# Patient Record
Sex: FEMALE | Race: WHITE | NOT HISPANIC OR LATINO
[De-identification: names, ages, dates, MRNs, and addresses within clinical notes are randomized per-mention and may not be internally consistent; named-entity substitution may affect disease eponyms.]

---

## 2017-02-21 ENCOUNTER — MEDICATION RENEWAL (OUTPATIENT)
Age: 82
End: 2017-02-21

## 2017-02-24 ENCOUNTER — RX RENEWAL (OUTPATIENT)
Age: 82
End: 2017-02-24

## 2017-03-06 ENCOUNTER — APPOINTMENT (OUTPATIENT)
Dept: INTERNAL MEDICINE | Facility: CLINIC | Age: 82
End: 2017-03-06

## 2017-03-06 ENCOUNTER — LABORATORY RESULT (OUTPATIENT)
Age: 82
End: 2017-03-06

## 2017-03-10 ENCOUNTER — APPOINTMENT (OUTPATIENT)
Dept: INTERNAL MEDICINE | Facility: CLINIC | Age: 82
End: 2017-03-10

## 2017-03-20 ENCOUNTER — RX RENEWAL (OUTPATIENT)
Age: 82
End: 2017-03-20

## 2017-03-20 LAB
25(OH)D3 SERPL-MCNC: 26.6 NG/ML
ALBUMIN SERPL ELPH-MCNC: 3.8 G/DL
ALP BLD-CCNC: 59 U/L
ALT SERPL-CCNC: 15 U/L
ANION GAP SERPL CALC-SCNC: 12 MMOL/L
APPEARANCE: ABNORMAL
AST SERPL-CCNC: 23 U/L
BASOPHILS # BLD AUTO: 0.03 K/UL
BASOPHILS NFR BLD AUTO: 0.4 %
BILIRUB SERPL-MCNC: 1 MG/DL
BILIRUBIN URINE: NEGATIVE
BLOOD URINE: ABNORMAL
BUN SERPL-MCNC: 22 MG/DL
CALCIUM SERPL-MCNC: 9.4 MG/DL
CHLORIDE SERPL-SCNC: 103 MMOL/L
CHOLEST SERPL-MCNC: 153 MG/DL
CHOLEST/HDLC SERPL: 2.7 RATIO
CO2 SERPL-SCNC: 29 MMOL/L
COLOR: YELLOW
CREAT SERPL-MCNC: 1.02 MG/DL
EOSINOPHIL # BLD AUTO: 0.23 K/UL
EOSINOPHIL NFR BLD AUTO: 2.9 %
FOLATE SERPL-MCNC: 11.5 NG/ML
GLUCOSE QUALITATIVE U: NORMAL MG/DL
GLUCOSE SERPL-MCNC: 100 MG/DL
HBA1C MFR BLD HPLC: 6 %
HCT VFR BLD CALC: 40.8 %
HDLC SERPL-MCNC: 57 MG/DL
HGB BLD-MCNC: 13.1 G/DL
IMM GRANULOCYTES NFR BLD AUTO: 0.1 %
KETONES URINE: NEGATIVE
LDLC SERPL CALC-MCNC: 82 MG/DL
LEUKOCYTE ESTERASE URINE: NEGATIVE
LYMPHOCYTES # BLD AUTO: 1.3 K/UL
LYMPHOCYTES NFR BLD AUTO: 16.6 %
MAN DIFF?: NORMAL
MCHC RBC-ENTMCNC: 29.9 PG
MCHC RBC-ENTMCNC: 32.1 GM/DL
MCV RBC AUTO: 93.2 FL
MONOCYTES # BLD AUTO: 0.41 K/UL
MONOCYTES NFR BLD AUTO: 5.2 %
NEUTROPHILS # BLD AUTO: 5.85 K/UL
NEUTROPHILS NFR BLD AUTO: 74.8 %
NITRITE URINE: NEGATIVE
PH URINE: 6.5
PLATELET # BLD AUTO: 126 K/UL
POTASSIUM SERPL-SCNC: 5.5 MMOL/L
PROT SERPL-MCNC: 6.7 G/DL
PROTEIN URINE: ABNORMAL MG/DL
RBC # BLD: 4.38 M/UL
RBC # FLD: 14.9 %
SODIUM SERPL-SCNC: 144 MMOL/L
SPECIFIC GRAVITY URINE: 1.02
T4 SERPL-MCNC: 6.6 UG/DL
TRIGL SERPL-MCNC: 71 MG/DL
TSH SERPL-ACNC: 5.61 UIU/ML
UROBILINOGEN URINE: NORMAL MG/DL
VIT B12 SERPL-MCNC: 694 PG/ML
WBC # FLD AUTO: 7.83 K/UL

## 2017-04-27 ENCOUNTER — APPOINTMENT (OUTPATIENT)
Dept: CARDIOLOGY | Facility: CLINIC | Age: 82
End: 2017-04-27

## 2017-04-27 ENCOUNTER — NON-APPOINTMENT (OUTPATIENT)
Age: 82
End: 2017-04-27

## 2017-04-27 VITALS
DIASTOLIC BLOOD PRESSURE: 95 MMHG | BODY MASS INDEX: 39.65 KG/M2 | HEIGHT: 61 IN | OXYGEN SATURATION: 97 % | WEIGHT: 210 LBS | HEART RATE: 89 BPM | SYSTOLIC BLOOD PRESSURE: 148 MMHG

## 2017-04-27 VITALS — DIASTOLIC BLOOD PRESSURE: 62 MMHG | SYSTOLIC BLOOD PRESSURE: 104 MMHG

## 2017-05-01 ENCOUNTER — MEDICATION RENEWAL (OUTPATIENT)
Age: 82
End: 2017-05-01

## 2017-05-24 ENCOUNTER — APPOINTMENT (OUTPATIENT)
Dept: INTERNAL MEDICINE | Facility: CLINIC | Age: 82
End: 2017-05-24

## 2017-05-24 ENCOUNTER — RX RENEWAL (OUTPATIENT)
Age: 82
End: 2017-05-24

## 2017-05-24 VITALS — RESPIRATION RATE: 14 BRPM | HEART RATE: 84 BPM | SYSTOLIC BLOOD PRESSURE: 136 MMHG | DIASTOLIC BLOOD PRESSURE: 78 MMHG

## 2017-06-09 ENCOUNTER — RX RENEWAL (OUTPATIENT)
Age: 82
End: 2017-06-09

## 2017-06-19 ENCOUNTER — RX RENEWAL (OUTPATIENT)
Age: 82
End: 2017-06-19

## 2017-06-27 ENCOUNTER — APPOINTMENT (OUTPATIENT)
Dept: INTERNAL MEDICINE | Facility: CLINIC | Age: 82
End: 2017-06-27

## 2017-06-27 VITALS — DIASTOLIC BLOOD PRESSURE: 70 MMHG | SYSTOLIC BLOOD PRESSURE: 110 MMHG

## 2017-06-27 DIAGNOSIS — H10.32 UNSPECIFIED ACUTE CONJUNCTIVITIS, LEFT EYE: ICD-10-CM

## 2017-06-27 RX ORDER — POLYMYXIN B SULFATE AND TRIMETHOPRIM 10000; 1 [USP'U]/ML; MG/ML
10000-0.1 SOLUTION OPHTHALMIC
Qty: 1 | Refills: 0 | Status: ACTIVE | COMMUNITY
Start: 2017-06-27 | End: 1900-01-01

## 2017-07-13 LAB
ALBUMIN SERPL ELPH-MCNC: 3.8 G/DL
ALP BLD-CCNC: 54 U/L
ALT SERPL-CCNC: 12 U/L
ANION GAP SERPL CALC-SCNC: 16 MMOL/L
AST SERPL-CCNC: 22 U/L
BILIRUB SERPL-MCNC: 1 MG/DL
BUN SERPL-MCNC: 21 MG/DL
CALCIUM SERPL-MCNC: 9.2 MG/DL
CHLORIDE SERPL-SCNC: 104 MMOL/L
CHOLEST SERPL-MCNC: 143 MG/DL
CHOLEST/HDLC SERPL: 2.8 RATIO
CO2 SERPL-SCNC: 27 MMOL/L
CREAT SERPL-MCNC: 0.76 MG/DL
GLUCOSE SERPL-MCNC: 70 MG/DL
HBA1C MFR BLD HPLC: 5.6 %
HDLC SERPL-MCNC: 52 MG/DL
LDLC SERPL CALC-MCNC: 72 MG/DL
NT-PROBNP SERPL-MCNC: 2081 PG/ML
POTASSIUM SERPL-SCNC: 3.9 MMOL/L
PROT SERPL-MCNC: 6.9 G/DL
SODIUM SERPL-SCNC: 147 MMOL/L
T4 FREE SERPL-MCNC: 1.2 NG/DL
TRIGL SERPL-MCNC: 95 MG/DL
TSH SERPL-ACNC: 3.32 UIU/ML

## 2017-08-03 ENCOUNTER — APPOINTMENT (OUTPATIENT)
Dept: CARDIOLOGY | Facility: CLINIC | Age: 82
End: 2017-08-03
Payer: MEDICARE

## 2017-08-03 ENCOUNTER — RX RENEWAL (OUTPATIENT)
Age: 82
End: 2017-08-03

## 2017-08-03 ENCOUNTER — NON-APPOINTMENT (OUTPATIENT)
Age: 82
End: 2017-08-03

## 2017-08-03 VITALS
DIASTOLIC BLOOD PRESSURE: 90 MMHG | SYSTOLIC BLOOD PRESSURE: 143 MMHG | HEIGHT: 61 IN | OXYGEN SATURATION: 92 % | BODY MASS INDEX: 39.65 KG/M2 | WEIGHT: 210 LBS | HEART RATE: 97 BPM | TEMPERATURE: 97.9 F

## 2017-08-03 VITALS — SYSTOLIC BLOOD PRESSURE: 122 MMHG | DIASTOLIC BLOOD PRESSURE: 78 MMHG

## 2017-08-03 PROCEDURE — 93000 ELECTROCARDIOGRAM COMPLETE: CPT

## 2017-08-03 PROCEDURE — 93306 TTE W/DOPPLER COMPLETE: CPT

## 2017-08-03 PROCEDURE — 99215 OFFICE O/P EST HI 40 MIN: CPT | Mod: 25

## 2017-08-17 ENCOUNTER — LABORATORY RESULT (OUTPATIENT)
Age: 82
End: 2017-08-17

## 2017-08-17 ENCOUNTER — APPOINTMENT (OUTPATIENT)
Dept: INTERNAL MEDICINE | Facility: CLINIC | Age: 82
End: 2017-08-17
Payer: MEDICARE

## 2017-08-17 DIAGNOSIS — Z01.818 ENCOUNTER FOR OTHER PREPROCEDURAL EXAMINATION: ICD-10-CM

## 2017-08-17 PROCEDURE — 36415 COLL VENOUS BLD VENIPUNCTURE: CPT

## 2017-08-17 PROCEDURE — 99214 OFFICE O/P EST MOD 30 MIN: CPT | Mod: 25

## 2017-09-07 LAB
ANION GAP SERPL CALC-SCNC: 14 MMOL/L
APPEARANCE: ABNORMAL
APTT BLD: 67.5 SEC
BASOPHILS # BLD AUTO: 0.01 K/UL
BASOPHILS NFR BLD AUTO: 0.1 %
BILIRUBIN URINE: NEGATIVE
BLOOD URINE: NEGATIVE
BUN SERPL-MCNC: 18 MG/DL
CALCIUM SERPL-MCNC: 9.7 MG/DL
CHLORIDE SERPL-SCNC: 100 MMOL/L
CO2 SERPL-SCNC: 28 MMOL/L
COLOR: ABNORMAL
CREAT SERPL-MCNC: 0.94 MG/DL
EOSINOPHIL # BLD AUTO: 0.25 K/UL
EOSINOPHIL NFR BLD AUTO: 3 %
GLUCOSE QUALITATIVE U: NORMAL MG/DL
GLUCOSE SERPL-MCNC: 83 MG/DL
HCT VFR BLD CALC: 42.5 %
HGB BLD-MCNC: 13.5 G/DL
IMM GRANULOCYTES NFR BLD AUTO: 0.2 %
INR PPP: 1.29 RATIO
KETONES URINE: NEGATIVE
LEUKOCYTE ESTERASE URINE: NEGATIVE
LYMPHOCYTES # BLD AUTO: 1.39 K/UL
LYMPHOCYTES NFR BLD AUTO: 16.8 %
MAN DIFF?: NORMAL
MCHC RBC-ENTMCNC: 29.6 PG
MCHC RBC-ENTMCNC: 31.8 GM/DL
MCV RBC AUTO: 93.2 FL
MONOCYTES # BLD AUTO: 0.53 K/UL
MONOCYTES NFR BLD AUTO: 6.4 %
NEUTROPHILS # BLD AUTO: 6.06 K/UL
NEUTROPHILS NFR BLD AUTO: 73.5 %
NITRITE URINE: NEGATIVE
PH URINE: 5.5
PLATELET # BLD AUTO: 148 K/UL
POTASSIUM SERPL-SCNC: 4.4 MMOL/L
PROTEIN URINE: NEGATIVE MG/DL
PT BLD: 14.6 SEC
RBC # BLD: 4.56 M/UL
RBC # FLD: 14.6 %
SODIUM SERPL-SCNC: 142 MMOL/L
SPECIFIC GRAVITY URINE: 1.02
UROBILINOGEN URINE: NORMAL MG/DL
WBC # FLD AUTO: 8.26 K/UL

## 2017-09-13 ENCOUNTER — APPOINTMENT (OUTPATIENT)
Dept: CARDIOLOGY | Facility: CLINIC | Age: 82
End: 2017-09-13
Payer: MEDICARE

## 2017-09-13 ENCOUNTER — NON-APPOINTMENT (OUTPATIENT)
Age: 82
End: 2017-09-13

## 2017-09-13 VITALS
BODY MASS INDEX: 39.65 KG/M2 | OXYGEN SATURATION: 96 % | SYSTOLIC BLOOD PRESSURE: 134 MMHG | WEIGHT: 210 LBS | HEIGHT: 61 IN | DIASTOLIC BLOOD PRESSURE: 85 MMHG | HEART RATE: 90 BPM

## 2017-09-13 PROCEDURE — 93000 ELECTROCARDIOGRAM COMPLETE: CPT

## 2017-09-13 PROCEDURE — 99215 OFFICE O/P EST HI 40 MIN: CPT

## 2017-09-18 ENCOUNTER — RX RENEWAL (OUTPATIENT)
Age: 82
End: 2017-09-18

## 2017-11-30 ENCOUNTER — MEDICATION RENEWAL (OUTPATIENT)
Age: 82
End: 2017-11-30

## 2017-12-04 ENCOUNTER — APPOINTMENT (OUTPATIENT)
Dept: CARDIOLOGY | Facility: CLINIC | Age: 82
End: 2017-12-04

## 2017-12-18 ENCOUNTER — RX RENEWAL (OUTPATIENT)
Age: 82
End: 2017-12-18

## 2018-02-01 ENCOUNTER — RX RENEWAL (OUTPATIENT)
Age: 83
End: 2018-02-01

## 2018-02-21 ENCOUNTER — NON-APPOINTMENT (OUTPATIENT)
Age: 83
End: 2018-02-21

## 2018-02-21 ENCOUNTER — APPOINTMENT (OUTPATIENT)
Dept: CARDIOLOGY | Facility: CLINIC | Age: 83
End: 2018-02-21
Payer: MEDICARE

## 2018-02-21 VITALS
WEIGHT: 207 LBS | HEIGHT: 61 IN | HEART RATE: 84 BPM | BODY MASS INDEX: 39.08 KG/M2 | DIASTOLIC BLOOD PRESSURE: 80 MMHG | SYSTOLIC BLOOD PRESSURE: 136 MMHG

## 2018-02-21 DIAGNOSIS — I48.0 PAROXYSMAL ATRIAL FIBRILLATION: ICD-10-CM

## 2018-02-21 PROCEDURE — 99214 OFFICE O/P EST MOD 30 MIN: CPT

## 2018-02-21 PROCEDURE — 93000 ELECTROCARDIOGRAM COMPLETE: CPT

## 2018-02-28 ENCOUNTER — RX RENEWAL (OUTPATIENT)
Age: 83
End: 2018-02-28

## 2018-03-17 ENCOUNTER — RX RENEWAL (OUTPATIENT)
Age: 83
End: 2018-03-17

## 2018-05-09 ENCOUNTER — APPOINTMENT (OUTPATIENT)
Dept: INTERNAL MEDICINE | Facility: CLINIC | Age: 83
End: 2018-05-09
Payer: MEDICARE

## 2018-05-09 VITALS — SYSTOLIC BLOOD PRESSURE: 132 MMHG | RESPIRATION RATE: 14 BRPM | HEART RATE: 78 BPM | DIASTOLIC BLOOD PRESSURE: 80 MMHG

## 2018-05-09 VITALS — HEIGHT: 61 IN | BODY MASS INDEX: 39.08 KG/M2 | WEIGHT: 207 LBS

## 2018-05-09 DIAGNOSIS — M17.11 UNILATERAL PRIMARY OSTEOARTHRITIS, RIGHT KNEE: ICD-10-CM

## 2018-05-09 PROCEDURE — 99214 OFFICE O/P EST MOD 30 MIN: CPT

## 2018-05-29 ENCOUNTER — RX RENEWAL (OUTPATIENT)
Age: 83
End: 2018-05-29

## 2018-05-31 ENCOUNTER — MEDICATION RENEWAL (OUTPATIENT)
Age: 83
End: 2018-05-31

## 2018-06-15 ENCOUNTER — RX RENEWAL (OUTPATIENT)
Age: 83
End: 2018-06-15

## 2018-07-10 ENCOUNTER — MEDICATION RENEWAL (OUTPATIENT)
Age: 83
End: 2018-07-10

## 2018-07-11 ENCOUNTER — APPOINTMENT (OUTPATIENT)
Dept: CARDIOLOGY | Facility: CLINIC | Age: 83
End: 2018-07-11
Payer: MEDICARE

## 2018-07-11 ENCOUNTER — NON-APPOINTMENT (OUTPATIENT)
Age: 83
End: 2018-07-11

## 2018-07-11 VITALS
WEIGHT: 210 LBS | SYSTOLIC BLOOD PRESSURE: 158 MMHG | HEIGHT: 61 IN | BODY MASS INDEX: 39.65 KG/M2 | HEART RATE: 88 BPM | DIASTOLIC BLOOD PRESSURE: 76 MMHG

## 2018-07-11 VITALS — SYSTOLIC BLOOD PRESSURE: 112 MMHG | DIASTOLIC BLOOD PRESSURE: 74 MMHG

## 2018-07-11 DIAGNOSIS — R06.00 DYSPNEA, UNSPECIFIED: ICD-10-CM

## 2018-07-11 PROCEDURE — 93000 ELECTROCARDIOGRAM COMPLETE: CPT

## 2018-07-11 PROCEDURE — 99214 OFFICE O/P EST MOD 30 MIN: CPT

## 2018-07-17 ENCOUNTER — RX RENEWAL (OUTPATIENT)
Age: 83
End: 2018-07-17

## 2018-07-25 ENCOUNTER — APPOINTMENT (OUTPATIENT)
Dept: INTERNAL MEDICINE | Facility: CLINIC | Age: 83
End: 2018-07-25

## 2018-08-27 ENCOUNTER — MEDICATION RENEWAL (OUTPATIENT)
Age: 83
End: 2018-08-27

## 2018-08-28 ENCOUNTER — RX RENEWAL (OUTPATIENT)
Age: 83
End: 2018-08-28

## 2018-09-13 ENCOUNTER — RX RENEWAL (OUTPATIENT)
Age: 83
End: 2018-09-13

## 2018-09-17 ENCOUNTER — MEDICATION RENEWAL (OUTPATIENT)
Age: 83
End: 2018-09-17

## 2018-10-22 ENCOUNTER — RX RENEWAL (OUTPATIENT)
Age: 83
End: 2018-10-22

## 2018-11-07 ENCOUNTER — NON-APPOINTMENT (OUTPATIENT)
Age: 83
End: 2018-11-07

## 2018-11-07 ENCOUNTER — APPOINTMENT (OUTPATIENT)
Dept: CARDIOLOGY | Facility: CLINIC | Age: 83
End: 2018-11-07
Payer: MEDICARE

## 2018-11-07 VITALS
BODY MASS INDEX: 39.65 KG/M2 | SYSTOLIC BLOOD PRESSURE: 125 MMHG | DIASTOLIC BLOOD PRESSURE: 89 MMHG | WEIGHT: 210 LBS | HEART RATE: 102 BPM | HEIGHT: 61 IN | OXYGEN SATURATION: 96 %

## 2018-11-07 VITALS — SYSTOLIC BLOOD PRESSURE: 128 MMHG | DIASTOLIC BLOOD PRESSURE: 84 MMHG

## 2018-11-07 PROCEDURE — 93306 TTE W/DOPPLER COMPLETE: CPT

## 2018-11-07 PROCEDURE — 99214 OFFICE O/P EST MOD 30 MIN: CPT

## 2018-11-07 PROCEDURE — 93000 ELECTROCARDIOGRAM COMPLETE: CPT

## 2018-11-07 NOTE — REASON FOR VISIT
[Anticoagulation] : anticoagulation [Atrial Fibrillation] : atrial fibrillation [Cardiomyopathy] : cardiomyopathy [Coronary Artery Disease] : coronary artery disease [Dyspnea] : dyspnea [FreeTextEntry1] : Accelerated ventricular rate

## 2018-11-07 NOTE — HISTORY OF PRESENT ILLNESS
[FreeTextEntry1] : Mrs. Barber presents presents in scheduled followup reporting that she has been feeling very well.  Unchanged exertional dyspnea, which occurs in the context of walking long distances w/ severe knee pain. Edema is unchanged; minimal upon arising in AM.  She is unable to elevate her legs because of knee pain. Ambulates with a cane without any effort provoked symptoms w/ usual ADL.\par \par No c/o chest, throat,jaw, arm or upper back discomfort.  No dyspnea, orthopnea or PND.  No palpitations, dizziness or syncope.  No claudication.

## 2018-12-07 ENCOUNTER — TRANSCRIPTION ENCOUNTER (OUTPATIENT)
Age: 83
End: 2018-12-07

## 2018-12-14 ENCOUNTER — RX RENEWAL (OUTPATIENT)
Age: 83
End: 2018-12-14

## 2019-01-23 ENCOUNTER — RX RENEWAL (OUTPATIENT)
Age: 84
End: 2019-01-23

## 2019-02-15 ENCOUNTER — APPOINTMENT (OUTPATIENT)
Dept: INTERNAL MEDICINE | Facility: CLINIC | Age: 84
End: 2019-02-15
Payer: MEDICARE

## 2019-02-15 DIAGNOSIS — R05 COUGH: ICD-10-CM

## 2019-02-15 PROCEDURE — 99213 OFFICE O/P EST LOW 20 MIN: CPT

## 2019-02-17 ENCOUNTER — RX RENEWAL (OUTPATIENT)
Age: 84
End: 2019-02-17

## 2019-03-13 ENCOUNTER — APPOINTMENT (OUTPATIENT)
Dept: INTERNAL MEDICINE | Facility: CLINIC | Age: 84
End: 2019-03-13

## 2019-03-29 ENCOUNTER — NON-APPOINTMENT (OUTPATIENT)
Age: 84
End: 2019-03-29

## 2019-03-29 ENCOUNTER — APPOINTMENT (OUTPATIENT)
Dept: CARDIOLOGY | Facility: CLINIC | Age: 84
End: 2019-03-29
Payer: MEDICARE

## 2019-03-29 VITALS
SYSTOLIC BLOOD PRESSURE: 143 MMHG | HEIGHT: 61 IN | WEIGHT: 208 LBS | HEART RATE: 87 BPM | OXYGEN SATURATION: 93 % | DIASTOLIC BLOOD PRESSURE: 90 MMHG | BODY MASS INDEX: 39.27 KG/M2

## 2019-03-29 DIAGNOSIS — E87.5 HYPERKALEMIA: ICD-10-CM

## 2019-03-29 PROCEDURE — 99215 OFFICE O/P EST HI 40 MIN: CPT

## 2019-03-29 PROCEDURE — 93000 ELECTROCARDIOGRAM COMPLETE: CPT

## 2019-03-29 NOTE — REASON FOR VISIT
[Anticoagulation] : anticoagulation [Atrial Fibrillation] : atrial fibrillation [Cardiomyopathy] : cardiomyopathy [Coronary Artery Disease] : coronary artery disease [Dyspnea] : dyspnea [FreeTextEntry1] : edema

## 2019-03-29 NOTE — HISTORY OF PRESENT ILLNESS
[FreeTextEntry1] : Mrs. Barber presents presents in scheduled followup accompanied by her daughter.  She has been feeling genrally well, Mild exertional dyspnea walikn w/ RW or cane and worsening edema, without weight gain. Spends much of the day sitting in chair. Legs are elevated, but nominally so..  Unchanged exertional dyspnea, which occurs in the context of walking long distances w/ severe knee pain. Edema is unchanged; minimal upon arising in AM.  Unable to to elevate her legs adequately because of knee pain.\par \par No c/o chest, throat,jaw, arm or upper back discomfort.  No  orthopnea or PND.  No palpitations, dizziness or syncope.  No claudication.

## 2019-04-11 ENCOUNTER — LABORATORY RESULT (OUTPATIENT)
Age: 84
End: 2019-04-11

## 2019-04-16 ENCOUNTER — MEDICATION RENEWAL (OUTPATIENT)
Age: 84
End: 2019-04-16

## 2019-04-16 DIAGNOSIS — Z79.899 OTHER LONG TERM (CURRENT) DRUG THERAPY: ICD-10-CM

## 2019-04-25 ENCOUNTER — RX RENEWAL (OUTPATIENT)
Age: 84
End: 2019-04-25

## 2019-04-25 ENCOUNTER — LABORATORY RESULT (OUTPATIENT)
Age: 84
End: 2019-04-25

## 2019-04-26 ENCOUNTER — APPOINTMENT (OUTPATIENT)
Dept: INTERNAL MEDICINE | Facility: CLINIC | Age: 84
End: 2019-04-26
Payer: MEDICARE

## 2019-04-26 VITALS
HEART RATE: 84 BPM | RESPIRATION RATE: 14 BRPM | HEIGHT: 61 IN | BODY MASS INDEX: 37.95 KG/M2 | SYSTOLIC BLOOD PRESSURE: 136 MMHG | WEIGHT: 201 LBS | DIASTOLIC BLOOD PRESSURE: 84 MMHG

## 2019-04-26 DIAGNOSIS — D69.3 IMMUNE THROMBOCYTOPENIC PURPURA: ICD-10-CM

## 2019-04-26 DIAGNOSIS — I50.43 ACUTE ON CHRONIC COMBINED SYSTOLIC (CONGESTIVE) AND DIASTOLIC (CONGESTIVE) HEART FAILURE: ICD-10-CM

## 2019-04-26 PROCEDURE — 36415 COLL VENOUS BLD VENIPUNCTURE: CPT

## 2019-04-26 PROCEDURE — 99214 OFFICE O/P EST MOD 30 MIN: CPT | Mod: 25

## 2019-04-26 PROCEDURE — G0439: CPT | Mod: 25

## 2019-04-26 PROCEDURE — 99497 ADVNCD CARE PLAN 30 MIN: CPT | Mod: 33

## 2019-04-26 RX ORDER — CEFDINIR 300 MG/1
300 CAPSULE ORAL
Qty: 20 | Refills: 0 | Status: DISCONTINUED | COMMUNITY
Start: 2019-02-15 | End: 2019-04-26

## 2019-04-26 NOTE — PHYSICAL EXAM
[No Acute Distress] : no acute distress [Supple] : supple [No Respiratory Distress] : no respiratory distress  [Clear to Auscultation] : lungs were clear to auscultation bilaterally [No Accessory Muscle Use] : no accessory muscle use [Normal Rate] : normal rate  [Regular Rhythm] : with a regular rhythm [Normal S1, S2] : normal S1 and S2 [No Edema] : there was no peripheral edema

## 2019-04-28 NOTE — PHYSICAL EXAM
[General Appearance - Alert] : alert [General Appearance - In No Acute Distress] : in no acute distress [Sclera] : the sclera and conjunctiva were normal [Neck Appearance] : the appearance of the neck was normal [Neck Cervical Mass (___cm)] : no neck mass was observed [Jugular Venous Distention Increased] : there was no jugular-venous distention [Thyroid Diffuse Enlargement] : the thyroid was not enlarged [Thyroid Nodule] : there were no palpable thyroid nodules [Auscultation Breath Sounds / Voice Sounds] : lungs were clear to auscultation bilaterally [Heart Sounds] : normal S1 and S2 [Heart Sounds Gallop] : no gallops [Murmurs] : no murmurs [Heart Sounds Pericardial Friction Rub] : no pericardial rub [Irregularly Irregular] : the rhythm was irregularly irregular [Breast Appearance] : normal in appearance [Breast Abnormal Lactation (Galactorrhea)] : no nipple discharge [Breast Palpation Mass] : no palpable masses [Bowel Sounds] : normal bowel sounds [Abdomen Soft] : soft [Abdomen Tenderness] : non-tender [] : no hepato-splenomegaly [Abdomen Mass (___ Cm)] : no abdominal mass palpated [Cervical Lymph Nodes Enlarged Posterior Bilaterally] : posterior cervical [Cervical Lymph Nodes Enlarged Anterior Bilaterally] : anterior cervical [Supraclavicular Lymph Nodes Enlarged Bilaterally] : supraclavicular [No CVA Tenderness] : no ~M costovertebral angle tenderness [No Spinal Tenderness] : no spinal tenderness [Abnormal Walk] : normal gait [Nail Clubbing] : no clubbing  or cyanosis of the fingernails [Motor Tone] : muscle strength and tone were normal [Musculoskeletal - Swelling] : no joint swelling seen [Oriented To Time, Place, And Person] : oriented to person, place, and time [Impaired Insight] : insight and judgment were intact [Affect] : the affect was normal [FreeTextEntry1] : trace pedal edema

## 2019-04-28 NOTE — ASSESSMENT
[FreeTextEntry1] : Blood work was drawn and sent to the lab today. The patient has been instructed to call the office next week to discuss today's lab work.\par \par Continue all medications.\par F/U with Cardiology as scheduled\par \par 16 minutes spent with patient discussing ACP/HCP. She has designated a proxy, and the proxy is aware of the patients wishes and will comply.\par \par Optho evaluation due.\par \par Consider MRI Brain if memory continues to decline.\par Do not consider patient a candidate for pharmacological intervention at this time.\par Consider Neuro eval\par \par F/U 3 months.

## 2019-04-28 NOTE — HEALTH RISK ASSESSMENT
[Excellent] : ~his/her~  mood as  excellent [] : No [No falls in past year] : Patient reported no falls in the past year [0] : 2) Feeling down, depressed, or hopeless: Not at all (0) [[id number]] : 0 [Alone] : lives alone [] :  [Fully functional (bathing, dressing, toileting, transferring, walking, feeding)] : Fully functional (bathing, dressing, toileting, transferring, walking, feeding) [Fully functional (using the telephone, shopping, preparing meals, housekeeping, doing laundry, using] : Fully functional and needs no help or supervision to perform IADLs (using the telephone, shopping, preparing meals, housekeeping, doing laundry, using transportation, managing medications and managing finances) [Reports changes in hearing] : Reports no changes in hearing [Reports changes in vision] : Reports no changes in vision [With Patient/Caregiver] : With Patient/Caregiver [Designated Healthcare Proxy] : Designated healthcare proxy [Name: ___] : Health Care Proxy's Name: [unfilled]  [Relationship: ___] : Relationship: [unfilled] [I will adhere to the patient's wishes as expressed in the advance directive except as noted below.] : I will adhere to the patient's wishes as expressed in the advance directive except as noted below [AdvancecareDate] : 04/19

## 2019-04-29 LAB
25(OH)D3 SERPL-MCNC: 20.9 NG/ML
ALBUMIN SERPL ELPH-MCNC: 4.1 G/DL
ALP BLD-CCNC: 63 U/L
ALT SERPL-CCNC: 11 U/L
ANION GAP SERPL CALC-SCNC: 9 MMOL/L
APPEARANCE: ABNORMAL
AST SERPL-CCNC: 20 U/L
B BURGDOR IGG+IGM SER QL IB: NORMAL
BILIRUB SERPL-MCNC: 1.3 MG/DL
BILIRUBIN URINE: NEGATIVE
BLOOD URINE: ABNORMAL
BUN SERPL-MCNC: 21 MG/DL
CALCIUM SERPL-MCNC: 9.3 MG/DL
CHLORIDE SERPL-SCNC: 101 MMOL/L
CO2 SERPL-SCNC: 34 MMOL/L
COLOR: YELLOW
CREAT SERPL-MCNC: 0.87 MG/DL
FOLATE SERPL-MCNC: 19.3 NG/ML
GLUCOSE QUALITATIVE U: NEGATIVE
GLUCOSE SERPL-MCNC: 57 MG/DL
KETONES URINE: NEGATIVE
LEUKOCYTE ESTERASE URINE: NEGATIVE
NITRITE URINE: NEGATIVE
PH URINE: 5.5
POTASSIUM SERPL-SCNC: 4.2 MMOL/L
PROT SERPL-MCNC: 6.5 G/DL
PROTEIN URINE: NORMAL
SODIUM SERPL-SCNC: 144 MMOL/L
SPECIFIC GRAVITY URINE: 1.02
T PALLIDUM AB SER QL IA: NEGATIVE
UROBILINOGEN URINE: NORMAL
VIT B12 SERPL-MCNC: 757 PG/ML

## 2019-05-08 ENCOUNTER — MEDICATION RENEWAL (OUTPATIENT)
Age: 84
End: 2019-05-08

## 2019-05-10 ENCOUNTER — APPOINTMENT (OUTPATIENT)
Dept: CARDIOLOGY | Facility: CLINIC | Age: 84
End: 2019-05-10
Payer: MEDICARE

## 2019-05-10 VITALS
DIASTOLIC BLOOD PRESSURE: 80 MMHG | OXYGEN SATURATION: 95 % | HEIGHT: 61 IN | SYSTOLIC BLOOD PRESSURE: 130 MMHG | HEART RATE: 72 BPM | WEIGHT: 204 LBS | BODY MASS INDEX: 38.51 KG/M2

## 2019-05-10 PROCEDURE — 99213 OFFICE O/P EST LOW 20 MIN: CPT

## 2019-05-10 NOTE — HISTORY OF PRESENT ILLNESS
[FreeTextEntry1] : Mrs. Barber presents presents in scheduled followup accompanied by her daughter.  She has been feeling  well, No c/o dyspnea, but edema is about the same despite bid furosemide(fair compliance w/ 2nd dose). Weight stable at home.  Spends much of the day sitting in chair. Legs are elevated, but nominally so.\par \par No c/o chest, throat,jaw, arm or upper back discomfort.  No  orthopnea or PND.  No palpitations, dizziness or syncope.  No claudication.

## 2019-05-17 ENCOUNTER — MEDICATION RENEWAL (OUTPATIENT)
Age: 84
End: 2019-05-17

## 2019-06-04 ENCOUNTER — MEDICATION RENEWAL (OUTPATIENT)
Age: 84
End: 2019-06-04

## 2019-06-05 ENCOUNTER — APPOINTMENT (OUTPATIENT)
Dept: CARDIOLOGY | Facility: CLINIC | Age: 84
End: 2019-06-05
Payer: MEDICARE

## 2019-06-05 ENCOUNTER — NON-APPOINTMENT (OUTPATIENT)
Age: 84
End: 2019-06-05

## 2019-06-05 VITALS
DIASTOLIC BLOOD PRESSURE: 97 MMHG | WEIGHT: 202 LBS | BODY MASS INDEX: 38.17 KG/M2 | OXYGEN SATURATION: 96 % | HEART RATE: 75 BPM | SYSTOLIC BLOOD PRESSURE: 154 MMHG

## 2019-06-05 VITALS — SYSTOLIC BLOOD PRESSURE: 124 MMHG | DIASTOLIC BLOOD PRESSURE: 80 MMHG

## 2019-06-05 PROCEDURE — 93000 ELECTROCARDIOGRAM COMPLETE: CPT

## 2019-06-05 PROCEDURE — 99214 OFFICE O/P EST MOD 30 MIN: CPT

## 2019-06-05 NOTE — HISTORY OF PRESENT ILLNESS
[FreeTextEntry1] : Mrs. Barber presents presents in scheduled followup.  She has been feeling  well, No c/o dyspnea, mild edema is unchanged. Weight Increased but she clearly increased. Her caloric intake and. Ambulates slowly and comfortably with hercane\par \par No c/o chest, throat,jaw, arm or upper back discomfort.  No  orthopnea or PND.  No palpitations, dizziness or syncope.  No claudication.

## 2019-06-12 ENCOUNTER — MEDICATION RENEWAL (OUTPATIENT)
Age: 84
End: 2019-06-12

## 2019-06-17 ENCOUNTER — MEDICATION RENEWAL (OUTPATIENT)
Age: 84
End: 2019-06-17

## 2019-07-22 ENCOUNTER — RX RENEWAL (OUTPATIENT)
Age: 84
End: 2019-07-22

## 2019-08-02 ENCOUNTER — APPOINTMENT (OUTPATIENT)
Dept: INTERNAL MEDICINE | Facility: CLINIC | Age: 84
End: 2019-08-02

## 2019-08-26 ENCOUNTER — RX RENEWAL (OUTPATIENT)
Age: 84
End: 2019-08-26

## 2019-08-27 ENCOUNTER — RX RENEWAL (OUTPATIENT)
Age: 84
End: 2019-08-27

## 2019-08-29 ENCOUNTER — MEDICATION RENEWAL (OUTPATIENT)
Age: 84
End: 2019-08-29

## 2019-09-04 ENCOUNTER — APPOINTMENT (OUTPATIENT)
Dept: INTERNAL MEDICINE | Facility: CLINIC | Age: 84
End: 2019-09-04
Payer: MEDICARE

## 2019-09-04 VITALS — BODY MASS INDEX: 38.89 KG/M2 | HEIGHT: 61 IN | WEIGHT: 206 LBS

## 2019-09-04 VITALS — RESPIRATION RATE: 14 BRPM | DIASTOLIC BLOOD PRESSURE: 80 MMHG | SYSTOLIC BLOOD PRESSURE: 132 MMHG | HEART RATE: 72 BPM

## 2019-09-04 DIAGNOSIS — R26.89 OTHER ABNORMALITIES OF GAIT AND MOBILITY: ICD-10-CM

## 2019-09-04 PROCEDURE — 99214 OFFICE O/P EST MOD 30 MIN: CPT | Mod: 25

## 2019-09-04 PROCEDURE — 36415 COLL VENOUS BLD VENIPUNCTURE: CPT

## 2019-09-06 LAB
ALBUMIN SERPL ELPH-MCNC: 4.4 G/DL
ALP BLD-CCNC: 62 U/L
ALT SERPL-CCNC: 14 U/L
ANION GAP SERPL CALC-SCNC: 12 MMOL/L
AST SERPL-CCNC: 21 U/L
BILIRUB SERPL-MCNC: 1.3 MG/DL
BUN SERPL-MCNC: 19 MG/DL
CALCIUM SERPL-MCNC: 10 MG/DL
CHLORIDE SERPL-SCNC: 101 MMOL/L
CHOLEST SERPL-MCNC: 155 MG/DL
CHOLEST/HDLC SERPL: 2.8 RATIO
CO2 SERPL-SCNC: 32 MMOL/L
CREAT SERPL-MCNC: 0.88 MG/DL
ESTIMATED AVERAGE GLUCOSE: 111 MG/DL
GLUCOSE SERPL-MCNC: 83 MG/DL
HBA1C MFR BLD HPLC: 5.5 %
HDLC SERPL-MCNC: 56 MG/DL
LDLC SERPL CALC-MCNC: 84 MG/DL
POTASSIUM SERPL-SCNC: 4.6 MMOL/L
PROT SERPL-MCNC: 6.9 G/DL
SODIUM SERPL-SCNC: 145 MMOL/L
TRIGL SERPL-MCNC: 74 MG/DL

## 2019-09-06 NOTE — PHYSICAL EXAM
[Normal] : soft, non-tender, non-distended, no masses palpated, no HSM and normal bowel sounds [de-identified] : trace b/l ankle edema [de-identified] : obese

## 2019-09-06 NOTE — ASSESSMENT
[FreeTextEntry1] : Blood work was drawn and sent to the lab today. The patient has been instructed to call the office next week to discuss today's lab work.\par \par Pt insists on holding pravachol - feels this is cause of memory issues.\par To hold for one month only, reassess in one month\par Neurology evaluation\par \par PT for balance.\par Low salt diet / leg elevation\par Consider compression stockings for ankle edema

## 2019-09-06 NOTE — HISTORY OF PRESENT ILLNESS
[de-identified] : Pt presents for f/u evaluation of afib, cad, htn, hyperlipidemia, obesity.\par \par feels well.\par No CP/SOB/palpitations.\par \par Still concerned about her memory, although no obvious changes noted since last visit.

## 2019-09-13 ENCOUNTER — APPOINTMENT (OUTPATIENT)
Dept: CARDIOLOGY | Facility: CLINIC | Age: 84
End: 2019-09-13

## 2019-10-09 ENCOUNTER — APPOINTMENT (OUTPATIENT)
Dept: INTERNAL MEDICINE | Facility: CLINIC | Age: 84
End: 2019-10-09
Payer: MEDICARE

## 2019-10-09 PROCEDURE — 99214 OFFICE O/P EST MOD 30 MIN: CPT | Mod: 25

## 2019-10-09 PROCEDURE — 36415 COLL VENOUS BLD VENIPUNCTURE: CPT

## 2019-10-12 NOTE — PHYSICAL EXAM
[Normal] : soft, non-tender, non-distended, no masses palpated, no HSM and normal bowel sounds [de-identified] : trace b/l ankle edema [de-identified] : obese

## 2019-10-12 NOTE — HISTORY OF PRESENT ILLNESS
[de-identified] : Pt presents for f/u evaluation of afib, cad, htn, hyperlipidemia, obesity.\par \par feels well.\par No CP/SOB/palpitations.\par \par Still concerned about her memory, feels there is a slight improvement off her statin.

## 2019-10-12 NOTE — ASSESSMENT
[FreeTextEntry1] : Blood work was drawn and sent to the lab today. The patient has been instructed to call the office next week to discuss today's lab work.\par \par To restart statin pending above bloodwork\par \par Neurology evaluation\par \par PT for balance.\par Low salt diet / leg elevation\par Consider compression stockings for ankle edema

## 2019-10-14 LAB
ALBUMIN SERPL ELPH-MCNC: 4.3 G/DL
ALP BLD-CCNC: 61 U/L
ALT SERPL-CCNC: 19 U/L
ANION GAP SERPL CALC-SCNC: 13 MMOL/L
AST SERPL-CCNC: 26 U/L
BILIRUB SERPL-MCNC: 1.2 MG/DL
BUN SERPL-MCNC: 15 MG/DL
CALCIUM SERPL-MCNC: 9.7 MG/DL
CHLORIDE SERPL-SCNC: 100 MMOL/L
CHOLEST SERPL-MCNC: 214 MG/DL
CHOLEST/HDLC SERPL: 3.5 RATIO
CO2 SERPL-SCNC: 30 MMOL/L
CREAT SERPL-MCNC: 0.9 MG/DL
GLUCOSE SERPL-MCNC: 94 MG/DL
HDLC SERPL-MCNC: 61 MG/DL
LDLC SERPL CALC-MCNC: 136 MG/DL
POTASSIUM SERPL-SCNC: 4.7 MMOL/L
PROT SERPL-MCNC: 6.8 G/DL
SODIUM SERPL-SCNC: 142 MMOL/L
TRIGL SERPL-MCNC: 83 MG/DL

## 2019-11-06 ENCOUNTER — APPOINTMENT (OUTPATIENT)
Dept: CARDIOLOGY | Facility: CLINIC | Age: 84
End: 2019-11-06

## 2019-11-25 ENCOUNTER — APPOINTMENT (OUTPATIENT)
Dept: CARDIOLOGY | Facility: CLINIC | Age: 84
End: 2019-11-25
Payer: MEDICARE

## 2019-11-25 ENCOUNTER — NON-APPOINTMENT (OUTPATIENT)
Age: 84
End: 2019-11-25

## 2019-11-25 VITALS
WEIGHT: 206 LBS | SYSTOLIC BLOOD PRESSURE: 119 MMHG | HEART RATE: 83 BPM | DIASTOLIC BLOOD PRESSURE: 84 MMHG | BODY MASS INDEX: 38.89 KG/M2 | HEIGHT: 61 IN | OXYGEN SATURATION: 93 %

## 2019-11-25 PROCEDURE — 93000 ELECTROCARDIOGRAM COMPLETE: CPT

## 2019-11-25 PROCEDURE — 99214 OFFICE O/P EST MOD 30 MIN: CPT

## 2019-11-25 NOTE — HISTORY OF PRESENT ILLNESS
[FreeTextEntry1] : Mrs. Barber presents presents in scheduled followup.  Left lower extremity weeping (one small site) resolved there is additional 20 mg of Lasix in the morning.  Swelling is back to baseline.  She is not moisturizing her legs.  She has been feeling otherwise well.\par \par No c/o chest, throat,jaw, arm or upper back discomfort.  No  orthopnea or PND.  No palpitations, dizziness or syncope.  No claudication.\par \par Saw Dr. Boyer concerns about memory loss.  Stopped her statin eelated to this concern.  LDL isabel to 136 and Dr. Gomez switched her to rosuvastatin high down MRI revealed mild cerebral atrophy multiple chronic right cerebellar infarcts.  MRA was normal.  He advised consideration of Aricept but her daughter was concerned about side effects and she did not begin this.

## 2019-11-25 NOTE — REASON FOR VISIT
[Anticoagulation] : anticoagulation [Cardiomyopathy] : cardiomyopathy [Atrial Fibrillation] : atrial fibrillation [Coronary Artery Disease] : coronary artery disease [Dyspnea] : dyspnea [FreeTextEntry1] : edema

## 2020-01-06 ENCOUNTER — RX RENEWAL (OUTPATIENT)
Age: 85
End: 2020-01-06

## 2020-01-21 ENCOUNTER — RX RENEWAL (OUTPATIENT)
Age: 85
End: 2020-01-21

## 2020-01-21 RX ORDER — POTASSIUM CHLORIDE 1500 MG/1
20 TABLET, EXTENDED RELEASE ORAL
Qty: 90 | Refills: 3 | Status: ACTIVE | COMMUNITY
Start: 2020-01-21 | End: 1900-01-01

## 2020-02-03 DIAGNOSIS — R26.81 UNSTEADINESS ON FEET: ICD-10-CM

## 2020-02-04 ENCOUNTER — RX RENEWAL (OUTPATIENT)
Age: 85
End: 2020-02-04

## 2020-03-08 ENCOUNTER — RX RENEWAL (OUTPATIENT)
Age: 85
End: 2020-03-08

## 2020-03-17 ENCOUNTER — RX RENEWAL (OUTPATIENT)
Age: 85
End: 2020-03-17

## 2020-05-11 ENCOUNTER — RX RENEWAL (OUTPATIENT)
Age: 85
End: 2020-05-11

## 2020-05-18 ENCOUNTER — NON-APPOINTMENT (OUTPATIENT)
Age: 85
End: 2020-05-18

## 2020-05-18 ENCOUNTER — APPOINTMENT (OUTPATIENT)
Dept: CARDIOLOGY | Facility: CLINIC | Age: 85
End: 2020-05-18
Payer: MEDICARE

## 2020-05-18 VITALS
WEIGHT: 206 LBS | TEMPERATURE: 97.3 F | OXYGEN SATURATION: 94 % | DIASTOLIC BLOOD PRESSURE: 82 MMHG | BODY MASS INDEX: 38.92 KG/M2 | HEART RATE: 92 BPM | SYSTOLIC BLOOD PRESSURE: 124 MMHG

## 2020-05-18 DIAGNOSIS — E55.9 VITAMIN D DEFICIENCY, UNSPECIFIED: ICD-10-CM

## 2020-05-18 DIAGNOSIS — E78.5 HYPERLIPIDEMIA, UNSPECIFIED: ICD-10-CM

## 2020-05-18 LAB
ALBUMIN SERPL ELPH-MCNC: 4.2 G/DL
ALP BLD-CCNC: 62 U/L
ALT SERPL-CCNC: 17 U/L
ANION GAP SERPL CALC-SCNC: 15 MMOL/L
AST SERPL-CCNC: 23 U/L
BASOPHILS # BLD AUTO: 0.02 K/UL
BASOPHILS NFR BLD AUTO: 0.3 %
BILIRUB SERPL-MCNC: 1.4 MG/DL
BUN SERPL-MCNC: 19 MG/DL
CALCIUM SERPL-MCNC: 9.2 MG/DL
CHLORIDE SERPL-SCNC: 99 MMOL/L
CHOLEST SERPL-MCNC: 134 MG/DL
CHOLEST/HDLC SERPL: 2.2 RATIO
CO2 SERPL-SCNC: 30 MMOL/L
CREAT SERPL-MCNC: 0.88 MG/DL
EOSINOPHIL # BLD AUTO: 0.17 K/UL
EOSINOPHIL NFR BLD AUTO: 2.4 %
ESTIMATED AVERAGE GLUCOSE: 111 MG/DL
GLUCOSE SERPL-MCNC: 95 MG/DL
HBA1C MFR BLD HPLC: 5.5 %
HCT VFR BLD CALC: 44.3 %
HDLC SERPL-MCNC: 60 MG/DL
HGB BLD-MCNC: 14 G/DL
IMM GRANULOCYTES NFR BLD AUTO: 0.3 %
LDLC SERPL CALC-MCNC: 62 MG/DL
LYMPHOCYTES # BLD AUTO: 0.92 K/UL
LYMPHOCYTES NFR BLD AUTO: 12.7 %
MAN DIFF?: NORMAL
MCHC RBC-ENTMCNC: 30 PG
MCHC RBC-ENTMCNC: 31.6 GM/DL
MCV RBC AUTO: 94.9 FL
MONOCYTES # BLD AUTO: 0.42 K/UL
MONOCYTES NFR BLD AUTO: 5.8 %
NEUTROPHILS # BLD AUTO: 5.67 K/UL
NEUTROPHILS NFR BLD AUTO: 78.5 %
NT-PROBNP SERPL-MCNC: 1412 PG/ML
PLATELET # BLD AUTO: 106 K/UL
POTASSIUM SERPL-SCNC: 4.2 MMOL/L
PROT SERPL-MCNC: 6.8 G/DL
RBC # BLD: 4.67 M/UL
RBC # FLD: 14.2 %
SODIUM SERPL-SCNC: 144 MMOL/L
TRIGL SERPL-MCNC: 58 MG/DL
TSH SERPL-ACNC: 5.83 UIU/ML
WBC # FLD AUTO: 7.22 K/UL

## 2020-05-18 PROCEDURE — 99214 OFFICE O/P EST MOD 30 MIN: CPT

## 2020-05-18 PROCEDURE — 93000 ELECTROCARDIOGRAM COMPLETE: CPT

## 2020-05-18 PROCEDURE — 36415 COLL VENOUS BLD VENIPUNCTURE: CPT

## 2020-05-18 NOTE — REASON FOR VISIT
[Anticoagulation] : anticoagulation [Atrial Fibrillation] : atrial fibrillation [Cardiomyopathy] : cardiomyopathy [Dyspnea] : dyspnea [Coronary Artery Disease] : coronary artery disease [FreeTextEntry1] : edema

## 2020-05-18 NOTE — HISTORY OF PRESENT ILLNESS
[FreeTextEntry1] : Mrs. Barber presents presents in scheduled followup accompanied by her daughter after calling this morning to request an urgent follow-up.  Bilateral lower extremity swelling with a few small areas of weeping.  No redness or fever.  During the quarantining, she has not left her home and spends virtually the entire day sitting with her legs down.  Attempted compression stockings but unable to get them on given the swelling.  She is not moisturizing her legs.  She has been feeling otherwise well.\par \par No c/o chest, throat,jaw, arm or upper back discomfort.  No  orthopnea or PND.  No palpitations, dizziness or syncope.  No claudication.\par \par Taking furosemide 40 mg in the morning 20 mg in the early evening.

## 2020-05-20 LAB — 25(OH)D3 SERPL-MCNC: 34.5 NG/ML

## 2020-06-08 ENCOUNTER — APPOINTMENT (OUTPATIENT)
Dept: CARDIOLOGY | Facility: CLINIC | Age: 85
End: 2020-06-08

## 2020-07-13 ENCOUNTER — RX RENEWAL (OUTPATIENT)
Age: 85
End: 2020-07-13

## 2020-08-24 ENCOUNTER — APPOINTMENT (OUTPATIENT)
Dept: CARDIOLOGY | Facility: CLINIC | Age: 85
End: 2020-08-24
Payer: MEDICARE

## 2020-08-24 ENCOUNTER — NON-APPOINTMENT (OUTPATIENT)
Age: 85
End: 2020-08-24

## 2020-08-24 VITALS
BODY MASS INDEX: 38.92 KG/M2 | SYSTOLIC BLOOD PRESSURE: 158 MMHG | TEMPERATURE: 97.3 F | WEIGHT: 206 LBS | DIASTOLIC BLOOD PRESSURE: 96 MMHG | OXYGEN SATURATION: 94 % | HEART RATE: 79 BPM

## 2020-08-24 VITALS — DIASTOLIC BLOOD PRESSURE: 68 MMHG | SYSTOLIC BLOOD PRESSURE: 114 MMHG

## 2020-08-24 PROCEDURE — 93306 TTE W/DOPPLER COMPLETE: CPT

## 2020-08-24 PROCEDURE — 99214 OFFICE O/P EST MOD 30 MIN: CPT

## 2020-08-24 PROCEDURE — 93000 ELECTROCARDIOGRAM COMPLETE: CPT

## 2020-08-24 RX ORDER — POTASSIUM CHLORIDE 1500 MG/1
20 TABLET, FILM COATED, EXTENDED RELEASE ORAL
Qty: 90 | Refills: 0 | Status: DISCONTINUED | COMMUNITY
Start: 2019-04-16 | End: 2020-08-24

## 2020-08-24 NOTE — HISTORY OF PRESENT ILLNESS
[FreeTextEntry1] : Mrs. Barber presents presents in scheduled followup accompanied by her son.  She has been feeling well and offers no complaints.  Following COVID quarantine precautions.\par \par No c/o chest, throat,jaw, arm or upper back discomfort.  No  orthopnea or PND.  No palpitations, dizziness or syncope.  No claudication.  LE edema without change.  Moisturizing.\par \par

## 2020-12-04 ENCOUNTER — RX RENEWAL (OUTPATIENT)
Age: 85
End: 2020-12-04

## 2020-12-11 ENCOUNTER — RX RENEWAL (OUTPATIENT)
Age: 85
End: 2020-12-11

## 2020-12-29 ENCOUNTER — RX RENEWAL (OUTPATIENT)
Age: 85
End: 2020-12-29

## 2021-01-30 ENCOUNTER — RX RENEWAL (OUTPATIENT)
Age: 86
End: 2021-01-30

## 2021-02-01 ENCOUNTER — RX RENEWAL (OUTPATIENT)
Age: 86
End: 2021-02-01

## 2021-02-17 ENCOUNTER — RX RENEWAL (OUTPATIENT)
Age: 86
End: 2021-02-17

## 2021-03-16 ENCOUNTER — NON-APPOINTMENT (OUTPATIENT)
Age: 86
End: 2021-03-16

## 2021-03-22 ENCOUNTER — APPOINTMENT (OUTPATIENT)
Dept: CARDIOLOGY | Facility: CLINIC | Age: 86
End: 2021-03-22
Payer: MEDICARE

## 2021-03-22 ENCOUNTER — NON-APPOINTMENT (OUTPATIENT)
Age: 86
End: 2021-03-22

## 2021-03-22 VITALS
OXYGEN SATURATION: 96 % | DIASTOLIC BLOOD PRESSURE: 89 MMHG | HEIGHT: 61 IN | BODY MASS INDEX: 38.71 KG/M2 | HEART RATE: 74 BPM | WEIGHT: 205 LBS | RESPIRATION RATE: 17 BRPM | SYSTOLIC BLOOD PRESSURE: 128 MMHG | TEMPERATURE: 97.6 F

## 2021-03-22 DIAGNOSIS — M25.473 EFFUSION, UNSPECIFIED ANKLE: ICD-10-CM

## 2021-03-22 DIAGNOSIS — I34.0 NONRHEUMATIC MITRAL (VALVE) INSUFFICIENCY: ICD-10-CM

## 2021-03-22 PROCEDURE — 99214 OFFICE O/P EST MOD 30 MIN: CPT

## 2021-03-22 PROCEDURE — 36415 COLL VENOUS BLD VENIPUNCTURE: CPT

## 2021-03-22 PROCEDURE — 99072 ADDL SUPL MATRL&STAF TM PHE: CPT

## 2021-03-22 PROCEDURE — 93000 ELECTROCARDIOGRAM COMPLETE: CPT

## 2021-03-22 NOTE — HISTORY OF PRESENT ILLNESS
[FreeTextEntry1] : Mrs. Barber presents presents in scheduled followup accompanied by her daughter.  She has been feeling well and offers no complaints.  Following COVID quarantine precautions received first dose of vaccine.\par \par No c/o chest, throat,jaw, arm or upper back discomfort.  No  orthopnea or PND.  No palpitations, dizziness or syncope.  No claudication.  LE edema without change.  Not moisturizing as aggressively.  She has difficult time washing her legs.  When daughter assists her in using exfoliate skin improves rather dramatically.\par \par

## 2021-03-23 LAB
25(OH)D3 SERPL-MCNC: 32 NG/ML
ALBUMIN SERPL ELPH-MCNC: 4 G/DL
ALP BLD-CCNC: 63 U/L
ALT SERPL-CCNC: 15 U/L
ANION GAP SERPL CALC-SCNC: 9 MMOL/L
AST SERPL-CCNC: 24 U/L
BASOPHILS # BLD AUTO: 0.02 K/UL
BASOPHILS NFR BLD AUTO: 0.3 %
BILIRUB SERPL-MCNC: 1 MG/DL
BUN SERPL-MCNC: 21 MG/DL
CALCIUM SERPL-MCNC: 9.6 MG/DL
CHLORIDE SERPL-SCNC: 102 MMOL/L
CHOLEST SERPL-MCNC: 135 MG/DL
CO2 SERPL-SCNC: 33 MMOL/L
CREAT SERPL-MCNC: 0.84 MG/DL
EOSINOPHIL # BLD AUTO: 0.11 K/UL
EOSINOPHIL NFR BLD AUTO: 1.6 %
ESTIMATED AVERAGE GLUCOSE: 117 MG/DL
GLUCOSE SERPL-MCNC: 82 MG/DL
HBA1C MFR BLD HPLC: 5.7 %
HCT VFR BLD CALC: 43.1 %
HDLC SERPL-MCNC: 56 MG/DL
HGB BLD-MCNC: 13.4 G/DL
IMM GRANULOCYTES NFR BLD AUTO: 0.3 %
LDLC SERPL CALC-MCNC: 68 MG/DL
LDLC SERPL DIRECT ASSAY-MCNC: 70 MG/DL
LYMPHOCYTES # BLD AUTO: 0.87 K/UL
LYMPHOCYTES NFR BLD AUTO: 12.9 %
MAGNESIUM SERPL-MCNC: 1.9 MG/DL
MAN DIFF?: NORMAL
MCHC RBC-ENTMCNC: 29.3 PG
MCHC RBC-ENTMCNC: 31.1 GM/DL
MCV RBC AUTO: 94.3 FL
MONOCYTES # BLD AUTO: 0.46 K/UL
MONOCYTES NFR BLD AUTO: 6.8 %
NEUTROPHILS # BLD AUTO: 5.28 K/UL
NEUTROPHILS NFR BLD AUTO: 78.1 %
NONHDLC SERPL-MCNC: 79 MG/DL
NT-PROBNP SERPL-MCNC: 1203 PG/ML
PLATELET # BLD AUTO: 67 K/UL
POTASSIUM SERPL-SCNC: 4.1 MMOL/L
PROT SERPL-MCNC: 6.5 G/DL
RBC # BLD: 4.57 M/UL
RBC # FLD: 14.4 %
SODIUM SERPL-SCNC: 144 MMOL/L
T4 FREE SERPL-MCNC: 1.1 NG/DL
TRIGL SERPL-MCNC: 55 MG/DL
TSH SERPL-ACNC: 4.83 UIU/ML
URATE SERPL-MCNC: 6.7 MG/DL
WBC # FLD AUTO: 6.76 K/UL

## 2021-05-19 ENCOUNTER — RX RENEWAL (OUTPATIENT)
Age: 86
End: 2021-05-19

## 2021-06-02 ENCOUNTER — RX RENEWAL (OUTPATIENT)
Age: 86
End: 2021-06-02

## 2021-07-13 ENCOUNTER — RX RENEWAL (OUTPATIENT)
Age: 86
End: 2021-07-13

## 2021-08-20 ENCOUNTER — RX RENEWAL (OUTPATIENT)
Age: 86
End: 2021-08-20

## 2021-08-27 RX ORDER — APIXABAN 5 MG/1
5 TABLET, FILM COATED ORAL
Qty: 60 | Refills: 6 | Status: DISCONTINUED | COMMUNITY
Start: 2021-08-11 | End: 2021-08-27

## 2021-09-14 ENCOUNTER — APPOINTMENT (OUTPATIENT)
Dept: INTERNAL MEDICINE | Facility: CLINIC | Age: 86
End: 2021-09-14
Payer: MEDICARE

## 2021-09-14 VITALS
BODY MASS INDEX: 40.02 KG/M2 | DIASTOLIC BLOOD PRESSURE: 70 MMHG | WEIGHT: 212 LBS | HEIGHT: 61 IN | OXYGEN SATURATION: 94 % | SYSTOLIC BLOOD PRESSURE: 120 MMHG

## 2021-09-14 DIAGNOSIS — M75.81 OTHER SHOULDER LESIONS, RIGHT SHOULDER: ICD-10-CM

## 2021-09-14 DIAGNOSIS — Z01.810 ENCOUNTER FOR PREPROCEDURAL CARDIOVASCULAR EXAMINATION: ICD-10-CM

## 2021-09-14 PROCEDURE — 99214 OFFICE O/P EST MOD 30 MIN: CPT

## 2021-09-15 PROBLEM — M75.81 TENDINITIS OF RIGHT ROTATOR CUFF: Status: ACTIVE | Noted: 2021-09-14

## 2021-09-20 ENCOUNTER — APPOINTMENT (OUTPATIENT)
Dept: CARDIOLOGY | Facility: CLINIC | Age: 86
End: 2021-09-20

## 2021-12-08 ENCOUNTER — RX RENEWAL (OUTPATIENT)
Age: 86
End: 2021-12-08

## 2022-02-14 ENCOUNTER — RX RENEWAL (OUTPATIENT)
Age: 87
End: 2022-02-14

## 2022-04-04 ENCOUNTER — RX RENEWAL (OUTPATIENT)
Age: 87
End: 2022-04-04

## 2022-05-18 ENCOUNTER — RX RENEWAL (OUTPATIENT)
Age: 87
End: 2022-05-18

## 2022-06-27 ENCOUNTER — APPOINTMENT (OUTPATIENT)
Dept: CARDIOLOGY | Facility: CLINIC | Age: 87
End: 2022-06-27

## 2022-07-29 DIAGNOSIS — Z00.00 ENCOUNTER FOR GENERAL ADULT MEDICAL EXAMINATION W/OUT ABNORMAL FINDINGS: ICD-10-CM

## 2022-08-01 ENCOUNTER — LABORATORY RESULT (OUTPATIENT)
Age: 87
End: 2022-08-01

## 2022-08-02 ENCOUNTER — LABORATORY RESULT (OUTPATIENT)
Age: 87
End: 2022-08-02

## 2022-08-11 DIAGNOSIS — R82.81 PYURIA: ICD-10-CM

## 2022-08-30 ENCOUNTER — NON-APPOINTMENT (OUTPATIENT)
Age: 87
End: 2022-08-30

## 2022-08-30 ENCOUNTER — APPOINTMENT (OUTPATIENT)
Dept: CARDIOLOGY | Facility: CLINIC | Age: 87
End: 2022-08-30

## 2022-08-30 VITALS
OXYGEN SATURATION: 94 % | DIASTOLIC BLOOD PRESSURE: 76 MMHG | WEIGHT: 210 LBS | SYSTOLIC BLOOD PRESSURE: 114 MMHG | BODY MASS INDEX: 39.68 KG/M2 | HEART RATE: 97 BPM

## 2022-08-30 DIAGNOSIS — I35.0 NONRHEUMATIC AORTIC (VALVE) STENOSIS: ICD-10-CM

## 2022-08-30 DIAGNOSIS — I25.10 ATHEROSCLEROTIC HEART DISEASE OF NATIVE CORONARY ARTERY W/OUT ANGINA PECTORIS: ICD-10-CM

## 2022-08-30 DIAGNOSIS — R60.0 LOCALIZED EDEMA: ICD-10-CM

## 2022-08-30 DIAGNOSIS — I48.91 UNSPECIFIED ATRIAL FIBRILLATION: ICD-10-CM

## 2022-08-30 DIAGNOSIS — I10 ESSENTIAL (PRIMARY) HYPERTENSION: ICD-10-CM

## 2022-08-30 DIAGNOSIS — I42.9 CARDIOMYOPATHY, UNSPECIFIED: ICD-10-CM

## 2022-08-30 PROCEDURE — 99215 OFFICE O/P EST HI 40 MIN: CPT | Mod: 25

## 2022-08-30 PROCEDURE — 93000 ELECTROCARDIOGRAM COMPLETE: CPT

## 2022-08-30 NOTE — HISTORY OF PRESENT ILLNESS
[FreeTextEntry1] : Mrs. Barber presents presents in scheduled followup accompanied by her daughter and son..  She has been feeling well and offers no complaints.  Her only ongoing problem relates to her chronic edema.  She lives alone and finds it very difficult to elevate her legs and has not been comfortable with compression stockings.\par \par No c/o chest, throat,jaw, arm or upper back discomfort.  No  orthopnea or PND.  No palpitations, dizziness or syncope.  No claudication.  LE edema without change.  Moisturizing regularly.  She has difficult time washing her legs. .\par \par

## 2022-10-14 ENCOUNTER — RX RENEWAL (OUTPATIENT)
Age: 87
End: 2022-10-14

## 2022-12-05 ENCOUNTER — RX RENEWAL (OUTPATIENT)
Age: 87
End: 2022-12-05

## 2022-12-05 DIAGNOSIS — R31.29 OTHER MICROSCOPIC HEMATURIA: ICD-10-CM

## 2022-12-06 ENCOUNTER — LABORATORY RESULT (OUTPATIENT)
Age: 87
End: 2022-12-06

## 2022-12-16 LAB
APPEARANCE: ABNORMAL
BILIRUBIN URINE: NEGATIVE
BLOOD URINE: ABNORMAL
COLOR: YELLOW
GLUCOSE QUALITATIVE U: NEGATIVE
KETONES URINE: NEGATIVE
LEUKOCYTE ESTERASE URINE: ABNORMAL
NITRITE URINE: POSITIVE
PH URINE: 6.5
PROTEIN URINE: ABNORMAL
SPECIFIC GRAVITY URINE: 1.01
UROBILINOGEN URINE: NORMAL

## 2023-01-23 ENCOUNTER — APPOINTMENT (OUTPATIENT)
Dept: INTERNAL MEDICINE | Facility: CLINIC | Age: 88
End: 2023-01-23

## 2023-02-26 ENCOUNTER — TRANSCRIPTION ENCOUNTER (OUTPATIENT)
Age: 88
End: 2023-02-26

## 2023-02-27 ENCOUNTER — LABORATORY RESULT (OUTPATIENT)
Age: 88
End: 2023-02-27

## 2023-02-27 ENCOUNTER — RESULT CHARGE (OUTPATIENT)
Age: 88
End: 2023-02-27

## 2023-02-27 ENCOUNTER — NON-APPOINTMENT (OUTPATIENT)
Age: 88
End: 2023-02-27

## 2023-02-27 LAB
BILIRUB UR QL STRIP: NEGATIVE
CLARITY UR: NORMAL
COLLECTION METHOD: NORMAL
GLUCOSE UR-MCNC: NEGATIVE
HCG UR QL: 0.2 EU/DL
HGB UR QL STRIP.AUTO: ABNORMAL
KETONES UR-MCNC: NEGATIVE
LEUKOCYTE ESTERASE UR QL STRIP: ABNORMAL
NITRITE UR QL STRIP: POSITIVE
PH UR STRIP: 5
PROT UR STRIP-MCNC: NEGATIVE
SP GR UR STRIP: 1.02

## 2023-02-27 RX ORDER — ROSUVASTATIN CALCIUM 5 MG/1
5 TABLET, FILM COATED ORAL
Qty: 30 | Refills: 5 | Status: ACTIVE | COMMUNITY
Start: 2019-10-14

## 2023-03-02 ENCOUNTER — NON-APPOINTMENT (OUTPATIENT)
Age: 88
End: 2023-03-02

## 2023-03-02 LAB
APPEARANCE: ABNORMAL
BILIRUBIN URINE: NEGATIVE
BLOOD URINE: ABNORMAL
COLOR: YELLOW
GLUCOSE QUALITATIVE U: NEGATIVE
KETONES URINE: NEGATIVE
LEUKOCYTE ESTERASE URINE: ABNORMAL
NITRITE URINE: POSITIVE
PH URINE: 5
PROTEIN URINE: NORMAL
SPECIFIC GRAVITY URINE: 1.02
UROBILINOGEN URINE: NORMAL

## 2023-03-02 RX ORDER — NITROFURANTOIN (MONOHYDRATE/MACROCRYSTALS) 25; 75 MG/1; MG/1
100 CAPSULE ORAL
Qty: 14 | Refills: 0 | Status: ACTIVE | COMMUNITY
Start: 2022-08-11 | End: 1900-01-01

## 2023-03-03 ENCOUNTER — LABORATORY RESULT (OUTPATIENT)
Age: 88
End: 2023-03-03

## 2023-03-03 ENCOUNTER — NON-APPOINTMENT (OUTPATIENT)
Age: 88
End: 2023-03-03

## 2023-03-05 ENCOUNTER — INPATIENT (INPATIENT)
Facility: HOSPITAL | Age: 88
LOS: 9 days | Discharge: HOME HEALTH | DRG: 871 | End: 2023-03-15
Attending: HOSPITALIST | Admitting: STUDENT IN AN ORGANIZED HEALTH CARE EDUCATION/TRAINING PROGRAM
Payer: MEDICARE

## 2023-03-05 VITALS
TEMPERATURE: 98 F | DIASTOLIC BLOOD PRESSURE: 82 MMHG | HEART RATE: 123 BPM | RESPIRATION RATE: 22 BRPM | HEIGHT: 63 IN | SYSTOLIC BLOOD PRESSURE: 146 MMHG | WEIGHT: 199.96 LBS | OXYGEN SATURATION: 100 %

## 2023-03-05 DIAGNOSIS — J96.01 ACUTE RESPIRATORY FAILURE WITH HYPOXIA: ICD-10-CM

## 2023-03-05 DIAGNOSIS — R06.03 ACUTE RESPIRATORY DISTRESS: ICD-10-CM

## 2023-03-05 DIAGNOSIS — J96.21 ACUTE AND CHRONIC RESPIRATORY FAILURE WITH HYPOXIA: ICD-10-CM

## 2023-03-05 DIAGNOSIS — A41.9 SEPSIS, UNSPECIFIED ORGANISM: ICD-10-CM

## 2023-03-05 DIAGNOSIS — N39.0 URINARY TRACT INFECTION, SITE NOT SPECIFIED: ICD-10-CM

## 2023-03-05 DIAGNOSIS — Z29.9 ENCOUNTER FOR PROPHYLACTIC MEASURES, UNSPECIFIED: ICD-10-CM

## 2023-03-05 DIAGNOSIS — I48.91 UNSPECIFIED ATRIAL FIBRILLATION: ICD-10-CM

## 2023-03-05 DIAGNOSIS — I89.0 LYMPHEDEMA, NOT ELSEWHERE CLASSIFIED: ICD-10-CM

## 2023-03-05 LAB
ALBUMIN SERPL ELPH-MCNC: 3.8 G/DL — SIGNIFICANT CHANGE UP (ref 3.3–5)
ALP SERPL-CCNC: 69 U/L — SIGNIFICANT CHANGE UP (ref 40–120)
ALT FLD-CCNC: 11 U/L — SIGNIFICANT CHANGE UP (ref 10–45)
ANION GAP SERPL CALC-SCNC: 11 MMOL/L — SIGNIFICANT CHANGE UP (ref 5–17)
APPEARANCE UR: CLEAR — SIGNIFICANT CHANGE UP
APTT BLD: 63.8 SEC — HIGH (ref 27.5–35.5)
AST SERPL-CCNC: 17 U/L — SIGNIFICANT CHANGE UP (ref 10–40)
BACTERIA # UR AUTO: NEGATIVE — SIGNIFICANT CHANGE UP
BASE EXCESS BLDV CALC-SCNC: 10 MMOL/L — HIGH (ref -2–3)
BASE EXCESS BLDV CALC-SCNC: 10.9 MMOL/L — HIGH (ref -2–3)
BASE EXCESS BLDV CALC-SCNC: 7.5 MMOL/L — HIGH (ref -2–3)
BASOPHILS # BLD AUTO: 0.04 K/UL — SIGNIFICANT CHANGE UP (ref 0–0.2)
BASOPHILS NFR BLD AUTO: 0.3 % — SIGNIFICANT CHANGE UP (ref 0–2)
BILIRUB SERPL-MCNC: 1.5 MG/DL — HIGH (ref 0.2–1.2)
BILIRUB UR-MCNC: NEGATIVE — SIGNIFICANT CHANGE UP
BUN SERPL-MCNC: 13 MG/DL — SIGNIFICANT CHANGE UP (ref 7–23)
CA-I SERPL-SCNC: 1.18 MMOL/L — SIGNIFICANT CHANGE UP (ref 1.15–1.33)
CA-I SERPL-SCNC: 1.2 MMOL/L — SIGNIFICANT CHANGE UP (ref 1.15–1.33)
CA-I SERPL-SCNC: 1.21 MMOL/L — SIGNIFICANT CHANGE UP (ref 1.15–1.33)
CALCIUM SERPL-MCNC: 9.7 MG/DL — SIGNIFICANT CHANGE UP (ref 8.4–10.5)
CHLORIDE BLDV-SCNC: 100 MMOL/L — SIGNIFICANT CHANGE UP (ref 96–108)
CHLORIDE BLDV-SCNC: 98 MMOL/L — SIGNIFICANT CHANGE UP (ref 96–108)
CHLORIDE BLDV-SCNC: 99 MMOL/L — SIGNIFICANT CHANGE UP (ref 96–108)
CHLORIDE SERPL-SCNC: 99 MMOL/L — SIGNIFICANT CHANGE UP (ref 96–108)
CO2 BLDV-SCNC: 41 MMOL/L — HIGH (ref 22–26)
CO2 BLDV-SCNC: 42 MMOL/L — HIGH (ref 22–26)
CO2 BLDV-SCNC: 43 MMOL/L — HIGH (ref 22–26)
CO2 SERPL-SCNC: 33 MMOL/L — HIGH (ref 22–31)
COLOR SPEC: YELLOW — SIGNIFICANT CHANGE UP
CREAT SERPL-MCNC: 0.81 MG/DL — SIGNIFICANT CHANGE UP (ref 0.5–1.3)
DIFF PNL FLD: ABNORMAL
EGFR: 69 ML/MIN/1.73M2 — SIGNIFICANT CHANGE UP
EOSINOPHIL # BLD AUTO: 0.05 K/UL — SIGNIFICANT CHANGE UP (ref 0–0.5)
EOSINOPHIL NFR BLD AUTO: 0.4 % — SIGNIFICANT CHANGE UP (ref 0–6)
EPI CELLS # UR: 1 /HPF — SIGNIFICANT CHANGE UP
FLUAV AG NPH QL: SIGNIFICANT CHANGE UP
FLUBV AG NPH QL: SIGNIFICANT CHANGE UP
GAS PNL BLDV: 138 MMOL/L — SIGNIFICANT CHANGE UP (ref 136–145)
GAS PNL BLDV: 138 MMOL/L — SIGNIFICANT CHANGE UP (ref 136–145)
GAS PNL BLDV: 141 MMOL/L — SIGNIFICANT CHANGE UP (ref 136–145)
GAS PNL BLDV: SIGNIFICANT CHANGE UP
GLUCOSE BLDV-MCNC: 105 MG/DL — HIGH (ref 70–99)
GLUCOSE BLDV-MCNC: 129 MG/DL — HIGH (ref 70–99)
GLUCOSE BLDV-MCNC: 129 MG/DL — HIGH (ref 70–99)
GLUCOSE SERPL-MCNC: 131 MG/DL — HIGH (ref 70–99)
GLUCOSE UR QL: NEGATIVE — SIGNIFICANT CHANGE UP
HCO3 BLDV-SCNC: 39 MMOL/L — HIGH (ref 22–29)
HCO3 BLDV-SCNC: 39 MMOL/L — HIGH (ref 22–29)
HCO3 BLDV-SCNC: 40 MMOL/L — HIGH (ref 22–29)
HCT VFR BLD CALC: 44.6 % — SIGNIFICANT CHANGE UP (ref 34.5–45)
HCT VFR BLDA CALC: 37 % — SIGNIFICANT CHANGE UP (ref 34.5–46.5)
HCT VFR BLDA CALC: 45 % — SIGNIFICANT CHANGE UP (ref 34.5–46.5)
HCT VFR BLDA CALC: 48 % — HIGH (ref 34.5–46.5)
HGB BLD CALC-MCNC: 12.3 G/DL — SIGNIFICANT CHANGE UP (ref 11.7–16.1)
HGB BLD CALC-MCNC: 14.9 G/DL — SIGNIFICANT CHANGE UP (ref 11.7–16.1)
HGB BLD CALC-MCNC: 16 G/DL — SIGNIFICANT CHANGE UP (ref 11.7–16.1)
HGB BLD-MCNC: 13.5 G/DL — SIGNIFICANT CHANGE UP (ref 11.5–15.5)
IMM GRANULOCYTES NFR BLD AUTO: 1 % — HIGH (ref 0–0.9)
INR BLD: 1.46 RATIO — HIGH (ref 0.88–1.16)
KETONES UR-MCNC: NEGATIVE — SIGNIFICANT CHANGE UP
LACTATE BLDV-MCNC: 1.6 MMOL/L — SIGNIFICANT CHANGE UP (ref 0.5–2)
LACTATE BLDV-MCNC: 1.9 MMOL/L — SIGNIFICANT CHANGE UP (ref 0.5–2)
LACTATE BLDV-MCNC: 2 MMOL/L — SIGNIFICANT CHANGE UP (ref 0.5–2)
LEUKOCYTE ESTERASE UR-ACNC: ABNORMAL
LYMPHOCYTES # BLD AUTO: 0.38 K/UL — LOW (ref 1–3.3)
LYMPHOCYTES # BLD AUTO: 2.8 % — LOW (ref 13–44)
MAGNESIUM SERPL-MCNC: 1.8 MG/DL — SIGNIFICANT CHANGE UP (ref 1.6–2.6)
MCHC RBC-ENTMCNC: 29.2 PG — SIGNIFICANT CHANGE UP (ref 27–34)
MCHC RBC-ENTMCNC: 30.3 GM/DL — LOW (ref 32–36)
MCV RBC AUTO: 96.5 FL — SIGNIFICANT CHANGE UP (ref 80–100)
MONOCYTES # BLD AUTO: 0.6 K/UL — SIGNIFICANT CHANGE UP (ref 0–0.9)
MONOCYTES NFR BLD AUTO: 4.4 % — SIGNIFICANT CHANGE UP (ref 2–14)
NEUTROPHILS # BLD AUTO: 12.42 K/UL — HIGH (ref 1.8–7.4)
NEUTROPHILS NFR BLD AUTO: 91.1 % — HIGH (ref 43–77)
NITRITE UR-MCNC: NEGATIVE — SIGNIFICANT CHANGE UP
NRBC # BLD: 0 /100 WBCS — SIGNIFICANT CHANGE UP (ref 0–0)
NT-PROBNP SERPL-SCNC: 2415 PG/ML — HIGH (ref 0–300)
PCO2 BLDV: 67 MMHG — HIGH (ref 39–42)
PCO2 BLDV: 82 MMHG — HIGH (ref 39–42)
PCO2 BLDV: 89 MMHG — HIGH (ref 39–42)
PH BLDV: 7.25 — LOW (ref 7.32–7.43)
PH BLDV: 7.3 — LOW (ref 7.32–7.43)
PH BLDV: 7.37 — SIGNIFICANT CHANGE UP (ref 7.32–7.43)
PH UR: 6.5 — SIGNIFICANT CHANGE UP (ref 5–8)
PHOSPHATE SERPL-MCNC: 4 MG/DL — SIGNIFICANT CHANGE UP (ref 2.5–4.5)
PLATELET # BLD AUTO: 64 K/UL — LOW (ref 150–400)
PO2 BLDV: 22 MMHG — LOW (ref 25–45)
PO2 BLDV: 33 MMHG — SIGNIFICANT CHANGE UP (ref 25–45)
PO2 BLDV: 41 MMHG — SIGNIFICANT CHANGE UP (ref 25–45)
POTASSIUM BLDV-SCNC: 3.5 MMOL/L — SIGNIFICANT CHANGE UP (ref 3.5–5.1)
POTASSIUM BLDV-SCNC: 3.7 MMOL/L — SIGNIFICANT CHANGE UP (ref 3.5–5.1)
POTASSIUM BLDV-SCNC: 3.8 MMOL/L — SIGNIFICANT CHANGE UP (ref 3.5–5.1)
POTASSIUM SERPL-MCNC: 3.7 MMOL/L — SIGNIFICANT CHANGE UP (ref 3.5–5.3)
POTASSIUM SERPL-SCNC: 3.7 MMOL/L — SIGNIFICANT CHANGE UP (ref 3.5–5.3)
PROT SERPL-MCNC: 7.7 G/DL — SIGNIFICANT CHANGE UP (ref 6–8.3)
PROT UR-MCNC: ABNORMAL
PROTHROM AB SERPL-ACNC: 17 SEC — HIGH (ref 10.5–13.4)
RBC # BLD: 4.62 M/UL — SIGNIFICANT CHANGE UP (ref 3.8–5.2)
RBC # FLD: 14.9 % — HIGH (ref 10.3–14.5)
RBC CASTS # UR COMP ASSIST: 24 /HPF — HIGH (ref 0–4)
RSV RNA NPH QL NAA+NON-PROBE: SIGNIFICANT CHANGE UP
SAO2 % BLDV: 35.2 % — LOW (ref 67–88)
SAO2 % BLDV: 39.1 % — LOW (ref 67–88)
SAO2 % BLDV: 75.3 % — SIGNIFICANT CHANGE UP (ref 67–88)
SARS-COV-2 RNA SPEC QL NAA+PROBE: SIGNIFICANT CHANGE UP
SODIUM SERPL-SCNC: 143 MMOL/L — SIGNIFICANT CHANGE UP (ref 135–145)
SP GR SPEC: 1.05 — HIGH (ref 1.01–1.02)
TROPONIN T, HIGH SENSITIVITY RESULT: 26 NG/L — SIGNIFICANT CHANGE UP (ref 0–51)
TROPONIN T, HIGH SENSITIVITY RESULT: 29 NG/L — SIGNIFICANT CHANGE UP (ref 0–51)
UROBILINOGEN FLD QL: NEGATIVE — SIGNIFICANT CHANGE UP
WBC # BLD: 13.63 K/UL — HIGH (ref 3.8–10.5)
WBC # FLD AUTO: 13.63 K/UL — HIGH (ref 3.8–10.5)
WBC UR QL: 1 /HPF — SIGNIFICANT CHANGE UP (ref 0–5)

## 2023-03-05 PROCEDURE — 71275 CT ANGIOGRAPHY CHEST: CPT | Mod: 26,MA

## 2023-03-05 PROCEDURE — 99285 EMERGENCY DEPT VISIT HI MDM: CPT

## 2023-03-05 PROCEDURE — 74177 CT ABD & PELVIS W/CONTRAST: CPT | Mod: 26,MA

## 2023-03-05 PROCEDURE — 71045 X-RAY EXAM CHEST 1 VIEW: CPT | Mod: 26

## 2023-03-05 PROCEDURE — 99223 1ST HOSP IP/OBS HIGH 75: CPT

## 2023-03-05 RX ORDER — ACETAMINOPHEN 500 MG
650 TABLET ORAL EVERY 6 HOURS
Refills: 0 | Status: DISCONTINUED | OUTPATIENT
Start: 2023-03-05 | End: 2023-03-15

## 2023-03-05 RX ORDER — METOPROLOL TARTRATE 50 MG
25 TABLET ORAL ONCE
Refills: 0 | Status: COMPLETED | OUTPATIENT
Start: 2023-03-05 | End: 2023-03-05

## 2023-03-05 RX ORDER — CEFTRIAXONE 500 MG/1
1000 INJECTION, POWDER, FOR SOLUTION INTRAMUSCULAR; INTRAVENOUS ONCE
Refills: 0 | Status: COMPLETED | OUTPATIENT
Start: 2023-03-05 | End: 2023-03-05

## 2023-03-05 RX ORDER — ATORVASTATIN CALCIUM 80 MG/1
20 TABLET, FILM COATED ORAL AT BEDTIME
Refills: 0 | Status: DISCONTINUED | OUTPATIENT
Start: 2023-03-05 | End: 2023-03-15

## 2023-03-05 RX ORDER — FUROSEMIDE 40 MG
20 TABLET ORAL ONCE
Refills: 0 | Status: COMPLETED | OUTPATIENT
Start: 2023-03-05 | End: 2023-03-05

## 2023-03-05 RX ORDER — LANOLIN ALCOHOL/MO/W.PET/CERES
3 CREAM (GRAM) TOPICAL AT BEDTIME
Refills: 0 | Status: DISCONTINUED | OUTPATIENT
Start: 2023-03-05 | End: 2023-03-15

## 2023-03-05 RX ORDER — DABIGATRAN ETEXILATE MESYLATE 150 MG/1
150 CAPSULE ORAL EVERY 12 HOURS
Refills: 0 | Status: DISCONTINUED | OUTPATIENT
Start: 2023-03-05 | End: 2023-03-15

## 2023-03-05 RX ADMIN — CEFTRIAXONE 100 MILLIGRAM(S): 500 INJECTION, POWDER, FOR SOLUTION INTRAMUSCULAR; INTRAVENOUS at 13:22

## 2023-03-05 RX ADMIN — Medication 20 MILLIGRAM(S): at 13:22

## 2023-03-05 RX ADMIN — CEFTRIAXONE 1000 MILLIGRAM(S): 500 INJECTION, POWDER, FOR SOLUTION INTRAMUSCULAR; INTRAVENOUS at 14:00

## 2023-03-05 NOTE — ED PROVIDER NOTE - NSICDXPASTMEDICALHX_GEN_ALL_CORE_FT
PAST MEDICAL HISTORY:  Dyslipidemia     Hypercalcemia     Hypertension     Myocardial Infarction 15-20 years ago

## 2023-03-05 NOTE — ED PROVIDER NOTE - ATTENDING CONTRIBUTION TO CARE
RGUJRAL 89-year-old female history of lymphedema on Lasix 40 mg twice daily brought in by her daughter from home. Patient lives by herself.  Patient has been experiencing intermittent shortness of breath x1 week and last night increased.  Patient denies any chest pain palpitations any cough fever or chills.  No abdominal pain nausea vomiting or diarrhea.  On exam patient is tachypneic mild respiratory distress brought in on nonrebreather.  Lungs with decreased air entry bilaterally with scant wheeze and rales.  Abdomen is soft nontender.  Lower extremity with baseline lymphedema.  Stat portable x-ray obtained call respiratory for BiPAP will obtain labs and CT to evaluate for not limited to ACS heart failure infection.  Patient also has been having intermittent hematuria for 2 weeks and recently started on Macrobid.

## 2023-03-05 NOTE — ED PROVIDER NOTE - PHYSICAL EXAMINATION
gen:Appears uncomfortable  Mentation: AAO x 3  psych: mood appropriate  HEENT: airway patent, conjunctivae clear bilaterally  Cardio: RRR, no m/r/g  Resp:  crackles in lower lung fields  GI: soft/nondistended/nontender  : no CVA tenderness  Neuro: sensation and motor function grossly intact  Skin: No evidence of rash  MSK: normal movement of all extremities  Lymph/Vasc:  chronic bilateral lower extremity lymphedema

## 2023-03-05 NOTE — H&P ADULT - PROBLEM SELECTOR PLAN 5
Diet: currently NPO due to BiPAP, can eat regular diet when off  DVT: Pradaxa  Dispo: pending course, PT consult    Communication: results and plan discussed w/ patient and her daughter at bedside, 10pm. On chronic lasix 40mg in AM, 20mg in PM; no recent changes to regimen. Patient does not routinely elevated her legs.   - lasix as above  - on potassium 20mEq qd at home

## 2023-03-05 NOTE — H&P ADULT - PROBLEM SELECTOR PLAN 2
WBC elevated to 13.6 (was 7.8 on outpatient labs 3/3) and tachycardic, tachypneic, meeting criteria for sepsis w/ most likely  source. CT A/P w/ possible L-sided pyelonephritis. Completed 1-2 days of macrobid as outpatient for UTI, confirmed w/ UA.   - ceftriaxone 1g (3/5 - )  - f/u Ucx, Bcx  - lactate uptrended 1.6 --> 2.0; f/u VBG w/ lactate

## 2023-03-05 NOTE — ED PROVIDER NOTE - PROGRESS NOTE DETAILS
Attending MD Erazo.  Pt signed out to me in stable condition pending CTA chest, CTAP rocephin ->TBA, 89 presented to ED with hypercarbic resp failure on bipap, had outpt UTI, CHF.  Pt with nitrofurantoin as an outpt for UTI now in setting of suspected pyelonephritis with hypercarbic resp failure.  CTA without PE.  Stable for admission to medicine with improving CO2 on Bipap and intact mental status.

## 2023-03-05 NOTE — ED ADULT NURSE NOTE - OBJECTIVE STATEMENT
Elderly patient presenting with daughter at bedside reporting shortness of breath x 2 days. Arrived on NRB @ 15L, tachypneic, and respirations noted to be labored. Upper extremities cool to touch. Denies chest pain. Nondiaphretic. No abdominal pain present. No urinary complaints. Denies sick contacts. Applied BIPAP shortly after arrival.

## 2023-03-05 NOTE — H&P ADULT - PROBLEM SELECTOR PLAN 4
Afib w/ RVR up to 123 on admission, likely compensatory response due to sepsis and dyspnea.  - continue metoprolol succinate 100mg qd (home), maintain HR goal <110  - CHADSVASC approx 6; continue pradaxa 150mg BID (home)  - maintain Mg>2, K>4 Afib w/ RVR up to 123 on admission, likely compensatory response due to sepsis and dyspnea.  - metoprolol succinate 100mg qd (home), maintain HR goal <110      - while inpatient tartrate 50mg BID (do succinate on dc)   - CHADSVASC approx 6; continue pradaxa 150mg BID (home)  - monitor on tele  - maintain Mg>2, K>4

## 2023-03-05 NOTE — ED ADULT NURSE REASSESSMENT NOTE - NS ED NURSE REASSESS COMMENT FT1
Writer consulted MD regarding rationale for Metoprolol. States he will read ordering MD note and get back to me.

## 2023-03-05 NOTE — H&P ADULT - NSHPREVIEWOFSYSTEMS_GEN_ALL_CORE
REVIEW OF SYSTEMS:    CONSTITUTIONAL: No weakness, fevers or chills  EYES/ENT: No visual changes;  No vertigo or throat pain   NECK: No pain or stiffness  RESPIRATORY: +cough, no wheezing, hemoptysis; +shortness of breath  CARDIOVASCULAR: No chest pain or palpitations  GASTROINTESTINAL: No abdominal or epigastric pain. No nausea, vomiting, or hematemesis; No diarrhea or constipation. No melena or hematochezia.  GENITOURINARY: No dysuria, frequency, +hematuria, incontinence  NEUROLOGICAL: No numbness or weakness  SKIN: No itching, rashes  ENDO: no heat, cold intolerance

## 2023-03-05 NOTE — H&P ADULT - HISTORY OF PRESENT ILLNESS
89F w/ PMH Afib (on pradaxa), prior DVT, HTN, HLD presenting w/ dyspnea x1week. Patient has had intermittent difficulty breathing over last 1 week w/ acute worsening over last 24h. Usually able to walk approx 15 feet w/o issue (exercise limited by lymphedema and arthritis), but has been unable to catch her breath after ambulating to and from her bathroom. Her daughter took SpO2 at home and reports it was in the 60s; patient then used her sister's portable O2, which helped until EMS arrived. She has been sleeping in a recliner chair more frequently than usual, but notes this is because of difficulty getting up from bed. Notes increased LE edema from baseline. Takes lasix on a regular basis and has been taking as prescribed, no increases or decreases in dosage recently. Denies PND, chest pain, palpitations, diaphoresis, arm/jaw pain, indigestion, nausea, vomiting, changes in bowel habits. Endorses intermittent dry cough; no fevers, constitutional symptoms, myalgias, weight loss. Daughter endorses chronic incontinence and seeing blood (no clots) in pull-ups over last few days. Patient recently diagnosed w/ UTI and took 1-2 days of macrobid 100mg BID, but noted that hematuria persisted.    ED vitals: 146/82, , RR 22, temp 97.9 100% NRB 15L  ED course: Started on BiPAP for increased WOB. CTA done w/o evidence PE. Give IV lasix 20mg x1, ceftriaxone x1.  89F w/ PMH Afib (on pradaxa), prior DVT, remote history of MI, HTN, HLD presenting w/ dyspnea x1week. Patient has had intermittent difficulty breathing over last 1 week w/ acute worsening over last 24h. Usually able to walk approx 15 feet w/o issue (exercise limited by lymphedema and arthritis), but has been unable to catch her breath after ambulating to and from her bathroom. Her daughter took SpO2 at home and reports it was in the 60s; patient then used her sister's portable O2, which helped until EMS arrived. She has been sleeping in a recliner chair more frequently than usual, but notes this is because of difficulty getting up from bed. Notes increased LE edema from baseline. Takes lasix on a regular basis and has been taking as prescribed, no increases or decreases in dosage recently. Denies PND, chest pain, palpitations, diaphoresis, arm/jaw pain, indigestion, nausea, vomiting, changes in bowel habits. Endorses intermittent dry cough; no fevers, constitutional symptoms, myalgias, weight loss. Daughter endorses chronic incontinence and seeing blood (no clots) in pull-ups over last few days.     Patient recently diagnosed w/ UTI and took 1-2 days of macrobid 100mg BID, but noted that hematuria persisted. Outpatient Ucx - Aug 2022, had E. coli resistant to ampicillin. No recent hospitalizations, abx use.     ED vitals: 146/82, , RR 22, temp 97.9 100% NRB 15L  ED course: Started on BiPAP for increased WOB. CTA done w/o evidence PE. Give IV lasix 20mg x1, ceftriaxone x1.

## 2023-03-05 NOTE — ED PROVIDER NOTE - OBJECTIVE STATEMENT
89-year-old female with a past medical history of hypertension, hyperlipidemia, A-fib on Eliquis, previous DVTs presenting with difficulty breathing. Patient has had intermittent difficulty breathing over the last 24 hours, getting worse. patient recently diagnosed with a urinary tract infection and currently being treated with nitrofurantoin. Today patient's breathing was labored and oxygen saturation was noted to be low and EMS placed the patient on oxygen with correction of her O2 saturation. Patient denies chest pain, fevers, nausea, vomiting, abdominal pain.

## 2023-03-05 NOTE — H&P ADULT - PROBLEM SELECTOR PLAN 1
Patient admitted w/ RICHARD x1week w/ acute worsening over last 24h; hypoxic at home, respiratory acidosis and started on BiPAP w/ improvement in CO2 levels. Trop WNL, BNP elevated to 2415; baseline is approx 1000. CT chest w/o edema, consolidations.   Last TTE on file 2020, showed biatrial enlargement w/ normal RV and LV systolic fxn, mild pulmonary hypertension, mild-moderate AS.   Considering CHF exacerbation, sepsis; less likely PNA, ACS. CTA w/o evidence of PE.   - continue BiPAP, wean as tolerated and f/u repeat VBG  - f/u TTE  - f/u RVP Patient admitted w/ RICHARD x1week w/ acute worsening over last 24h; hypoxic at home, respiratory acidosis and started on BiPAP w/ improvement in CO2 levels. Trop WNL, BNP elevated to 2415; baseline is approx 1000. CT chest w/o edema, consolidations.   Last TTE on file 2020, showed biatrial enlargement w/ normal RV and LV systolic fxn, mild pulmonary hypertension, mild-moderate AS. According to OP cardiology notes, has had periods of systolic dysfxn in past.   Considering CHF exacerbation, sepsis; less likely PNA, ACS. CTA w/o evidence of PE.   - continue BiPAP, wean as tolerated and f/u repeat VBG  - f/u TTE  - f/u RVP Patient admitted w/ RICHARD x1week w/ acute worsening over last 24h; hypoxic at home, respiratory acidosis and started on BiPAP w/ improvement in CO2 levels. Trop WNL, BNP elevated to 2415; baseline is approx 1000. CT chest w/o edema, consolidations. Evidence of hypervolemia on exam.   Last TTE on file 2020, showed biatrial enlargement w/ normal RV and LV systolic fxn, mild pulmonary hypertension, mild-moderate AS. According to OP cardiology notes, has had periods of systolic dysfxn in past.   Considering CHF exacerbation, sepsis; less likely PNA, ACS. CTA w/o evidence of PE.   - continue BiPAP, wean as tolerated and f/u repeat VBG  - f/u TTE  - f/u RVP  - continue lasix Patient admitted w/ RICHARD x1week w/ acute worsening over last 24h; hypoxic at home, respiratory acidosis and started on BiPAP w/ improvement in CO2 levels. Trop WNL, BNP elevated to 2415; baseline is approx 1000. CT chest w/o edema, consolidations. Evidence of hypervolemia on exam.   Last TTE on file 2020, showed biatrial enlargement w/ normal RV and LV systolic fxn, mild pulmonary hypertension, mild-moderate AS. According to OP cardiology notes, has had periods of systolic dysfxn in past.   Considering CHF exacerbation, sepsis; less likely PNA, ACS. CTA w/o evidence of PE.   - continue BiPAP, wean as tolerated and f/u repeat VBG  - f/u TTE  - f/u RVP  - continue lasix 20mg IV BID

## 2023-03-05 NOTE — H&P ADULT - NSHPLABSRESULTS_GEN_ALL_CORE
.  LABS:                         13.5   13.63 )-----------( 64       ( 05 Mar 2023 11:34 )             44.6     -    143  |  99  |  13  ----------------------------<  131<H>  3.7   |  33<H>  |  0.81    Ca    9.7      05 Mar 2023 11:34  Phos  4.0     03-  Mg     1.8     03-05    TPro  7.7  /  Alb  3.8  /  TBili  1.5<H>  /  DBili  x   /  AST  17  /  ALT  11  /  AlkPhos  69  03-05    PT/INR - ( 05 Mar 2023 11:34 )   PT: 17.0 sec;   INR: 1.46 ratio         PTT - ( 05 Mar 2023 11:34 )  PTT:63.8 sec  Urinalysis Basic - ( 05 Mar 2023 18:17 )    Color: Yellow / Appearance: Clear / S.047 / pH: x  Gluc: x / Ketone: Negative  / Bili: Negative / Urobili: Negative   Blood: x / Protein: Trace / Nitrite: Negative   Leuk Esterase: Large / RBC: 24 /hpf / WBC 1 /HPF   Sq Epi: x / Non Sq Epi: 1 /hpf / Bacteria: Negative      Serum Pro-Brain Natriuretic Peptide: 2415 pg/mL ( @ 11:34)        RADIOLOGY, EKG & ADDITIONAL TESTS: Reviewed.

## 2023-03-05 NOTE — H&P ADULT - NSHPSOCIALHISTORY_GEN_ALL_CORE
Lives alone, ambulates few steps at a time w/ walker. Manages medications on her own, family checks in daily.   4 steps to enter house - does w/ assistance  No alcohol use, smoking, drug use

## 2023-03-05 NOTE — ED PROVIDER NOTE - CLINICAL SUMMARY MEDICAL DECISION MAKING FREE TEXT BOX
89-year-old female with a past medical history of hypertension, hyperlipidemia, A-fib on Eliquis, previous DVTs presenting with difficulty breathing that was began yesterday has been getting progressively worse, patient also has been recently diagnosed with urinary tract infection with hematuria. Concern for possible fluid overload. CBC, CMP, troponin, BNP, chest x-ray, BiPAP.

## 2023-03-05 NOTE — H&P ADULT - ASSESSMENT
89F w/ PMH Afib (on pradaxa), remote history MI, HTN, prior DVT presenting  89F w/ PMH Afib (on pradaxa), remote history MI, HTN, prior DVT presenting w/ intermittent dyspnea x1 week w/ acute worsening x24h, admitted w/ acute hypoxic and hypercapnic respiratory failure 2/2 urosepsis +/- CHF exacerbation.

## 2023-03-05 NOTE — H&P ADULT - ATTENDING COMMENTS
89 yo f w pmh htn, hld, cad c/b mi, afib, dvt p/w worsening sob/quintanilla + le swelling, lethargy, found to have arf iso urosepsis + afib rvr w adhf, admitted to medicine for further mgmt    arf  per outpatient labs, baseline bicarb ~31-33; expected compensation paco2 ~60  vbg ph 7.25 w pvco2 89 -> ph 7.37 w pvco2 67 s/p bpap; currently off bpap. transitioned to nc @ 3 lpm  imaging with no evidence of vasc ashwini, pl eff, pna, obstructive lung disease, pe  repeat abg/vbg in am  Monitor mental status with frequent neurochecks  Monitor SpO2, RR, for signs of respiratory distress; Goal SpO2 >88-92%, PaO2 >55-60 mmHg  maintain fall, delirium, seizure, aspiration precautions; keep head end of bed elevated  oxygen supplementation/nippv as needed to maintain goal  aggressive pulmonary toilet/hygiene     urosepsis  s/p 2 days of macrobid at home, 1 g ceftriaxone in er  afebrile, but leukocytosis + tachypnea + tachycardia; meets sepsis criteria  ua currently with no pyuria but + LE, no bacteriuria and no nitrites, large bld w rbcs  imaging shows ?l pyelonephritis  follow up uc + bc  Monitor for fever, changes in white count, mental status  empirical abx w ceftriaxone; adjust according to final c+s    afib rvr  s/p lopressor 25 in er, hr remains ~110  monitor on telemetry  lopressor 25 now  switch home toprol 100 -> lopressor 50 bid while inhouse  titrate rate control to maintain goal hr <110/min  cont home pradaxa    adhf  worsening bl le ext swelling; bnp ~2400 (worse than prior of ~1k); imaging w no pl eff, vasc ashwini  no clinft of acs; trop 26->29; ekg w no new st seg - t wave changes suggestive of mi  s/p lasix 20 ivp in er  follow up tte  monitor for chest pain, ekg/telemetry changes, volume status via i/o + daily weights  switch home po lasix 40 am + 20 pm -> lasix 20 ivp bid w hold parameters and adjust to maintain goal net negative fluid balance ~1 L/day  elevate bl le ext + wound care in am    otherwise, concur w a+p as outlined by resident above

## 2023-03-05 NOTE — H&P ADULT - PROBLEM SELECTOR PLAN 6
Diet: currently NPO due to BiPAP, can eat regular diet when off  DVT: Pradaxa  Dispo: pending course, PT consult    GOC: Patient's son, Akin Barber and daughter Emperatriz Barber are HCP. Wishes to remain FULL CODE.     Communication: results and plan discussed w/ patient and her daughter at bedside, 10pm.

## 2023-03-05 NOTE — H&P ADULT - NSHPPHYSICALEXAM_GEN_ALL_CORE
LOS:     VITALS:   T(C): 36.9 (03-05-23 @ 21:54), Max: 36.9 (03-05-23 @ 21:54)  HR: 113 (03-05-23 @ 21:54) (97 - 123)  BP: 112/71 (03-05-23 @ 21:54) (100/60 - 147/89)  RR: 22 (03-05-23 @ 21:54) (22 - 36)  SpO2: 97% (03-05-23 @ 21:54) (96% - 100%)    GENERAL: NAD, lying in bed comfortably, BiPAP in place  HEAD:  Atraumatic, Normocephalic  EYES: EOMI, PERRLA, conjunctiva and sclera clear  NECK: Supple, No JVD  CHEST/LUNG: diffuse crackles throughout lung fields  HEART: tachycardia, irregular rhythm, no M/G/R  ABDOMEN: BSx4; Soft, nontender, nondistended  EXTREMITIES:  b/l LEs edematous and w/ chronic lymphedema changes, no tenderness to palpations  NERVOUS SYSTEM:  A&Ox3, moving all extremities  SKIN: No rashes or lesions

## 2023-03-05 NOTE — H&P ADULT - NSICDXPASTMEDICALHX_GEN_ALL_CORE_FT
PAST MEDICAL HISTORY:  Dyslipidemia     Hypercalcemia     Hypertension     Lymphedema     Myocardial Infarction 15-20 years ago

## 2023-03-06 LAB
ALBUMIN SERPL ELPH-MCNC: 3.1 G/DL — LOW (ref 3.3–5)
ALP SERPL-CCNC: 54 U/L — SIGNIFICANT CHANGE UP (ref 40–120)
ALT FLD-CCNC: 8 U/L — LOW (ref 10–45)
ANION GAP SERPL CALC-SCNC: 10 MMOL/L — SIGNIFICANT CHANGE UP (ref 5–17)
AST SERPL-CCNC: 15 U/L — SIGNIFICANT CHANGE UP (ref 10–40)
BASE EXCESS BLDV CALC-SCNC: 10.5 MMOL/L — HIGH (ref -2–3)
BASOPHILS # BLD AUTO: 0.04 K/UL — SIGNIFICANT CHANGE UP (ref 0–0.2)
BASOPHILS NFR BLD AUTO: 0.4 % — SIGNIFICANT CHANGE UP (ref 0–2)
BILIRUB SERPL-MCNC: 1.3 MG/DL — HIGH (ref 0.2–1.2)
BUN SERPL-MCNC: 14 MG/DL — SIGNIFICANT CHANGE UP (ref 7–23)
CA-I SERPL-SCNC: 1.21 MMOL/L — SIGNIFICANT CHANGE UP (ref 1.15–1.33)
CALCIUM SERPL-MCNC: 9.4 MG/DL — SIGNIFICANT CHANGE UP (ref 8.4–10.5)
CHLORIDE BLDV-SCNC: 101 MMOL/L — SIGNIFICANT CHANGE UP (ref 96–108)
CHLORIDE SERPL-SCNC: 101 MMOL/L — SIGNIFICANT CHANGE UP (ref 96–108)
CO2 BLDV-SCNC: 40 MMOL/L — HIGH (ref 22–26)
CO2 SERPL-SCNC: 33 MMOL/L — HIGH (ref 22–31)
CREAT SERPL-MCNC: 0.89 MG/DL — SIGNIFICANT CHANGE UP (ref 0.5–1.3)
CULTURE RESULTS: SIGNIFICANT CHANGE UP
EGFR: 62 ML/MIN/1.73M2 — SIGNIFICANT CHANGE UP
EOSINOPHIL # BLD AUTO: 0.23 K/UL — SIGNIFICANT CHANGE UP (ref 0–0.5)
EOSINOPHIL NFR BLD AUTO: 2.5 % — SIGNIFICANT CHANGE UP (ref 0–6)
GAS PNL BLDV: 140 MMOL/L — SIGNIFICANT CHANGE UP (ref 136–145)
GAS PNL BLDV: SIGNIFICANT CHANGE UP
GLUCOSE BLDV-MCNC: 92 MG/DL — SIGNIFICANT CHANGE UP (ref 70–99)
GLUCOSE SERPL-MCNC: 95 MG/DL — SIGNIFICANT CHANGE UP (ref 70–99)
HCO3 BLDV-SCNC: 38 MMOL/L — HIGH (ref 22–29)
HCT VFR BLD CALC: 37.6 % — SIGNIFICANT CHANGE UP (ref 34.5–45)
HCT VFR BLDA CALC: 35 % — SIGNIFICANT CHANGE UP (ref 34.5–46.5)
HGB BLD CALC-MCNC: 11.8 G/DL — SIGNIFICANT CHANGE UP (ref 11.7–16.1)
HGB BLD-MCNC: 11.3 G/DL — LOW (ref 11.5–15.5)
IMM GRANULOCYTES NFR BLD AUTO: 0.3 % — SIGNIFICANT CHANGE UP (ref 0–0.9)
LACTATE BLDV-MCNC: 2.1 MMOL/L — HIGH (ref 0.5–2)
LYMPHOCYTES # BLD AUTO: 0.58 K/UL — LOW (ref 1–3.3)
LYMPHOCYTES # BLD AUTO: 6.2 % — LOW (ref 13–44)
MAGNESIUM SERPL-MCNC: 1.8 MG/DL — SIGNIFICANT CHANGE UP (ref 1.6–2.6)
MCHC RBC-ENTMCNC: 28.8 PG — SIGNIFICANT CHANGE UP (ref 27–34)
MCHC RBC-ENTMCNC: 30.1 GM/DL — LOW (ref 32–36)
MCV RBC AUTO: 95.9 FL — SIGNIFICANT CHANGE UP (ref 80–100)
MONOCYTES # BLD AUTO: 0.59 K/UL — SIGNIFICANT CHANGE UP (ref 0–0.9)
MONOCYTES NFR BLD AUTO: 6.3 % — SIGNIFICANT CHANGE UP (ref 2–14)
NEUTROPHILS # BLD AUTO: 7.91 K/UL — HIGH (ref 1.8–7.4)
NEUTROPHILS NFR BLD AUTO: 84.3 % — HIGH (ref 43–77)
NRBC # BLD: 0 /100 WBCS — SIGNIFICANT CHANGE UP (ref 0–0)
PCO2 BLDV: 68 MMHG — HIGH (ref 39–42)
PH BLDV: 7.36 — SIGNIFICANT CHANGE UP (ref 7.32–7.43)
PHOSPHATE SERPL-MCNC: 3.6 MG/DL — SIGNIFICANT CHANGE UP (ref 2.5–4.5)
PLATELET # BLD AUTO: 59 K/UL — LOW (ref 150–400)
PO2 BLDV: 56 MMHG — HIGH (ref 25–45)
POTASSIUM BLDV-SCNC: 3.6 MMOL/L — SIGNIFICANT CHANGE UP (ref 3.5–5.1)
POTASSIUM SERPL-MCNC: 3.8 MMOL/L — SIGNIFICANT CHANGE UP (ref 3.5–5.3)
POTASSIUM SERPL-SCNC: 3.8 MMOL/L — SIGNIFICANT CHANGE UP (ref 3.5–5.3)
PROT SERPL-MCNC: 6.2 G/DL — SIGNIFICANT CHANGE UP (ref 6–8.3)
RAPID RVP RESULT: SIGNIFICANT CHANGE UP
RBC # BLD: 3.92 M/UL — SIGNIFICANT CHANGE UP (ref 3.8–5.2)
RBC # FLD: 15.3 % — HIGH (ref 10.3–14.5)
SAO2 % BLDV: 90.3 % — HIGH (ref 67–88)
SARS-COV-2 RNA SPEC QL NAA+PROBE: SIGNIFICANT CHANGE UP
SODIUM SERPL-SCNC: 144 MMOL/L — SIGNIFICANT CHANGE UP (ref 135–145)
SPECIMEN SOURCE: SIGNIFICANT CHANGE UP
TSH SERPL-MCNC: 4.49 UIU/ML — HIGH (ref 0.27–4.2)
WBC # BLD: 9.38 K/UL — SIGNIFICANT CHANGE UP (ref 3.8–10.5)
WBC # FLD AUTO: 9.38 K/UL — SIGNIFICANT CHANGE UP (ref 3.8–10.5)

## 2023-03-06 PROCEDURE — 99233 SBSQ HOSP IP/OBS HIGH 50: CPT

## 2023-03-06 RX ORDER — CEFTRIAXONE 500 MG/1
1000 INJECTION, POWDER, FOR SOLUTION INTRAMUSCULAR; INTRAVENOUS EVERY 24 HOURS
Refills: 0 | Status: COMPLETED | OUTPATIENT
Start: 2023-03-06 | End: 2023-03-07

## 2023-03-06 RX ORDER — FUROSEMIDE 40 MG
20 TABLET ORAL EVERY 12 HOURS
Refills: 0 | Status: DISCONTINUED | OUTPATIENT
Start: 2023-03-06 | End: 2023-03-09

## 2023-03-06 RX ORDER — METOPROLOL TARTRATE 50 MG
25 TABLET ORAL ONCE
Refills: 0 | Status: COMPLETED | OUTPATIENT
Start: 2023-03-06 | End: 2023-03-06

## 2023-03-06 RX ORDER — METOPROLOL TARTRATE 50 MG
50 TABLET ORAL
Refills: 0 | Status: DISCONTINUED | OUTPATIENT
Start: 2023-03-06 | End: 2023-03-07

## 2023-03-06 RX ADMIN — Medication 50 MILLIGRAM(S): at 18:30

## 2023-03-06 RX ADMIN — Medication 20 MILLIGRAM(S): at 05:52

## 2023-03-06 RX ADMIN — DABIGATRAN ETEXILATE MESYLATE 150 MILLIGRAM(S): 150 CAPSULE ORAL at 05:51

## 2023-03-06 RX ADMIN — Medication 25 MILLIGRAM(S): at 01:06

## 2023-03-06 RX ADMIN — DABIGATRAN ETEXILATE MESYLATE 150 MILLIGRAM(S): 150 CAPSULE ORAL at 18:30

## 2023-03-06 RX ADMIN — Medication 50 MILLIGRAM(S): at 05:52

## 2023-03-06 RX ADMIN — CEFTRIAXONE 100 MILLIGRAM(S): 500 INJECTION, POWDER, FOR SOLUTION INTRAMUSCULAR; INTRAVENOUS at 13:05

## 2023-03-06 RX ADMIN — Medication 1 TABLET(S): at 12:14

## 2023-03-06 RX ADMIN — ATORVASTATIN CALCIUM 20 MILLIGRAM(S): 80 TABLET, FILM COATED ORAL at 22:25

## 2023-03-06 RX ADMIN — Medication 20 MILLIGRAM(S): at 18:30

## 2023-03-06 NOTE — PROGRESS NOTE ADULT - PROBLEM SELECTOR PLAN 4
Afib w/ RVR up to 123 on admission, likely compensatory response due to sepsis and dyspnea.  - metoprolol succinate 100mg qd (home), maintain HR goal <110      - while inpatient tartrate 50mg BID (do succinate on dc)   - CHADSVASC approx 6; continue pradaxa 150mg BID (home)  - monitor on tele  - maintain Mg>2, K>4

## 2023-03-06 NOTE — PATIENT PROFILE ADULT - FUNCTIONAL ASSESSMENT - BASIC MOBILITY 6.
2-calculated by average/Not able to assess (calculate score using [facility] averaging method)

## 2023-03-06 NOTE — PROGRESS NOTE ADULT - ASSESSMENT
89F w/ PMH Afib (on pradaxa), remote history MI, HTN, prior DVT presenting w/ intermittent dyspnea x1 week w/ acute worsening x24h, admitted w/ acute hypoxic and hypercapnic respiratory failure 2/2 urosepsis +/- CHF exacerbation.

## 2023-03-06 NOTE — PROGRESS NOTE ADULT - ATTENDING COMMENTS
89 yo f w pmh htn, hld, cad c/b mi, afib, dvt p/w worsening sob/quintanilla + le swelling, lethargy, found to have arf iso urosepsis + afib rvr w adhf, admitted to medicine for further mgmt.     # Acute hypercapnic/hypoxemic respiratory failure  - most likely secondary to heart failure exacerbation  - echo, diuresis to maintain net neg 1-2L/day  - strict I&Os, daily weights  - titrated to 2-4L NC, continue to titrate  - aggressive pulmonary toilet/hygiene     # Urosepsis  - s/p 2 days of macrobid at home, 1 g ceftriaxone in er  - f/u culture data  - plan for short course of abx    # afib rvr  s/p lopressor 25 in er, hr remains ~110  monitor on telemetry  lopressor 25 now  switch home toprol 100 -> lopressor 50 bid while inhouse  titrate rate control to maintain goal hr <110/min  cont home pradaxa    Plan as above. D/w HS. 89 yo f w pmh htn, hld, cad c/b mi, afib, dvt p/w worsening sob/quintanilla + le swelling, lethargy, found to have arf iso urosepsis + afib rvr w adhf, admitted to medicine for further mgmt.     # Acute hypercapnic/hypoxemic respiratory failure  - most likely secondary to heart failure exacerbation  - echo, diuresis to maintain net neg 1-2L/day  - strict I&Os, daily weights  - titrated to 2-4L NC, continue to titrate  - aggressive pulmonary toilet/hygiene     # Urosepsis  - s/p 2 days of macrobid at home, 1 g ceftriaxone in er  - f/u culture data  - plan for short course of abx    # afib rvr  s/p lopressor 25 in er, hr remains ~110  monitor on telemetry  lopressor 25 now  switch home toprol 100 -> lopressor 50 bid while inhouse  titrate rate control to maintain goal hr <110/min  cont home pradaxa  f/u TSH    Plan as above. D/w HS.

## 2023-03-06 NOTE — PROGRESS NOTE ADULT - SUBJECTIVE AND OBJECTIVE BOX
INCOMPLETE INTERVAL: NAEO  SUBJECTIVE: Patient examined bedside this AM. States that her SOB is much improved from last night, but still present. Feels alright otherwise. Has hematuria, but no dysuria or change in urine frequency. Last BM yesterday. Denies Fevers, chills, SOB, cough, chest pain, abdominal pain, headache, dizziness, nausea, vomiting, changes in BM     OBJECTIVE:  ICU Vital Signs Last 24 Hrs  T(C): 37 (06 Mar 2023 09:34), Max: 37 (06 Mar 2023 09:34)  T(F): 98.6 (06 Mar 2023 09:34), Max: 98.6 (06 Mar 2023 09:34)  HR: 98 (06 Mar 2023 13:09) (97 - 116)  BP: 104/58 (06 Mar 2023 13:09) (90/93 - 114/79)  BP(mean): --  ABP: --  ABP(mean): --  RR: 20 (06 Mar 2023 13:09) (20 - 32)  SpO2: 100% (06 Mar 2023 13:09) (95% - 100%)    O2 Parameters below as of 06 Mar 2023 13:09  Patient On (Oxygen Delivery Method): nasal cannula  O2 Flow (L/min): 3            03-05 @ 07:01  -  03-06 @ 07:00  --------------------------------------------------------  IN: 0 mL / OUT: 430 mL / NET: -430 mL      CAPILLARY BLOOD GLUCOSE          PHYSICAL EXAM:  General: Well-groomed, NAD, laying in bed, on 4L NC  HEENT: PERRLA, EOMI, non-icteric  Neck:  symmetric,  JVD absent  Respiratory: Clear to ascultation bilaterally, bilateral basilar crackles, no Resp distress; no accessory muscle use  Cardiovascular:  RRR, no murmurs/rubs/gallops  Abdomen: Soft, NT, ND  Extremities: Marked bilateral edema below knees with warmth and erythema, but non-tender and no evidence of skin compromise. Per patient, this is baseline  Skin: No rashes or lesions noted  Neurological: Sensation grossly intact  Psychiatry: AOx3, appropriate insight/judgement, appropriate affect, recent/remote memory intact    PRN Meds:  acetaminophen     Tablet .. 650 milliGRAM(s) Oral every 6 hours PRN  melatonin 3 milliGRAM(s) Oral at bedtime PRN      LABS:                        11.3   9.38  )-----------( 59       ( 06 Mar 2023 07:09 )             37.6     Hgb Trend: 11.3<--, 13.5<--  03-06    144  |  101  |  14  ----------------------------<  95  3.8   |  33<H>  |  0.89    Ca    9.4      06 Mar 2023 07:09  Phos  3.6     03-06  Mg     1.8     03-06    TPro  6.2  /  Alb  3.1<L>  /  TBili  1.3<H>  /  DBili  x   /  AST  15  /  ALT  8<L>  /  AlkPhos  54  03-06    Creatinine Trend: 0.89<--, 0.81<--  PT/INR - ( 05 Mar 2023 11:34 )   PT: 17.0 sec;   INR: 1.46 ratio         PTT - ( 05 Mar 2023 11:34 )  PTT:63.8 sec  Urinalysis Basic - ( 05 Mar 2023 18:17 )    Color: Yellow / Appearance: Clear / S.047 / pH: x  Gluc: x / Ketone: Negative  / Bili: Negative / Urobili: Negative   Blood: x / Protein: Trace / Nitrite: Negative   Leuk Esterase: Large / RBC: 24 /hpf / WBC 1 /HPF   Sq Epi: x / Non Sq Epi: 1 /hpf / Bacteria: Negative        Venous Blood Gas:   @ 07:09  7.36/68/56/38/90.3  VBG Lactate: 2.1  Venous Blood Gas:   @ 22:59  7.37/67/41/39/75.3  VBG Lactate: 1.9  Venous Blood Gas:   @ 14:18  7.30/82/22/40/35.2  VBG Lactate: 2.0  Venous Blood Gas:   @ 11:20  7.25/89/33/39/39.1  VBG Lactate: 1.6      MICROBIOLOGY:       RADIOLOGY:  [ ] Reviewed and interpreted by me    EKG:

## 2023-03-06 NOTE — PROGRESS NOTE ADULT - PROBLEM SELECTOR PLAN 2
WBC elevated to 13.6 (was 7.8 on outpatient labs 3/3) and tachycardic, tachypneic, meeting criteria for sepsis w/ most likely  source. CT A/P w/ possible L-sided pyelonephritis. Completed 1-2 days of macrobid as outpatient for UTI, confirmed w/ UA.   - ceftriaxone 1g (3/5 - )  - f/u Ucx, Bcx  - lactate uptrended 1.6 --> 2.0; f/u VBG w/ lactate WBC elevated to 13.6 (was 7.8 on outpatient labs 3/3) and tachycardic, tachypneic, meeting criteria for sepsis w/ most likely  source. CT A/P w/ possible L-sided pyelonephritis. Completed 1-2 days of macrobid as outpatient for UTI, confirmed w/ UA.   - ceftriaxone 1g (3/5 - )  - f/u Ucx, Bcx

## 2023-03-06 NOTE — PROGRESS NOTE ADULT - PROBLEM SELECTOR PLAN 5
On chronic lasix 40mg in AM, 20mg in PM; no recent changes to regimen. Patient does not routinely elevated her legs.   - lasix as above  - on potassium 20mEq qd at home

## 2023-03-06 NOTE — PROGRESS NOTE ADULT - PROBLEM SELECTOR PLAN 1
Patient admitted w/ RICHARD x1week w/ acute worsening over last 24h; hypoxic at home, respiratory acidosis and started on BiPAP w/ improvement in CO2 levels. Trop WNL, BNP elevated to 2415; baseline is approx 1000. CT chest w/o edema, consolidations. Evidence of hypervolemia on exam.   Last TTE on file 2020, showed biatrial enlargement w/ normal RV and LV systolic fxn, mild pulmonary hypertension, mild-moderate AS. According to OP cardiology notes, has had periods of systolic dysfxn in past.   Considering CHF exacerbation, sepsis; less likely PNA, ACS. CTA w/o evidence of PE.   - continue BiPAP, wean as tolerated and f/u repeat VBG  - f/u TTE  - f/u RVP  - continue lasix 20mg IV BID Patient admitted w/ RICHARD x1week w/ acute worsening over last 24h; hypoxic at home, respiratory acidosis and started on BiPAP w/ improvement in CO2 levels. Trop WNL, BNP elevated to 2415; baseline is approx 1000. CT chest w/o edema, consolidations. Evidence of hypervolemia on exam.   Last TTE on file 2020, showed biatrial enlargement w/ normal RV and LV systolic fxn, mild pulmonary hypertension, mild-moderate AS. According to OP cardiology notes, has had periods of systolic dysfxn in past.   Considering CHF exacerbation, sepsis; less likely PNA, ACS. CTA w/o evidence of PE.   -Now weaned to 2L NC  - f/u TTE  -RVP negative  - continue lasix 20mg IV BID

## 2023-03-07 LAB
ANION GAP SERPL CALC-SCNC: 7 MMOL/L — SIGNIFICANT CHANGE UP (ref 5–17)
APTT BLD: 53.9 SEC — HIGH (ref 27.5–35.5)
BUN SERPL-MCNC: 18 MG/DL — SIGNIFICANT CHANGE UP (ref 7–23)
CALCIUM SERPL-MCNC: 8.9 MG/DL — SIGNIFICANT CHANGE UP (ref 8.4–10.5)
CHLORIDE SERPL-SCNC: 102 MMOL/L — SIGNIFICANT CHANGE UP (ref 96–108)
CO2 SERPL-SCNC: 36 MMOL/L — HIGH (ref 22–31)
CREAT SERPL-MCNC: 0.84 MG/DL — SIGNIFICANT CHANGE UP (ref 0.5–1.3)
EGFR: 66 ML/MIN/1.73M2 — SIGNIFICANT CHANGE UP
GAS PNL BLDA: SIGNIFICANT CHANGE UP
GLUCOSE SERPL-MCNC: 104 MG/DL — HIGH (ref 70–99)
HCT VFR BLD CALC: 33.2 % — LOW (ref 34.5–45)
HGB BLD-MCNC: 10.1 G/DL — LOW (ref 11.5–15.5)
INR BLD: 1.48 RATIO — HIGH (ref 0.88–1.16)
MAGNESIUM SERPL-MCNC: 1.8 MG/DL — SIGNIFICANT CHANGE UP (ref 1.6–2.6)
MCHC RBC-ENTMCNC: 29.1 PG — SIGNIFICANT CHANGE UP (ref 27–34)
MCHC RBC-ENTMCNC: 30.4 GM/DL — LOW (ref 32–36)
MCV RBC AUTO: 95.7 FL — SIGNIFICANT CHANGE UP (ref 80–100)
NRBC # BLD: 0 /100 WBCS — SIGNIFICANT CHANGE UP (ref 0–0)
PHOSPHATE SERPL-MCNC: 3.4 MG/DL — SIGNIFICANT CHANGE UP (ref 2.5–4.5)
PLATELET # BLD AUTO: 74 K/UL — LOW (ref 150–400)
POTASSIUM SERPL-MCNC: 3.5 MMOL/L — SIGNIFICANT CHANGE UP (ref 3.5–5.3)
POTASSIUM SERPL-SCNC: 3.5 MMOL/L — SIGNIFICANT CHANGE UP (ref 3.5–5.3)
PROTHROM AB SERPL-ACNC: 17.1 SEC — HIGH (ref 10.5–13.4)
RBC # BLD: 3.47 M/UL — LOW (ref 3.8–5.2)
RBC # FLD: 15.2 % — HIGH (ref 10.3–14.5)
SODIUM SERPL-SCNC: 145 MMOL/L — SIGNIFICANT CHANGE UP (ref 135–145)
T4 FREE SERPL-MCNC: 1.1 NG/DL — SIGNIFICANT CHANGE UP (ref 0.9–1.8)
WBC # BLD: 7.51 K/UL — SIGNIFICANT CHANGE UP (ref 3.8–10.5)
WBC # FLD AUTO: 7.51 K/UL — SIGNIFICANT CHANGE UP (ref 3.8–10.5)

## 2023-03-07 PROCEDURE — 71045 X-RAY EXAM CHEST 1 VIEW: CPT | Mod: 26

## 2023-03-07 PROCEDURE — 99222 1ST HOSP IP/OBS MODERATE 55: CPT

## 2023-03-07 PROCEDURE — 99233 SBSQ HOSP IP/OBS HIGH 50: CPT

## 2023-03-07 PROCEDURE — 93306 TTE W/DOPPLER COMPLETE: CPT | Mod: 26

## 2023-03-07 RX ORDER — METOPROLOL TARTRATE 50 MG
75 TABLET ORAL
Refills: 0 | Status: DISCONTINUED | OUTPATIENT
Start: 2023-03-07 | End: 2023-03-09

## 2023-03-07 RX ORDER — SOD,AMMONIUM,POTASSIUM LACTATE
1 CREAM (GRAM) TOPICAL
Refills: 0 | Status: DISCONTINUED | OUTPATIENT
Start: 2023-03-07 | End: 2023-03-15

## 2023-03-07 RX ORDER — FUROSEMIDE 40 MG
20 TABLET ORAL ONCE
Refills: 0 | Status: COMPLETED | OUTPATIENT
Start: 2023-03-07 | End: 2023-03-07

## 2023-03-07 RX ORDER — CHLORHEXIDINE GLUCONATE 213 G/1000ML
1 SOLUTION TOPICAL
Refills: 0 | Status: DISCONTINUED | OUTPATIENT
Start: 2023-03-07 | End: 2023-03-15

## 2023-03-07 RX ORDER — METOPROLOL TARTRATE 50 MG
25 TABLET ORAL ONCE
Refills: 0 | Status: COMPLETED | OUTPATIENT
Start: 2023-03-07 | End: 2023-03-07

## 2023-03-07 RX ADMIN — Medication 1 APPLICATION(S): at 18:22

## 2023-03-07 RX ADMIN — Medication 20 MILLIGRAM(S): at 05:07

## 2023-03-07 RX ADMIN — ATORVASTATIN CALCIUM 20 MILLIGRAM(S): 80 TABLET, FILM COATED ORAL at 21:35

## 2023-03-07 RX ADMIN — Medication 1 TABLET(S): at 14:06

## 2023-03-07 RX ADMIN — Medication 20 MILLIGRAM(S): at 14:40

## 2023-03-07 RX ADMIN — CHLORHEXIDINE GLUCONATE 1 APPLICATION(S): 213 SOLUTION TOPICAL at 14:06

## 2023-03-07 RX ADMIN — Medication 20 MILLIGRAM(S): at 18:22

## 2023-03-07 RX ADMIN — Medication 25 MILLIGRAM(S): at 10:07

## 2023-03-07 RX ADMIN — DABIGATRAN ETEXILATE MESYLATE 150 MILLIGRAM(S): 150 CAPSULE ORAL at 18:21

## 2023-03-07 RX ADMIN — DABIGATRAN ETEXILATE MESYLATE 150 MILLIGRAM(S): 150 CAPSULE ORAL at 05:06

## 2023-03-07 RX ADMIN — Medication 75 MILLIGRAM(S): at 18:21

## 2023-03-07 RX ADMIN — CEFTRIAXONE 100 MILLIGRAM(S): 500 INJECTION, POWDER, FOR SOLUTION INTRAMUSCULAR; INTRAVENOUS at 14:06

## 2023-03-07 RX ADMIN — Medication 50 MILLIGRAM(S): at 05:07

## 2023-03-07 NOTE — PROGRESS NOTE ADULT - ATTENDING COMMENTS
89 yo f w pmh htn, hld, cad c/b mi, afib, dvt p/w worsening sob/quintanilla + le swelling, lethargy, found to have arf iso urosepsis + afib rvr w adhf, admitted to medicine for further mgmt.     # Acute hypercapnic/hypoxemic respiratory failure  - most likely secondary to heart failure exacerbation  - echo, diuresis to maintain net neg 1-2L/day  - strict I&Os, daily weights  - titrated to 2-4L NC, continue to titrate  - aggressive pulmonary toilet/hygiene     # Urosepsis  - s/p 2 days of macrobid at home, 1 g ceftriaxone in er  - f/u culture data  - plan for short course of abx    # afib rvr  - s/p lopressor 25 in er, hr remains ~110  - monitor on telemetry  - lopressor 25 now  - switch home toprol 100. increase lopressor 75 bid  - titrate rate control to maintain goal hr <110/min  - cont home pradaxa  - f/u TSH    Plan as above. D/w HS.

## 2023-03-07 NOTE — CONSULT NOTE ADULT - NS ATTEND AMEND GEN_ALL_CORE FT
Pt seen and examined with ACP.  Assessment and plan reviewed and discussed.  Agree with above.    Status of wounds and treatment recommendations d/w  pt and pt's son at bedside  All questions answered.   Pt and son  expressed understanding.    I spent 55  minutes face to face w/ this pt of which more than 50% of the time was spent counseling & coordinating care of this pt.

## 2023-03-07 NOTE — PROGRESS NOTE ADULT - PROBLEM SELECTOR PLAN 2
WBC elevated to 13.6 (was 7.8 on outpatient labs 3/3) and tachycardic, tachypneic, meeting criteria for sepsis w/ most likely  source. CT A/P w/ possible L-sided pyelonephritis. Completed 1-2 days of macrobid as outpatient for UTI, confirmed w/ UA.   - ceftriaxone 1g (3/5 - )  -UCx w/ normal jacque  -BCx NGTD

## 2023-03-07 NOTE — PROGRESS NOTE ADULT - SUBJECTIVE AND OBJECTIVE BOX
INTERVAL: Overnight patient was awoken by fire alarm and was disoriented, thinking she was home. Nursing was able to contact her son who reoriented her.  SUBJECTIVE: Patient examined bedside this AM with son present. She states that her SOB is improving. Only other concern is feeling of food being "stuck" in her throat after meals for the past few months. No associated burning/pain, no foul breath, no emesis; drinking water helps alleviate the feeling somewhat but not completely. No other concerns. Denies Fevers, chills, cough, chest pain, abdominal pain, headache, dizziness, nausea, vomiting, dysuria, changes in urination, changes in BM     OBJECTIVE:  ICU Vital Signs Last 24 Hrs  T(C): 36.9 (07 Mar 2023 04:36), Max: 37 (06 Mar 2023 09:34)  T(F): 98.4 (07 Mar 2023 04:36), Max: 98.6 (06 Mar 2023 09:34)  HR: 113 (07 Mar 2023 04:36) (97 - 123)       80s-100s in A-Fib on telemetry  BP: 117/83 (07 Mar 2023 04:36) (90/93 - 123/78)  BP(mean): --  ABP: --  ABP(mean): --  RR: 18 (07 Mar 2023 04:36) (18 - 27)  SpO2: 98% (07 Mar 2023 04:36) (97% - 100%)    O2 Parameters below as of 07 Mar 2023 04:36  Patient On (Oxygen Delivery Method): nasal cannula  O2 Flow (L/min): 3            -06 @ 07:01  -  -07 @ 07:00  --------------------------------------------------------  IN: 0 mL / OUT: 800 mL / NET: -800 mL      CAPILLARY BLOOD GLUCOSE          PHYSICAL EXAM:  General: Well-groomed, NAD, laying in bed, on 4L NC  HEENT: PERRLA, EOMI, non-icteric  Neck:  symmetric,  JVD absent  Respiratory: Crackles at bases R>L; scattered mild wheezes bilaterally; no Resp distress; no accessory muscle use  Cardiovascular:  A-fib, no murmurs/rubs/gallops  Abdomen: Soft, NT, ND  Extremities: Significant lymphedema (as previous), legs now wrapped with ACE bandages  Skin: No rashes or lesions noted  Neurological: Sensation grossly intact  Psychiatry: AOx3, appropriate insight/judgement, appropriate affect, recent/remote memory intact    PRN Meds:  acetaminophen     Tablet .. 650 milliGRAM(s) Oral every 6 hours PRN  melatonin 3 milliGRAM(s) Oral at bedtime PRN      LABS:                        10.1   7.51  )-----------( 74       ( 07 Mar 2023 05:31 )             33.2     Hgb Trend: 10.1<--, 11.3<--, 13.5<--      145  |  102  |  18  ----------------------------<  104<H>  3.5   |  36<H>  |  0.84    Ca    8.9      07 Mar 2023 05:31  Phos  3.4     0307  Mg     1.8     07    TPro  6.2  /  Alb  3.1<L>  /  TBili  1.3<H>  /  DBili  x   /  AST  15  /  ALT  8<L>  /  AlkPhos  54  03-06    Creatinine Trend: 0.84<--, 0.89<--, 0.81<--  PT/INR - ( 07 Mar 2023 05:31 )   PT: 17.1 sec;   INR: 1.48 ratio         PTT - ( 07 Mar 2023 05:31 )  PTT:53.9 sec  Urinalysis Basic - ( 05 Mar 2023 18:17 )    Color: Yellow / Appearance: Clear / S.047 / pH: x  Gluc: x / Ketone: Negative  / Bili: Negative / Urobili: Negative   Blood: x / Protein: Trace / Nitrite: Negative   Leuk Esterase: Large / RBC: 24 /hpf / WBC 1 /HPF   Sq Epi: x / Non Sq Epi: 1 /hpf / Bacteria: Negative        Venous Blood Gas:   @ 07:09  7.36/68/56/38/90.3  VBG Lactate: 2.1  Venous Blood Gas:   @ 22:59  7.37/67/41/39/75.3  VBG Lactate: 1.9  Venous Blood Gas:   @ 14:18  7.30/82/22/40/35.2  VBG Lactate: 2.0  Venous Blood Gas:   @ 11:20  7.25/89/33/39/39.1  VBG Lactate: 1.6      MICROBIOLOGY:     Culture - Urine (collected 05 Mar 2023 18:17)  Source: Clean Catch Clean Catch (Midstream)  Final Report (06 Mar 2023 16:24):    <10,000 CFU/mL Normal Urogenital Tiffanie    Culture - Blood (collected 05 Mar 2023 11:45)  Source: .Blood Blood-Peripheral  Preliminary Report (06 Mar 2023 19:01):    No growth to date.    Culture - Blood (collected 05 Mar 2023 11:20)  Source: .Blood Blood-Peripheral  Preliminary Report (06 Mar 2023 19:01):    No growth to date.        RADIOLOGY:  [ ] Reviewed and interpreted by me    EKG:

## 2023-03-07 NOTE — PROGRESS NOTE ADULT - PROBLEM SELECTOR PLAN 1
Patient admitted w/ RICHARD x1week w/ acute worsening over last 24h; hypoxic at home, respiratory acidosis and started on BiPAP w/ improvement in CO2 levels. Trop WNL, BNP elevated to 2415; baseline is approx 1000. CT chest w/o edema, consolidations. Evidence of hypervolemia on exam.   Last TTE on file 2020, showed biatrial enlargement w/ normal RV and LV systolic fxn, mild pulmonary hypertension, mild-moderate AS. According to OP cardiology notes, has had periods of systolic dysfxn in past.   Considering CHF exacerbation, sepsis; less likely PNA, ACS. CTA w/o evidence of PE.   -Now weaned to 2L NC  - f/u TTE  -RVP negative  - continue lasix 20mg IV BID

## 2023-03-07 NOTE — CONSULT NOTE ADULT - ASSESSMENT
Impression:    BLE Lymphedema  Venous Stasis     Recommend:  1.) topical therapy: Left lower leg wound - cleanse with soap and water, pat dry, apply Ammonium Lactate 12% cream daily and then apply ACE wraps daily  2.) BLE elevation  3.) Consider BLE dopplers to evaluate for DVT  5.) Nutrition optimization  6.) Offload heels/feet with complete cair air fluidized boots; ensure that the soles of the feet are not resting on the foot board of the bed.    Care as per medicine. Will not actively follow but will remain available. Please recall for new issues or deterioration.  Upon [facility] [address] [place] [address] [phone]  Seen with Dr. Pinon  Thank you for this consult  Sarah Alvarado, NP-C, CWOCN [id number] Impression:    BLE Lymphedema  Venous Stasis     Recommend:  1.) topical therapy: Left lower leg wound - cleanse with soap and water, pat dry, apply Ammonium Lactate 12% cream twice daily and then apply ACE wraps   2.) BLE elevation  3.) Consider BLE dopplers to evaluate for DVT  5.) Nutrition optimization  6.) Offload heels/feet with complete cair air fluidized boots; ensure that the soles of the feet are not resting on the foot board of the bed.    Care as per medicine. Will not actively follow but will remain available. Please recall for new issues or deterioration.  Upon discharge f/u as outpatient at [facility] [address] [place] [address] [phone]  Seen with Dr. Pinon  Thank you for this consult  Sarah Alvarado, NP-C, CWOCN [id number] Impression:    BLE Lymphedema  Venous Stasis     Recommend:  1.) topical therapy: BLE-  cleanse with soap and water, pat dry, apply Ammonium Lactate 12% cream twice daily and then apply ACE wraps   2.) BLE elevation  3.) Consider BLE dopplers to evaluate for DVT  5.) Nutrition optimization  6.) Offload heels/feet with complete cair air fluidized boots; ensure that the soles of the feet are not resting on the foot board of the bed.    Care as per medicine. Will not actively follow but will remain available. Please recall for new issues or deterioration.  Upon discharge f/u as outpatient at [facility] [address] [place] [address] [phone]  Seen with Dr. Pinon  Thank you for this consult  Sarah Alvarado, NP-C, CWOCN [id number]

## 2023-03-07 NOTE — CONSULT NOTE ADULT - SUBJECTIVE AND OBJECTIVE BOX
Wound Surgery Consult Note:    HPI:  89F w/ PMH Afib (on pradaxa), prior DVT, remote history of MI, HTN, HLD presenting w/ dyspnea x1week. Patient has had intermittent difficulty breathing over last 1 week w/ acute worsening over last 24h. Usually able to walk approx 15 feet w/o issue (exercise limited by lymphedema and arthritis), but has been unable to catch her breath after ambulating to and from her bathroom. Her daughter took SpO2 at home and reports it was in the 60s; patient then used her sister's portable O2, which helped until EMS arrived. She has been sleeping in a recliner chair more frequently than usual, but notes this is because of difficulty getting up from bed. Notes increased LE edema from baseline. Takes lasix on a regular basis and has been taking as prescribed, no increases or decreases in dosage recently. Denies PND, chest pain, palpitations, diaphoresis, arm/jaw pain, indigestion, nausea, vomiting, changes in bowel habits. Endorses intermittent dry cough; no fevers, constitutional symptoms, myalgias, weight loss. Daughter endorses chronic incontinence and seeing blood (no clots) in pull-ups over last few days.     Patient recently diagnosed w/ UTI and took 1-2 days of macrobid 100mg BID, but noted that hematuria persisted. Outpatient Ucx - Aug 2022, had E. coli resistant to ampicillin. No recent hospitalizations, abx use.     ED vitals: 146/82, , RR 22, temp 97.9 100% NRB 15L  ED course: Started on BiPAP for increased WOB. CTA done w/o evidence PE. Give IV lasix 20mg x1, ceftriaxone x1.  (05 Mar 2023 22:15)    Request for wound care consult for bilateral leg edema received from primary team. Ms. Barber was encountered on an alternating air with low air loss surface. She is incontinent of stool and urine. Her son was at the bedside and provided history of her BLE swelling. He stated that it started about 2 years ago and that she does not use any type of compression. Nor does she see a specialist for management of her leg edema. As per her medical record, she has a history of a DVT. Please consider ordering Dopplers of BLE to evaluate for DVTs.     PAST MEDICAL & SURGICAL HISTORY:  Hypertension  Dyslipidemia  Myocardial Infarction, 15-20 years ago  Hypercalcemia  Lymphedema  S/P Parathyroidectomy  S/P Hysterectomy  Bilateral Cataracts    REVIEW OF SYSTEMS:    Constitutional:     [x ] negative [ ] fevers [ ] chills [ ] weight loss [ ] weight gain  HEENT:                  [x ] negative [ ] dry eyes [ ] eye irritation [ ] postnasal drip [ ] nasal congestion   CV:                         [x ] negative  [ ] chest pain [ ] orthopnea [ ] palpitations [ ] tachycardia  Resp:                     [ ] negative [ ] cough [x ] shortness of breath [x ] dyspnea [ ] wheezing [ ] sputum [ ] hemoptysis  GI:                          [ ] negative [ ] nausea [ ] vomiting [ ] diarrhea [ ] constipation [ ] abd pain [ ] dysphagia [x ] incontinent of bowel  :                        [ ] negative [ ] dysuria [ ] nocturia [ ] hematuria [ ] increased urinary frequency [ x] incontinent of urine  Musculoskeletal:     [ ] negative [ ] back pain [ ] myalgias [ ] arthralgias [ ] fracture [ x] ambulatory  Skin:                       [ ] negative [ ] rash [ ] itch [ ] wound [x ] skin discoloration  Neurological:        [x ] negative [ ] headache [ ] dizziness [ ] syncope [x ] weakness [ ] numbness  Psychiatric:           [x ] negative [ ] anxiety [ ] depression  Endocrine:            [x ] negative [ ] diabetes [ ] thyroid problem  Heme/Lymph:      [x ] negative [ ] anemia [ ] bleeding problem  Allergic/Immune: [x ] negative [ ] itchy eyes [ ] nasal discharge [ ] hives [ ] angioedema    [x ] All other systems negative    MEDICATIONS  (STANDING):  atorvastatin 20 milliGRAM(s) Oral at bedtime  cefTRIAXone   IVPB 1000 milliGRAM(s) IV Intermittent every 24 hours  chlorhexidine 2% Cloths 1 Application(s) Topical <User Schedule>  dabigatran 150 milliGRAM(s) Oral every 12 hours  furosemide   Injectable 20 milliGRAM(s) IV Push every 12 hours  metoprolol tartrate 75 milliGRAM(s) Oral two times a day  multivitamin 1 Tablet(s) Oral daily    MEDICATIONS  (PRN):  acetaminophen     Tablet .. 650 milliGRAM(s) Oral every 6 hours PRN Temp greater or equal to 38C (100.4F), Mild Pain (1 - 3)  melatonin 3 milliGRAM(s) Oral at bedtime PRN Insomnia    Allergies    No Known Allergies    Intolerances    SOCIAL HISTORY:  , Denies smoking, ETOH, drugs    FAMILY HISTORY: no significant family history among 1st degree relatives    Vital Signs Last 24 Hrs  T(C): 36.6 (07 Mar 2023 11:09), Max: 36.9 (07 Mar 2023 04:36)  T(F): 97.8 (07 Mar 2023 11:09), Max: 98.4 (07 Mar 2023 04:36)  HR: 100 (07 Mar 2023 11:09) (97 - 123)  BP: 115/79 (07 Mar 2023 11:09) (104/58 - 123/78)  BP(mean): --  RR: 18 (07 Mar 2023 11:09) (18 - 28)  SpO2: 96% (07 Mar 2023 11:09) (96% - 100%)    Parameters below as of 07 Mar 2023 11:09  Patient On (Oxygen Delivery Method): nasal cannula  O2 Flow (L/min): 2    Physical Exam:  General: Alert, obese  Ophthamology: sclera clear  ENMT: moist mucous membranes, trachea midline  Respiratory: equal chest rise with respirations  Gastrointestinal: soft NT/ND  Neurology: verbal, following commands  Psych: calm, appropriate  Musculoskeletal: no contractures  Vascular: BLE edema equal, Right DP, Left pedal pulses not manually palpable  Skin:  Bilateral lower legs with cobblestoning, crusty debris, hyperpigmentation, no open wounds or drainage  No odor, erythema, increased warmth, tenderness, induration, fluctuance      LABS:      145  |  102  |  18  ----------------------------<  104<H>  3.5   |  36<H>  |  0.84    Ca    8.9      07 Mar 2023 05:31  Phos  3.4     03-07  Mg     1.8     03-07    TPro  6.2  /  Alb  3.1<L>  /  TBili  1.3<H>  /  DBili  x   /  AST  15  /  ALT  8<L>  /  AlkPhos  54  03-06                          10.1   7.51  )-----------( 74       ( 07 Mar 2023 05:31 )             33.2     PT/INR - ( 07 Mar 2023 05:31 )   PT: 17.1 sec;   INR: 1.48 ratio         PTT - ( 07 Mar 2023 05:31 )  PTT:53.9 sec  Urinalysis Basic - ( 05 Mar 2023 18:17 )    Color: Yellow / Appearance: Clear / S.047 / pH: x  Gluc: x / Ketone: Negative  / Bili: Negative / Urobili: Negative   Blood: x / Protein: Trace / Nitrite: Negative   Leuk Esterase: Large / RBC: 24 /hpf / WBC 1 /HPF   Sq Epi: x / Non Sq Epi: 1 /hpf / Bacteria: Negative        RADIOLOGY & ADDITIONAL STUDIES:    EXAM:  CT ABDOMEN AND PELVIS IC   ORDERED BY:  ARTI CHEN     ACC: [id number] EXAM:  CT ANGIO CHEST PULM ART WAWIC   ORDERED BY:    ARTI CHEN     PROCEDURE DATE:  2023      INTERPRETATION:  CLINICAL INDICATION: New onset hematuria (recently   diagnosed with UTI), shortness of breath, evaluate for pulmonary embolism    TECHNIQUE: Enhanced helical images were obtained of the chest, abdomen   and pelvis. Coronal and sagittal images were reconstructed.  Images were   obtained after the uneventful administration of 90 cc of nonionic   intravenous contrast (Omnipaque 350) and enteric contrast. 3D MIP images   were provided.    COMPARISON: Abdominal CT 3/9/2012. No prior chest CT available for   comparison    FINDINGS:  CTA: The study is technically adequate with a good contrast bolus to the   pulmonary arteries. No pulmonary embolism. The pulmonary artery measures   3.2 cm. Mid ascending aorta measures 3.7 cm. Biatrial enlargement   qualitatively. Sequela of remote myocardial infarct with myocardial   thinning of the basal inferoseptal and mid to apical anteroseptal wall.   Question small apical aneurysm. Mild coronary arterial and mitral annular   calcification. No pericardial effusion.    Lungs/Airways/Pleura: The central airways are patent. Trace pleural   effusions. Few small clusters of nodules likely related to small airways   disease, for example in the right upper lobe series 2 image 38. Otherwise   no consolidation or pulmonary edema.    Mediastinum/Lymph nodes: No thoracic adenopathy. Small hiatal hernia.    Hepatobiliary: Numerous hepatic cysts, some enlarged since .   Unremarkable gallbladder.    Pancreas: Unremarkable.    Spleen: Unremarkable.    Adrenal glands: Unremarkable.    Kidneys: Unchanged right renal parenchymal calcification. Ill-defined   area of decreased attenuation within the lower pole the left kidney. No   hydronephrosis.    Bowel: No bowel obstruction. Diverticulosis. Normal appendix.    Abdominal lymph nodes: No lymphadenopathy.    Peritoneum: No ascites.    Pelvis: Urinary bladder is decompressed and incompletely evaluated.    Bones/soft tissues: No aggressive osseous lesion. Unchanged Tarlov cyst.    IMPRESSION:  No pulmonary embolism.    Ill-defined area of low-attenuation in the lower pole of the left kidney,   diagnostic considerations include pyelonephritis (in the setting of   reported UTI) or infarct versus motion-related artifact.       Wound Surgery Consult Note:    HPI:  89F w/ PMH Afib (on pradaxa), prior DVT, remote history of MI, HTN, HLD presenting w/ dyspnea x1week. Patient has had intermittent difficulty breathing over last 1 week w/ acute worsening over last 24h. Usually able to walk approx 15 feet w/o issue (exercise limited by lymphedema and arthritis), but has been unable to catch her breath after ambulating to and from her bathroom. Her daughter took SpO2 at home and reports it was in the 60s; patient then used her sister's portable O2, which helped until EMS arrived. She has been sleeping in a recliner chair more frequently than usual, but notes this is because of difficulty getting up from bed. Notes increased LE edema from baseline. Takes lasix on a regular basis and has been taking as prescribed, no increases or decreases in dosage recently. Denies PND, chest pain, palpitations, diaphoresis, arm/jaw pain, indigestion, nausea, vomiting, changes in bowel habits. Endorses intermittent dry cough; no fevers, constitutional symptoms, myalgias, weight loss. Daughter endorses chronic incontinence and seeing blood (no clots) in pull-ups over last few days.     Patient recently diagnosed w/ UTI and took 1-2 days of macrobid 100mg BID, but noted that hematuria persisted. Outpatient Ucx - Aug 2022, had E. coli resistant to ampicillin. No recent hospitalizations, abx use.     ED vitals: 146/82, , RR 22, temp 97.9 100% NRB 15L  ED course: Started on BiPAP for increased WOB. CTA done w/o evidence PE. Give IV lasix 20mg x1, ceftriaxone x1.  (05 Mar 2023 22:15)    Request for wound care consult for bilateral leg edema received from primary team. Ms. Barber was encountered on an alternating air with low air loss surface. She is incontinent of stool and urine. Her son was at the bedside and provided history of her BLE swelling. He stated that it started about 2 years ago and that she does not use any type of compression. Nor does she see a specialist for management of her leg edema. As per her medical record, she has a history of a DVT. Please consider ordering Dopplers of BLE to evaluate for DVTs.     PAST MEDICAL & SURGICAL HISTORY:  Hypertension  Dyslipidemia  Myocardial Infarction, 15-20 years ago  Hypercalcemia  Lymphedema  S/P Parathyroidectomy  S/P Hysterectomy  Bilateral Cataracts    REVIEW OF SYSTEMS:    Constitutional:     [x ] negative [ ] fevers [ ] chills [ ] weight loss [ ] weight gain  HEENT:                  [x ] negative [ ] dry eyes [ ] eye irritation [ ] postnasal drip [ ] nasal congestion   CV:                         [x ] negative  [ ] chest pain [ ] orthopnea [ ] palpitations [ ] tachycardia  Resp:                     [ ] negative [ ] cough [x ] shortness of breath [x ] dyspnea [ ] wheezing [ ] sputum [ ] hemoptysis  GI:                          [ ] negative [ ] nausea [ ] vomiting [ ] diarrhea [ ] constipation [ ] abd pain [ ] dysphagia [x ] incontinent of bowel  :                        [ ] negative [ ] dysuria [ ] nocturia [ ] hematuria [ ] increased urinary frequency [ x] incontinent of urine  Musculoskeletal:     [ ] negative [ ] back pain [ ] myalgias [ ] arthralgias [ ] fracture [ x] ambulatory  Skin:                       [ ] negative [ ] rash [ ] itch [ ] wound [x ] skin discoloration  Neurological:        [x ] negative [ ] headache [ ] dizziness [ ] syncope [x ] weakness [ ] numbness  Psychiatric:           [x ] negative [ ] anxiety [ ] depression  Endocrine:            [x ] negative [ ] diabetes [ ] thyroid problem  Heme/Lymph:      [x ] negative [ ] anemia [ ] bleeding problem  Allergic/Immune: [x ] negative [ ] itchy eyes [ ] nasal discharge [ ] hives [ ] angioedema    [x ] All other systems negative    MEDICATIONS  (STANDING):  atorvastatin 20 milliGRAM(s) Oral at bedtime  cefTRIAXone   IVPB 1000 milliGRAM(s) IV Intermittent every 24 hours  chlorhexidine 2% Cloths 1 Application(s) Topical <User Schedule>  dabigatran 150 milliGRAM(s) Oral every 12 hours  furosemide   Injectable 20 milliGRAM(s) IV Push every 12 hours  metoprolol tartrate 75 milliGRAM(s) Oral two times a day  multivitamin 1 Tablet(s) Oral daily    MEDICATIONS  (PRN):  acetaminophen     Tablet .. 650 milliGRAM(s) Oral every 6 hours PRN Temp greater or equal to 38C (100.4F), Mild Pain (1 - 3)  melatonin 3 milliGRAM(s) Oral at bedtime PRN Insomnia    Allergies    No Known Allergies    Intolerances    SOCIAL HISTORY:  , Denies smoking, ETOH, drugs    FAMILY HISTORY: no significant family history among 1st degree relatives    Vital Signs Last 24 Hrs  T(C): 36.6 (07 Mar 2023 11:09), Max: 36.9 (07 Mar 2023 04:36)  T(F): 97.8 (07 Mar 2023 11:09), Max: 98.4 (07 Mar 2023 04:36)  HR: 100 (07 Mar 2023 11:09) (97 - 123)  BP: 115/79 (07 Mar 2023 11:09) (104/58 - 123/78)  BP(mean): --  RR: 18 (07 Mar 2023 11:09) (18 - 28)  SpO2: 96% (07 Mar 2023 11:09) (96% - 100%)    Parameters below as of 07 Mar 2023 11:09  Patient On (Oxygen Delivery Method): nasal cannula  O2 Flow (L/min): 2    Physical Exam:  General: Alert, obese  Ophthamology: sclera clear  ENMT: moist mucous membranes, trachea midline  Respiratory: equal chest rise with respirations  Gastrointestinal: soft NT/ND  Neurology: verbal, following commands  Psych: calm, appropriate  Musculoskeletal: no contractures  Vascular: BLE edema equal, Right DP, Left pedal pulses not manually palpable  Skin:  Bilateral lower legs with cobblestoning, crusty debris, hyperpigmentation, dry flaking skin, no open wounds or drainage  No odor, erythema, increased warmth, tenderness, induration, fluctuance      LABS:      145  |  102  |  18  ----------------------------<  104<H>  3.5   |  36<H>  |  0.84    Ca    8.9      07 Mar 2023 05:31  Phos  3.4     03-07  Mg     1.8     03-07    TPro  6.2  /  Alb  3.1<L>  /  TBili  1.3<H>  /  DBili  x   /  AST  15  /  ALT  8<L>  /  AlkPhos  54  03-06                          10.1   7.51  )-----------( 74       ( 07 Mar 2023 05:31 )             33.2     PT/INR - ( 07 Mar 2023 05:31 )   PT: 17.1 sec;   INR: 1.48 ratio         PTT - ( 07 Mar 2023 05:31 )  PTT:53.9 sec  Urinalysis Basic - ( 05 Mar 2023 18:17 )    Color: Yellow / Appearance: Clear / S.047 / pH: x  Gluc: x / Ketone: Negative  / Bili: Negative / Urobili: Negative   Blood: x / Protein: Trace / Nitrite: Negative   Leuk Esterase: Large / RBC: 24 /hpf / WBC 1 /HPF   Sq Epi: x / Non Sq Epi: 1 /hpf / Bacteria: Negative        RADIOLOGY & ADDITIONAL STUDIES:    EXAM:  CT ABDOMEN AND PELVIS IC   ORDERED BY:  ARTI CHEN     ACC: [id number] EXAM:  CT ANGIO CHEST PULM ART WAWIC   ORDERED BY:    ARTI CHEN     PROCEDURE DATE:  2023      INTERPRETATION:  CLINICAL INDICATION: New onset hematuria (recently   diagnosed with UTI), shortness of breath, evaluate for pulmonary embolism    TECHNIQUE: Enhanced helical images were obtained of the chest, abdomen   and pelvis. Coronal and sagittal images were reconstructed.  Images were   obtained after the uneventful administration of 90 cc of nonionic   intravenous contrast (Omnipaque 350) and enteric contrast. 3D MIP images   were provided.    COMPARISON: Abdominal CT 3/9/2012. No prior chest CT available for   comparison    FINDINGS:  CTA: The study is technically adequate with a good contrast bolus to the   pulmonary arteries. No pulmonary embolism. The pulmonary artery measures   3.2 cm. Mid ascending aorta measures 3.7 cm. Biatrial enlargement   qualitatively. Sequela of remote myocardial infarct with myocardial   thinning of the basal inferoseptal and mid to apical anteroseptal wall.   Question small apical aneurysm. Mild coronary arterial and mitral annular   calcification. No pericardial effusion.    Lungs/Airways/Pleura: The central airways are patent. Trace pleural   effusions. Few small clusters of nodules likely related to small airways   disease, for example in the right upper lobe series 2 image 38. Otherwise   no consolidation or pulmonary edema.    Mediastinum/Lymph nodes: No thoracic adenopathy. Small hiatal hernia.    Hepatobiliary: Numerous hepatic cysts, some enlarged since .   Unremarkable gallbladder.    Pancreas: Unremarkable.    Spleen: Unremarkable.    Adrenal glands: Unremarkable.    Kidneys: Unchanged right renal parenchymal calcification. Ill-defined   area of decreased attenuation within the lower pole the left kidney. No   hydronephrosis.    Bowel: No bowel obstruction. Diverticulosis. Normal appendix.    Abdominal lymph nodes: No lymphadenopathy.    Peritoneum: No ascites.    Pelvis: Urinary bladder is decompressed and incompletely evaluated.    Bones/soft tissues: No aggressive osseous lesion. Unchanged Tarlov cyst.    IMPRESSION:  No pulmonary embolism.    Ill-defined area of low-attenuation in the lower pole of the left kidney,   diagnostic considerations include pyelonephritis (in the setting of   reported UTI) or infarct versus motion-related artifact.

## 2023-03-08 LAB
ANION GAP SERPL CALC-SCNC: 8 MMOL/L — SIGNIFICANT CHANGE UP (ref 5–17)
APTT BLD: 55.3 SEC — HIGH (ref 27.5–35.5)
BASE EXCESS BLDV CALC-SCNC: 13.3 MMOL/L — HIGH (ref -2–3)
BUN SERPL-MCNC: 15 MG/DL — SIGNIFICANT CHANGE UP (ref 7–23)
CA-I SERPL-SCNC: 1.22 MMOL/L — SIGNIFICANT CHANGE UP (ref 1.15–1.33)
CALCIUM SERPL-MCNC: 8.9 MG/DL — SIGNIFICANT CHANGE UP (ref 8.4–10.5)
CHLORIDE BLDV-SCNC: 102 MMOL/L — SIGNIFICANT CHANGE UP (ref 96–108)
CHLORIDE SERPL-SCNC: 100 MMOL/L — SIGNIFICANT CHANGE UP (ref 96–108)
CO2 BLDV-SCNC: 45 MMOL/L — HIGH (ref 22–26)
CO2 SERPL-SCNC: 37 MMOL/L — HIGH (ref 22–31)
CREAT SERPL-MCNC: 0.78 MG/DL — SIGNIFICANT CHANGE UP (ref 0.5–1.3)
EGFR: 72 ML/MIN/1.73M2 — SIGNIFICANT CHANGE UP
GAS PNL BLDV: 143 MMOL/L — SIGNIFICANT CHANGE UP (ref 136–145)
GAS PNL BLDV: SIGNIFICANT CHANGE UP
GLUCOSE BLDV-MCNC: 87 MG/DL — SIGNIFICANT CHANGE UP (ref 70–99)
GLUCOSE SERPL-MCNC: 90 MG/DL — SIGNIFICANT CHANGE UP (ref 70–99)
HCO3 BLDV-SCNC: 43 MMOL/L — HIGH (ref 22–29)
HCT VFR BLD CALC: 37.5 % — SIGNIFICANT CHANGE UP (ref 34.5–45)
HCT VFR BLDA CALC: 35 % — SIGNIFICANT CHANGE UP (ref 34.5–46.5)
HGB BLD CALC-MCNC: 11.5 G/DL — LOW (ref 11.7–16.1)
HGB BLD-MCNC: 11 G/DL — LOW (ref 11.5–15.5)
INR BLD: 1.54 RATIO — HIGH (ref 0.88–1.16)
LACTATE BLDV-MCNC: 1.7 MMOL/L — SIGNIFICANT CHANGE UP (ref 0.5–2)
MAGNESIUM SERPL-MCNC: 1.8 MG/DL — SIGNIFICANT CHANGE UP (ref 1.6–2.6)
MCHC RBC-ENTMCNC: 28.9 PG — SIGNIFICANT CHANGE UP (ref 27–34)
MCHC RBC-ENTMCNC: 29.3 GM/DL — LOW (ref 32–36)
MCV RBC AUTO: 98.4 FL — SIGNIFICANT CHANGE UP (ref 80–100)
NRBC # BLD: 0 /100 WBCS — SIGNIFICANT CHANGE UP (ref 0–0)
PCO2 BLDV: 85 MMHG — HIGH (ref 39–42)
PH BLDV: 7.31 — LOW (ref 7.32–7.43)
PHOSPHATE SERPL-MCNC: 3.2 MG/DL — SIGNIFICANT CHANGE UP (ref 2.5–4.5)
PLATELET # BLD AUTO: 100 K/UL — LOW (ref 150–400)
PO2 BLDV: 68 MMHG — HIGH (ref 25–45)
POTASSIUM BLDV-SCNC: 3.2 MMOL/L — LOW (ref 3.5–5.1)
POTASSIUM SERPL-MCNC: 3.5 MMOL/L — SIGNIFICANT CHANGE UP (ref 3.5–5.3)
POTASSIUM SERPL-SCNC: 3.5 MMOL/L — SIGNIFICANT CHANGE UP (ref 3.5–5.3)
PROTHROM AB SERPL-ACNC: 18 SEC — HIGH (ref 10.5–13.4)
RBC # BLD: 3.81 M/UL — SIGNIFICANT CHANGE UP (ref 3.8–5.2)
RBC # FLD: 14.9 % — HIGH (ref 10.3–14.5)
SAO2 % BLDV: 94 % — HIGH (ref 67–88)
SODIUM SERPL-SCNC: 145 MMOL/L — SIGNIFICANT CHANGE UP (ref 135–145)
WBC # BLD: 7.58 K/UL — SIGNIFICANT CHANGE UP (ref 3.8–10.5)
WBC # FLD AUTO: 7.58 K/UL — SIGNIFICANT CHANGE UP (ref 3.8–10.5)

## 2023-03-08 PROCEDURE — 99233 SBSQ HOSP IP/OBS HIGH 50: CPT

## 2023-03-08 PROCEDURE — 99233 SBSQ HOSP IP/OBS HIGH 50: CPT | Mod: GC

## 2023-03-08 PROCEDURE — 93970 EXTREMITY STUDY: CPT | Mod: 26

## 2023-03-08 RX ORDER — POTASSIUM CHLORIDE 20 MEQ
40 PACKET (EA) ORAL EVERY 4 HOURS
Refills: 0 | Status: COMPLETED | OUTPATIENT
Start: 2023-03-08 | End: 2023-03-08

## 2023-03-08 RX ORDER — MAGNESIUM SULFATE 500 MG/ML
2 VIAL (ML) INJECTION ONCE
Refills: 0 | Status: COMPLETED | OUTPATIENT
Start: 2023-03-08 | End: 2023-03-08

## 2023-03-08 RX ORDER — IPRATROPIUM/ALBUTEROL SULFATE 18-103MCG
3 AEROSOL WITH ADAPTER (GRAM) INHALATION EVERY 6 HOURS
Refills: 0 | Status: DISCONTINUED | OUTPATIENT
Start: 2023-03-08 | End: 2023-03-08

## 2023-03-08 RX ORDER — IPRATROPIUM/ALBUTEROL SULFATE 18-103MCG
3 AEROSOL WITH ADAPTER (GRAM) INHALATION EVERY 6 HOURS
Refills: 0 | Status: COMPLETED | OUTPATIENT
Start: 2023-03-08 | End: 2023-03-10

## 2023-03-08 RX ADMIN — Medication 20 MILLIGRAM(S): at 05:29

## 2023-03-08 RX ADMIN — DABIGATRAN ETEXILATE MESYLATE 150 MILLIGRAM(S): 150 CAPSULE ORAL at 17:54

## 2023-03-08 RX ADMIN — Medication 40 MILLIEQUIVALENT(S): at 10:13

## 2023-03-08 RX ADMIN — CHLORHEXIDINE GLUCONATE 1 APPLICATION(S): 213 SOLUTION TOPICAL at 05:29

## 2023-03-08 RX ADMIN — Medication 1 APPLICATION(S): at 05:31

## 2023-03-08 RX ADMIN — Medication 40 MILLIEQUIVALENT(S): at 14:57

## 2023-03-08 RX ADMIN — Medication 3 MILLILITER(S): at 14:41

## 2023-03-08 RX ADMIN — DABIGATRAN ETEXILATE MESYLATE 150 MILLIGRAM(S): 150 CAPSULE ORAL at 05:28

## 2023-03-08 RX ADMIN — Medication 1 TABLET(S): at 12:47

## 2023-03-08 RX ADMIN — Medication 75 MILLIGRAM(S): at 05:29

## 2023-03-08 RX ADMIN — Medication 3 MILLILITER(S): at 17:54

## 2023-03-08 RX ADMIN — Medication 1 APPLICATION(S): at 17:56

## 2023-03-08 RX ADMIN — ATORVASTATIN CALCIUM 20 MILLIGRAM(S): 80 TABLET, FILM COATED ORAL at 21:27

## 2023-03-08 NOTE — PROGRESS NOTE ADULT - PROBLEM SELECTOR PLAN 1
Patient admitted w/ RICHARD x1week w/ acute worsening over last 24h; hypoxic at home, respiratory acidosis and started on BiPAP w/ improvement in CO2 levels. Trop WNL, BNP elevated to 2415; baseline is approx 1000. CT chest w/o edema, consolidations. Evidence of hypervolemia on exam.   Last TTE on file 2020, showed biatrial enlargement w/ normal RV and LV systolic fxn, mild pulmonary hypertension, mild-moderate AS. According to OP cardiology notes, has had periods of systolic dysfxn in past.   Considering CHF exacerbation, sepsis; less likely PNA, ACS. CTA w/o evidence of PE.   -Now on 4L NC  -Echo showing diastolic dysfunction with moderate pulm HTN and EF 72%--> c/w HFpEF and Group 2 Pulm HTN  -RVP negative    -Increase lasix to 30mg IV BID  -Signifcant wheezing on exam, likely cardiac in nature--> trial of duonebs  -Patient with worsened hypercapnia in AM, c/f KALIN--> trial NIPPV tonight Patient admitted w/ RICHARD x1week w/ acute worsening over last 24h; hypoxic at home, respiratory acidosis and started on BiPAP w/ improvement in CO2 levels. Trop WNL, BNP 2415; CT chest w/o edema, consolidations.  Last TTE on file 2020, showed biatrial enlargement w/ normal RV and LV systolic fxn, mild pulmonary hypertension, mild-moderate AS. According to OP cardiology notes, has had periods of systolic dysfxn in past.   Considering CHF exacerbation, sepsis; less likely PNA, ACS. CTA w/o evidence of PE.   -Now on 4L NC  -Echo showing diastolic dysfunction with moderate pulm HTN and EF 72%--> c/w HFpEF and Group 2 Pulm HTN    -c/w lasix to 20mg IV BID  -Signifcant wheezing on exam, likely cardiac in nature--> trial of duonebs  -Patient with worsened hypercapnia in AM, c/f KALIN--> trial NIPPV tonight

## 2023-03-08 NOTE — PROGRESS NOTE ADULT - PROBLEM SELECTOR PLAN 2
WBC elevated to 13.6 (was 7.8 on outpatient labs 3/3) and tachycardic, tachypneic, meeting criteria for sepsis w/ most likely  source. CT A/P w/ possible L-sided pyelonephritis. Completed 1-2 days of macrobid as outpatient for UTI, confirmed w/ UA.   - ceftriaxone 1g (3/5 - 3/7)  -UCx w/ normal jacque  -BCx NGTD

## 2023-03-08 NOTE — PROGRESS NOTE ADULT - ASSESSMENT
89F w/ PMH Afib (on pradaxa), remote history MI, HTN, prior DVT presenting w/ intermittent dyspnea x1 week w/ acute worsening x24h, admitted w/ acute hypoxic and hypercapnic respiratory failure 2/2 urosepsis +/- CHF exacerbation.     Echo showing signs of pulm HTN and HFrEF. Patient also with chronic retention of CO2, worse in the AM, c/f KALIN vs [facility]. 89F w/ PMH Afib (on pradaxa), remote history MI, HTN, prior DVT presenting w/ intermittent dyspnea x1 week w/ acute worsening x24h, admitted w/ acute hypoxic and hypercapnic respiratory failure 2/2 urosepsis +/- CHF exacerbation.     Echo showing signs of pulm HTN and HFpEF (concentric hypertrophy). Patient also with chronic retention of CO2, worse in the AM, c/f KALIN vs [facility].

## 2023-03-08 NOTE — CONSULT NOTE ADULT - ASSESSMENT
89 y/o female pt with right foot plantar pain   - pt seen and evaluated  - symptoms consistent with plantar fasciitis   - no heel DTIs noted at this time  - rec z flow boots in bed at all times   - fasciitis likely secondary to decreased ambulation  - xrays ordered to r/o any osseous abnormalities including pathologic fractures

## 2023-03-08 NOTE — PROGRESS NOTE ADULT - SUBJECTIVE AND OBJECTIVE BOX
INTERVAL: Yesterday afternoon patient had episode of dyspnea with tachypnea to 30s, but normal O2 sat (~95% on 2L O2). On exam, noted to be uncomfortable with increased WOB (+sternocleidomastoid contractions, +tracheal tug) and worsened crackles on exam. Patient reassured, O2 increased to 4L NC, Lasix 20 IV ordered, ABG and CxR obtained. ABG notable for hypercapnia (similar to prev) and hyperoxia. CxR showing some atelectasis but minimal pulm edema.  SUBJECTIVE: Patient examined bedside this AM with son at bedside. States that SOB improved, but still requiring oxygen. No other concerns elicited. Denies Fevers, chills, cough, chest pain, abdominal pain, headache, dizziness, nausea, vomiting, dysuria, changes in urination, changes in BM. Provided information on HFpEF, cardiac wheezing, and KALIN.    OBJECTIVE:  ICU Vital Signs Last 24 Hrs  T(C): 36.4 (08 Mar 2023 04:28), Max: 36.7 (07 Mar 2023 09:58)  T(F): 97.6 (08 Mar 2023 04:28), Max: 98.1 (07 Mar 2023 09:58)  HR: 100 (08 Mar 2023 05:26) (77 - 102)  BP: 107/73 (08 Mar 2023 05:26) (107/73 - 123/84)  BP(mean): --  ABP: --  ABP(mean): --  RR: 18 (08 Mar 2023 04:28) (18 - 28)  SpO2: 98% (08 Mar 2023 04:28) (95% - 99%)    O2 Parameters below as of 08 Mar 2023 04:28  Patient On (Oxygen Delivery Method): room air  O2 Flow (L/min): 3            03-07 @ 07:01  -  03-08 @ 07:00  --------------------------------------------------------  IN: 400 mL / OUT: 1300 mL / NET: -900 mL      CAPILLARY BLOOD GLUCOSE          PHYSICAL EXAM:  General: Well-groomed, NAD, laying in bed, on 4L NC  HEENT: PERRLA, EOMI, non-icteric  Neck:  symmetric,  JVD absent  Respiratory: Bilateral basilar crackles (improved from prev) and diffuse wheezing (worse from prev), no Resp distress; some accessory muscle use  Cardiovascular:  A-fib, as previous, no murmurs/rubs/gallops  Abdomen: Soft, NT, ND  Extremities: Significant lymphedema wrapped with bandages (similar to prev)  Skin: No rashes or lesions noted  Neurological: Sensation grossly intact  Psychiatry: AOx3, appropriate insight/judgement, appropriate affect, recent/remote memory intact    PRN Meds:  acetaminophen     Tablet .. 650 milliGRAM(s) Oral every 6 hours PRN  albuterol/ipratropium for Nebulization 3 milliLiter(s) Nebulizer every 6 hours PRN  melatonin 3 milliGRAM(s) Oral at bedtime PRN      LABS:                        11.0   7.58  )-----------( 100      ( 08 Mar 2023 06:26 )             37.5     Hgb Trend: 11.0<--, 10.1<--, 11.3<--, 13.5<--  03-08    145  |  100  |  15  ----------------------------<  90  3.5   |  37<H>  |  0.78    Ca    8.9      08 Mar 2023 06:26  Phos  3.2     03-08  Mg     1.8     03-08      Creatinine Trend: 0.78<--, 0.84<--, 0.89<--, 0.81<--  PT/INR - ( 08 Mar 2023 06:26 )   PT: 18.0 sec;   INR: 1.54 ratio         PTT - ( 08 Mar 2023 06:26 )  PTT:55.3 sec    Arterial Blood Gas:  03-07 @ 14:20  7.37/67/155/39/99.8/11.2  ABG lactate: --    Venous Blood Gas:  03-08 @ 06:24  7.31/85/68/43/94.0  VBG Lactate: 1.7      MICROBIOLOGY:     Culture - Urine (collected 05 Mar 2023 18:17)  Source: Clean Catch Clean Catch (Midstream)  Final Report (06 Mar 2023 16:24):    <10,000 CFU/mL Normal Urogenital Tiffanie    Culture - Blood (collected 05 Mar 2023 11:45)  Source: .Blood Blood-Peripheral  Preliminary Report (06 Mar 2023 19:01):    No growth to date.    Culture - Blood (collected 05 Mar 2023 11:20)  Source: .Blood Blood-Peripheral  Preliminary Report (06 Mar 2023 19:01):    No growth to date.        RADIOLOGY:  [ ] Reviewed and interpreted by me    EKG:

## 2023-03-08 NOTE — PHYSICAL THERAPY INITIAL EVALUATION ADULT - DISCHARGE PLANNER MADE AWARE
Erythromycin Pregnancy And Lactation Text: This medication is Pregnancy Category B and is considered safe during pregnancy. It is also excreted in breast milk. Azithromycin Counseling:  I discussed with the patient the risks of azithromycin including but not limited to GI upset, allergic reaction, drug rash, diarrhea, and yeast infections. Birth Control Pills Counseling: Birth Control Pill Counseling: I discussed with the patient the potential side effects of OCPs including but not limited to increased risk of stroke, heart attack, thrombophlebitis, deep venous thrombosis, hepatic adenomas, breast changes, GI upset, headaches, and depression.  The patient verbalized understanding of the proper use and possible adverse effects of OCPs. All of the patient's questions and concerns were addressed. Detail Level: Zone Dapsone Pregnancy And Lactation Text: This medication is Pregnancy Category C and is not considered safe during pregnancy or breast feeding. yes Topical Retinoid counseling:  Patient advised to apply a pea-sized amount only at bedtime and wait 30 minutes after washing their face before applying.  If too drying, patient may add a non-comedogenic moisturizer. The patient verbalized understanding of the proper use and possible adverse effects of retinoids.  All of the patient's questions and concerns were addressed. High Dose Vitamin A Pregnancy And Lactation Text: High dose vitamin A therapy is contraindicated during pregnancy and breast feeding. Topical Sulfur Applications Pregnancy And Lactation Text: This medication is Pregnancy Category C and has an unknown safety profile during pregnancy. It is unknown if this topical medication is excreted in breast milk. Sarecycline Pregnancy And Lactation Text: This medication is Pregnancy Category D and not consider safe during pregnancy. It is also excreted in breast milk. Azithromycin Pregnancy And Lactation Text: This medication is considered safe during pregnancy and is also secreted in breast milk. Topical Clindamycin Counseling: Patient counseled that this medication may cause skin irritation or allergic reactions.  In the event of skin irritation, the patient was advised to reduce the amount of the drug applied or use it less frequently.   The patient verbalized understanding of the proper use and possible adverse effects of clindamycin.  All of the patient's questions and concerns were addressed. Spironolactone Counseling: Patient advised regarding risks of diarrhea, abdominal pain, hyperkalemia, birth defects (for female patients), liver toxicity and renal toxicity. The patient may need blood work to monitor liver and kidney function and potassium levels while on therapy. The patient verbalized understanding of the proper use and possible adverse effects of spironolactone.  All of the patient's questions and concerns were addressed. Benzoyl Peroxide Counseling: Patient counseled that medicine may cause skin irritation and bleach clothing.  In the event of skin irritation, the patient was advised to reduce the amount of the drug applied or use it less frequently.   The patient verbalized understanding of the proper use and possible adverse effects of benzoyl peroxide.  All of the patient's questions and concerns were addressed. Doxycycline Counseling:  Patient counseled regarding possible photosensitivity and increased risk for sunburn.  Patient instructed to avoid sunlight, if possible.  When exposed to sunlight, patients should wear protective clothing, sunglasses, and sunscreen.  The patient was instructed to call the office immediately if the following severe adverse effects occur:  hearing changes, easy bruising/bleeding, severe headache, or vision changes.  The patient verbalized understanding of the proper use and possible adverse effects of doxycycline.  All of the patient's questions and concerns were addressed. Isotretinoin Counseling: Patient should get monthly blood tests, not donate blood, not drive at night if vision affected, not share medication, and not undergo elective surgery for 6 months after tx completed. Side effects reviewed, pt to contact office should one occur. Topical Retinoid Pregnancy And Lactation Text: This medication is Pregnancy Category C. It is unknown if this medication is excreted in breast milk. Minocycline Counseling: Patient advised regarding possible photosensitivity and discoloration of the teeth, skin, lips, tongue and gums.  Patient instructed to avoid sunlight, if possible.  When exposed to sunlight, patients should wear protective clothing, sunglasses, and sunscreen.  The patient was instructed to call the office immediately if the following severe adverse effects occur:  hearing changes, easy bruising/bleeding, severe headache, or vision changes.  The patient verbalized understanding of the proper use and possible adverse effects of minocycline.  All of the patient's questions and concerns were addressed. Benzoyl Peroxide Pregnancy And Lactation Text: This medication is Pregnancy Category C. It is unknown if benzoyl peroxide is excreted in breast milk. Bactrim Counseling:  I discussed with the patient the risks of sulfa antibiotics including but not limited to GI upset, allergic reaction, drug rash, diarrhea, dizziness, photosensitivity, and yeast infections.  Rarely, more serious reactions can occur including but not limited to aplastic anemia, agranulocytosis, methemoglobinemia, blood dyscrasias, liver or kidney failure, lung infiltrates or desquamative/blistering drug rashes. Birth Control Pills Pregnancy And Lactation Text: This medication should be avoided if pregnant and for the first 30 days post-partum. Doxycycline Pregnancy And Lactation Text: This medication is Pregnancy Category D and not consider safe during pregnancy. It is also excreted in breast milk but is considered safe for shorter treatment courses. Isotretinoin Pregnancy And Lactation Text: This medication is Pregnancy Category X and is considered extremely dangerous during pregnancy. It is unknown if it is excreted in breast milk. Tazorac Counseling:  Patient advised that medication is irritating and drying.  Patient may need to apply sparingly and wash off after an hour before eventually leaving it on overnight.  The patient verbalized understanding of the proper use and possible adverse effects of tazorac.  All of the patient's questions and concerns were addressed. Topical Clindamycin Pregnancy And Lactation Text: This medication is Pregnancy Category B and is considered safe during pregnancy. It is unknown if it is excreted in breast milk. Spironolactone Pregnancy And Lactation Text: This medication can cause feminization of the male fetus and should be avoided during pregnancy. The active metabolite is also found in breast milk. Topical Sulfur Applications Counseling: Topical Sulfur Counseling: Patient counseled that this medication may cause skin irritation or allergic reactions.  In the event of skin irritation, the patient was advised to reduce the amount of the drug applied or use it less frequently.   The patient verbalized understanding of the proper use and possible adverse effects of topical sulfur application.  All of the patient's questions and concerns were addressed. Include Pregnancy/Lactation Warning?: No Bactrim Pregnancy And Lactation Text: This medication is Pregnancy Category D and is known to cause fetal risk.  It is also excreted in breast milk. Sarecycline Counseling: Patient advised regarding possible photosensitivity and discoloration of the teeth, skin, lips, tongue and gums.  Patient instructed to avoid sunlight, if possible.  When exposed to sunlight, patients should wear protective clothing, sunglasses, and sunscreen.  The patient was instructed to call the office immediately if the following severe adverse effects occur:  hearing changes, easy bruising/bleeding, severe headache, or vision changes.  The patient verbalized understanding of the proper use and possible adverse effects of sarecycline.  All of the patient's questions and concerns were addressed. Tetracycline Counseling: Patient counseled regarding possible photosensitivity and increased risk for sunburn.  Patient instructed to avoid sunlight, if possible.  When exposed to sunlight, patients should wear protective clothing, sunglasses, and sunscreen.  The patient was instructed to call the office immediately if the following severe adverse effects occur:  hearing changes, easy bruising/bleeding, severe headache, or vision changes.  The patient verbalized understanding of the proper use and possible adverse effects of tetracycline.  All of the patient's questions and concerns were addressed. Patient understands to avoid pregnancy while on therapy due to potential birth defects. Dapsone Counseling: I discussed with the patient the risks of dapsone including but not limited to hemolytic anemia, agranulocytosis, rashes, methemoglobinemia, kidney failure, peripheral neuropathy, headaches, GI upset, and liver toxicity.  Patients who start dapsone require monitoring including baseline LFTs and weekly CBCs for the first month, then every month thereafter.  The patient verbalized understanding of the proper use and possible adverse effects of dapsone.  All of the patient's questions and concerns were addressed. Erythromycin Counseling:  I discussed with the patient the risks of erythromycin including but not limited to GI upset, allergic reaction, drug rash, diarrhea, increase in liver enzymes, and yeast infections. Tazorac Pregnancy And Lactation Text: This medication is not safe during pregnancy. It is unknown if this medication is excreted in breast milk. High Dose Vitamin A Counseling: Side effects reviewed, pt to contact office should one occur.

## 2023-03-08 NOTE — PHYSICAL THERAPY INITIAL EVALUATION ADULT - GENERAL OBSERVATIONS, REHAB EVAL
Pt received semisupine in bed,  PIV locked, pulse ox, 2L NC Pt received semisupine in bed,  PIV locked, pulse ox, 3L NC

## 2023-03-08 NOTE — CONSULT NOTE ADULT - SUBJECTIVE AND OBJECTIVE BOX
Patient is a 89y old  Female who presents with a chief complaint of SOB (08 Mar 2023 16:10)      HPI:  89F w/ PMH Afib (on pradaxa), prior DVT, remote history of MI, HTN, HLD presenting w/ dyspnea x1week. Patient has had intermittent difficulty breathing over last 1 week w/ acute worsening over last 24h. Usually able to walk approx 15 feet w/o issue (exercise limited by lymphedema and arthritis), but has been unable to catch her breath after ambulating to and from her bathroom. Her daughter took SpO2 at home and reports it was in the 60s; patient then used her sister's portable O2, which helped until EMS arrived. She has been sleeping in a recliner chair more frequently than usual, but notes this is because of difficulty getting up from bed. Notes increased LE edema from baseline. Takes lasix on a regular basis and has been taking as prescribed, no increases or decreases in dosage recently. Denies PND, chest pain, palpitations, diaphoresis, arm/jaw pain, indigestion, nausea, vomiting, changes in bowel habits. Endorses intermittent dry cough; no fevers, constitutional symptoms, myalgias, weight loss. Daughter endorses chronic incontinence and seeing blood (no clots) in pull-ups over last few days.     Patient recently diagnosed w/ UTI and took 1-2 days of macrobid 100mg BID, but noted that hematuria persisted. Outpatient [facility] - Aug 2022, had E. coli resistant to ampicillin. No recent hospitalizations, abx use.     Podiatry consulted for right foot pain. Patient relates it started within the last week and denies any trauma to the feet. Relates that pain is worst first step after rest.  PAST MEDICAL & SURGICAL HISTORY:  Hypertension      Dyslipidemia      Myocardial Infarction  15-20 years ago      Hypercalcemia      Lymphedema      S/P Parathyroidectomy      S/P Hysterectomy      Bilateral Cataracts          MEDICATIONS  (STANDING):  albuterol/ipratropium for Nebulization 3 milliLiter(s) Nebulizer every 6 hours  ammonium lactate 12% Lotion 1 Application(s) Topical two times a day  atorvastatin 20 milliGRAM(s) Oral at bedtime  chlorhexidine 2% Cloths 1 Application(s) Topical <User Schedule>  dabigatran 150 milliGRAM(s) Oral every 12 hours  furosemide   Injectable 20 milliGRAM(s) IV Push every 12 hours  magnesium sulfate  IVPB 2 Gram(s) IV Intermittent once  metoprolol tartrate 75 milliGRAM(s) Oral two times a day  multivitamin 1 Tablet(s) Oral daily  predniSONE   Tablet 40 milliGRAM(s) Oral daily    MEDICATIONS  (PRN):  acetaminophen     Tablet .. 650 milliGRAM(s) Oral every 6 hours PRN Temp greater or equal to 38C (100.4F), Mild Pain (1 - 3)  melatonin 3 milliGRAM(s) Oral at bedtime PRN Insomnia      Allergies    No Known Allergies    Intolerances        VITALS:    Vital Signs Last 24 Hrs  T(C): 36.5 (08 Mar 2023 11:12), Max: 36.7 (07 Mar 2023 18:19)  T(F): 97.7 (08 Mar 2023 11:12), Max: 98.1 (07 Mar 2023 18:19)  HR: 81 (08 Mar 2023 17:05) (70 - 102)  BP: 97/65 (08 Mar 2023 17:05) (97/65 - 123/84)  BP(mean): --  RR: 18 (08 Mar 2023 11:12) (18 - 24)  SpO2: 94% (08 Mar 2023 15:31) (94% - 99%)    Parameters below as of 08 Mar 2023 12:01  Patient On (Oxygen Delivery Method): nasal cannula  O2 Flow (L/min): 3      LABS:                          11.0   7.58  )-----------( 100      ( 08 Mar 2023 06:26 )             37.5       03-08    145  |  100  |  15  ----------------------------<  90  3.5   |  37<H>  |  0.78    Ca    8.9      08 Mar 2023 06:26  Phos  3.2     03-08  Mg     1.8     03-08        CAPILLARY BLOOD GLUCOSE          PT/INR - ( 08 Mar 2023 06:26 )   PT: 18.0 sec;   INR: 1.54 ratio         PTT - ( 08 Mar 2023 06:26 )  PTT:55.3 sec    LOWER EXTREMITY PHYSICAL EXAM:    Vasular: DP/PT 0/4, B/L, CFT <3 seconds B/L, Temperature gradient WNL, B/L, Lower extremity pitting edema B/L   Neuro: Epicritic sensation intact to the level of foot, B/L.  Musculoskeletal/Ortho: pain with palpation of right foot plantarly at the arch and at the insertion of the plantar fascia on the calcaneus, severe pes planovalgus foot type  Skin: no heel DTIs noted    RADIOLOGY & ADDITIONAL STUDIES:  < from: [facility] Duplex Lower Ext Vein Scan, [place] (03.08.23 @ 09:39) >    ACC: [id number] EXAM:  DUPLEX SCAN EXT VEINS LOWER BI   ORDERED BY:  VITALY DE JESUS     PROCEDURE DATE:  03/08/2023          INTERPRETATION:  CLINICAL INFORMATION: Short of breath, bilateral lower   extremity swelling    COMPARISON: None available.    TECHNIQUE: Duplex sonography of the BILATERAL LOWER extremity veins with   color and spectral Doppler, with and without compression.    FINDINGS:    RIGHT:  Normal compressibility of the RIGHT common femoral, femoral and popliteal   veins.  Doppler examination shows normal spontaneous and phasic flow.  No RIGHT calf vein thrombosis is detected.    LEFT:  Normal compressibility of the LEFT common femoral, femoral and popliteal   veins.  Doppler examination shows normal spontaneous and phasic flow.  No LEFT calf vein thrombosis is detected.    IMPRESSION:  No evidence of deep venous thrombosis in either lower extremity.          --- End of Report ---            JEREMIAH RABAGO MD; Attending Radiologist  This document has been electronically signed. Mar  8 2023 10:01AM    < end of copied text >

## 2023-03-08 NOTE — CONSULT NOTE ADULT - ASSESSMENT
89F w/ PMH Afib (on pradaxa), prior DVT, remote history of MI, lymphedema, HTN, HLD presenting with acute dyspnea on day of presentation  89F w/ PMH Afib (on pradaxa), prior DVT, remote history of MI, lymphedema, HTN, HLD presenting with acute dyspnea on day of presentation concerning for decompensated heart failure.     #HFpEF Patient presented with dyspnea, modest BNP elevation. TTE demonstrated diastolic dysfunction, due to body habitus it her volume status is not easily apparent.   - BNP 2415  - Chest Xray and Chest CT with no radiographic evidence of pulmonary edema, no PE    Plan:   - Diurese with IV lasix 40mg daily  - Can start jardiance once on oral diuretics  - Hypercarbia likely due to obstructive disease as patient has significant expiratory wheezing on exam vs obesity hypoventilation syndrome  - Recommend nebulizer treatment and agree with Bipap    #A.fib  - Continue metoprolol 75mg BID  - Pradaxa 150mg BID    Patricia Webb MD  Cardiology Fellow- PGY 4

## 2023-03-08 NOTE — CONSULT NOTE ADULT - SUBJECTIVE AND OBJECTIVE BOX
CARDIOLOGY FELLOW CONSULT NOTE    HPI:  89F w/ PMH Afib (on pradaxa), prior DVT, remote history of MI, lymphedema, HTN, HLD presenting w/ dyspnea x1week. Patient has had intermittent difficulty breathing over last 1 week w/ acute worsening over last 24h. Usually able to walk approx 15 feet w/o issue (exercise limited by lymphedema and arthritis), but has been unable to catch her breath after ambulating to and from her bathroom. Her daughter took SpO2 at home and reports it was in the 60s; patient then used her sister's portable O2, which helped until EMS arrived. She has been sleeping in a recliner chair more frequently than usual, but notes this is because of difficulty getting up from bed. Notes increased LE edema from baseline. Takes lasix on a regular basis and has been taking as prescribed, no increases or decreases in dosage recently. Denies PND, chest pain, palpitations, diaphoresis, arm/jaw pain, indigestion, nausea, vomiting, changes in bowel habits. Endorses intermittent dry cough; has been diuresed with some improvement in respiratory status.     Seen sitting in a chair with supplemental nasal canula oxygen and no obvious respiratory distress.     PMHx:   Hypertension  Dyslipidemia  Myocardial Infarction  Hypercalcemia  Lymphedema      PSHx:   S/P Parathyroidectomy  S/P Hysterectomy  Bilateral Cataracts      Allergies:  No Known Allergies    Home Meds:    Current Medications:   acetaminophen     Tablet .. 650 milliGRAM(s) Oral every 6 hours PRN  albuterol/ipratropium for Nebulization 3 milliLiter(s) Nebulizer every 6 hours  ammonium lactate 12% Lotion 1 Application(s) Topical two times a day  atorvastatin 20 milliGRAM(s) Oral at bedtime  chlorhexidine 2% Cloths 1 Application(s) Topical <User Schedule>  dabigatran 150 milliGRAM(s) Oral every 12 hours  furosemide   Injectable 20 milliGRAM(s) IV Push every 12 hours  magnesium sulfate  IVPB 2 Gram(s) IV Intermittent once  melatonin 3 milliGRAM(s) Oral at bedtime PRN  metoprolol tartrate 75 milliGRAM(s) Oral two times a day  multivitamin 1 Tablet(s) Oral daily  predniSONE   Tablet 40 milliGRAM(s) Oral daily    ROS:  CV: chest pain (-), palpitation (-), orthopnea (-), PND (-), edema (+)  PULM: SOB (+), cough (+), wheezing (+), hemoptysis (-).   CONST: fever (-), chills (-) or fatigue (-)  GI: abdominal distension (-), abdominal pain (-) , nausea/vomiting (-), hematemesis, (-), melena (-), hematochezia (-)  : dysuria (-), frequency (-), hematuria (-).   NEURO: numbness (-), weakness (-), dizziness (-)  SKIN: itching (-), rash (-)  HEENT:  visual changes (-); vertigo or throat pain (-);  neck stiffness (-)     Physical Exam:  T(F): 97.7 (03-08), Max: 98.1 (03-07)  HR: 70 (03-08) (70 - 100)  BP: 112/77 (03-08) (107/73 - 123/84)  RR: 18 (03-08)  SpO2: 94% (03-08)    GENERAL: NAD  HEAD:  Atraumatic, Normocephalic.  EYES: EOMI, PERRLA, conjunctiva and sclera clear.  NECK: Supple, No JVD appreciated, body habitus limited complete evalution  CHEST/LUNG: Bilateral end-expiratory wheezing   HEART: Regular rate and rhythm; No murmurs, rubs, or gallops.  ABDOMEN: Bowel sounds present; Soft, Nontender, Nondistended.   EXTREMITIES:  2+ Peripheral Pulses, brisk capillary refill. No clubbing, cyanosis, or edema.  PSYCH: Normal affect.    SKIN: No rashes or lesions.    ECG: Personally reviewed    Echo: Personally reviewed    Stress Testing: Personally reviewed    Cath: Personally reviewed    CXR: Personally reviewed    Labs: Personally reviewed                        11.0   7.58  )-----------( 100      ( 08 Mar 2023 06:26 )             37.5     03-08    145  |  100  |  15  ----------------------------<  90  3.5   |  37<H>  |  0.78    Ca    8.9      08 Mar 2023 06:26  Phos  3.2     03-08  Mg     1.8     03-08    PT/INR - ( 08 Mar 2023 06:26 )   PT: 18.0 sec;   INR: 1.54 ratio         PTT - ( 08 Mar 2023 06:26 )  PTT:55.3 sec    CARDIAC MARKERS ( 05 Mar 2023 14:27 )  29 ng/L / x     / x     / x     / x     / x      CARDIAC MARKERS ( 05 Mar 2023 11:34 )  26 ng/L / x     / x     / x     / x     / x        Serum Pro-Brain Natriuretic Peptide: 2415 pg/mL (03-05 @ 11:34)    Thyroid Stimulating Hormone, Serum: 4.49 uIU/mL (03-06 @ 07:09)   CARDIOLOGY FELLOW CONSULT NOTE    HPI:  89F w/ PMH Afib (on pradaxa), prior DVT, remote history of MI, lymphedema, HTN, HLD presenting w/ dyspnea x1week. Patient has had intermittent difficulty breathing over last 1 week w/ acute worsening over last 24h. Usually able to walk approx 15 feet w/o issue (exercise limited by lymphedema and arthritis), but has been unable to catch her breath after ambulating to and from her bathroom. Her daughter took SpO2 at home and reports it was in the 60s; patient then used her sister's portable O2, which helped until EMS arrived. She has been sleeping in a recliner chair more frequently than usual, but notes this is because of difficulty getting up from bed. Notes increased LE edema from baseline. Takes lasix on a regular basis and has been taking as prescribed, no increases or decreases in dosage recently. Denies PND, chest pain, palpitations, diaphoresis, arm/jaw pain, indigestion, nausea, vomiting, changes in bowel habits. Endorses intermittent dry cough; has been diuresed with some improvement in respiratory status.     Seen sitting in a chair with supplemental nasal canula oxygen and no obvious respiratory distress.     PMHx:   Hypertension  Dyslipidemia  Myocardial Infarction  Hypercalcemia  Lymphedema      PSHx:   S/P Parathyroidectomy  S/P Hysterectomy  Bilateral Cataracts      Allergies:  No Known Allergies    Home Meds:    Current Medications:   acetaminophen     Tablet .. 650 milliGRAM(s) Oral every 6 hours PRN  albuterol/ipratropium for Nebulization 3 milliLiter(s) Nebulizer every 6 hours  ammonium lactate 12% Lotion 1 Application(s) Topical two times a day  atorvastatin 20 milliGRAM(s) Oral at bedtime  chlorhexidine 2% Cloths 1 Application(s) Topical <User Schedule>  dabigatran 150 milliGRAM(s) Oral every 12 hours  furosemide   Injectable 20 milliGRAM(s) IV Push every 12 hours  magnesium sulfate  IVPB 2 Gram(s) IV Intermittent once  melatonin 3 milliGRAM(s) Oral at bedtime PRN  metoprolol tartrate 75 milliGRAM(s) Oral two times a day  multivitamin 1 Tablet(s) Oral daily  predniSONE   Tablet 40 milliGRAM(s) Oral daily    ROS:  CV: chest pain (-), palpitation (-), orthopnea (-), PND (-), edema (+)  PULM: SOB (+), cough (+), wheezing (+), hemoptysis (-).   CONST: fever (-), chills (-) or fatigue (-)  GI: abdominal distension (-), abdominal pain (-) , nausea/vomiting (-), hematemesis, (-), melena (-), hematochezia (-)  : dysuria (-), frequency (-), hematuria (-).   NEURO: numbness (-), weakness (-), dizziness (-)  SKIN: itching (-), rash (-)  HEENT:  visual changes (-); vertigo or throat pain (-);  neck stiffness (-)     Physical Exam:  T(F): 97.7 (03-08), Max: 98.1 (03-07)  HR: 70 (03-08) (70 - 100)  BP: 112/77 (03-08) (107/73 - 123/84)  RR: 18 (03-08)  SpO2: 94% (03-08)    GENERAL: NAD  HEAD:  Atraumatic, Normocephalic.  EYES: EOMI, PERRLA, conjunctiva and sclera clear.  NECK: Supple, No JVD appreciated, body habitus limited complete evalution  CHEST/LUNG: Bilateral end-expiratory wheezing, very mild crackles appreciated   HEART: Irregularly irregular; No murmurs, rubs, or gallops.  ABDOMEN: Bowel sounds present; Soft, Nontender, Nondistended.   EXTREMITIES:  Signs of chronic lymphedema with overlying skin thickening and hyperpigmentation, no obvious pitting edema  PSYCH: Normal affect.    SKIN: No rashes or lesions.    ECG: Personally reviewed    Echo: Personally reviewed    Stress Testing: Personally reviewed    Cath: Personally reviewed    CXR: Personally reviewed    Labs: Personally reviewed                        11.0   7.58  )-----------( 100      ( 08 Mar 2023 06:26 )             37.5     03-08    145  |  100  |  15  ----------------------------<  90  3.5   |  37<H>  |  0.78    Ca    8.9      08 Mar 2023 06:26  Phos  3.2     03-08  Mg     1.8     03-08    PT/INR - ( 08 Mar 2023 06:26 )   PT: 18.0 sec;   INR: 1.54 ratio         PTT - ( 08 Mar 2023 06:26 )  PTT:55.3 sec    CARDIAC MARKERS ( 05 Mar 2023 14:27 )  29 ng/L / x     / x     / x     / x     / x      CARDIAC MARKERS ( 05 Mar 2023 11:34 )  26 ng/L / x     / x     / x     / x     / x        Serum Pro-Brain Natriuretic Peptide: 2415 pg/mL (03-05 @ 11:34)    Thyroid Stimulating Hormone, Serum: 4.49 uIU/mL (03-06 @ 07:09)

## 2023-03-08 NOTE — PHYSICAL THERAPY INITIAL EVALUATION ADULT - PERTINENT HX OF CURRENT PROBLEM, REHAB EVAL
Pt a 89 y/o  female admitted to [facility] on 3/5/23  PMH Afib (on pradaxa), prior DVT, remote history of MI, HTN, HLD presenting w/ dyspnea x1week. Patient has had intermittent difficulty breathing over last 1 week w/ acute worsening over last 24h. Usually able to walk approx 15 feet w/o issue (exercise limited by lymphedema and arthritis), but has been unable to catch her breath after ambulating to and from her bathroom. Her daughter took SpO2 at home and reports it was in the 60s; patient then used her sister's portable O2, which helped until EMS arrived. She has been sleeping in a recliner chair more frequently than usual, but notes this is because of difficulty getting up from bed. Notes increased LE edema from baseline. Takes lasix on a regular basis and has been taking as prescribed, no increases or decreases in dosage recently. ED course: Started on BiPAP for increased WOB. CTA done w/o evidence PE. (-) CTA chest , (-) VA duplex

## 2023-03-08 NOTE — PROGRESS NOTE ADULT - ATTENDING COMMENTS
89 yo f w pmh htn, hld, cad c/b mi, afib, dvt p/w worsening sob/quintanilla + le swelling, lethargy, found to have arf iso urosepsis + afib rvr w adhf, admitted to medicine for further mgmt.     # Acute hypercapnic/hypoxemic respiratory failure  - most likely secondary to heart failure exacerbation  - echo, diuresis to maintain net neg 1-2L/day  - strict I&Os, daily weights  - titrated to 2-4L NC, continue to titrate  - aggressive pulmonary toilet/hygiene     # Urosepsis  - s/p 2 days of macrobid at home, 1 g ceftriaxone in er  - cultures negative    # afib rvr  - s/p lopressor 25 in er, hr remains ~110  - monitor on telemetry  - switch home toprol 100 -> increase lopressor 75 bid  - titrate rate control to maintain goal hr <110/min  - cont home pradaxa  - TSH wnl  - left VM for outpatient cardiologist for input regarding afib rvr    Plan as above. D/w HS. 89 yo f w pmh htn, hld, cad c/b mi, afib, dvt p/w worsening sob/quintanilla + le swelling, lethargy, found to have arf iso urosepsis + afib rvr w adhf, admitted to medicine for further mgmt.     # Acute hypercapnic/hypoxemic respiratory failure  - most likely secondary to heart failure exacerbation - signs of pHTN, HFpEF   - echo, diuresis to maintain net neg 1-2L/day  - strict I&Os, daily weights  - titrated to 2-4L NC, continue to titrate  - aggressive pulmonary toilet/hygiene     # Urosepsis  - s/p 2 days of macrobid at home, 1 g ceftriaxone in er  - cultures negative    # afib rvr  - s/p lopressor 25 in er, hr remains ~110  - monitor on telemetry  - switch home toprol 100 -> increase lopressor 75 bid  - titrate rate control to maintain goal hr <110/min  - cont home pradaxa  - TSH wnl  - left VM for outpatient cardiologist for input regarding afib rvr    Plan as above. D/w HS.

## 2023-03-08 NOTE — PHYSICAL THERAPY INITIAL EVALUATION ADULT - ADDITIONAL COMMENTS
Pt lives in a private house alone with 3 steps to enter. Pt has HHA 12hrs x 7 days to assist with ADLs. Pt walks short distances with RW

## 2023-03-09 LAB
ANION GAP SERPL CALC-SCNC: 6 MMOL/L — SIGNIFICANT CHANGE UP (ref 5–17)
BASE EXCESS BLDV CALC-SCNC: 13.9 MMOL/L — HIGH (ref -2–3)
BUN SERPL-MCNC: 15 MG/DL — SIGNIFICANT CHANGE UP (ref 7–23)
CA-I SERPL-SCNC: 1.19 MMOL/L — SIGNIFICANT CHANGE UP (ref 1.15–1.33)
CALCIUM SERPL-MCNC: 8.9 MG/DL — SIGNIFICANT CHANGE UP (ref 8.4–10.5)
CHLORIDE BLDV-SCNC: 101 MMOL/L — SIGNIFICANT CHANGE UP (ref 96–108)
CHLORIDE SERPL-SCNC: 101 MMOL/L — SIGNIFICANT CHANGE UP (ref 96–108)
CO2 BLDV-SCNC: 43 MMOL/L — HIGH (ref 22–26)
CO2 SERPL-SCNC: 37 MMOL/L — HIGH (ref 22–31)
CREAT SERPL-MCNC: 0.73 MG/DL — SIGNIFICANT CHANGE UP (ref 0.5–1.3)
EGFR: 78 ML/MIN/1.73M2 — SIGNIFICANT CHANGE UP
GAS PNL BLDV: 140 MMOL/L — SIGNIFICANT CHANGE UP (ref 136–145)
GAS PNL BLDV: SIGNIFICANT CHANGE UP
GAS PNL BLDV: SIGNIFICANT CHANGE UP
GLUCOSE BLDV-MCNC: 227 MG/DL — HIGH (ref 70–99)
GLUCOSE SERPL-MCNC: 96 MG/DL — SIGNIFICANT CHANGE UP (ref 70–99)
HCO3 BLDV-SCNC: 41 MMOL/L — HIGH (ref 22–29)
HCT VFR BLD CALC: 35.9 % — SIGNIFICANT CHANGE UP (ref 34.5–45)
HCT VFR BLDA CALC: 35 % — SIGNIFICANT CHANGE UP (ref 34.5–46.5)
HGB BLD CALC-MCNC: 11.7 G/DL — SIGNIFICANT CHANGE UP (ref 11.7–16.1)
HGB BLD-MCNC: 10.7 G/DL — LOW (ref 11.5–15.5)
LACTATE BLDV-MCNC: 2 MMOL/L — SIGNIFICANT CHANGE UP (ref 0.5–2)
MAGNESIUM SERPL-MCNC: 1.8 MG/DL — SIGNIFICANT CHANGE UP (ref 1.6–2.6)
MCHC RBC-ENTMCNC: 29.3 PG — SIGNIFICANT CHANGE UP (ref 27–34)
MCHC RBC-ENTMCNC: 29.8 GM/DL — LOW (ref 32–36)
MCV RBC AUTO: 98.4 FL — SIGNIFICANT CHANGE UP (ref 80–100)
NRBC # BLD: 0 /100 WBCS — SIGNIFICANT CHANGE UP (ref 0–0)
PCO2 BLDV: 63 MMHG — HIGH (ref 39–42)
PH BLDV: 7.42 — SIGNIFICANT CHANGE UP (ref 7.32–7.43)
PHOSPHATE SERPL-MCNC: 2.1 MG/DL — LOW (ref 2.5–4.5)
PLATELET # BLD AUTO: 99 K/UL — LOW (ref 150–400)
PO2 BLDV: 63 MMHG — HIGH (ref 25–45)
POTASSIUM BLDV-SCNC: 4.2 MMOL/L — SIGNIFICANT CHANGE UP (ref 3.5–5.1)
POTASSIUM SERPL-MCNC: 3.8 MMOL/L — SIGNIFICANT CHANGE UP (ref 3.5–5.3)
POTASSIUM SERPL-SCNC: 3.8 MMOL/L — SIGNIFICANT CHANGE UP (ref 3.5–5.3)
RBC # BLD: 3.65 M/UL — LOW (ref 3.8–5.2)
RBC # FLD: 14.5 % — SIGNIFICANT CHANGE UP (ref 10.3–14.5)
SAO2 % BLDV: 95.3 % — HIGH (ref 67–88)
SODIUM SERPL-SCNC: 144 MMOL/L — SIGNIFICANT CHANGE UP (ref 135–145)
WBC # BLD: 6.58 K/UL — SIGNIFICANT CHANGE UP (ref 3.8–10.5)
WBC # FLD AUTO: 6.58 K/UL — SIGNIFICANT CHANGE UP (ref 3.8–10.5)

## 2023-03-09 PROCEDURE — 99223 1ST HOSP IP/OBS HIGH 75: CPT | Mod: GC

## 2023-03-09 PROCEDURE — 99233 SBSQ HOSP IP/OBS HIGH 50: CPT

## 2023-03-09 PROCEDURE — 99233 SBSQ HOSP IP/OBS HIGH 50: CPT | Mod: GC

## 2023-03-09 RX ORDER — METOPROLOL TARTRATE 50 MG
75 TABLET ORAL
Refills: 0 | Status: DISCONTINUED | OUTPATIENT
Start: 2023-03-09 | End: 2023-03-15

## 2023-03-09 RX ORDER — FUROSEMIDE 40 MG
40 TABLET ORAL DAILY
Refills: 0 | Status: DISCONTINUED | OUTPATIENT
Start: 2023-03-09 | End: 2023-03-10

## 2023-03-09 RX ORDER — ASCORBIC ACID 60 MG
500 TABLET,CHEWABLE ORAL DAILY
Refills: 0 | Status: DISCONTINUED | OUTPATIENT
Start: 2023-03-09 | End: 2023-03-15

## 2023-03-09 RX ORDER — ACETAZOLAMIDE 250 MG/1
250 TABLET ORAL ONCE
Refills: 0 | Status: COMPLETED | OUTPATIENT
Start: 2023-03-09 | End: 2023-03-09

## 2023-03-09 RX ORDER — SODIUM,POTASSIUM PHOSPHATES 278-250MG
1 POWDER IN PACKET (EA) ORAL EVERY 6 HOURS
Refills: 0 | Status: COMPLETED | OUTPATIENT
Start: 2023-03-09 | End: 2023-03-09

## 2023-03-09 RX ADMIN — Medication 3 MILLILITER(S): at 12:14

## 2023-03-09 RX ADMIN — Medication 40 MILLIGRAM(S): at 10:40

## 2023-03-09 RX ADMIN — Medication 500 MILLIGRAM(S): at 21:35

## 2023-03-09 RX ADMIN — Medication 1 TABLET(S): at 12:14

## 2023-03-09 RX ADMIN — Medication 3 MILLILITER(S): at 05:51

## 2023-03-09 RX ADMIN — Medication 3 MILLILITER(S): at 18:18

## 2023-03-09 RX ADMIN — Medication 3 MILLILITER(S): at 00:10

## 2023-03-09 RX ADMIN — Medication 75 MILLIGRAM(S): at 17:53

## 2023-03-09 RX ADMIN — Medication 1 APPLICATION(S): at 18:18

## 2023-03-09 RX ADMIN — DABIGATRAN ETEXILATE MESYLATE 150 MILLIGRAM(S): 150 CAPSULE ORAL at 18:17

## 2023-03-09 RX ADMIN — DABIGATRAN ETEXILATE MESYLATE 150 MILLIGRAM(S): 150 CAPSULE ORAL at 05:40

## 2023-03-09 RX ADMIN — CHLORHEXIDINE GLUCONATE 1 APPLICATION(S): 213 SOLUTION TOPICAL at 05:51

## 2023-03-09 RX ADMIN — Medication 3 MILLILITER(S): at 21:35

## 2023-03-09 RX ADMIN — Medication 1 PACKET(S): at 12:18

## 2023-03-09 RX ADMIN — Medication 20 MILLIGRAM(S): at 05:40

## 2023-03-09 RX ADMIN — ACETAZOLAMIDE 250 MILLIGRAM(S): 250 TABLET ORAL at 20:53

## 2023-03-09 RX ADMIN — Medication 1 APPLICATION(S): at 05:43

## 2023-03-09 RX ADMIN — Medication 75 MILLIGRAM(S): at 05:41

## 2023-03-09 RX ADMIN — ATORVASTATIN CALCIUM 20 MILLIGRAM(S): 80 TABLET, FILM COATED ORAL at 21:34

## 2023-03-09 RX ADMIN — Medication 40 MILLIGRAM(S): at 05:40

## 2023-03-09 RX ADMIN — Medication 1 PACKET(S): at 08:14

## 2023-03-09 NOTE — PROGRESS NOTE ADULT - SUBJECTIVE AND OBJECTIVE BOX
INCOMPLETE    INTERVAL:  SUBJECTIVE: Patient examined bedside this AM.    OBJECTIVE:  ICU Vital Signs Last 24 Hrs  T(C): 36.1 (09 Mar 2023 04:19), Max: 37.4 (08 Mar 2023 20:26)  T(F): 97 (09 Mar 2023 04:19), Max: 99.4 (08 Mar 2023 20:26)  HR: 110 (09 Mar 2023 05:42) (70 - 113)  BP: 109/79 (09 Mar 2023 05:42) (97/65 - 129/82)  BP(mean): --  ABP: --  ABP(mean): --  RR: 18 (09 Mar 2023 04:19) (18 - 18)  SpO2: 94% (09 Mar 2023 05:39) (94% - 98%)    O2 Parameters below as of 09 Mar 2023 04:19  Patient On (Oxygen Delivery Method): nasal cannula  O2 Flow (L/min): 3            03-07 @ 07:01  -  03-08 @ 07:00  --------------------------------------------------------  IN: 400 mL / OUT: 1300 mL / NET: -900 mL    03-08 @ 07:01  -  03-09 @ 06:57  --------------------------------------------------------  IN: 560 mL / OUT: 250 mL / NET: 310 mL      CAPILLARY BLOOD GLUCOSE          PHYSICAL EXAM:  General: Well-groomed, NAD, laying in bed, on RA  HEENT: PERRLA, EOMI, non-icteric  Neck:  symmetric,  JVD absent  Respiratory: Clear to ascultation bilaterally, no crackles/rales, no Resp distress; no accessory muscle use  Cardiovascular:  RRR, no murmurs/rubs/gallops  Abdomen: Soft, NT, ND  Extremities: No edema noted  Skin: No rashes or lesions noted  Neurological: Sensation grossly intact; strength 5/5 in all extremities.  Psychiatry: AOx3, appropriate insight/judgement, appropriate affect, recent/remote memory intact    PRN Meds:  acetaminophen     Tablet .. 650 milliGRAM(s) Oral every 6 hours PRN  melatonin 3 milliGRAM(s) Oral at bedtime PRN      LABS:                        10.7   6.58  )-----------( x        ( 09 Mar 2023 06:28 )             35.9     Hgb Trend: 10.7<--, 11.0<--, 10.1<--, 11.3<--, 13.5<--  03-09    144  |  101  |  15  ----------------------------<  96  3.8   |  37<H>  |  0.73    Ca    8.9      09 Mar 2023 06:19  Phos  2.1     03-09  Mg     1.8     03-09      Creatinine Trend: 0.73<--, 0.78<--, 0.84<--, 0.89<--, 0.81<--  PT/INR - ( 08 Mar 2023 06:26 )   PT: 18.0 sec;   INR: 1.54 ratio         PTT - ( 08 Mar 2023 06:26 )  PTT:55.3 sec    Arterial Blood Gas:  03-07 @ 14:20  7.37/67/155/39/99.8/11.2  ABG lactate: --    Venous Blood Gas:  03-08 @ 06:24  7.31/85/68/43/94.0  VBG Lactate: 1.7      MICROBIOLOGY:       RADIOLOGY:  [ ] Reviewed and interpreted by me    EKG: INTERVAL: NAEO  SUBJECTIVE: Patient examined bedside this AM with daughter over the phone. States that her breathing feels better, no concerns. Was able to wear the BIPAP for ~5 hours overnight. Denies Fevers, chills, cough, chest pain, abdominal pain, headache, dizziness, nausea, vomiting, dysuria, changes in urination, changes in BM     OBJECTIVE:  ICU Vital Signs Last 24 Hrs  T(C): 36.1 (09 Mar 2023 04:19), Max: 37.4 (08 Mar 2023 20:26)  T(F): 97 (09 Mar 2023 04:19), Max: 99.4 (08 Mar 2023 20:26)  HR: 110 (09 Mar 2023 05:42) (70 - 113)  BP: 109/79 (09 Mar 2023 05:42) (97/65 - 129/82)  BP(mean): --  ABP: --  ABP(mean): --  RR: 18 (09 Mar 2023 04:19) (18 - 18)  SpO2: 94% (09 Mar 2023 05:39) (94% - 98%)    O2 Parameters below as of 09 Mar 2023 04:19  Patient On (Oxygen Delivery Method): nasal cannula  O2 Flow (L/min): 3            03-07 @ 07:01  -  03-08 @ 07:00  --------------------------------------------------------  IN: 400 mL / OUT: 1300 mL / NET: -900 mL    03-08 @ 07:01  -  03-09 @ 06:57  --------------------------------------------------------  IN: 560 mL / OUT: 250 mL / NET: 310 mL      CAPILLARY BLOOD GLUCOSE          PHYSICAL EXAM:  General: Well-groomed, NAD, laying in bed, on 2L NC  HEENT: PERRLA, EOMI, non-icteric  Neck:  symmetric,  JVD absent  Respiratory: Faint bibasilar crackles, no wheezing; no Resp distress; no accessory muscle use  Cardiovascular:  A-fib, no murmurs/rubs/gallops  Abdomen: Soft, NT, ND  Extremities: Sever lymphedema, improved from previous, now wrapped  Skin: No rashes or lesions noted  Neurological: Sensation grossly intact  Psychiatry: AOx3, appropriate insight/judgement, appropriate affect, recent/remote memory intact    PRN Meds:  acetaminophen     Tablet .. 650 milliGRAM(s) Oral every 6 hours PRN  melatonin 3 milliGRAM(s) Oral at bedtime PRN      LABS:                        10.7   6.58  )-----------( x        ( 09 Mar 2023 06:28 )             35.9     Hgb Trend: 10.7<--, 11.0<--, 10.1<--, 11.3<--, 13.5<--  03-09    144  |  101  |  15  ----------------------------<  96  3.8   |  37<H>  |  0.73    Ca    8.9      09 Mar 2023 06:19  Phos  2.1     03-09  Mg     1.8     03-09      Creatinine Trend: 0.73<--, 0.78<--, 0.84<--, 0.89<--, 0.81<--  PT/INR - ( 08 Mar 2023 06:26 )   PT: 18.0 sec;   INR: 1.54 ratio         PTT - ( 08 Mar 2023 06:26 )  PTT:55.3 sec    Arterial Blood Gas:  03-07 @ 14:20  7.37/67/155/39/99.8/11.2  ABG lactate: --    Venous Blood Gas:  03-08 @ 06:24  7.31/85/68/43/94.0  VBG Lactate: 1.7      MICROBIOLOGY:       RADIOLOGY:  [ ] Reviewed and interpreted by me    EKG:

## 2023-03-09 NOTE — PROGRESS NOTE ADULT - PROBLEM SELECTOR PLAN 6
Diet: currently NPO due to BiPAP, can eat regular diet when off  DVT: Pradaxa  Dispo: pending course, PT consult    GOC: Patient's son, Akin Barber and daughter Empertariz Barber are HCP. Wishes to remain FULL CODE.     Communication: results and plan discussed w/ patient and her daughter at bedside, 10pm. Diet: Regular  DVT: Pradaxa  Dispo: pending course, PT consult    GOC: Patient's son, Akin Barber and daughter Emperatriz Barber are HCP. Wishes to remain FULL CODE.     Communication: results and plan discussed w/ patient and her daughter at bedside, 10pm.

## 2023-03-09 NOTE — DIETITIAN INITIAL EVALUATION ADULT - ADD RECOMMEND
1) Consider Low Sodium diet.  2) Continue multivitamin; recommend addition of Vitamin C to promote wound healing if not medically contraindicated.  3) Monitor PO intake, GI tolerance, skin integrity, labs, weight, and bowel movement regularity.   4) Honor food preferences as feasible. Assist with meals PRN and encourage PO intake.  5) RD remains available upon request and will follow-up per protocol.

## 2023-03-09 NOTE — DIETITIAN INITIAL EVALUATION ADULT - PERTINENT LABORATORY DATA
03-09    144  |  101  |  15  ----------------------------<  96  3.8   |  37<H>  |  0.73    Ca    8.9      09 Mar 2023 06:19  Phos  2.1     03-09  Mg     1.8     03-09

## 2023-03-09 NOTE — PROGRESS NOTE ADULT - PROBLEM SELECTOR PLAN 1
Patient admitted w/ RICHARD x1week w/ acute worsening over last 24h; hypoxic at home, respiratory acidosis and started on BiPAP w/ improvement in CO2 levels. Trop WNL, BNP 2415; CT chest w/o edema, consolidations.  Last TTE on file 2020, showed biatrial enlargement w/ normal RV and LV systolic fxn, mild pulmonary hypertension, mild-moderate AS. According to OP cardiology notes, has had periods of systolic dysfxn in past.   Considering CHF exacerbation, sepsis; less likely PNA, ACS. CTA w/o evidence of PE.   -Now on 4L NC  -Echo showing diastolic dysfunction with moderate pulm HTN and EF 72%--> c/w HFpEF and Group 2 Pulm HTN    -c/w lasix to 20mg IV BID  -Signifcant wheezing on exam, likely cardiac in nature--> trial of duonebs  -Patient with worsened hypercapnia in AM, c/f KALIN--> trial NIPPV tonight Patient admitted w/ RICHARD x1week w/ acute worsening over last 24h; hypoxic at home, respiratory acidosis and started on BiPAP w/ improvement in CO2 levels. Trop WNL, BNP 2415; CT chest w/o edema, consolidations.  According to OP cardiology notes, has had periods of systolic dysfxn in past.   -Now on 2L NC  -Echo showing diastolic dysfunction with moderate pulm HTN and EF 72%--> c/w HFpEF and Group 2 Pulm HTN  -Hypoxia persistent and out of proportion to lung exam--> Pulmonology consult    -Switch lasix 20mg IV BID --> 40mg IV qd  -Signifcant wheezing on exam, likely cardiac in nature--> trial of duonebs  -Patient trialed on NIPPV with improvement in AM hypercapnia--> c/w NIPPV

## 2023-03-09 NOTE — PROGRESS NOTE ADULT - ASSESSMENT
89F w/ PMH Afib (on pradaxa), remote history MI, HTN, prior DVT presenting w/ intermittent dyspnea x1 week w/ acute worsening x24h, admitted w/ acute hypoxic and hypercapnic respiratory failure 2/2 urosepsis +/- CHF exacerbation.     Echo showing signs of pulm HTN and HFpEF (concentric hypertrophy). Patient also with chronic retention of CO2, worse in the AM, c/f KALIN vs OHS. 89F w/ PMH Afib (on pradaxa), remote history MI, HTN, prior DVT presenting w/ intermittent dyspnea x1 week w/ acute worsening x24h, admitted w/ acute hypoxic and hypercapnic respiratory failure 2/2 urosepsis +/- CHF exacerbation.     Echo showing signs of pulm HTN and HFpEF (concentric hypertrophy). Patient also with chronic retention of CO2, worse in the AM, c/f KALIN vs OHS/chronic lung disease

## 2023-03-09 NOTE — CONSULT NOTE ADULT - ASSESSMENT
89YO Female PMH Afib (on pradaxa), prior DVT, remote history of MI, HTN, HLD who was admitted 3/5 w/ dyspnea x1week.  CTA was done which showed no PE however did appear to have mosaicism and trace effusions. Additionally small nodules associated with small airways disease were also noted. Pulmonary consulted for Respiratory Failure.    #Hypoxemic and Hypercapnic Respiratory Failure  - Pt was initially noted to by hypercapnic in the setting of acute HF exacerbation  - VBG 7.25/89 which improved with Bilevel and diuresis  - Bicarbonate was notably elevated on admission to 33 and has been rising with diuresis to 37  - VBG done 3/9 shows 7.42/63 consistent with metabolic acidosis in addition to respiratory acidosis  - Echo shows HFpEF with concentric remodeling and increased LV filling pressures as well as moderate PH  - CT does show some mosaicism which may be related to small airways disease  - C/w diuresis to euvolemia  - C/w Strict In and Out  - C/w Daily Weights     89YO Female PMH Afib (on pradaxa), prior DVT, remote history of MI, HTN, HLD who was admitted 3/5 w/ dyspnea x1week.  CTA was done which showed no PE however did appear to have mosaicism and trace effusions. Additionally small nodules associated with small airways disease were also noted. Pulmonary consulted for Respiratory Failure.    #Hypoxemic and Hypercapnic Respiratory Failure  - Pt was initially noted to by hypercapnic in the setting of acute HF exacerbation  - VBG 7.25/89 which improved with Bilevel and diuresis  - Bicarbonate was notably elevated on admission to 33 and has been rising with diuresis to 37  - VBG done 3/9 shows 7.42/63 consistent with metabolic acidosis in addition to respiratory acidosis (which likely has been present for some time as pt's admission bicarb was elevated to 33)  - Echo shows HFpEF with concentric remodeling and increased LV filling pressures as well as moderate PH  - Pt never smoked and had no wheezing on exam- doubt COPD  - Please diurese with Diamox 250mg x1  - Please recheck Chem and VBG tomorrow  - C/w Strict In and Out  - C/w Daily Weights    Case discussed with Dr. Hernandez

## 2023-03-09 NOTE — DIETITIAN INITIAL EVALUATION ADULT - REASON FOR ADMISSION
Acute respiratory distress    Per chart: 89F w/ PMH Afib (on pradaxa), remote history MI, HTN, prior DVT presenting w/ intermittent dyspnea x1 week w/ acute worsening x24h, admitted w/ acute hypoxic and hypercapnic respiratory failure 2/2 urosepsis +/- CHF exacerbation.   Echo showing signs of pulm HTN and HFpEF (concentric hypertrophy). Patient also with chronic retention of CO2, worse in the AM, c/f KALIN vs OHS.

## 2023-03-09 NOTE — DIETITIAN INITIAL EVALUATION ADULT - OTHER INFO
Pt states UBW ~190 lbs. Denies recent wt changes.   Dosing wt: 199.9 lbs (03-05, stated)  Wt history per chart: 229 lbs (03-09), 210 lbs (08/30/22), 212 lbs (09/14/21). RD to continue to monitor weight trends as able/available.     Per chart, pt currently ordered for IV lasix, PHOS-NaK, prednisone, atorvastatin, and a multivitamin in-house.

## 2023-03-09 NOTE — PROGRESS NOTE ADULT - SUBJECTIVE AND OBJECTIVE BOX
Cardiology Progress Note  ------------------------------------------------------------------------------------------  SUBJECTIVE:   No events overnight. Seen this morning, she was unaware if she used the Bipap machine    Telemetry: a.fib 90-120s  -------------------------------------------------------------------------------------------  ROS:  CV: chest pain (-), palpitation (-), orthopnea (-), PND (-), edema (-)  PULM: SOB (-), cough (-), wheezing (-), hemoptysis (-).   CONST: fever (-), chills (-) or fatigue (-)  GI: abdominal distension (-), abdominal pain (-) , nausea/vomiting (-), hematemesis, (-), melena (-), hematochezia (-)  : dysuria (-), frequency (-), hematuria (-).   NEURO: numbness (-), weakness (-), dizziness (-)  SKIN: itching (-), rash (-)  HEENT:  visual changes (-); vertigo or throat pain (-);  neck stiffness (-)     All other review of systems is negative unless indicated above.   -------------------------------------------------------------------------------------------  VS:  T(F): 98.5 (03-09), Max: 99.4 (03-08)  HR: 120 (03-09) (70 - 120)  BP: 116/80 (03-09) (97/65 - 129/82)  RR: 18 (03-09)  SpO2: 92% (03-09)  I&O's Summary    08 Mar 2023 07:01  -  09 Mar 2023 07:00  --------------------------------------------------------  IN: 560 mL / OUT: 250 mL / NET: 310 mL    09 Mar 2023 07:01  -  09 Mar 2023 11:21  --------------------------------------------------------  IN: 320 mL / OUT: 0 mL / NET: 320 mL    ------------------------------------------------------------------------------------------  PHYSICAL EXAM:  HEAD:  Atraumatic, Normocephalic.  EYES: EOMI, PERRLA, conjunctiva and sclera clear.  NECK: Supple, No JVD appreciated, body habitus limited complete evaluation  CHEST/LUNG: Improved  end-expiratory wheezing,   HEART: Irregularly irregular; No murmurs, rubs, or gallops.  ABDOMEN: Bowel sounds present; Soft, Nontender, Nondistended.   EXTREMITIES:  Signs of chronic lymphedema with overlying skin thickening and hyperpigmentation, no obvious pitting edema  PSYCH: Normal affect.  SKIN: No rashes or lesions.    -------------------------------------------------------------------------------------------  LABS:                          10.7   6.58  )-----------( 99       ( 09 Mar 2023 06:28 )             35.9     03-09    144  |  101  |  15  ----------------------------<  96  3.8   |  37<H>  |  0.73    Ca    8.9      09 Mar 2023 06:19  Phos  2.1     03-09  Mg     1.8     03-09    PT/INR - ( 08 Mar 2023 06:26 )   PT: 18.0 sec;   INR: 1.54 ratio      PTT - ( 08 Mar 2023 06:26 )  PTT:55.3 sec  CARDIAC MARKERS ( 05 Mar 2023 14:27 )  29 ng/L / x     / x     / x     / x     / x      CARDIAC MARKERS ( 05 Mar 2023 11:34 )  26 ng/L / x     / x     / x     / x     / x        -------------------------------------------------------------------------------------------  Meds:  acetaminophen     Tablet .. 650 milliGRAM(s) Oral every 6 hours PRN  albuterol/ipratropium for Nebulization 3 milliLiter(s) Nebulizer every 6 hours  ammonium lactate 12% Lotion 1 Application(s) Topical two times a day  atorvastatin 20 milliGRAM(s) Oral at bedtime  chlorhexidine 2% Cloths 1 Application(s) Topical <User Schedule>  dabigatran 150 milliGRAM(s) Oral every 12 hours  furosemide   Injectable 40 milliGRAM(s) IV Push daily  melatonin 3 milliGRAM(s) Oral at bedtime PRN  metoprolol tartrate 75 milliGRAM(s) Oral two times a day  multivitamin 1 Tablet(s) Oral daily  potassium phosphate / sodium phosphate Powder (PHOS-NaK) 1 Packet(s) Oral every 6 hours  predniSONE   Tablet 40 milliGRAM(s) Oral daily  -------------------------------------------------------------------------------------------

## 2023-03-09 NOTE — CONSULT NOTE ADULT - SUBJECTIVE AND OBJECTIVE BOX
CHIEF COMPLAINT: SOB    HPI:  89YO Female PMH Afib (on pradaxa), prior DVT, remote history of MI, HTN, HLD who was admitted 3/5 w/ dyspnea x1week.     Patient has had intermittent difficulty breathing over last 1 week w/ acute worsening over last 24h. Usually able to walk approx 15 feet w/o issue (exercise limited by lymphedema and arthritis), but has been unable to catch her breath after ambulating to and from her bathroom. Her daughter took SpO2 at home and reports it was in the 60s; patient then used her sister's portable O2, which helped until EMS arrived. She has been sleeping in a recliner chair more frequently than usual, but notes this is because of difficulty getting up from bed. Notes increased LE edema from baseline. Takes lasix on a regular basis and has been taking as prescribed, no increases or decreases in dosage recently. Denies PND, chest pain, palpitations, diaphoresis, arm/jaw pain, indigestion, nausea, vomiting, changes in bowel habits. Endorses intermittent dry cough; no fevers, constitutional symptoms, myalgias, weight loss. Daughter endorses chronic incontinence and seeing blood (no clots) in pull-ups over last few days.     Patient recently diagnosed w/ UTI and took 1-2 days of macrobid 100mg BID, but noted that hematuria persisted. Outpatient Ucx - Aug 2022, had E. coli resistant to ampicillin. No recent hospitalizations, abx use.     In ED she was hemodynamically stable and afebrile saturating 100% NRB 15L. Labs notable for no leukocytosis, Bicarb 33, VBG 7.25/89, BNP 2418. She was started on BiPAP. CTA was done which showed no PE however did appear to have mosaicism and trace effusions. Additionally small nodules associated with small airways disease were also noted. She was given IV lasix 20mg x1, ceftriaxone x1.     Since admission she has been diuresed 1.2L though has been positive the last 2 days. She had VBG which showed 7.42/63 as bicarb has been risking to most recently 37. She was noted to wheeze on exam. Echo was done showing EF 72% w/ LV concentric remodeling, increased LV filling pressures, Moderate RA enlargement and Increased RVSP to 56 with RAP of 12-15mmHg is consistent with moderate PH.     Pulmonary consulted for Respiratory Failure.      PAST MEDICAL & SURGICAL HISTORY:  Hypertension  Dyslipidemia  Myocardial Infarction 15-20 years ago  Hypercalcemia  Lymphedema  S/P Parathyroidectomy  S/P Hysterectomy  Bilateral Cataracts    FAMILY HISTORY:      SOCIAL HISTORY:  Smoking: [ ] Never Smoked [ ] Former Smoker (__ packs x ___ years) [ ] Current Smoker  (__ packs x ___ years)  Substance Use: [ ] Never Used [ ] Used ____  EtOH Use:  Marital Status: [ ] Single [ ]  [ ]  [ ]   Sexual History:   Occupation:  Recent Travel:  Country of Birth:  Advance Directives:    Allergies    No Known Allergies    Intolerances        HOME MEDICATIONS:  Home Medications:  dabigatran 150 mg oral capsule: 1 cap(s) orally 2 times a day (05 Mar 2023 20:57)  furosemide 20 mg oral tablet: 2 tab(s) orally 2 times a day (05 Mar 2023 20:57)  Klor-Con M20 oral tablet, extended release: 1 tab(s) orally once a day (05 Mar 2023 20:57)  metoprolol succinate 100 mg oral tablet, extended release: 1 tab(s) orally once a day (05 Mar 2023 20:57)  multivitamin: 1 tab(s) orally once a day (05 Mar 2023 20:57)  nitrofurantoin macrocrystals-monohydrate 100 mg oral capsule: 1 cap(s) orally 2 times a day x7 days  NOTE: x2 doses received prior to admission (05 Mar 2023 20:57)  rosuvastatin 5 mg oral tablet: 1 tab(s) orally once a day (at bedtime) (05 Mar 2023 20:57)      REVIEW OF SYSTEMS:  Constitutional: [ ] negative [ ] fevers [ ] chills [ ] weight loss [ ] weight gain  HEENT: [ ] negative [ ] dry eyes [ ] eye irritation [ ] postnasal drip [ ] nasal congestion  CV: [ ] negative  [ ] chest pain [ ] orthopnea [ ] palpitations [ ] murmur  Resp: [ ] negative [ ] cough [ ] shortness of breath [ ] dyspnea [ ] wheezing [ ] sputum [ ] hemoptysis  GI: [ ] negative [ ] nausea [ ] vomiting [ ] diarrhea [ ] constipation [ ] abd pain [ ] dysphagia   : [ ] negative [ ] dysuria [ ] nocturia [ ] hematuria [ ] increased urinary frequency  Musculoskeletal: [ ] negative [ ] back pain [ ] myalgias [ ] arthralgias [ ] fracture  Skin: [ ] negative [ ] rash [ ] itch  Neurological: [ ] negative [ ] headache [ ] dizziness [ ] syncope [ ] weakness [ ] numbness  Psychiatric: [ ] negative [ ] anxiety [ ] depression  Endocrine: [ ] negative [ ] diabetes [ ] thyroid problem  Hematologic/Lymphatic: [ ] negative [ ] anemia [ ] bleeding problem  Allergic/Immunologic: [ ] negative [ ] itchy eyes [ ] nasal discharge [ ] hives [ ] angioedema  [ ] All other systems negative  [ ] Unable to assess ROS because ________    OBJECTIVE:  ICU Vital Signs Last 24 Hrs  T(C): 36.9 (09 Mar 2023 11:17), Max: 37.4 (08 Mar 2023 20:26)  T(F): 98.5 (09 Mar 2023 11:17), Max: 99.4 (08 Mar 2023 20:26)  HR: 100 (09 Mar 2023 12:18) (73 - 120)  BP: 116/80 (09 Mar 2023 11:17) (97/65 - 129/82)  BP(mean): --  ABP: --  ABP(mean): --  RR: 18 (09 Mar 2023 11:17) (18 - 18)  SpO2: 92% (09 Mar 2023 11:17) (92% - 98%)    O2 Parameters below as of 09 Mar 2023 11:17  Patient On (Oxygen Delivery Method): nasal cannula  O2 Flow (L/min): 2            03-08 @ 07:01 - 03-09 @ 07:00  --------------------------------------------------------  IN: 560 mL / OUT: 250 mL / NET: 310 mL    03-09 @ 07:01  -  03-09 @ 13:50  --------------------------------------------------------  IN: 600 mL / OUT: 600 mL / NET: 0 mL      CAPILLARY BLOOD GLUCOSE          PHYSICAL EXAM:  General:   HEENT:   Lymph Nodes:  Neck:   Respiratory:   Cardiovascular:   Abdomen:   Extremities:   Skin:   Neurological:  Psychiatry:    LINES:     HOSPITAL MEDICATIONS:  Standing Meds:  albuterol/ipratropium for Nebulization 3 milliLiter(s) Nebulizer every 6 hours  ammonium lactate 12% Lotion 1 Application(s) Topical two times a day  atorvastatin 20 milliGRAM(s) Oral at bedtime  chlorhexidine 2% Cloths 1 Application(s) Topical <User Schedule>  dabigatran 150 milliGRAM(s) Oral every 12 hours  furosemide   Injectable 40 milliGRAM(s) IV Push daily  metoprolol tartrate 75 milliGRAM(s) Oral two times a day  multivitamin 1 Tablet(s) Oral daily  predniSONE   Tablet 40 milliGRAM(s) Oral daily      PRN Meds:  acetaminophen     Tablet .. 650 milliGRAM(s) Oral every 6 hours PRN  melatonin 3 milliGRAM(s) Oral at bedtime PRN      LABS:                        10.7   6.58  )-----------( 99       ( 09 Mar 2023 06:28 )             35.9     Hgb Trend: 10.7<--, 11.0<--, 10.1<--, 11.3<--, 13.5<--  03-09    144  |  101  |  15  ----------------------------<  96  3.8   |  37<H>  |  0.73    Ca    8.9      09 Mar 2023 06:19  Phos  2.1     03-09  Mg     1.8     03-09      Creatinine Trend: 0.73<--, 0.78<--, 0.84<--, 0.89<--, 0.81<--  PT/INR - ( 08 Mar 2023 06:26 )   PT: 18.0 sec;   INR: 1.54 ratio         PTT - ( 08 Mar 2023 06:26 )  PTT:55.3 sec    Arterial Blood Gas:  03-07 @ 14:20  7.37/67/155/39/99.8/11.2  ABG lactate: --    Venous Blood Gas:  03-09 @ 09:49  7.42/63/63/41/95.3  VBG Lactate: 2.0  Venous Blood Gas:  03-08 @ 06:24  7.31/85/68/43/94.0  VBG Lactate: 1.7      MICROBIOLOGY:       RADIOLOGY:  [ ] Reviewed and interpreted by me    PULMONARY FUNCTION TESTS:    EKG: CHIEF COMPLAINT: SOB    HPI:  89YO Female Never smoker PMH Afib (on pradaxa), prior DVT, remote history of MI, HTN, HLD who was admitted 3/5 w/ dyspnea x1week.     Patient has had intermittent difficulty breathing over last 1 week w/ acute worsening over last 24h. Usually able to walk approx 15 feet w/o issue (exercise limited by lymphedema and arthritis), but has been unable to catch her breath after ambulating to and from her bathroom. Her daughter took SpO2 at home and reports it was in the 60s; patient then used her sister's portable O2, which helped until EMS arrived. She has been sleeping in a recliner chair more frequently than usual, but notes this is because of difficulty getting up from bed. Notes increased LE edema from baseline. Takes lasix on a regular basis and has been taking as prescribed, no increases or decreases in dosage recently. Denies PND, chest pain, palpitations, diaphoresis, arm/jaw pain, indigestion, nausea, vomiting, changes in bowel habits. Endorses intermittent dry cough; no fevers, constitutional symptoms, myalgias, weight loss. Daughter endorses chronic incontinence and seeing blood (no clots) in pull-ups over last few days.     Patient recently diagnosed w/ UTI and took 1-2 days of macrobid 100mg BID, but noted that hematuria persisted. Outpatient Ucx - Aug 2022, had E. coli resistant to ampicillin. No recent hospitalizations, abx use.     In ED she was hemodynamically stable and afebrile saturating 100% NRB 15L. Labs notable for no leukocytosis, Bicarb 33, VBG 7.25/89, BNP 2418. She was started on BiPAP. CTA was done which showed no PE however did appear to have mosaicism and trace effusions. Additionally small nodules associated with small airways disease were also noted. She was given IV lasix 20mg x1, ceftriaxone x1.     Since admission she has been diuresed 1.2L though has been positive the last 2 days. She had VBG which showed 7.42/63 as bicarb has been risking to most recently 37. She was noted to wheeze on exam. Echo was done showing EF 72% w/ LV concentric remodeling, increased LV filling pressures, Moderate RA enlargement and Increased RVSP to 56 with RAP of 12-15mmHg is consistent with moderate PH.     Pulmonary consulted for Respiratory Failure.      PAST MEDICAL & SURGICAL HISTORY:  Hypertension  Dyslipidemia  Myocardial Infarction 15-20 years ago  Hypercalcemia  Lymphedema  S/P Parathyroidectomy  S/P Hysterectomy  Bilateral Cataracts    FAMILY HISTORY:      SOCIAL HISTORY:  Smoking: [x ] Never Smoked [ ] Former Smoker (__ packs x ___ years) [ ] Current Smoker  (__ packs x ___ years)  Substance Use: [ ] Never Used [ ] Used ____  EtOH Use:  Marital Status: [ ] Single [ ]  [ ]  [ ]   Sexual History:   Occupation:  Recent Travel:  Country of Birth:  Advance Directives:    Allergies    No Known Allergies    Intolerances        HOME MEDICATIONS:  Home Medications:  dabigatran 150 mg oral capsule: 1 cap(s) orally 2 times a day (05 Mar 2023 20:57)  furosemide 20 mg oral tablet: 2 tab(s) orally 2 times a day (05 Mar 2023 20:57)  Klor-Con M20 oral tablet, extended release: 1 tab(s) orally once a day (05 Mar 2023 20:57)  metoprolol succinate 100 mg oral tablet, extended release: 1 tab(s) orally once a day (05 Mar 2023 20:57)  multivitamin: 1 tab(s) orally once a day (05 Mar 2023 20:57)  nitrofurantoin macrocrystals-monohydrate 100 mg oral capsule: 1 cap(s) orally 2 times a day x7 days  NOTE: x2 doses received prior to admission (05 Mar 2023 20:57)  rosuvastatin 5 mg oral tablet: 1 tab(s) orally once a day (at bedtime) (05 Mar 2023 20:57)      REVIEW OF SYSTEMS:  Constitutional: [ ] negative [ ] fevers [ ] chills [ ] weight loss [ ] weight gain  HEENT: [ ] negative [ ] dry eyes [ ] eye irritation [ ] postnasal drip [ ] nasal congestion  CV: [ ] negative  [ ] chest pain [ ] orthopnea [ ] palpitations [ ] murmur  Resp: [ ] negative [ ] cough [x ] shortness of breath [ ] dyspnea [ ] wheezing [ ] sputum [ ] hemoptysis  GI: [ ] negative [ ] nausea [ ] vomiting [ ] diarrhea [ ] constipation [ ] abd pain [ ] dysphagia   : [ ] negative [ ] dysuria [ ] nocturia [ ] hematuria [ ] increased urinary frequency  Musculoskeletal: [ ] negative [ ] back pain [ ] myalgias [ ] arthralgias [ ] fracture  Skin: [ ] negative [ ] rash [ ] itch  Neurological: [ ] negative [ ] headache [ ] dizziness [ ] syncope [ ] weakness [ ] numbness  Psychiatric: [ ] negative [ ] anxiety [ ] depression  Endocrine: [ ] negative [ ] diabetes [ ] thyroid problem  Hematologic/Lymphatic: [ ] negative [ ] anemia [ ] bleeding problem  Allergic/Immunologic: [ ] negative [ ] itchy eyes [ ] nasal discharge [ ] hives [ ] angioedema  [x ] All other systems negative  [ ] Unable to assess ROS because ________    OBJECTIVE:  ICU Vital Signs Last 24 Hrs  T(C): 36.9 (09 Mar 2023 11:17), Max: 37.4 (08 Mar 2023 20:26)  T(F): 98.5 (09 Mar 2023 11:17), Max: 99.4 (08 Mar 2023 20:26)  HR: 100 (09 Mar 2023 12:18) (73 - 120)  BP: 116/80 (09 Mar 2023 11:17) (97/65 - 129/82)  BP(mean): --  ABP: --  ABP(mean): --  RR: 18 (09 Mar 2023 11:17) (18 - 18)  SpO2: 92% (09 Mar 2023 11:17) (92% - 98%)    O2 Parameters below as of 09 Mar 2023 11:17  Patient On (Oxygen Delivery Method): nasal cannula  O2 Flow (L/min): 2            03-08 @ 07:01  -  03-09 @ 07:00  --------------------------------------------------------  IN: 560 mL / OUT: 250 mL / NET: 310 mL    03-09 @ 07:01  -  03-09 @ 13:50  --------------------------------------------------------  IN: 600 mL / OUT: 600 mL / NET: 0 mL      CAPILLARY BLOOD GLUCOSE      PHYSICAL EXAM:  General: Female sitting n chair in no acute distress  HEENT: Nasal canula in place  Respiratory: Crackles to mid lung fields NO wheezing  Cardiovascular: Regular rate and rhythm no m/r/g  Abdomen: Nontender nondistended  Extremities: Lymphedema in bilateral legs reportedly better than usual  Skin: No rashes  Neurological: No appreciable neurologic deficits  Psychiatry: Appropriate mood and affect      LINES:     HOSPITAL MEDICATIONS:  Standing Meds:  albuterol/ipratropium for Nebulization 3 milliLiter(s) Nebulizer every 6 hours  ammonium lactate 12% Lotion 1 Application(s) Topical two times a day  atorvastatin 20 milliGRAM(s) Oral at bedtime  chlorhexidine 2% Cloths 1 Application(s) Topical <User Schedule>  dabigatran 150 milliGRAM(s) Oral every 12 hours  furosemide   Injectable 40 milliGRAM(s) IV Push daily  metoprolol tartrate 75 milliGRAM(s) Oral two times a day  multivitamin 1 Tablet(s) Oral daily  predniSONE   Tablet 40 milliGRAM(s) Oral daily      PRN Meds:  acetaminophen     Tablet .. 650 milliGRAM(s) Oral every 6 hours PRN  melatonin 3 milliGRAM(s) Oral at bedtime PRN      LABS:                        10.7   6.58  )-----------( 99       ( 09 Mar 2023 06:28 )             35.9     Hgb Trend: 10.7<--, 11.0<--, 10.1<--, 11.3<--, 13.5<--  03-09    144  |  101  |  15  ----------------------------<  96  3.8   |  37<H>  |  0.73    Ca    8.9      09 Mar 2023 06:19  Phos  2.1     03-09  Mg     1.8     03-09      Creatinine Trend: 0.73<--, 0.78<--, 0.84<--, 0.89<--, 0.81<--  PT/INR - ( 08 Mar 2023 06:26 )   PT: 18.0 sec;   INR: 1.54 ratio         PTT - ( 08 Mar 2023 06:26 )  PTT:55.3 sec    Arterial Blood Gas:  03-07 @ 14:20  7.37/67/155/39/99.8/11.2  ABG lactate: --    Venous Blood Gas:  03-09 @ 09:49  7.42/63/63/41/95.3  VBG Lactate: 2.0  Venous Blood Gas:  03-08 @ 06:24  7.31/85/68/43/94.0  VBG Lactate: 1.7      MICROBIOLOGY:       RADIOLOGY:  [ ] Reviewed and interpreted by me    PULMONARY FUNCTION TESTS:    EKG: CHIEF COMPLAINT: SOB    HPI:  89YO Female Never smoker PMH Afib (on pradaxa), prior DVT, remote history of MI, HTN, HLD who was admitted 3/5 w/ dyspnea x1week.     Patient has had intermittent difficulty breathing over last 1 week w/ acute worsening over last 24h. Usually able to walk approx 15 feet w/o issue (exercise limited by lymphedema and arthritis), but has been unable to catch her breath after ambulating to and from her bathroom. Her daughter took SpO2 at home and reports it was in the 60s; patient then used her sister's portable O2, which helped until EMS arrived. She has been sleeping in a recliner chair more frequently than usual, but notes this is because of difficulty getting up from bed. Notes increased LE edema from baseline. Takes lasix on a regular basis and has been taking as prescribed, no increases or decreases in dosage recently. Denies PND, chest pain, palpitations, diaphoresis, arm/jaw pain, indigestion, nausea, vomiting, changes in bowel habits. Endorses intermittent dry cough; no fevers, constitutional symptoms, myalgias, weight loss. Daughter endorses chronic incontinence and seeing blood (no clots) in pull-ups over last few days.     Patient recently diagnosed w/ UTI and took 1-2 days of macrobid 100mg BID, but noted that hematuria persisted. Outpatient Ucx - Aug 2022, had E. coli resistant to ampicillin. No recent hospitalizations, abx use.     In ED she was hemodynamically stable and afebrile saturating 100% NRB 15L. Labs notable for no leukocytosis, Bicarb 33, VBG 7.25/89, BNP 2418. She was started on BiPAP. CTA was done which showed no PE however did appear to have mosaicism and trace effusions. Additionally small nodules associated with small airways disease were also noted. She was given IV lasix 20mg x1, ceftriaxone x1.     Since admission she has been diuresed 1.2L though has been positive the last 2 days. She had VBG which showed 7.42/63 as bicarb has been risking to most recently 37. She was noted to wheeze on exam. Echo was done showing EF 72% w/ LV concentric remodeling, increased LV filling pressures, Moderate RA enlargement and Increased RVSP to 56 with RAP of 12-15mmHg is consistent with moderate PH.     Pulmonary consulted for Respiratory Failure.      PAST MEDICAL & SURGICAL HISTORY:  Hypertension  Dyslipidemia  Myocardial Infarction 15-20 years ago  Hypercalcemia  Lymphedema  S/P Parathyroidectomy  S/P Hysterectomy  Bilateral Cataracts    FAMILY HISTORY:  No disease in first degree relatives.    SOCIAL HISTORY:  Smoking: [x ] Never Smoked [ ] Former Smoker (__ packs x ___ years) [ ] Current Smoker  (__ packs x ___ years)  Substance Use: [ ] Never Used [ ] Used ____  EtOH Use:  Marital Status: [ ] Single [ ]  [ ]  [ ]   Sexual History:   Occupation:  Recent Travel:  Country of Birth:  Advance Directives:    Allergies    No Known Allergies    Intolerances        HOME MEDICATIONS:  Home Medications:  dabigatran 150 mg oral capsule: 1 cap(s) orally 2 times a day (05 Mar 2023 20:57)  furosemide 20 mg oral tablet: 2 tab(s) orally 2 times a day (05 Mar 2023 20:57)  Klor-Con M20 oral tablet, extended release: 1 tab(s) orally once a day (05 Mar 2023 20:57)  metoprolol succinate 100 mg oral tablet, extended release: 1 tab(s) orally once a day (05 Mar 2023 20:57)  multivitamin: 1 tab(s) orally once a day (05 Mar 2023 20:57)  nitrofurantoin macrocrystals-monohydrate 100 mg oral capsule: 1 cap(s) orally 2 times a day x7 days  NOTE: x2 doses received prior to admission (05 Mar 2023 20:57)  rosuvastatin 5 mg oral tablet: 1 tab(s) orally once a day (at bedtime) (05 Mar 2023 20:57)      REVIEW OF SYSTEMS:  Constitutional: [ ] negative [ ] fevers [ ] chills [ ] weight loss [ ] weight gain  HEENT: [ ] negative [ ] dry eyes [ ] eye irritation [ ] postnasal drip [ ] nasal congestion  CV: [ ] negative  [ ] chest pain [ ] orthopnea [ ] palpitations [ ] murmur  Resp: [ ] negative [ ] cough [x ] shortness of breath [ ] dyspnea [ ] wheezing [ ] sputum [ ] hemoptysis  GI: [ ] negative [ ] nausea [ ] vomiting [ ] diarrhea [ ] constipation [ ] abd pain [ ] dysphagia   : [ ] negative [ ] dysuria [ ] nocturia [ ] hematuria [ ] increased urinary frequency  Musculoskeletal: [ ] negative [ ] back pain [ ] myalgias [ ] arthralgias [ ] fracture  Skin: [ ] negative [ ] rash [ ] itch  Neurological: [ ] negative [ ] headache [ ] dizziness [ ] syncope [ ] weakness [ ] numbness  Psychiatric: [ ] negative [ ] anxiety [ ] depression  Endocrine: [ ] negative [ ] diabetes [ ] thyroid problem  Hematologic/Lymphatic: [ ] negative [ ] anemia [ ] bleeding problem  Allergic/Immunologic: [ ] negative [ ] itchy eyes [ ] nasal discharge [ ] hives [ ] angioedema  [x ] All other systems negative  [ ] Unable to assess ROS because ________    OBJECTIVE:  ICU Vital Signs Last 24 Hrs  T(C): 36.9 (09 Mar 2023 11:17), Max: 37.4 (08 Mar 2023 20:26)  T(F): 98.5 (09 Mar 2023 11:17), Max: 99.4 (08 Mar 2023 20:26)  HR: 100 (09 Mar 2023 12:18) (73 - 120)  BP: 116/80 (09 Mar 2023 11:17) (97/65 - 129/82)  BP(mean): --  ABP: --  ABP(mean): --  RR: 18 (09 Mar 2023 11:17) (18 - 18)  SpO2: 92% (09 Mar 2023 11:17) (92% - 98%)    O2 Parameters below as of 09 Mar 2023 11:17  Patient On (Oxygen Delivery Method): nasal cannula  O2 Flow (L/min): 2            03-08 @ 07:01  -  03-09 @ 07:00  --------------------------------------------------------  IN: 560 mL / OUT: 250 mL / NET: 310 mL    03-09 @ 07:01  -  03-09 @ 13:50  --------------------------------------------------------  IN: 600 mL / OUT: 600 mL / NET: 0 mL      CAPILLARY BLOOD GLUCOSE      PHYSICAL EXAM:  General: Female sitting n chair in no acute distress  HEENT: Nasal canula in place  Respiratory: Crackles to mid lung fields NO wheezing  Cardiovascular: Regular rate and rhythm no m/r/g  Abdomen: Nontender nondistended  Extremities: Lymphedema in bilateral legs reportedly better than usual  Skin: No rashes  Neurological: No appreciable neurologic deficits  Psychiatry: Appropriate mood and affect      LINES:     HOSPITAL MEDICATIONS:  Standing Meds:  albuterol/ipratropium for Nebulization 3 milliLiter(s) Nebulizer every 6 hours  ammonium lactate 12% Lotion 1 Application(s) Topical two times a day  atorvastatin 20 milliGRAM(s) Oral at bedtime  chlorhexidine 2% Cloths 1 Application(s) Topical <User Schedule>  dabigatran 150 milliGRAM(s) Oral every 12 hours  furosemide   Injectable 40 milliGRAM(s) IV Push daily  metoprolol tartrate 75 milliGRAM(s) Oral two times a day  multivitamin 1 Tablet(s) Oral daily  predniSONE   Tablet 40 milliGRAM(s) Oral daily      PRN Meds:  acetaminophen     Tablet .. 650 milliGRAM(s) Oral every 6 hours PRN  melatonin 3 milliGRAM(s) Oral at bedtime PRN      LABS:                        10.7   6.58  )-----------( 99       ( 09 Mar 2023 06:28 )             35.9     Hgb Trend: 10.7<--, 11.0<--, 10.1<--, 11.3<--, 13.5<--  03-09    144  |  101  |  15  ----------------------------<  96  3.8   |  37<H>  |  0.73    Ca    8.9      09 Mar 2023 06:19  Phos  2.1     03-09  Mg     1.8     03-09      Creatinine Trend: 0.73<--, 0.78<--, 0.84<--, 0.89<--, 0.81<--  PT/INR - ( 08 Mar 2023 06:26 )   PT: 18.0 sec;   INR: 1.54 ratio         PTT - ( 08 Mar 2023 06:26 )  PTT:55.3 sec    Arterial Blood Gas:  03-07 @ 14:20  7.37/67/155/39/99.8/11.2  ABG lactate: --    Venous Blood Gas:  03-09 @ 09:49  7.42/63/63/41/95.3  VBG Lactate: 2.0  Venous Blood Gas:  03-08 @ 06:24  7.31/85/68/43/94.0  VBG Lactate: 1.7      MICROBIOLOGY:       RADIOLOGY:  [ ] Reviewed and interpreted by me    PULMONARY FUNCTION TESTS:    EKG:

## 2023-03-09 NOTE — DIETITIAN INITIAL EVALUATION ADULT - PERSON TAUGHT/METHOD
Reviewed CHF diet education. Discussed Na restriction, foods high in Na to avoid, reading food labels, tips for limiting Na in your diet. Reviewed daily weights, Wt gain parameters to contact MD. Discussed Na intake in relation to fluid retention, edema and Wt gain.   Also encouraged adequate PO intake of meals and protein with each meal to promote wound healing. Pt verbalized understanding and made aware RD remains available for diet education review./verbal instruction/teach back - (Patient repeats in own words)/patient instructed/son instructed

## 2023-03-09 NOTE — DIETITIAN INITIAL EVALUATION ADULT - ORAL INTAKE PTA/DIET HISTORY
Pt reports good appetite/PO intake PTA, was not following any therapeutic diet PTA, "I eat food that is good."  Confirms NKFA.

## 2023-03-09 NOTE — DIETITIAN INITIAL EVALUATION ADULT - REASON INDICATOR FOR ASSESSMENT
RD consult for "Suspected DTI to sacrum and B/L heels"  Source: pt, pt's son at bedside, medical record. Chart reviewed, events noted.

## 2023-03-09 NOTE — DIETITIAN INITIAL EVALUATION ADULT - NSFNSNUTRHOMESUPPLEMENTFT_GEN_A_CORE
There are no Wet Read(s) to document.
Pt reports use of vitamin supplements PTA but pt and son unable to recall which ones. Denies use of any additional nutrition/dietary supplements PTA.

## 2023-03-09 NOTE — DIETITIAN INITIAL EVALUATION ADULT - NSFNSGIIOFT_GEN_A_CORE
Benign prostate hyperplasia  Urinary retention  Recurrent urinary tract infections  Pyuria    CYSTOSCOPY :  The flexible cystoscope was introduced per the urethra for benign prostate hyperplasia today with lidocaine 1% gel demonstrating the urethra to be without stricture or mass. The prostate was enlarged with effacement. The bladder was without tumor, diverticuli or stones.  The ureteral orifices were normal in shape and position. The  patient tolerated the procedure well without complication.    
Denies nausea, vomiting, constipation, diarrhea. Reports last BM 3/9. Pt not currently on bowel regimen.

## 2023-03-09 NOTE — PROGRESS NOTE ADULT - ATTENDING COMMENTS
89 yo f w pmh htn, hld, cad c/b mi, afib, dvt p/w worsening sob/quintanilla + le swelling, lethargy, found to have arf iso urosepsis + afib rvr w adhf, admitted to medicine for further mgmt.     # Acute hypercapnic/hypoxemic respiratory failure  - most likely secondary to heart failure exacerbation - signs of pHTN, HFpEF vs ?COPD exacerbation  - CTC suggestive of small airway disease  - pulm consult   - echo, diuresis to maintain net neg 1-2L/day  - strict I&Os, daily weights  - titrated to 2-4L NC, continue to titrate  - aggressive pulmonary toilet/hygiene    # Urosepsis  - s/p 2 days of macrobid at home, 1 g ceftriaxone in er  - cultures negative    # afib rvr  - s/p lopressor 25 in er, hr remains ~110  - monitor on telemetry  - switch home toprol 100 -> increase lopressor 75 bid  - titrate rate control to maintain goal hr <110/min  - cont home pradaxa  - TSH wnl  - cardiology recs appreciated    Plan as above. D/w HS. 89 yo f w pmh htn, hld, cad c/b mi, afib, dvt p/w worsening sob/quintanilla + le swelling, lethargy, found to have arf iso urosepsis + afib rvr w adhf, admitted to medicine for further mgmt.     # Acute hypercapnic/hypoxemic respiratory failure  - most likely secondary to heart failure exacerbation - signs of pHTN, HFpEF vs ?COPD exacerbation (wheezing on exam)  - CTC suggestive of small airway disease  - pulm consult   - echo, diuresis to maintain net neg 1-2L/day  - strict I&Os, daily weights  - titrated to 2-4L NC, continue to titrate  - duoneb ATC  - aggressive pulmonary toilet/hygiene    # Urosepsis  - s/p 2 days of macrobid at home, 1 g ceftriaxone in er  - cultures negative    # afib rvr  - s/p lopressor 25 in er, hr remains ~110  - monitor on telemetry  - switch home toprol 100 -> increase lopressor 75 bid  - titrate rate control to maintain goal hr <110/min  - cont home pradaxa  - TSH wnl  - cardiology recs appreciated    Plan as above. D/w HS.

## 2023-03-09 NOTE — CONSULT NOTE ADULT - ATTENDING COMMENTS
89 year old woman with chronic atrial fibrillation on pradaxa, also history of DVT admitted for acute dyspnea. Has known chronic congestive heart failure with preserved systolic function. On exam difficult to discern jugular vein due to body habitus or peripheral edema due to chronic severe lymphedema. Continues to have episodic dyspnea and tachypnea with normal or hyperoxemia, but has hypercapnea. Echo consistent with diastolic dysfunction, pulmonary hypertension, but normal systolic function. Troponin not elevated and serum proBNP minimally elevated or normal for age. Agree with plan for BiPAP, continue diuresis as tolerated. She may benefit from addition of SGLT-2 inhibitor.    To contact call Cardiology Fellow or Attending as listed on [email or web address] password: [email or web address].
Attending Attestation:    Patient seen and examined with resident/fellow.  Agree with above except as noted.  89 yo female with atrial fib, HTN, DVT, HFpEF, chronic leg edema, who has been c/o worsening RICHARD and leg swelling.  Pt with elevated serum bicarb on admission  indicating chronic hypercapnia. She is not on home 02. Never smoker. No h/o COPD.  Pt has been diuresed and has been feeling better. Her legs and feet are less swollen than on admission.  TTE reveals normal EF with moderate pulmonary HTN.      A/P Acute on chronic hypercapnic respiratory failure and acute hypoxemic respiratory failure due decompensated HFpEF.  1. Continue diuresis. However diuresis with Diamox for a few doses.  2. Metabolic alkalosis due to over diuresis with Lasix.  Gentle diuresis with Diamox. Start at 250mls. Would continue Diamox until  serum bicarb back to [id number].  3. Follow I & 0s   4. Daily weight  5. STOP steroids. Pt does not have COPD. Wheezing likely due to pulmonary edema

## 2023-03-09 NOTE — PROGRESS NOTE ADULT - ASSESSMENT
89F w/ PMH Afib (on pradaxa), prior DVT, remote history of MI, lymphedema, HTN, HLD presenting with acute dyspnea on day of presentation concerning for decompensated heart failure.     #HFpEF Patient presented with dyspnea, modest BNP elevation. TTE demonstrated diastolic dysfunction, due to body habitus it her volume status is not easily apparent.   - BNP 2415  - Chest Xray and Chest CT with no radiographic evidence of pulmonary edema, no PE    Plan:   - Diurese with IV lasix 40mg daily  - Can start jardiance once on oral diuretics  - Hypercarbia likely due to obstructive disease as patient has significant expiratory wheezing on exam vs obesity hypoventilation syndrome  - Recommend nebulizer treatment and agree with Bipap  - Wean O2 as tolerated    #A.fib  - Continue metoprolol 75mg BID  - Pradaxa 150mg BID    Recommendations are preliminary until attending attestation.    Patricia Webb MD  Cardiology Fellow- PGY 4 89F w/ PMH Afib (on pradaxa), prior DVT, remote history of MI, lymphedema, HTN, HLD presenting with acute dyspnea on day of presentation concerning for decompensated heart failure.     #HFpEF Patient presented with dyspnea, modest BNP elevation. TTE demonstrated diastolic dysfunction, due to body habitus it her volume status is not easily apparent.   - BNP 2415  - Chest Xray and Chest CT with no radiographic evidence of pulmonary edema, no PE    Plan:   - Diurese with IV lasix 40mg daily  - Can start jardiance once on oral diuretics  - Hypercarbia likely due to obstructive disease as patient has significant expiratory wheezing on exam vs obesity hypoventilation syndrome  - Recommend nebulizer treatment and agree with Bipap  - Wean O2 as tolerated    #A.fib  - Continue metoprolol 75mg BID  - Pradaxa 150mg BID      Patricia Webb MD  Cardiology Fellow- PGY 4

## 2023-03-09 NOTE — PROGRESS NOTE ADULT - ATTENDING COMMENTS
89 year old woman with chronic atrial fibrillation on pradaxa, also history of DVT admitted for acute dyspnea. Has known chronic congestive heart failure with preserved systolic function. On exam difficult to discern jugular veins due to body habitus or peripheral edema due to chronic severe lymphedema. Continues to have episodic dyspnea and tachypnea with normal or hyperoxemia, but has hypercapnea. Echo consistent with diastolic dysfunction, pulmonary hypertension, but normal systolic function. Troponin not elevated and serum proBNP minimally elevated or normal for age. Agree with plan for BiPAP, continue diuresis as tolerated. She may benefit from addition of SGLT-2 inhibitor. Pulmonary evaluating for exacerbated obstructive pulmonary disease.    To contact call Cardiology Fellow or Attending as listed on [email or web address] password: [email or web address].

## 2023-03-09 NOTE — DIETITIAN INITIAL EVALUATION ADULT - PERTINENT MEDS FT
MEDICATIONS  (STANDING):  albuterol/ipratropium for Nebulization 3 milliLiter(s) Nebulizer every 6 hours  ammonium lactate 12% Lotion 1 Application(s) Topical two times a day  atorvastatin 20 milliGRAM(s) Oral at bedtime  chlorhexidine 2% Cloths 1 Application(s) Topical <User Schedule>  dabigatran 150 milliGRAM(s) Oral every 12 hours  furosemide   Injectable 40 milliGRAM(s) IV Push daily  metoprolol tartrate 75 milliGRAM(s) Oral two times a day  multivitamin 1 Tablet(s) Oral daily  potassium phosphate / sodium phosphate Powder (PHOS-NaK) 1 Packet(s) Oral every 6 hours  predniSONE   Tablet 40 milliGRAM(s) Oral daily    MEDICATIONS  (PRN):  acetaminophen     Tablet .. 650 milliGRAM(s) Oral every 6 hours PRN Temp greater or equal to 38C (100.4F), Mild Pain (1 - 3)  melatonin 3 milliGRAM(s) Oral at bedtime PRN Insomnia

## 2023-03-10 LAB
ANION GAP SERPL CALC-SCNC: 6 MMOL/L — SIGNIFICANT CHANGE UP (ref 5–17)
BASE EXCESS BLDV CALC-SCNC: 14.8 MMOL/L — HIGH (ref -2–3)
BUN SERPL-MCNC: 17 MG/DL — SIGNIFICANT CHANGE UP (ref 7–23)
CA-I SERPL-SCNC: 1.21 MMOL/L — SIGNIFICANT CHANGE UP (ref 1.15–1.33)
CALCIUM SERPL-MCNC: 9.2 MG/DL — SIGNIFICANT CHANGE UP (ref 8.4–10.5)
CHLORIDE BLDV-SCNC: 98 MMOL/L — SIGNIFICANT CHANGE UP (ref 96–108)
CHLORIDE SERPL-SCNC: 98 MMOL/L — SIGNIFICANT CHANGE UP (ref 96–108)
CO2 BLDV-SCNC: 43 MMOL/L — HIGH (ref 22–26)
CO2 SERPL-SCNC: 38 MMOL/L — HIGH (ref 22–31)
CREAT SERPL-MCNC: 0.89 MG/DL — SIGNIFICANT CHANGE UP (ref 0.5–1.3)
CULTURE RESULTS: SIGNIFICANT CHANGE UP
CULTURE RESULTS: SIGNIFICANT CHANGE UP
EGFR: 62 ML/MIN/1.73M2 — SIGNIFICANT CHANGE UP
GAS PNL BLDV: 138 MMOL/L — SIGNIFICANT CHANGE UP (ref 136–145)
GAS PNL BLDV: SIGNIFICANT CHANGE UP
GAS PNL BLDV: SIGNIFICANT CHANGE UP
GLUCOSE BLDV-MCNC: 92 MG/DL — SIGNIFICANT CHANGE UP (ref 70–99)
GLUCOSE SERPL-MCNC: 97 MG/DL — SIGNIFICANT CHANGE UP (ref 70–99)
HCO3 BLDV-SCNC: 41 MMOL/L — HIGH (ref 22–29)
HCT VFR BLD CALC: 37.2 % — SIGNIFICANT CHANGE UP (ref 34.5–45)
HCT VFR BLDA CALC: 38 % — SIGNIFICANT CHANGE UP (ref 34.5–46.5)
HGB BLD CALC-MCNC: 12.7 G/DL — SIGNIFICANT CHANGE UP (ref 11.7–16.1)
HGB BLD-MCNC: 11.3 G/DL — LOW (ref 11.5–15.5)
LACTATE BLDV-MCNC: 2.1 MMOL/L — HIGH (ref 0.5–2)
MAGNESIUM SERPL-MCNC: 1.7 MG/DL — SIGNIFICANT CHANGE UP (ref 1.6–2.6)
MCHC RBC-ENTMCNC: 29 PG — SIGNIFICANT CHANGE UP (ref 27–34)
MCHC RBC-ENTMCNC: 30.4 GM/DL — LOW (ref 32–36)
MCV RBC AUTO: 95.6 FL — SIGNIFICANT CHANGE UP (ref 80–100)
MRSA PCR RESULT.: SIGNIFICANT CHANGE UP
NRBC # BLD: 0 /100 WBCS — SIGNIFICANT CHANGE UP (ref 0–0)
PCO2 BLDV: 58 MMHG — HIGH (ref 39–42)
PH BLDV: 7.46 — HIGH (ref 7.32–7.43)
PHOSPHATE SERPL-MCNC: 2.4 MG/DL — LOW (ref 2.5–4.5)
PLATELET # BLD AUTO: 132 K/UL — LOW (ref 150–400)
PO2 BLDV: 48 MMHG — HIGH (ref 25–45)
POTASSIUM BLDV-SCNC: 3.3 MMOL/L — LOW (ref 3.5–5.1)
POTASSIUM SERPL-MCNC: 3.3 MMOL/L — LOW (ref 3.5–5.3)
POTASSIUM SERPL-SCNC: 3.3 MMOL/L — LOW (ref 3.5–5.3)
RBC # BLD: 3.89 M/UL — SIGNIFICANT CHANGE UP (ref 3.8–5.2)
RBC # FLD: 14.6 % — HIGH (ref 10.3–14.5)
S AUREUS DNA NOSE QL NAA+PROBE: SIGNIFICANT CHANGE UP
SAO2 % BLDV: 81 % — SIGNIFICANT CHANGE UP (ref 67–88)
SODIUM SERPL-SCNC: 142 MMOL/L — SIGNIFICANT CHANGE UP (ref 135–145)
SPECIMEN SOURCE: SIGNIFICANT CHANGE UP
SPECIMEN SOURCE: SIGNIFICANT CHANGE UP
WBC # BLD: 7.75 K/UL — SIGNIFICANT CHANGE UP (ref 3.8–10.5)
WBC # FLD AUTO: 7.75 K/UL — SIGNIFICANT CHANGE UP (ref 3.8–10.5)

## 2023-03-10 PROCEDURE — 99233 SBSQ HOSP IP/OBS HIGH 50: CPT | Mod: GC

## 2023-03-10 PROCEDURE — 99233 SBSQ HOSP IP/OBS HIGH 50: CPT

## 2023-03-10 PROCEDURE — 99232 SBSQ HOSP IP/OBS MODERATE 35: CPT | Mod: GC

## 2023-03-10 RX ORDER — FAMOTIDINE 10 MG/ML
20 INJECTION INTRAVENOUS
Refills: 0 | Status: DISCONTINUED | OUTPATIENT
Start: 2023-03-10 | End: 2023-03-15

## 2023-03-10 RX ORDER — IPRATROPIUM/ALBUTEROL SULFATE 18-103MCG
3 AEROSOL WITH ADAPTER (GRAM) INHALATION EVERY 6 HOURS
Refills: 0 | Status: DISCONTINUED | OUTPATIENT
Start: 2023-03-10 | End: 2023-03-15

## 2023-03-10 RX ORDER — POTASSIUM CHLORIDE 20 MEQ
40 PACKET (EA) ORAL ONCE
Refills: 0 | Status: COMPLETED | OUTPATIENT
Start: 2023-03-10 | End: 2023-03-10

## 2023-03-10 RX ORDER — ACETAZOLAMIDE 250 MG/1
250 TABLET ORAL EVERY 12 HOURS
Refills: 0 | Status: COMPLETED | OUTPATIENT
Start: 2023-03-10 | End: 2023-03-11

## 2023-03-10 RX ORDER — POTASSIUM PHOSPHATE, MONOBASIC POTASSIUM PHOSPHATE, DIBASIC 236; 224 MG/ML; MG/ML
15 INJECTION, SOLUTION INTRAVENOUS ONCE
Refills: 0 | Status: COMPLETED | OUTPATIENT
Start: 2023-03-10 | End: 2023-03-10

## 2023-03-10 RX ORDER — PANTOPRAZOLE SODIUM 20 MG/1
40 TABLET, DELAYED RELEASE ORAL
Refills: 0 | Status: DISCONTINUED | OUTPATIENT
Start: 2023-03-10 | End: 2023-03-15

## 2023-03-10 RX ORDER — MAGNESIUM SULFATE 500 MG/ML
2 VIAL (ML) INJECTION ONCE
Refills: 0 | Status: COMPLETED | OUTPATIENT
Start: 2023-03-10 | End: 2023-03-10

## 2023-03-10 RX ORDER — IPRATROPIUM/ALBUTEROL SULFATE 18-103MCG
3 AEROSOL WITH ADAPTER (GRAM) INHALATION EVERY 6 HOURS
Refills: 0 | Status: DISCONTINUED | OUTPATIENT
Start: 2023-03-10 | End: 2023-03-10

## 2023-03-10 RX ORDER — ACETAZOLAMIDE 250 MG/1
250 TABLET ORAL
Refills: 0 | Status: DISCONTINUED | OUTPATIENT
Start: 2023-03-10 | End: 2023-03-10

## 2023-03-10 RX ADMIN — PANTOPRAZOLE SODIUM 40 MILLIGRAM(S): 20 TABLET, DELAYED RELEASE ORAL at 08:47

## 2023-03-10 RX ADMIN — Medication 3 MILLILITER(S): at 05:53

## 2023-03-10 RX ADMIN — Medication 1 APPLICATION(S): at 17:48

## 2023-03-10 RX ADMIN — DABIGATRAN ETEXILATE MESYLATE 150 MILLIGRAM(S): 150 CAPSULE ORAL at 17:47

## 2023-03-10 RX ADMIN — Medication 25 GRAM(S): at 12:40

## 2023-03-10 RX ADMIN — Medication 75 MILLIGRAM(S): at 05:53

## 2023-03-10 RX ADMIN — Medication 1 TABLET(S): at 11:24

## 2023-03-10 RX ADMIN — Medication 75 MILLIGRAM(S): at 17:47

## 2023-03-10 RX ADMIN — POTASSIUM PHOSPHATE, MONOBASIC POTASSIUM PHOSPHATE, DIBASIC 62.5 MILLIMOLE(S): 236; 224 INJECTION, SOLUTION INTRAVENOUS at 14:51

## 2023-03-10 RX ADMIN — Medication 40 MILLIGRAM(S): at 05:54

## 2023-03-10 RX ADMIN — ATORVASTATIN CALCIUM 20 MILLIGRAM(S): 80 TABLET, FILM COATED ORAL at 21:26

## 2023-03-10 RX ADMIN — Medication 1 APPLICATION(S): at 05:54

## 2023-03-10 RX ADMIN — Medication 40 MILLIEQUIVALENT(S): at 12:40

## 2023-03-10 RX ADMIN — Medication 500 MILLIGRAM(S): at 11:25

## 2023-03-10 RX ADMIN — ACETAZOLAMIDE 250 MILLIGRAM(S): 250 TABLET ORAL at 17:47

## 2023-03-10 RX ADMIN — DABIGATRAN ETEXILATE MESYLATE 150 MILLIGRAM(S): 150 CAPSULE ORAL at 05:54

## 2023-03-10 RX ADMIN — Medication 3 MILLILITER(S): at 11:28

## 2023-03-10 RX ADMIN — CHLORHEXIDINE GLUCONATE 1 APPLICATION(S): 213 SOLUTION TOPICAL at 05:45

## 2023-03-10 RX ADMIN — Medication 40 MILLIGRAM(S): at 06:01

## 2023-03-10 NOTE — PROGRESS NOTE ADULT - SUBJECTIVE AND OBJECTIVE BOX
Cardiology Progress Note  ------------------------------------------------------------------------------------------  SUBJECTIVE:   No events overnight. Denies CP, SOB or Palpitations.   -------------------------------------------------------------------------------------------  ROS:  CV: chest pain (-), palpitation (-), orthopnea (-), PND (-), edema (-)  PULM: SOB (-), cough (-), wheezing (-), hemoptysis (-).   CONST: fever (-), chills (-) or fatigue (-)  GI: abdominal distension (-), abdominal pain (-) , nausea/vomiting (-), hematemesis, (-), melena (-), hematochezia (-)  : dysuria (-), frequency (-), hematuria (-).   NEURO: numbness (-), weakness (-), dizziness (-)  SKIN: itching (-), rash (-)  HEENT:  visual changes (-); vertigo or throat pain (-);  neck stiffness (-)     All other review of systems is negative unless indicated above.   -------------------------------------------------------------------------------------------  VS:  T(F): 97.4 (03-10), Max: 98.5 (03-09)  HR: 11 (03-10) (11 - 121)  BP: 111/73 (03-10) (99/55 - 125/74)  RR: 18 (03-10)  SpO2: 94% (03-10)  I&O's Summary    09 Mar 2023 07:01  -  10 Mar 2023 07:00  --------------------------------------------------------  IN: 960 mL / OUT: 1300 mL / NET: -340 mL    ------------------------------------------------------------------------------------------  PHYSICAL EXAM:  GENERAL: NAD  HEAD:  Atraumatic, Normocephalic.  EYES: EOMI, PERRLA, conjunctiva and sclera clear.  ENT: Moist mucous membranes.  NECK: Supple, No JVD.  CHEST/LUNG: Clear to auscultation bilaterally; No rales, rhonchi, wheezing, or rubs. Unlabored respirations.  HEART: Regular rate and rhythm; No murmurs, rubs, or gallops.  ABDOMEN: Bowel sounds present; Soft, Nontender, Nondistended.   EXTREMITIES:  2+ Peripheral Pulses, brisk capillary refill. No clubbing, cyanosis, or edema.  PSYCH: Normal affect.  SKIN: No rashes or lesions.  -------------------------------------------------------------------------------------------  LABS:                          11.3   7.75  )-----------( 132      ( 10 Mar 2023 06:54 )             37.2     03-10    142  |  98  |  17  ----------------------------<  97  3.3<L>   |  38<H>  |  0.89    Ca    9.2      10 Mar 2023 06:54  Phos  2.4     03-10  Mg     1.7     03-10    CARDIAC MARKERS ( 05 Mar 2023 14:27 )  29 ng/L / x     / x     / x     / x     / x      CARDIAC MARKERS ( 05 Mar 2023 11:34 )  26 ng/L / x     / x     / x     / x     / x        -------------------------------------------------------------------------------------------  Meds:  acetaminophen     Tablet .. 650 milliGRAM(s) Oral every 6 hours PRN  albuterol/ipratropium for Nebulization 3 milliLiter(s) Nebulizer every 6 hours  ammonium lactate 12% Lotion 1 Application(s) Topical two times a day  ascorbic acid 500 milliGRAM(s) Oral daily  atorvastatin 20 milliGRAM(s) Oral at bedtime  chlorhexidine 2% Cloths 1 Application(s) Topical <User Schedule>  dabigatran 150 milliGRAM(s) Oral every 12 hours  furosemide   Injectable 40 milliGRAM(s) IV Push daily  melatonin 3 milliGRAM(s) Oral at bedtime PRN  metoprolol tartrate 75 milliGRAM(s) Oral two times a day  multivitamin 1 Tablet(s) Oral daily  predniSONE   Tablet 40 milliGRAM(s) Oral daily    -------------------------------------------------------------------------------------------   Cardiology Progress Note  ------------------------------------------------------------------------------------------  SUBJECTIVE:   Patient was seen by pulm, and reportedly used her CPAP overnight. Feels about the same as she did prior.   -------------------------------------------------------------------------------------------  ROS:  CV: chest pain (-), palpitation (-), orthopnea (-), PND (-), edema (-)  PULM: SOB (-), cough (-), wheezing (-), hemoptysis (-).   CONST: fever (-), chills (-) or fatigue (-)  GI: abdominal distension (-), abdominal pain (-) , nausea/vomiting (-), hematemesis, (-), melena (-), hematochezia (-)  : dysuria (-), frequency (-), hematuria (-).   NEURO: numbness (-), weakness (-), dizziness (-)  SKIN: itching (-), rash (-)  HEENT:  visual changes (-); vertigo or throat pain (-);  neck stiffness (-)     All other review of systems is negative unless indicated above.   -------------------------------------------------------------------------------------------  VS:  T(F): 97.4 (03-10), Max: 98.5 (03-09)  HR: 11 (03-10) (11 - 121)  BP: 111/73 (03-10) (99/55 - 125/74)  RR: 18 (03-10)  SpO2: 94% (03-10)  I&O's Summary    09 Mar 2023 07:01  -  10 Mar 2023 07:00  --------------------------------------------------------  IN: 960 mL / OUT: 1300 mL / NET: -340 mL    ------------------------------------------------------------------------------------------  PHYSICAL EXAM:    HEAD:  Atraumatic, Normocephalic.  EYES: EOMI, PERRLA, conjunctiva and sclera clear.  NECK: Supple, No JVD appreciated, body habitus limited complete evaluation  CHEST/LUNG: Improved  end-expiratory wheezing,   HEART: Irregularly irregular; No murmurs, rubs, or gallops.  ABDOMEN: Bowel sounds present; Soft, Nontender, Nondistended.   EXTREMITIES:  Signs of chronic lymphedema with overlying skin thickening and hyperpigmentation, no obvious pitting edema  PSYCH: Normal affect.  SKIN: No rashes or lesions.    -------------------------------------------------------------------------------------------  LABS:                          11.3   7.75  )-----------( 132      ( 10 Mar 2023 06:54 )             37.2     03-10    142  |  98  |  17  ----------------------------<  97  3.3<L>   |  38<H>  |  0.89    Ca    9.2      10 Mar 2023 06:54  Phos  2.4     03-10  Mg     1.7     03-10    CARDIAC MARKERS ( 05 Mar 2023 14:27 )  29 ng/L / x     / x     / x     / x     / x      CARDIAC MARKERS ( 05 Mar 2023 11:34 )  26 ng/L / x     / x     / x     / x     / x        -------------------------------------------------------------------------------------------  Meds:  acetaminophen     Tablet .. 650 milliGRAM(s) Oral every 6 hours PRN  albuterol/ipratropium for Nebulization 3 milliLiter(s) Nebulizer every 6 hours  ammonium lactate 12% Lotion 1 Application(s) Topical two times a day  ascorbic acid 500 milliGRAM(s) Oral daily  atorvastatin 20 milliGRAM(s) Oral at bedtime  chlorhexidine 2% Cloths 1 Application(s) Topical <User Schedule>  dabigatran 150 milliGRAM(s) Oral every 12 hours  furosemide   Injectable 40 milliGRAM(s) IV Push daily  melatonin 3 milliGRAM(s) Oral at bedtime PRN  metoprolol tartrate 75 milliGRAM(s) Oral two times a day  multivitamin 1 Tablet(s) Oral daily  predniSONE   Tablet 40 milliGRAM(s) Oral daily    -------------------------------------------------------------------------------------------

## 2023-03-10 NOTE — PROGRESS NOTE ADULT - ATTENDING COMMENTS
89 yo f w pmh htn, hld, cad c/b mi, afib, dvt p/w worsening sob/quintanilla + le swelling, lethargy, found to have arf iso urosepsis + afib rvr w adhf, admitted to medicine for further mgmt.     # Acute hypercapnic/hypoxemic respiratory failure  - most likely secondary to heart failure exacerbation - signs of pHTN, HFpEF vs ?COPD exacerbation (wheezing on exam)  - CTC suggestive of small airway disease  - pulm consult appreciated - will give diamox 250 x 2 3/10, hold lasix  - pulm does not think she has COPD; respiratory failure driven by fluid. stop steroids.   - strict I&Os, daily weights  - titrated to 1L NC  - aggressive pulmonary toilet/hygiene  - duoneb PRN    # Urosepsis  - s/p 2 days of macrobid at home, 1 g ceftriaxone in er  - cultures negative    # afib rvr  - s/p lopressor 25 in er, hr remains ~110  - monitor on telemetry  - switch home toprol 100 -> increase lopressor 75 bid  - titrate rate control to maintain goal hr <110/min  - cont home pradaxa  - TSH wnl  - cardiology recs appreciated    Plan as above. D/w HS.

## 2023-03-10 NOTE — PROGRESS NOTE ADULT - PROBLEM SELECTOR PLAN 3
As above for treatment of sepsis. Afib w/ RVR up to 123 on admission, likely compensatory response due to sepsis and dyspnea.  - metoprolol succinate 100mg qd (home), maintain HR goal <110      - while inpatient tartrate 50mg BID (do succinate on dc)   - CHADSVASC approx 6; continue pradaxa 150mg BID (home)  - monitor on tele  - maintain Mg>2, K>4

## 2023-03-10 NOTE — PROGRESS NOTE ADULT - SUBJECTIVE AND OBJECTIVE BOX
CHIEF COMPLAINT:    Interval Events:    REVIEW OF SYSTEMS:  Constitutional: [ ] negative [ ] fevers [ ] chills [ ] weight loss [ ] weight gain  HEENT: [ ] negative [ ] dry eyes [ ] eye irritation [ ] postnasal drip [ ] nasal congestion  CV: [ ] negative  [ ] chest pain [ ] orthopnea [ ] palpitations [ ] murmur  Resp: [ ] negative [ ] cough [ ] shortness of breath [ ] dyspnea [ ] wheezing [ ] sputum [ ] hemoptysis  GI: [ ] negative [ ] nausea [ ] vomiting [ ] diarrhea [ ] constipation [ ] abd pain [ ] dysphagia   : [ ] negative [ ] dysuria [ ] nocturia [ ] hematuria [ ] increased urinary frequency  Musculoskeletal: [ ] negative [ ] back pain [ ] myalgias [ ] arthralgias [ ] fracture  Skin: [ ] negative [ ] rash [ ] itch  Neurological: [ ] negative [ ] headache [ ] dizziness [ ] syncope [ ] weakness [ ] numbness  Psychiatric: [ ] negative [ ] anxiety [ ] depression  Endocrine: [ ] negative [ ] diabetes [ ] thyroid problem  Hematologic/Lymphatic: [ ] negative [ ] anemia [ ] bleeding problem  Allergic/Immunologic: [ ] negative [ ] itchy eyes [ ] nasal discharge [ ] hives [ ] angioedema  [ ] All other systems negative  [ ] Unable to assess ROS because ________    OBJECTIVE:  ICU Vital Signs Last 24 Hrs  T(C): 36.3 (10 Mar 2023 04:34), Max: 36.9 (09 Mar 2023 11:17)  T(F): 97.4 (10 Mar 2023 04:34), Max: 98.5 (09 Mar 2023 11:17)  HR: 11 (10 Mar 2023 04:42) (11 - 121)  BP: 111/73 (10 Mar 2023 04:34) (99/55 - 125/74)  BP(mean): --  ABP: --  ABP(mean): --  RR: 18 (10 Mar 2023 04:34) (18 - 18)  SpO2: 94% (10 Mar 2023 04:42) (92% - 97%)    O2 Parameters below as of 10 Mar 2023 04:34  Patient On (Oxygen Delivery Method): nasal cannula  O2 Flow (L/min): 2            03-09 @ 07:01  -  03-10 @ 07:00  --------------------------------------------------------  IN: 960 mL / OUT: 1300 mL / NET: -340 mL      CAPILLARY BLOOD GLUCOSE          PHYSICAL EXAM:  General:   HEENT:   Lymph Nodes:  Neck:   Respiratory:   Cardiovascular:   Abdomen:   Extremities:   Skin:   Neurological:  Psychiatry:    HOSPITAL MEDICATIONS:  MEDICATIONS  (STANDING):  albuterol/ipratropium for Nebulization 3 milliLiter(s) Nebulizer every 6 hours  ammonium lactate 12% Lotion 1 Application(s) Topical two times a day  ascorbic acid 500 milliGRAM(s) Oral daily  atorvastatin 20 milliGRAM(s) Oral at bedtime  chlorhexidine 2% Cloths 1 Application(s) Topical <User Schedule>  dabigatran 150 milliGRAM(s) Oral every 12 hours  furosemide   Injectable 40 milliGRAM(s) IV Push daily  metoprolol tartrate 75 milliGRAM(s) Oral two times a day  multivitamin 1 Tablet(s) Oral daily  predniSONE   Tablet 40 milliGRAM(s) Oral daily    MEDICATIONS  (PRN):  acetaminophen     Tablet .. 650 milliGRAM(s) Oral every 6 hours PRN Temp greater or equal to 38C (100.4F), Mild Pain (1 - 3)  melatonin 3 milliGRAM(s) Oral at bedtime PRN Insomnia      LABS:                        11.3   7.75  )-----------( 132      ( 10 Mar 2023 06:54 )             37.2     Hgb Trend: 11.3<--, 10.7<--, 11.0<--, 10.1<--, 11.3<--  03-10    142  |  98  |  17  ----------------------------<  97  3.3<L>   |  38<H>  |  0.89    Ca    9.2      10 Mar 2023 06:54  Phos  2.4     03-10  Mg     1.7     03-10      Creatinine Trend: 0.89<--, 0.73<--, 0.78<--, 0.84<--, 0.89<--, 0.81<--        Venous Blood Gas:  03-10 @ 06:45  7.46/58/48/41/81.0  VBG Lactate: 2.1  Venous Blood Gas:  03-09 @ 09:49  7.42/63/63/41/95.3  VBG Lactate: 2.0      MICROBIOLOGY:       RADIOLOGY:  [ ] Reviewed and interpreted by me    PULMONARY FUNCTION TESTS:    EKG: CHIEF COMPLAINT: SOB    Interval Events: Pt reports improvement today    REVIEW OF SYSTEMS:  Constitutional: [ ] negative [ ] fevers [ ] chills [ ] weight loss [ ] weight gain  HEENT: [ ] negative [ ] dry eyes [ ] eye irritation [ ] postnasal drip [ ] nasal congestion  CV: [ ] negative  [ ] chest pain [ ] orthopnea [ ] palpitations [ ] murmur  Resp: [ ] negative [ ] cough [x ] shortness of breath [ ] dyspnea [ ] wheezing [ ] sputum [ ] hemoptysis  GI: [ ] negative [ ] nausea [ ] vomiting [ ] diarrhea [ ] constipation [ ] abd pain [ ] dysphagia   : [ ] negative [ ] dysuria [ ] nocturia [ ] hematuria [ ] increased urinary frequency  Musculoskeletal: [ ] negative [ ] back pain [ ] myalgias [ ] arthralgias [ ] fracture  Skin: [ ] negative [ ] rash [ ] itch  Neurological: [ ] negative [ ] headache [ ] dizziness [ ] syncope [ ] weakness [ ] numbness  Psychiatric: [ ] negative [ ] anxiety [ ] depression  Endocrine: [ ] negative [ ] diabetes [ ] thyroid problem  Hematologic/Lymphatic: [ ] negative [ ] anemia [ ] bleeding problem  Allergic/Immunologic: [ ] negative [ ] itchy eyes [ ] nasal discharge [ ] hives [ ] angioedema  [x ] All other systems negative  [ ] Unable to assess ROS because ________    OBJECTIVE:  ICU Vital Signs Last 24 Hrs  T(C): 36.3 (10 Mar 2023 04:34), Max: 36.9 (09 Mar 2023 11:17)  T(F): 97.4 (10 Mar 2023 04:34), Max: 98.5 (09 Mar 2023 11:17)  HR: 11 (10 Mar 2023 04:42) (11 - 121)  BP: 111/73 (10 Mar 2023 04:34) (99/55 - 125/74)  BP(mean): --  ABP: --  ABP(mean): --  RR: 18 (10 Mar 2023 04:34) (18 - 18)  SpO2: 94% (10 Mar 2023 04:42) (92% - 97%)    O2 Parameters below as of 10 Mar 2023 04:34  Patient On (Oxygen Delivery Method): nasal cannula  O2 Flow (L/min): 2            03-09 @ 07:01  -  03-10 @ 07:00  --------------------------------------------------------  IN: 960 mL / OUT: 1300 mL / NET: -340 mL      CAPILLARY BLOOD GLUCOSE    PHYSICAL EXAM:  General: Female sitting in chair in no acute distress  HEENT: Nasal canula in place  Respiratory: Crackles to mid lung fields NO wheezing  Cardiovascular: Regular rate and rhythm no m/r/g  Abdomen: Nontender nondistended  Extremities: Lymphedema in bilateral legs reportedly better than usual  Skin: No rashes  Neurological: No appreciable neurologic deficits  Psychiatry: Appropriate mood and affect    HOSPITAL MEDICATIONS:  MEDICATIONS  (STANDING):  albuterol/ipratropium for Nebulization 3 milliLiter(s) Nebulizer every 6 hours  ammonium lactate 12% Lotion 1 Application(s) Topical two times a day  ascorbic acid 500 milliGRAM(s) Oral daily  atorvastatin 20 milliGRAM(s) Oral at bedtime  chlorhexidine 2% Cloths 1 Application(s) Topical <User Schedule>  dabigatran 150 milliGRAM(s) Oral every 12 hours  furosemide   Injectable 40 milliGRAM(s) IV Push daily  metoprolol tartrate 75 milliGRAM(s) Oral two times a day  multivitamin 1 Tablet(s) Oral daily  predniSONE   Tablet 40 milliGRAM(s) Oral daily    MEDICATIONS  (PRN):  acetaminophen     Tablet .. 650 milliGRAM(s) Oral every 6 hours PRN Temp greater or equal to 38C (100.4F), Mild Pain (1 - 3)  melatonin 3 milliGRAM(s) Oral at bedtime PRN Insomnia      LABS:                        11.3   7.75  )-----------( 132      ( 10 Mar 2023 06:54 )             37.2     Hgb Trend: 11.3<--, 10.7<--, 11.0<--, 10.1<--, 11.3<--  03-10    142  |  98  |  17  ----------------------------<  97  3.3<L>   |  38<H>  |  0.89    Ca    9.2      10 Mar 2023 06:54  Phos  2.4     03-10  Mg     1.7     03-10      Creatinine Trend: 0.89<--, 0.73<--, 0.78<--, 0.84<--, 0.89<--, 0.81<--        Venous Blood Gas:  03-10 @ 06:45  7.46/58/48/41/81.0  VBG Lactate: 2.1  Venous Blood Gas:  03-09 @ 09:49  7.42/63/63/41/95.3  VBG Lactate: 2.0      MICROBIOLOGY:       RADIOLOGY:  [ ] Reviewed and interpreted by me    PULMONARY FUNCTION TESTS:    EKG:

## 2023-03-10 NOTE — PROGRESS NOTE ADULT - PROBLEM SELECTOR PLAN 4
Afib w/ RVR up to 123 on admission, likely compensatory response due to sepsis and dyspnea.  - metoprolol succinate 100mg qd (home), maintain HR goal <110      - while inpatient tartrate 50mg BID (do succinate on dc)   - CHADSVASC approx 6; continue pradaxa 150mg BID (home)  - monitor on tele  - maintain Mg>2, K>4 On chronic lasix 40mg in AM, 20mg in PM; no recent changes to regimen. Patient does not routinely elevated her legs.   - lasix as above  - on potassium 20mEq qd at home

## 2023-03-10 NOTE — PROGRESS NOTE ADULT - ASSESSMENT
89YO Female PMH Afib (on pradaxa), prior DVT, remote history of MI, HTN, HLD who was admitted 3/5 w/ dyspnea x1week.  CTA was done which showed no PE however did appear to have mosaicism and trace effusions. Additionally small nodules associated with small airways disease were also noted. Pulmonary consulted for Respiratory Failure.    #Hypoxemic and Hypercapnic Respiratory Failure  - Pt was initially noted to by hypercapnic in the setting of acute HF exacerbation  - VBG 7.25/89 which improved with Bilevel and diuresis  - Bicarbonate was notably elevated on admission to 33 and has been rising with diuresis to 37  - VBG done 3/9 shows 7.42/63 consistent with metabolic acidosis in addition to respiratory acidosis (which likely has been present for some time as pt's admission bicarb was elevated to 33)  - Echo shows HFpEF with concentric remodeling and increased LV filling pressures as well as moderate PH  - Pt never smoked and had no wheezing on exam- doubt COPD  - S/p Diamox 250mg x1  - Please recheck Chem and VBG tomorrow  - C/w Strict In and Out  - C/w Daily Weights    Case discussed with Dr. Hernandez   89YO Female PMH Afib (on pradaxa), prior DVT, remote history of MI, HTN, HLD who was admitted 3/5 w/ dyspnea x1week.  CTA was done which showed no PE however did appear to have mosaicism and trace effusions. Additionally small nodules associated with small airways disease were also noted. Pulmonary consulted for Respiratory Failure.    #Hypoxemic and Hypercapnic Respiratory Failure  - Pt was initially noted to by hypercapnic in the setting of acute HF exacerbation  - VBG 7.25/89 which improved with Bilevel and diuresis  - Bicarbonate was notably elevated on admission to 33 and has been rising with diuresis to 37  - VBG done 3/9 shows 7.42/63 consistent with metabolic acidosis in addition to respiratory acidosis (which likely has been present for some time as pt's admission bicarb was elevated to 33)  - Echo shows HFpEF with concentric remodeling and increased LV filling pressures as well as moderate PH  - Pt never smoked and had no wheezing on exam- doubt COPD  - S/p Diamox 250mg x1 3/9  - VBG 7.46/59 this am  - Please give another dose of Diamox 250mg x1 today  - Please recheck Chem and VBG tomorrow  - C/w Strict In and Out  - C/w Daily Weights       89YO Female PMH Afib (on pradaxa), prior DVT, remote history of MI, HTN, HLD who was admitted 3/5 w/ dyspnea x1week.  CTA was done which showed no PE however did appear to have mosaicism and trace effusions. Additionally small nodules associated with small airways disease were also noted. Pulmonary consulted for Respiratory Failure.    #Hypoxemic and Hypercapnic Respiratory Failure  - Pt was initially noted to by hypercapnic in the setting of acute HF exacerbation  - VBG 7.25/89 which improved with Bilevel and diuresis  - Bicarbonate was notably elevated on admission to 33 and has been rising with diuresis to 37  - VBG done 3/9 shows 7.42/63 consistent with metabolic acidosis in addition to respiratory acidosis (which likely has been present for some time as pt's admission bicarb was elevated to 33)  - Echo shows HFpEF with concentric remodeling and increased LV filling pressures as well as moderate PH  - Pt never smoked and had no wheezing on exam- doubt COPD  - S/p Diamox 250mg x1 3/9  - VBG 7.46/59 this am  - Please give Diamox 250mg Q12 today 3/10  - Please hold Lasix  - Please recheck Chem and VBG tomorrow  - C/w Strict In and Out  - C/w Daily Weights       89YO Female PMH Afib (on pradaxa), prior DVT, remote history of MI, HTN, HLD who was admitted 3/5 w/ dyspnea x1week.  CTA was done which showed no PE however did appear to have mosaicism and trace effusions. Additionally small nodules associated with small airways disease were also noted. Pulmonary consulted for Respiratory Failure.    #Hypoxemic and Hypercapnic Respiratory Failure  - Pt was initially noted to by hypercapnic in the setting of acute HF exacerbation  - VBG 7.25/89 which improved with Bilevel and diuresis  - Bicarbonate was notably elevated on admission to 33 and has been rising with diuresis to 37  - VBG done 3/9 shows 7.42/63 consistent with metabolic acidosis in addition to respiratory acidosis (which likely has been present for some time as pt's admission bicarb was elevated to 33)  - Echo shows HFpEF with concentric remodeling and increased LV filling pressures as well as moderate PH  - Pt never smoked and had no wheezing on exam- doubt COPD  - S/p Diamox 250mg x1 3/9  - VBG 7.46/59 this am  - Please give Diamox 250mg Q12 today 3/10  - Please hold Lasix  - Please recheck BMP and VBG tomorrow  - C/w Strict In and Out  - C/w Daily Weights       89YO Female PMH Afib (on pradaxa), prior DVT, remote history of MI, HTN, HLD who was admitted 3/5 w/ dyspnea x1week.  CTA was done which showed no PE however did appear to have mosaicism and trace effusions. Additionally small nodules associated with small airways disease were also noted. Pulmonary consulted for Respiratory Failure.    #Hypoxemic and Hypercapnic Respiratory Failure  - Pt was initially noted to by hypercapnic in the setting of acute HF exacerbation  - VBG 7.25/89 which improved with Bilevel and diuresis  - Bicarbonate was notably elevated on admission to 33 and has been rising with diuresis to 37  - VBG done 3/9 shows 7.42/63 consistent with metabolic acidosis in addition to respiratory acidosis (which likely has been present for some time as pt's admission bicarb was elevated to 33)  - Echo shows HFpEF with concentric remodeling and increased LV filling pressures as well as moderate PH  - Pt never smoked and had no wheezing on exam- doubt COPD  - S/p Diamox 250mg x1 3/9  - VBG 7.46/59 this am  - Please give Diamox 250mg Q12 today 3/10  - Please hold Lasix  - Please stop steroids  - C/w Strict In and Out  - C/w Daily Weights    Case discussed with Dr. Hernandez

## 2023-03-10 NOTE — PROGRESS NOTE ADULT - ASSESSMENT
89F w/ PMH Afib (on pradaxa), prior DVT, remote history of MI, lymphedema, HTN, HLD presenting with acute dyspnea on day of presentation concerning for decompensated heart failure.     #HFpEF Patient presented with dyspnea, modest BNP elevation. TTE demonstrated diastolic dysfunction, due to body habitus it her volume status is not easily apparent.   - BNP 2415  - Chest Xray and Chest CT with no radiographic evidence of pulmonary edema, no PE    Plan:   - Hold diuretics  - Can start jardiance once on oral diuretics  - Hypercarbia likely due to obstructive disease vs obesity hypoventilation syndrome  - Wean O2 as tolerated    #A.fib  - Continue metoprolol 75mg BID  - Pradaxa 150mg BID      Patricia Webb MD  Cardiology Fellow- PGY 4   89F w/ PMH Afib (on pradaxa), prior DVT, remote history of MI, lymphedema, HTN, HLD presenting with acute dyspnea on day of presentation concerning for decompensated heart failure.     #HFpEF Patient presented with dyspnea, modest BNP elevation. TTE demonstrated diastolic dysfunction, due to body habitus it her volume status is not easily apparent.   - BNP 2415  - Chest Xray and Chest CT with no radiographic evidence of pulmonary edema, no PE    Plan:   - Hold diuretics  - Consider Jardiance, however concern since had recent urinary tract infection/urosepsis.  - Hypercarbia likely due to obstructive disease vs obesity hypoventilation syndrome  - Wean O2 as tolerated    #A.fib  - Continue metoprolol 75mg BID  - Pradaxa 150mg BID      Patricia Webb MD  Cardiology Fellow- PGY 4

## 2023-03-10 NOTE — PROGRESS NOTE ADULT - ATTENDING COMMENTS
Attending Attestation:    Patient seen and examined with resident/fellow.  Agree with above except as noted.  89 yo female with atrial fib, HTN, DVT, HFpEF, chronic leg edema, who has been c/o worsening RICHARD and leg swelling.  Pt with elevated serum bicarb on admission  indicating chronic hypercapnia. She is not on home 02. Never smoker. No h/o COPD.  Pt has been diuresed and has been feeling better. Her legs and feet are less swollen than on admission.  TTE reveals normal EF with moderate pulmonary HTN.      A/P Acute on chronic hypercapnic respiratory failure and acute hypoxemic respiratory failure due decompensated HFpEF.  1. Continue diuresis. However diuresis with Diamox for a few doses.  2. Metabolic alkalosis due to over diuresis with Lasix.  Gentle diuresis with Diamox. Give  250mls BID today. Would continue Diamox until  serum bicarb back to [id number].  3. Follow I & 0s   4. Daily weight  5.  Please STOP steroids. Pt does not have COPD. Wheezing likely due to pulmonary edema

## 2023-03-10 NOTE — PROGRESS NOTE ADULT - ASSESSMENT
89F w/ PMH Afib (on pradaxa), remote history MI, HTN, prior DVT presenting w/ intermittent dyspnea x1 week w/ acute worsening x24h, admitted w/ acute hypoxic and hypercapnic respiratory failure 2/2 urosepsis +/- CHF exacerbation.     Echo showing signs of pulm HTN and HFpEF (concentric hypertrophy). Patient also with chronic retention of CO2, worse in the AM, c/f KALIN vs OHS/chronic lung disease

## 2023-03-10 NOTE — PROGRESS NOTE ADULT - SUBJECTIVE AND OBJECTIVE BOX
INCOMPLETE    INTERVAL:  SUBJECTIVE: Patient examined bedside this AM.    OBJECTIVE:  ICU Vital Signs Last 24 Hrs  T(C): 36.6 (10 Mar 2023 11:15), Max: 36.7 (09 Mar 2023 20:31)  T(F): 97.9 (10 Mar 2023 11:15), Max: 98.1 (09 Mar 2023 20:31)  HR: 105 (10 Mar 2023 11:15) (11 - 121)  BP: 100/68 (10 Mar 2023 11:15) (99/55 - 125/74)  BP(mean): --  ABP: --  ABP(mean): --  RR: 18 (10 Mar 2023 11:15) (18 - 18)  SpO2: 92% (10 Mar 2023 11:15) (92% - 97%)    O2 Parameters below as of 10 Mar 2023 11:15  Patient On (Oxygen Delivery Method): nasal cannula  O2 Flow (L/min): 2            03-09 @ 07:01  -  03-10 @ 07:00  --------------------------------------------------------  IN: 960 mL / OUT: 1300 mL / NET: -340 mL    03-10 @ 07:01  -  03-10 @ 11:32  --------------------------------------------------------  IN: 280 mL / OUT: 0 mL / NET: 280 mL      CAPILLARY BLOOD GLUCOSE          PHYSICAL EXAM:  General: Well-groomed, NAD, laying in bed, on RA  HEENT: PERRLA, EOMI, non-icteric  Neck:  symmetric,  JVD absent  Respiratory: Clear to ascultation bilaterally, no crackles/rales, no Resp distress; no accessory muscle use  Cardiovascular:  RRR, no murmurs/rubs/gallops  Abdomen: Soft, NT, ND  Extremities: No edema noted  Skin: No rashes or lesions noted  Neurological: Sensation grossly intact; strength 5/5 in all extremities.  Psychiatry: AOx3, appropriate insight/judgement, appropriate affect, recent/remote memory intact    PRN Meds:  acetaminophen     Tablet .. 650 milliGRAM(s) Oral every 6 hours PRN  famotidine    Tablet 20 milliGRAM(s) Oral two times a day PRN  melatonin 3 milliGRAM(s) Oral at bedtime PRN      LABS:                        11.3   7.75  )-----------( 132      ( 10 Mar 2023 06:54 )             37.2     Hgb Trend: 11.3<--, 10.7<--, 11.0<--, 10.1<--, 11.3<--  03-10    142  |  98  |  17  ----------------------------<  97  3.3<L>   |  38<H>  |  0.89    Ca    9.2      10 Mar 2023 06:54  Phos  2.4     03-10  Mg     1.7     03-10      Creatinine Trend: 0.89<--, 0.73<--, 0.78<--, 0.84<--, 0.89<--, 0.81<--        Venous Blood Gas:  03-10 @ 06:45  7.46/58/48/41/81.0  VBG Lactate: 2.1  Venous Blood Gas:  03-09 @ 09:49  7.42/63/63/41/95.3  VBG Lactate: 2.0      MICROBIOLOGY:       RADIOLOGY:  [ ] Reviewed and interpreted by me    EKG: INTERVAL: NAEO  SUBJECTIVE: Patient examined bedside this AM with son at bedside. States that she became out of breath while trying to open orange juice carton. Per son, that is more activity than she gets at home. Otherwise feels that breathing is slightly improved. Complaining of some indigestion. Denies Fevers, chills, cough, headache, dizziness, nausea, vomiting, dysuria, changes in urination, changes in BM     OBJECTIVE:  ICU Vital Signs Last 24 Hrs  T(C): 36.6 (10 Mar 2023 11:15), Max: 36.7 (09 Mar 2023 20:31)  T(F): 97.9 (10 Mar 2023 11:15), Max: 98.1 (09 Mar 2023 20:31)  HR: 105 (10 Mar 2023 11:15) (11 - 121)  BP: 100/68 (10 Mar 2023 11:15) (99/55 - 125/74)  BP(mean): --  ABP: --  ABP(mean): --  RR: 18 (10 Mar 2023 11:15) (18 - 18)  SpO2: 92% (10 Mar 2023 11:15) (92% - 97%)    O2 Parameters below as of 10 Mar 2023 11:15  Patient On (Oxygen Delivery Method): nasal cannula  O2 Flow (L/min): 2            03-09 @ 07:01  -  03-10 @ 07:00  --------------------------------------------------------  IN: 960 mL / OUT: 1300 mL / NET: -340 mL    03-10 @ 07:01  -  03-10 @ 11:32  --------------------------------------------------------  IN: 280 mL / OUT: 0 mL / NET: 280 mL      CAPILLARY BLOOD GLUCOSE          PHYSICAL EXAM:  General: Well-groomed, NAD, laying in bed, on 3L NC  HEENT: PERRLA, EOMI, non-icteric  Neck:  symmetric,  JVD absent  Respiratory: Bibasilar crackles (improved from prev); no Resp distress; mild accessory muscle use  Cardiovascular:  A-fib, no murmurs/rubs/gallops  Abdomen: Soft, NT, ND  Extremities: Severe lymphedema as previous  Skin: No rashes or lesions noted  Neurological: Sensation grossly intact  Psychiatry: AOx3, appropriate insight/judgement, appropriate affect, recent/remote memory intact    PRN Meds:  acetaminophen     Tablet .. 650 milliGRAM(s) Oral every 6 hours PRN  famotidine    Tablet 20 milliGRAM(s) Oral two times a day PRN  melatonin 3 milliGRAM(s) Oral at bedtime PRN      LABS:                        11.3   7.75  )-----------( 132      ( 10 Mar 2023 06:54 )             37.2     Hgb Trend: 11.3<--, 10.7<--, 11.0<--, 10.1<--, 11.3<--  03-10    142  |  98  |  17  ----------------------------<  97  3.3<L>   |  38<H>  |  0.89    Ca    9.2      10 Mar 2023 06:54  Phos  2.4     03-10  Mg     1.7     03-10      Creatinine Trend: 0.89<--, 0.73<--, 0.78<--, 0.84<--, 0.89<--, 0.81<--        Venous Blood Gas:  03-10 @ 06:45  7.46/58/48/41/81.0  VBG Lactate: 2.1  Venous Blood Gas:  03-09 @ 09:49  7.42/63/63/41/95.3  VBG Lactate: 2.0      MICROBIOLOGY:       RADIOLOGY:  [ ] Reviewed and interpreted by me    EKG:

## 2023-03-10 NOTE — PROGRESS NOTE ADULT - PROBLEM SELECTOR PLAN 6
Diet: Regular  DVT: Pradaxa  Dispo: pending course, PT consult    GOC: Patient's son, Akin Barber and daughter Emperatriz Barber are HCP. Wishes to remain FULL CODE.     Communication: results and plan discussed w/ patient and her daughter at bedside, 10pm.

## 2023-03-10 NOTE — PROGRESS NOTE ADULT - PROBLEM SELECTOR PLAN 1
Patient admitted w/ RICHARD x1week w/ acute worsening over last 24h; hypoxic at home, respiratory acidosis and started on BiPAP w/ improvement in CO2 levels. Trop WNL, BNP 2415; CT chest w/o edema, consolidations.  According to OP cardiology notes, has had periods of systolic dysfxn in past.   -Now on 2L NC  -Echo showing diastolic dysfunction with moderate pulm HTN and EF 72%--> c/w HFpEF and Group 2 Pulm HTN  -Hypoxia persistent and out of proportion to lung exam--> Pulmonology consult    -Switch lasix 20mg IV BID --> 40mg IV qd  -Signifcant wheezing on exam, likely cardiac in nature--> trial of duonebs  -Patient trialed on NIPPV with improvement in AM hypercapnia--> c/w NIPPV Patient admitted w/ RICHARD x1week w/ acute worsening over last 24h; hypoxic at home, respiratory acidosis and started on BiPAP w/ improvement in CO2 levels. Trop WNL, BNP 2415; CT chest w/o edema, consolidations.  According to OP cardiology notes, has had periods of systolic dysfxn in past.   -Now on 2L NC  -Echo showing diastolic dysfunction with moderate pulm HTN and EF 72%--> c/w HFpEF and Group 2 Pulm HTN  -Hypoxia persistent and out of proportion to lung exam--> Pulmonology consult    -Dc lasix  -start Acetazolamide 250 bid  -Signifcant wheezing on exam, likely cardiac in nature--> trial of duonebs  -Trial off BIPAP tonight and obtain ABG in AM

## 2023-03-10 NOTE — PROGRESS NOTE ADULT - ATTENDING COMMENTS
89 year old woman with chronic atrial fibrillation on dabigatran, also history of DVT admitted for acute dyspnea. Has known chronic congestive heart failure with preserved systolic function. On exam difficult to discern jugular veins due to body habitus or peripheral edema due to chronic severe lymphedema. Continued to have episodic dyspnea and tachypnea with normal or hyperoxemia, but has hypercapnea. Echo consistent with diastolic dysfunction, pulmonary hypertension, but normal systolic function. Troponin not elevated and serum proBNP minimally elevated or normal for age. Agree with plan for BiPAP overnight, tolerating and seems to be improving each day, Metabolic alkalosis treated with diamox and now discontinuing furosemide. Ultimately may benefit from addition of SGLT-2 inhibitor. Pulmonary evaluating for exacerbated obstructive pulmonary disease.    To contact call Cardiology Fellow or Attending as listed on [email or web address] password: [email or web address].

## 2023-03-10 NOTE — PROGRESS NOTE ADULT - PROBLEM SELECTOR PLAN 5
On chronic lasix 40mg in AM, 20mg in PM; no recent changes to regimen. Patient does not routinely elevated her legs.   - lasix as above  - on potassium 20mEq qd at home Diet: Regular  DVT: Pradaxa  Dispo: pending course, PT consult    GOC: Patient's son, Akin Barber and daughter Emperatriz Barber are HCP. Wishes to remain FULL CODE.     Communication: results and plan discussed w/ patient and her daughter at bedside, 10pm.

## 2023-03-11 ENCOUNTER — TRANSCRIPTION ENCOUNTER (OUTPATIENT)
Age: 88
End: 2023-03-11

## 2023-03-11 LAB
ANION GAP SERPL CALC-SCNC: 5 MMOL/L — SIGNIFICANT CHANGE UP (ref 5–17)
BUN SERPL-MCNC: 20 MG/DL — SIGNIFICANT CHANGE UP (ref 7–23)
CALCIUM SERPL-MCNC: 8.9 MG/DL — SIGNIFICANT CHANGE UP (ref 8.4–10.5)
CHLORIDE SERPL-SCNC: 99 MMOL/L — SIGNIFICANT CHANGE UP (ref 96–108)
CO2 SERPL-SCNC: 38 MMOL/L — HIGH (ref 22–31)
CREAT SERPL-MCNC: 0.86 MG/DL — SIGNIFICANT CHANGE UP (ref 0.5–1.3)
EGFR: 64 ML/MIN/1.73M2 — SIGNIFICANT CHANGE UP
GAS PNL BLDA: SIGNIFICANT CHANGE UP
GLUCOSE SERPL-MCNC: 98 MG/DL — SIGNIFICANT CHANGE UP (ref 70–99)
HCT VFR BLD CALC: 36.3 % — SIGNIFICANT CHANGE UP (ref 34.5–45)
HGB BLD-MCNC: 11 G/DL — LOW (ref 11.5–15.5)
MAGNESIUM SERPL-MCNC: 2.1 MG/DL — SIGNIFICANT CHANGE UP (ref 1.6–2.6)
MCHC RBC-ENTMCNC: 28.9 PG — SIGNIFICANT CHANGE UP (ref 27–34)
MCHC RBC-ENTMCNC: 30.3 GM/DL — LOW (ref 32–36)
MCV RBC AUTO: 95.3 FL — SIGNIFICANT CHANGE UP (ref 80–100)
NRBC # BLD: 0 /100 WBCS — SIGNIFICANT CHANGE UP (ref 0–0)
PHOSPHATE SERPL-MCNC: 3.3 MG/DL — SIGNIFICANT CHANGE UP (ref 2.5–4.5)
PLATELET # BLD AUTO: 165 K/UL — SIGNIFICANT CHANGE UP (ref 150–400)
POTASSIUM SERPL-MCNC: 3.5 MMOL/L — SIGNIFICANT CHANGE UP (ref 3.5–5.3)
POTASSIUM SERPL-SCNC: 3.5 MMOL/L — SIGNIFICANT CHANGE UP (ref 3.5–5.3)
RBC # BLD: 3.81 M/UL — SIGNIFICANT CHANGE UP (ref 3.8–5.2)
RBC # FLD: 15 % — HIGH (ref 10.3–14.5)
SODIUM SERPL-SCNC: 142 MMOL/L — SIGNIFICANT CHANGE UP (ref 135–145)
WBC # BLD: 9.16 K/UL — SIGNIFICANT CHANGE UP (ref 3.8–10.5)
WBC # FLD AUTO: 9.16 K/UL — SIGNIFICANT CHANGE UP (ref 3.8–10.5)

## 2023-03-11 PROCEDURE — 99232 SBSQ HOSP IP/OBS MODERATE 35: CPT | Mod: GC

## 2023-03-11 RX ORDER — FUROSEMIDE 40 MG
40 TABLET ORAL DAILY
Refills: 0 | Status: DISCONTINUED | OUTPATIENT
Start: 2023-03-11 | End: 2023-03-13

## 2023-03-11 RX ORDER — ACETAZOLAMIDE 250 MG/1
250 TABLET ORAL ONCE
Refills: 0 | Status: COMPLETED | OUTPATIENT
Start: 2023-03-11 | End: 2023-03-11

## 2023-03-11 RX ORDER — POTASSIUM CHLORIDE 20 MEQ
40 PACKET (EA) ORAL ONCE
Refills: 0 | Status: COMPLETED | OUTPATIENT
Start: 2023-03-11 | End: 2023-03-11

## 2023-03-11 RX ADMIN — Medication 3 MILLILITER(S): at 12:02

## 2023-03-11 RX ADMIN — Medication 1 APPLICATION(S): at 05:23

## 2023-03-11 RX ADMIN — ACETAZOLAMIDE 250 MILLIGRAM(S): 250 TABLET ORAL at 14:20

## 2023-03-11 RX ADMIN — DABIGATRAN ETEXILATE MESYLATE 150 MILLIGRAM(S): 150 CAPSULE ORAL at 18:06

## 2023-03-11 RX ADMIN — CHLORHEXIDINE GLUCONATE 1 APPLICATION(S): 213 SOLUTION TOPICAL at 05:21

## 2023-03-11 RX ADMIN — Medication 75 MILLIGRAM(S): at 05:22

## 2023-03-11 RX ADMIN — Medication 40 MILLIEQUIVALENT(S): at 18:05

## 2023-03-11 RX ADMIN — ATORVASTATIN CALCIUM 20 MILLIGRAM(S): 80 TABLET, FILM COATED ORAL at 21:48

## 2023-03-11 RX ADMIN — Medication 1 APPLICATION(S): at 16:00

## 2023-03-11 RX ADMIN — Medication 1 TABLET(S): at 12:01

## 2023-03-11 RX ADMIN — Medication 40 MILLIGRAM(S): at 12:00

## 2023-03-11 RX ADMIN — Medication 500 MILLIGRAM(S): at 12:01

## 2023-03-11 RX ADMIN — DABIGATRAN ETEXILATE MESYLATE 150 MILLIGRAM(S): 150 CAPSULE ORAL at 05:22

## 2023-03-11 RX ADMIN — PANTOPRAZOLE SODIUM 40 MILLIGRAM(S): 20 TABLET, DELAYED RELEASE ORAL at 05:22

## 2023-03-11 RX ADMIN — ACETAZOLAMIDE 250 MILLIGRAM(S): 250 TABLET ORAL at 05:22

## 2023-03-11 RX ADMIN — Medication 75 MILLIGRAM(S): at 18:07

## 2023-03-11 NOTE — DISCHARGE NOTE PROVIDER - NSFOLLOWUPCLINICS_GEN_ALL_ED_FT
[facility] Pulmonolgy and Sleep Medicine  Pulmonology  [address] [place] [address], [place] [address]  Phone: [phone]  Fax:

## 2023-03-11 NOTE — PROGRESS NOTE ADULT - ASSESSMENT
89F w/ PMH Afib (on pradaxa), prior DVT, remote history of MI, lymphedema, HTN, HLD presenting with acute dyspnea on day of presentation concerning for decompensated heart failure.     #HFpEF Patient presented with dyspnea, modest BNP elevation. TTE demonstrated diastolic dysfunction, due to body habitus it her volume status is not easily apparent.   - Initial BNP 2415  - Chest Xray and Chest CT with no radiographic evidence of pulmonary edema, no PE  Plan:   - Repeat BNP for trend  - Very difficult physical examination, suggest starting PO furosemide 40 mg daily  - Elevated bicarb likely from chronic respiratory acidosis as opposed to contraction  - Daily chemistries for electrolytes and creatinine   - Consider Jardiance, however concern since had recent urinary tract infection/urosepsis.  - Hypercarbia likely due to obstructive disease vs obesity hypoventilation syndrome (her hypercarbia appears chronic and compensated)  - Wean O2 as tolerated    #A.fib  - Continue metoprolol 75mg BID  - Pradaxa 150mg BID  - Telemetry     Juvenal Shaffer MD  Department of Cardiology  Cardiology Fellow, PGY5 89F w/ PMH Afib (on pradaxa), prior DVT, remote history of MI, lymphedema, HTN, HLD presenting with acute dyspnea on day of presentation concerning for decompensated heart failure.     #HFpEF Patient presented with dyspnea, modest BNP elevation. TTE demonstrated diastolic dysfunction, due to body habitus it her volume status is not easily apparent.   - Initial BNP 2415  - Chest Xray and Chest CT with no radiographic evidence of pulmonary edema, no PE  Plan:   - Repeat BNP for trend  - Very difficult physical examination, consider PO furosemide  - Elevated bicarb may be from chronic respiratory acidosis in addition to contraction  - Daily chemistries for electrolytes and creatinine   - Consider Jardiance, however concern since had recent urinary tract infection/urosepsis.  - Hypercarbia likely due to obstructive disease vs obesity hypoventilation syndrome (her hypercarbia appears chronic and compensated)  - Wean O2 as tolerated    #A.fib  - Continue metoprolol 75mg BID  - Pradaxa 150mg BID  - Telemetry     Juvenal Shaffer MD  Department of Cardiology  Cardiology Fellow, PGY5

## 2023-03-11 NOTE — DISCHARGE NOTE PROVIDER - NSDCCPCAREPLAN_GEN_ALL_CORE_FT
PRINCIPAL DISCHARGE DIAGNOSIS  Diagnosis: Acute diastolic heart failure  Assessment and Plan of Treatment: Heart failure is a condition in which the heart has trouble pumping blood. This may mean that the heart cannot pump enough blood out to the body or that the heart does not fill up with enough blood. When this happens, parts of the body do not get the blood and oxygen they need to function properly. This can cause symptoms such as breathing problems, tiredness (fatigue), swelling, and confusion.  Heart failure exacerbation refers to heart failure symptoms that get worse. The symptoms may get worse suddenly or develop slowly over time. Heart failure exacerbation is a serious medical problem that should be treated right away.  When heart failure symptoms suddenly or slowly get worse, this may be a sign of heart failure exacerbation.  Symptoms of heart failure include: Shortness of breath during activity or exercise; A cough that does not go away; Swelling of the legs, ankles, feet, or abdomen; Losing or gaining weight for no reason; Trouble breathing when lying down; Increased heart rate or irregular heartbeat; Fatigue; Feeling light-headed, dizzy, or close to fainting; Nausea or lack of appetite.      SECONDARY DISCHARGE DIAGNOSES  Diagnosis: Atrial fibrillation  Assessment and Plan of Treatment: Atrial fibrillation is a type of irregular or rapid heartbeat (arrhythmia). In atrial fibrillation, the top part of the heart (atria) beats in an irregular pattern. This makes the heart unable to pump blood normally and effectively.  The goal of treatment is to prevent blood clots from forming, control your heart rate, or restore your heartbeat to a normal rhythm. If this condition is not treated, it can cause serious problems, such as a weakened heart muscle (cardiomyopathy) or a stroke.     PRINCIPAL DISCHARGE DIAGNOSIS  Diagnosis: Acute diastolic heart failure  Assessment and Plan of Treatment: Heart failure is a condition in which the heart has trouble pumping blood. This may mean that the heart cannot pump enough blood out to the body or that the heart does not fill up with enough blood. When this happens, parts of the body do not get the blood and oxygen they need to function properly. This can cause symptoms such as breathing problems, tiredness (fatigue), swelling, and confusion.  Heart failure exacerbation refers to heart failure symptoms that get worse. The symptoms may get worse suddenly or develop slowly over time. Heart failure exacerbation is a serious medical problem that should be treated right away.  When heart failure symptoms suddenly or slowly get worse, this may be a sign of heart failure exacerbation.  Symptoms of heart failure include: Shortness of breath during activity or exercise; A cough that does not go away; Swelling of the legs, ankles, feet, or abdomen; Losing or gaining weight for no reason; Trouble breathing when lying down; Increased heart rate or irregular heartbeat; Fatigue; Feeling light-headed, dizzy, or close to fainting; Nausea or lack of appetite.      SECONDARY DISCHARGE DIAGNOSES  Diagnosis: Atrial fibrillation  Assessment and Plan of Treatment: Atrial fibrillation is a type of irregular or rapid heartbeat (arrhythmia). In atrial fibrillation, the top part of the heart (atria) beats in an irregular pattern. This makes the heart unable to pump blood normally and effectively.  The goal of treatment is to prevent blood clots from forming, control your heart rate, or restore your heartbeat to a normal rhythm. If this condition is not treated, it can cause serious problems, such as a weakened heart muscle (cardiomyopathy) or a stroke.    Diagnosis: KALIN (obstructive sleep apnea)  Assessment and Plan of Treatment: We suspect you have sleep apnea based on the high carbon dioxide levels in your blood. You need to see a pulmonologist for lung function test and sleep study. We will send you home on a BiPAP machine. Please use this during hours of sleep.     PRINCIPAL DISCHARGE DIAGNOSIS  Diagnosis: Acute diastolic heart failure  Assessment and Plan of Treatment: Heart failure is a condition in which the heart has trouble pumping blood. This may mean that the heart cannot pump enough blood out to the body or that the heart does not fill up with enough blood. When this happens, parts of the body do not get the blood and oxygen they need to function properly. This can cause symptoms such as breathing problems, tiredness (fatigue), swelling, and confusion.  Heart failure exacerbation refers to heart failure symptoms that get worse. The symptoms may get worse suddenly or develop slowly over time. Heart failure exacerbation is a serious medical problem that should be treated right away.  When heart failure symptoms suddenly or slowly get worse, this may be a sign of heart failure exacerbation.   You had a chage in your medications, specifically your lasix dosing. Please take 40 mg in the morning and 20 mg in the afternoon. You will aslo require oxygen.  Follow up with your primary care doctor and cardiologist within one week of discharge.  Symptoms of heart failure include: Shortness of breath during activity or exercise; A cough that does not go away; Swelling of the legs, ankles, feet, or abdomen; Losing or gaining weight for no reason; Trouble breathing when lying down; Increased heart rate or irregular heartbeat; Fatigue; Feeling light-headed, dizzy, or close to fainting; Nausea or lack of appetite.      SECONDARY DISCHARGE DIAGNOSES  Diagnosis: Atrial fibrillation  Assessment and Plan of Treatment: Atrial fibrillation is a type of irregular or rapid heartbeat (arrhythmia). In atrial fibrillation, the top part of the heart (atria) beats in an irregular pattern. This makes the heart unable to pump blood normally and effectively.  The goal of treatment is to prevent blood clots from forming, control your heart rate, or restore your heartbeat to a normal rhythm. If this condition is not treated, it can cause serious problems, such as a weakened heart muscle (cardiomyopathy) or a stroke.    Diagnosis: KALIN (obstructive sleep apnea)  Assessment and Plan of Treatment: We suspect you have sleep apnea based on the high carbon dioxide levels in your blood. You need to see a pulmonologist for lung function test and sleep study.

## 2023-03-11 NOTE — DISCHARGE NOTE PROVIDER - HOSPITAL COURSE
89F w/ PMH Afib (on pradaxa), prior DVT, remote history of MI, HTN, HLD presenting w/ dyspnea x1week. Patient has had intermittent difficulty breathing over last 1 week w/ acute worsening over last 24h. Usually able to walk approx 15 feet w/o issue (exercise limited by lymphedema and arthritis), but has been unable to catch her breath after ambulating to and from her bathroom. Her daughter took SpO2 at home and reports it was in the 60s; patient then used her sister's portable O2, which helped until EMS arrived. She has been sleeping in a recliner chair more frequently than usual, but notes this is because of difficulty getting up from bed. Notes increased LE edema from baseline. Takes lasix on a regular basis and has been taking as prescribed, no increases or decreases in dosage recently. Denies PND, chest pain, palpitations, diaphoresis, arm/jaw pain, indigestion, nausea, vomiting, changes in bowel habits. Endorses intermittent dry cough; no fevers, constitutional symptoms, myalgias, weight loss. Daughter endorses chronic incontinence and seeing blood (no clots) in pull-ups over last few days.   Patient recently diagnosed w/ UTI and took 1-2 days of macrobid 100mg BID, but noted that hematuria persisted. Outpatient [facility] - Aug 2022, had E. coli resistant to ampicillin. No recent hospitalizations, abx use.   ED vitals: 146/82, , RR 22, temp 97.9 100% NRB 15L  ED course: Started on BiPAP for increased WOB. CTA done w/o evidence PE. Give IV lasix 20mg x1, ceftriaxone x1.     General Medicine Course:  On floors, patient supplemental O2 quickly titrated off BIPAP and down to 4L O2 NC. Patient noted to be volume overloaded on exam, with cardiac history c/f new CHF. Patient was started on increased diuretic dosing and cardiology consulted. Echo notable for mild diastolic dysfunction and moderate pulmonary edema. Diuresis continued and patient weaned to 2L O2. However, there was significant difficulty further weaning O2 despite optimization of fluid status. Pulmonology consulted to evaluate for underlying lung disease, however workup negative. Subsequently [patient was weaned off O2/patient was discharged on home O2]  Patient with persistent hypercapnia with metabolic compensation while on floor. Concern for OHS/KALIN. Patient trialed on BIPAP ovn with significant improvement in AM hypercapnia. Therefore likely component of KALIN. Will require additional outpatient evaluation for BIPAP machine.  Patient initially with uncontrolled A-fib on floors, HR 90s-110s. Metoprolol dosing increased from 50-->75 to good effect.  Patient with severe BLE lymphedema on exam. Evaluated by wound care for any skin breakage. DVT study negative. Ace wraps applied to good effect.  Patient evaluated by PT, who rec JUNIOR, but family declining, prefer home PT.  Patient medically optimized for discharge home with PT and followup by PCP, Cardiology, and Sleep Medicine 89F w/ PMH Afib (on pradaxa), prior DVT, remote history of MI, HTN, HLD presenting w/ dyspnea x1week. She was noted to be hypervolemic on exam, with a.fib RVR, and found to have hypercarbic and hypoxic respiratory failure, subsequently started on NIPVV and admitted for respiratory failure in s/o likely acute decompensated heart failure vs tachycardia induced cardiomyopathy.     On floors, patient supplemental O2 quickly titrated off BIPAP and down to 4L O2 NC. Patient noted to be volume overloaded on exam, with elevated BNP. Patient was started on increased diuretic dosing and cardiology consulted. Echo notable for EF 72% mild diastolic dysfunction and moderate pulmonary hypertension. Diuresis continued and patient weaned to 2L O2. However, there was significant difficulty further weaning O2 despite optimization of fluid status. Pulmonology consulted to evaluate for underlying lung disease, however workup negative. Subsequently [patient **** was weaned off O2/patient was discharged on home O2]    Patient with persistent hypercapnia with metabolic compensation while on floor. Concern for OHS/KALIN. Patient trialed on BIPAP ovn with significant improvement in AM hypercapnia. Therefore likely component of KALIN. Will require additional outpatient evaluation for BIPAP machine.    Patient initially with uncontrolled A-fib on floors, HR 90s-110s. Metoprolol dosing increased from 50-->75 to good effect.    Patient with severe BLE lymphedema on exam. Evaluated by wound care for any skin breakage. DVT study negative. Ace wraps applied to good effect.    Patient evaluated by PT, who rec JUNIOR, but family declining, prefer home PT.    Patient medically optimized for discharge home with PT and followup by PCP, Cardiology, and Sleep Medicine 89F w/ PMH Afib (on pradaxa), prior DVT, remote history of MI, HTN, HLD presenting w/ dyspnea x1week. She was noted to be hypervolemic on exam, with a.fib RVR, and found to have hypercarbic and hypoxic respiratory failure, subsequently started on NIPVV and admitted for respiratory failure in s/o likely acute decompensated heart failure vs tachycardia induced cardiomyopathy.     On floors, patient supplemental O2 quickly titrated off BIPAP and down to 4L O2 NC. Patient noted to be volume overloaded on exam, with elevated BNP. Patient was started on increased diuretic dosing and cardiology consulted. Echo notable for EF 72% mild diastolic dysfunction and moderate pulmonary hypertension. Diuresis continued and patient weaned to 2L O2. However, there was significant difficulty further weaning O2 despite optimization of fluid status. Pulmonology consulted to evaluate for underlying lung disease, however workup negative. Patient will require home oxygen given desaturations with ambulation, not requiring BiPAP at this time. Plan for new diuretic regimen will plan for lasix 40 mg in AM and 20 mg in PM.     Patient evaluated by PT, who rec JUNIOR, but family declining, prefer home PT.    Patient medically optimized for discharge home with PT and followup by PCP, Cardiology, and Sleep Medicine 89F w/ PMH Afib (on pradaxa), prior DVT, remote history of MI, HTN, HLD presenting w/ dyspnea x1week. She was noted to be hypervolemic on exam, with a.fib RVR, and found to have hypercarbic and hypoxic respiratory failure, subsequently started on NIPVV and admitted for respiratory failure in s/o likely acute decompensated heart failure vs tachycardia induced cardiomyopathy.     On floors, patient supplemental O2 quickly titrated off BIPAP and down to 4L O2 NC. Patient noted to be volume overloaded on exam, with elevated BNP. Patient was started on increased diuretic dosing and cardiology consulted. Echo notable for EF 72% mild diastolic dysfunction and moderate pulmonary hypertension. Diuresis continued and patient weaned to 2L O2. However, there was significant difficulty further weaning O2 despite optimization of fluid status. Pulmonology consulted to evaluate for underlying lung disease, however workup negative. Patient will require home oxygen given desaturations with ambulation, not requiring BiPAP at this time. Plan for new diuretic regimen will plan for lasix 40 mg in AM and 20 mg in PM. Spironolactone qD.     Patient evaluated by PT, who rec JUNIOR, but family declining, prefer home PT.    Patient medically optimized for discharge home with PT and followup by PCP, Cardiology, and Sleep Medicine

## 2023-03-11 NOTE — PROGRESS NOTE ADULT - ATTENDING COMMENTS
This is a 89F with h/o pmh htn, hld, cad c/b mi, afib, dvt p/w worsening sob/quintanilla + le swelling, lethargy, found to have arf iso urosepsis + afib rvr w adhf, admitted to medicine for further mgmt.     1. Acute hypercapnic/hypoxemic respiratory failure, likely secondary to heart failure exacerbation, COPD exacerbation (wheezing on exam)  2. Acute exacerbation pf HFpEF  3. Afib rvr  4. Pulmonary HTN    - Afebrile, still requiring O2 1-2L NC  - CT chest- small vessel disease, Echo- EF 72%, diastolic dysfunction with moderate pulm HTN   - appreciated Card and Pulmonary f/u plans- on PO lasix 40mg/d, Diamox 250mg x 1, pro-bnp in am  - c/w Pradexa, Metoprolol 75mg bid, statin  - Bronchodilators  - d/c planning

## 2023-03-11 NOTE — PROGRESS NOTE ADULT - SUBJECTIVE AND OBJECTIVE BOX
INCOMPLETE    INTERVAL:  SUBJECTIVE: Patient examined bedside this AM.    OBJECTIVE:  ICU Vital Signs Last 24 Hrs  T(C): 36.9 (11 Mar 2023 04:00), Max: 36.9 (11 Mar 2023 04:00)  T(F): 98.4 (11 Mar 2023 04:00), Max: 98.4 (11 Mar 2023 04:00)  HR: 84 (11 Mar 2023 04:00) (84 - 105)  BP: 111/75 (11 Mar 2023 04:00) (100/68 - 111/75)  BP(mean): --  ABP: --  ABP(mean): --  RR: 17 (11 Mar 2023 04:00) (17 - 18)  SpO2: 94% (11 Mar 2023 04:00) (92% - 95%)    O2 Parameters below as of 11 Mar 2023 04:00  Patient On (Oxygen Delivery Method): nasal cannula  O2 Flow (L/min): 2            03-09 @ 07:01  -  03-10 @ 07:00  --------------------------------------------------------  IN: 960 mL / OUT: 1300 mL / NET: -340 mL    03-10 @ 07:01  -  03-11 @ 06:52  --------------------------------------------------------  IN: 840 mL / OUT: 500 mL / NET: 340 mL      CAPILLARY BLOOD GLUCOSE          PHYSICAL EXAM:  General: Well-groomed, NAD, laying in bed, on RA  HEENT: PERRLA, EOMI, non-icteric  Neck:  symmetric,  JVD absent  Respiratory: Clear to ascultation bilaterally, no crackles/rales, no Resp distress; no accessory muscle use  Cardiovascular:  RRR, no murmurs/rubs/gallops  Abdomen: Soft, NT, ND  Extremities: No edema noted  Skin: No rashes or lesions noted  Neurological: Sensation grossly intact; strength 5/5 in all extremities.  Psychiatry: AOx3, appropriate insight/judgement, appropriate affect, recent/remote memory intact    PRN Meds:  acetaminophen     Tablet .. 650 milliGRAM(s) Oral every 6 hours PRN  albuterol/ipratropium for Nebulization 3 milliLiter(s) Nebulizer every 6 hours PRN  famotidine    Tablet 20 milliGRAM(s) Oral two times a day PRN  melatonin 3 milliGRAM(s) Oral at bedtime PRN      LABS:                        11.0   9.16  )-----------( 165      ( 11 Mar 2023 06:01 )             36.3     Hgb Trend: 11.0<--, 11.3<--, 10.7<--, 11.0<--, 10.1<--  03-11    142  |  99  |  20  ----------------------------<  98  3.5   |  38<H>  |  0.86    Ca    8.9      11 Mar 2023 06:01  Phos  3.3     03-11  Mg     2.1     03-11      Creatinine Trend: 0.86<--, 0.89<--, 0.73<--, 0.78<--, 0.84<--, 0.89<--      Arterial Blood Gas:  03-11 @ 06:29  7.40/62/86/38/98.4/11.5  ABG lactate: --    Venous Blood Gas:  03-10 @ 06:45  7.46/58/48/41/81.0  VBG Lactate: 2.1  Venous Blood Gas:  03-09 @ 09:49  7.42/63/63/41/95.3  VBG Lactate: 2.0      MICROBIOLOGY:       RADIOLOGY:  [ ] Reviewed and interpreted by me    EKG: INTERVAL: Patient trialed offf BIPAP ovn, ABG collected this AM.  SUBJECTIVE: Patient examined bedside this AM. Feels tired, but otherwise no concerns. States that breathing feels better. Was able to walk briefly with PT yesterday, though very tired afterwards. Denies Fevers, chills, cough, chest pain, abdominal pain, headache, dizziness, nausea, vomiting, dysuria, changes in urination, changes in BM     OBJECTIVE:  ICU Vital Signs Last 24 Hrs  T(C): 36.9 (11 Mar 2023 04:00), Max: 36.9 (11 Mar 2023 04:00)  T(F): 98.4 (11 Mar 2023 04:00), Max: 98.4 (11 Mar 2023 04:00)  HR: 84 (11 Mar 2023 04:00) (84 - 105)  BP: 111/75 (11 Mar 2023 04:00) (100/68 - 111/75)  BP(mean): --  ABP: --  ABP(mean): --  RR: 17 (11 Mar 2023 04:00) (17 - 18)  SpO2: 94% (11 Mar 2023 04:00) (92% - 95%)    O2 Parameters below as of 11 Mar 2023 04:00  Patient On (Oxygen Delivery Method): nasal cannula  O2 Flow (L/min): 2            03-09 @ 07:01  -  03-10 @ 07:00  --------------------------------------------------------  IN: 960 mL / OUT: 1300 mL / NET: -340 mL    03-10 @ 07:01  -  03-11 @ 06:52  --------------------------------------------------------  IN: 840 mL / OUT: 500 mL / NET: 340 mL      CAPILLARY BLOOD GLUCOSE          PHYSICAL EXAM:  General: Well-groomed, NAD, laying in bed, on RA  HEENT: PERRLA, EOMI, non-icteric  Neck:  symmetric,  JVD absent  Respiratory: Faint bibasilar crackles (improved from prev); no Resp distress; no accessory muscle use  Cardiovascular:  A-fib, no murmurs/rubs/gallops  Abdomen: Soft, NT, ND  Extremities: Severe lymphedema bilat (similar to prev)  Skin: No rashes or lesions noted  Neurological: Sensation grossly intact; strength 5/5 in all extremities.  Psychiatry: AOx3, appropriate insight/judgement, appropriate affect, recent/remote memory intact    PRN Meds:  acetaminophen     Tablet .. 650 milliGRAM(s) Oral every 6 hours PRN  albuterol/ipratropium for Nebulization 3 milliLiter(s) Nebulizer every 6 hours PRN  famotidine    Tablet 20 milliGRAM(s) Oral two times a day PRN  melatonin 3 milliGRAM(s) Oral at bedtime PRN      LABS:                        11.0   9.16  )-----------( 165      ( 11 Mar 2023 06:01 )             36.3     Hgb Trend: 11.0<--, 11.3<--, 10.7<--, 11.0<--, 10.1<--  03-11    142  |  99  |  20  ----------------------------<  98  3.5   |  38<H>  |  0.86    Ca    8.9      11 Mar 2023 06:01  Phos  3.3     03-11  Mg     2.1     03-11      Creatinine Trend: 0.86<--, 0.89<--, 0.73<--, 0.78<--, 0.84<--, 0.89<--      Arterial Blood Gas:  03-11 @ 06:29  7.40/62/86/38/98.4/11.5  ABG lactate: --    Venous Blood Gas:  03-10 @ 06:45  7.46/58/48/41/81.0  VBG Lactate: 2.1  Venous Blood Gas:  03-09 @ 09:49  7.42/63/63/41/95.3  VBG Lactate: 2.0      MICROBIOLOGY:       RADIOLOGY:  [ ] Reviewed and interpreted by me    EKG:

## 2023-03-11 NOTE — PROGRESS NOTE ADULT - PROBLEM SELECTOR PLAN 2
WBC elevated to 13.6 (was 7.8 on outpatient labs 3/3) and tachycardic, tachypneic, meeting criteria for sepsis w/ most likely  source. CT A/P w/ possible L-sided pyelonephritis. Completed 1-2 days of macrobid as outpatient for UTI, confirmed w/ UA.   - ceftriaxone 1g (3/5 - 3/7)  -UCx w/ normal jacque  -BCx NGTD  -No signs of infection

## 2023-03-11 NOTE — DISCHARGE NOTE PROVIDER - NSDCCPTREATMENT_GEN_ALL_CORE_FT
PRINCIPAL PROCEDURE  Procedure: Transthoracic echo  Findings and Treatment: Conclusions:  1. Calcified trileaflet aortic valve with decreased  opening. Peak transaortic valve gradient equals 13 mm Hg,  mean transaortic valve gradient equals 7 mm Hg, estimated  aortic valve area equals 1.7 sqcm (by continuity equation),  aortic valve velocity time integral equals 31 cm,  consistent with mild aortic stenosis.  2. Increased relative wall thickness with normal left  ventricular mass index, consistent with concentric left  ventricular remodeling.  3. Normal left ventricular systolic function. No segmental  wall motion abnormalities.  4. Increased E/e'  is consistent with elevated left  ventricular filling pressure.  5. Moderate right atrial enlargement.  6. Normal right ventricular size and function.  7. Normal tricuspid valve. Moderate-severe tricuspid  regurgitation.  8. Estimated pulmonary artery systolic pressure equals 56  mm Hg, assuming right atrial pressure equals 12-15 mm Hg,  consistent with moderate pulmonary pressures.  *** No recent echocardiogram for comparison.  Echo performed in the setting of atrial fibrillation with  RVR/

## 2023-03-11 NOTE — PROGRESS NOTE ADULT - PROBLEM SELECTOR PLAN 1
Patient admitted w/ RICHARD x1week w/ acute worsening over last 24h; hypoxic at home, respiratory acidosis and started on BiPAP w/ improvement in CO2 levels. Trop WNL, BNP 2415; CT chest w/o edema, consolidations.  According to OP cardiology notes, has had periods of systolic dysfxn in past.   -Now on 2L NC  -Echo showing diastolic dysfunction with moderate pulm HTN and EF 72%--> c/w HFpEF and Group 2 Pulm HTN  -Hypoxia persistent and out of proportion to lung exam--> Pulmonology consult    -Dc lasix  -start Acetazolamide 250 bid  -Signifcant wheezing on exam, likely cardiac in nature--> trial of duonebs  -Trial off BIPAP tonight and obtain ABG in AM Patient admitted w/ RICHARD x1week w/ acute worsening over last 24h; hypoxic at home, respiratory acidosis and started on BiPAP w/ improvement in CO2 levels. Trop WNL, BNP 2415; CT chest w/o edema, consolidations.  According to OP cardiology notes, has had periods of systolic dysfxn in past.   -Now on 2L NC  -Echo showing diastolic dysfunction with moderate pulm HTN and EF 72%--> c/w HFpEF and Group 2 Pulm HTN  -Hypoxia persistent and out of proportion to lung exam--> Pulmonology consult    -DC diuretics  -Signifcant wheezing on exam, likely cardiac in nature--> trial of duonebs  -Trial off BIPAP tonight and obtain ABG in AM     -showing signifcant hypercarbia, likely will need BIPAP at night

## 2023-03-11 NOTE — PROGRESS NOTE ADULT - ATTENDING COMMENTS
89 y/o woman with chronic HFpEF, AF on A/C, and a history of DVT admitted with worsening dyspnea.   --TTE 3/7 with mild AS (NAVYA 1.7 cm2), concentric LV remodeling with normal LVEF, mod-severe TR, and moderately elevated pulmonary pressures (56 mmHg).   --The patient subjectively states that she feels much better but acknowledges persistent volume overload.  --On furosemide PO for now but monitor for rising bicarb; given difficulty in exam assessment of volume, suggest repeating BNP to help monitor volume status; last BNP 3/5 was 2415.  --Monitor and replete electrolytes and adust diuretic strategy accordingly.  --Monitor on telemetry.  --Will follow.

## 2023-03-11 NOTE — DISCHARGE NOTE PROVIDER - NSDCMRMEDTOKEN_GEN_ALL_CORE_FT
Commode: 3-in-1 commode  dabigatran 150 mg oral capsule: 1 cap(s) orally 2 times a day  furosemide 20 mg oral tablet: 2 tab(s) orally 2 times a day  Klor-Con M20 oral tablet, extended release: 1 tab(s) orally once a day  metoprolol succinate 100 mg oral tablet, extended release: 1 tab(s) orally once a day  multivitamin: 1 tab(s) orally once a day  nitrofurantoin macrocrystals-monohydrate 100 mg oral capsule: 1 cap(s) orally 2 times a day x7 days  NOTE: x2 doses received prior to admission  rosuvastatin 5 mg oral tablet: 1 tab(s) orally once a day (at bedtime)  Transport chair: Transport chair   Commode: 3-in-1 commode  dabigatran 150 mg oral capsule: 1 cap(s) orally 2 times a day  furosemide 20 mg oral tablet: 2 tab(s) orally 2 times a day  Klor-Con M20 oral tablet, extended release: 1 tab(s) orally once a day  metoprolol succinate 100 mg oral tablet, extended release: 1 tab(s) orally once a day  metoprolol tartrate 75 mg oral tablet: 1 tab(s) orally 2 times a day  multivitamin: 1 tab(s) orally once a day  rosuvastatin 5 mg oral tablet: 1 tab(s) orally once a day (at bedtime)  Transport chair: Transport chair   dabigatran 150 mg oral capsule: 1 cap(s) orally 2 times a day  famotidine 20 mg oral tablet: 1 tab(s) orally 2 times a day, As needed, Indigestion  Klor-Con M20 oral tablet, extended release: 1 tab(s) orally once a day  Lasix 20 mg oral tablet: 2 tab(s) orally once a day (in the morning) and 1 tab orally once a day in the evening  metoprolol tartrate 75 mg oral tablet: 1 tab(s) orally 2 times a day  metoprolol tartrate 75 mg oral tablet: 1 tab(s) orally 2 times a day  multivitamin: 1 tab(s) orally once a day  rosuvastatin 5 mg oral tablet: 1 tab(s) orally once a day (at bedtime)   dabigatran 150 mg oral capsule: 1 cap(s) orally 2 times a day  famotidine 20 mg oral tablet: 1 tab(s) orally 2 times a day, As needed, Indigestion  Klor-Con M20 oral tablet, extended release: 1 tab(s) orally once a day  Lasix 20 mg oral tablet: 2 tab(s) orally once a day (in the morning) and 1 tab orally once a day in the evening  metoprolol tartrate 75 mg oral tablet: 1 tab(s) orally 2 times a day  multivitamin: 1 tab(s) orally once a day  rosuvastatin 5 mg oral tablet: 1 tab(s) orally once a day (at bedtime)   Aldactone 25 mg oral tablet: 0.5 tab(s) orally once a day  dabigatran 150 mg oral capsule: 1 cap(s) orally 2 times a day  famotidine 20 mg oral tablet: 1 tab(s) orally 2 times a day, As needed, Indigestion  Klor-Con M20 oral tablet, extended release: 1 tab(s) orally once a day  Lasix 20 mg oral tablet: 2 tab(s) orally once a day (in the morning) and 1 tab orally once a day in the evening  metoprolol tartrate 75 mg oral tablet: 1 tab(s) orally 2 times a day  multivitamin: 1 tab(s) orally once a day  rosuvastatin 5 mg oral tablet: 1 tab(s) orally once a day (at bedtime)

## 2023-03-11 NOTE — DISCHARGE NOTE PROVIDER - CARE PROVIDERS DIRECT ADDRESSES
[email or web address]

## 2023-03-11 NOTE — DISCHARGE NOTE PROVIDER - PROVIDER TOKENS
PROVIDER:[TOKEN:[125:MIIS:125],FOLLOWUP:[2 weeks],ESTABLISHEDPATIENT:[T]],PROVIDER:[TOKEN:[[id number]:MIIS:[id number]],FOLLOWUP:[1 week],ESTABLISHEDPATIENT:[T]]

## 2023-03-11 NOTE — DISCHARGE NOTE PROVIDER - CARE PROVIDER_API CALL
Nilesh Wiley (MD)  Cardiovascular Disease; Internal Medicine  [address], [address]  [place] [address]  Phone: [phone]  Fax: [phone]  Established Patient  Follow Up Time: 2 weeks    Jitendra Gomez  INTERNAL MEDICINE  [address], [address]  [place] [address]  Phone: [phone]  Fax: [phone]  Established Patient  Follow Up Time: 1 week

## 2023-03-11 NOTE — PROGRESS NOTE ADULT - SUBJECTIVE AND OBJECTIVE BOX
Cardiology Consult Progress Note    Patient seen and examined at bedside.    Overnight Events:   NAEO  Tele: Afib with ventricular rates 80s-105    Review Of Systems: No chest pain, shortness of breath, or palpitations            Current Meds:  acetaminophen     Tablet .. 650 milliGRAM(s) Oral every 6 hours PRN  albuterol/ipratropium for Nebulization 3 milliLiter(s) Nebulizer every 6 hours PRN  ammonium lactate 12% Lotion 1 Application(s) Topical two times a day  ascorbic acid 500 milliGRAM(s) Oral daily  atorvastatin 20 milliGRAM(s) Oral at bedtime  chlorhexidine 2% Cloths 1 Application(s) Topical <User Schedule>  dabigatran 150 milliGRAM(s) Oral every 12 hours  famotidine    Tablet 20 milliGRAM(s) Oral two times a day PRN  melatonin 3 milliGRAM(s) Oral at bedtime PRN  metoprolol tartrate 75 milliGRAM(s) Oral two times a day  multivitamin 1 Tablet(s) Oral daily  pantoprazole    Tablet 40 milliGRAM(s) Oral before breakfast      Vitals:  T(F): 98.4 (03-11), Max: 98.4 (03-11)  HR: 84 (03-11) (84 - 105)  BP: 111/75 (03-11) (100/68 - 111/75)  RR: 17 (03-11)  SpO2: 94% (03-11)  I&O's Summary    10 Mar 2023 07:01  -  11 Mar 2023 07:00  --------------------------------------------------------  IN: 840 mL / OUT: 500 mL / NET: 340 mL      Physical Exam:  HEAD:  Atraumatic, Normocephalic.  EYES: EOMI, PERRLA, conjunctiva and sclera clear.  NECK: Supple, body habitus limited complete evaluation  CHEST/LUNG: Improved  end-expiratory wheezing,   HEART: Irregularly irregular; No murmurs, rubs, or gallops.  ABDOMEN: Bowel sounds present; Soft, Nontender, Nondistended.   EXTREMITIES:  Signs of chronic lymphedema with overlying skin thickening and hyperpigmentation, les wrapped, pitting edema at ankles and feet  PSYCH: Normal affect.  SKIN: No rashes or lesions.                        11.0   9.16  )-----------( 165      ( 11 Mar 2023 06:01 )             36.3     03-11    142  |  99  |  20  ----------------------------<  98  3.5   |  38<H>  |  0.86    Ca    8.9      11 Mar 2023 06:01  Phos  3.3     03-11  Mg     2.1     03-11    CARDIAC MARKERS ( 05 Mar 2023 14:27 )  29 ng/L / x     / x     / x     / x     / x      CARDIAC MARKERS ( 05 Mar 2023 11:34 )  26 ng/L / x     / x     / x     / x     / x        Serum Pro-Brain Natriuretic Peptide: 2415 pg/mL (03-05 @ 11:34)

## 2023-03-12 LAB
ANION GAP SERPL CALC-SCNC: 8 MMOL/L — SIGNIFICANT CHANGE UP (ref 5–17)
BUN SERPL-MCNC: 19 MG/DL — SIGNIFICANT CHANGE UP (ref 7–23)
CALCIUM SERPL-MCNC: 9 MG/DL — SIGNIFICANT CHANGE UP (ref 8.4–10.5)
CHLORIDE SERPL-SCNC: 100 MMOL/L — SIGNIFICANT CHANGE UP (ref 96–108)
CO2 SERPL-SCNC: 34 MMOL/L — HIGH (ref 22–31)
CREAT SERPL-MCNC: 0.83 MG/DL — SIGNIFICANT CHANGE UP (ref 0.5–1.3)
EGFR: 67 ML/MIN/1.73M2 — SIGNIFICANT CHANGE UP
GLUCOSE SERPL-MCNC: 105 MG/DL — HIGH (ref 70–99)
HCT VFR BLD CALC: 37.9 % — SIGNIFICANT CHANGE UP (ref 34.5–45)
HGB BLD-MCNC: 11.5 G/DL — SIGNIFICANT CHANGE UP (ref 11.5–15.5)
MAGNESIUM SERPL-MCNC: 2 MG/DL — SIGNIFICANT CHANGE UP (ref 1.6–2.6)
MCHC RBC-ENTMCNC: 29.2 PG — SIGNIFICANT CHANGE UP (ref 27–34)
MCHC RBC-ENTMCNC: 30.3 GM/DL — LOW (ref 32–36)
MCV RBC AUTO: 96.2 FL — SIGNIFICANT CHANGE UP (ref 80–100)
NRBC # BLD: 0 /100 WBCS — SIGNIFICANT CHANGE UP (ref 0–0)
NT-PROBNP SERPL-SCNC: 2840 PG/ML — HIGH (ref 0–300)
PHOSPHATE SERPL-MCNC: 3.8 MG/DL — SIGNIFICANT CHANGE UP (ref 2.5–4.5)
PLATELET # BLD AUTO: 170 K/UL — SIGNIFICANT CHANGE UP (ref 150–400)
POTASSIUM SERPL-MCNC: 3.8 MMOL/L — SIGNIFICANT CHANGE UP (ref 3.5–5.3)
POTASSIUM SERPL-SCNC: 3.8 MMOL/L — SIGNIFICANT CHANGE UP (ref 3.5–5.3)
RBC # BLD: 3.94 M/UL — SIGNIFICANT CHANGE UP (ref 3.8–5.2)
RBC # FLD: 15.3 % — HIGH (ref 10.3–14.5)
SODIUM SERPL-SCNC: 142 MMOL/L — SIGNIFICANT CHANGE UP (ref 135–145)
WBC # BLD: 8.42 K/UL — SIGNIFICANT CHANGE UP (ref 3.8–10.5)
WBC # FLD AUTO: 8.42 K/UL — SIGNIFICANT CHANGE UP (ref 3.8–10.5)

## 2023-03-12 PROCEDURE — 99232 SBSQ HOSP IP/OBS MODERATE 35: CPT

## 2023-03-12 RX ORDER — NITROFURANTOIN MACROCRYSTAL 50 MG
1 CAPSULE ORAL
Qty: 0 | Refills: 0 | DISCHARGE

## 2023-03-12 RX ORDER — METOPROLOL TARTRATE 50 MG
1 TABLET ORAL
Qty: 0 | Refills: 0 | DISCHARGE
Start: 2023-03-12

## 2023-03-12 RX ADMIN — Medication 500 MILLIGRAM(S): at 12:00

## 2023-03-12 RX ADMIN — Medication 75 MILLIGRAM(S): at 05:39

## 2023-03-12 RX ADMIN — ATORVASTATIN CALCIUM 20 MILLIGRAM(S): 80 TABLET, FILM COATED ORAL at 21:08

## 2023-03-12 RX ADMIN — PANTOPRAZOLE SODIUM 40 MILLIGRAM(S): 20 TABLET, DELAYED RELEASE ORAL at 05:39

## 2023-03-12 RX ADMIN — Medication 1 TABLET(S): at 12:01

## 2023-03-12 RX ADMIN — CHLORHEXIDINE GLUCONATE 1 APPLICATION(S): 213 SOLUTION TOPICAL at 05:41

## 2023-03-12 RX ADMIN — DABIGATRAN ETEXILATE MESYLATE 150 MILLIGRAM(S): 150 CAPSULE ORAL at 05:38

## 2023-03-12 RX ADMIN — Medication 40 MILLIGRAM(S): at 05:39

## 2023-03-12 RX ADMIN — Medication 75 MILLIGRAM(S): at 17:26

## 2023-03-12 RX ADMIN — DABIGATRAN ETEXILATE MESYLATE 150 MILLIGRAM(S): 150 CAPSULE ORAL at 17:26

## 2023-03-12 NOTE — PROGRESS NOTE ADULT - PROBLEM SELECTOR PLAN 2
WBC elevated to 13.6 (was 7.8 on outpatient labs 3/3) and tachycardic, tachypneic, meeting criteria for sepsis w/ most likely  source. CT A/P w/ possible L-sided pyelonephritis. Completed 1-2 days of macrobid as outpatient for UTI, confirmed w/ UA.   - ceftriaxone 1g (3/5 - 3/7)  -UCx w/ normal jacque  -BCx NGTD  -No signs of infection RESOLVED   WBC elevated to 13.6 (was 7.8 on outpatient labs 3/3) and tachycardic, tachypneic, meeting criteria for sepsis w/ most likely  source. CT A/P w/ possible L-sided pyelonephritis. Completed 1-2 days of macrobid as outpatient for UTI, confirmed w/ UA.   - ceftriaxone 1g (3/5 - 3/7)  -UCx w/ normal jacque  -BCx NGTD  -No signs of infection

## 2023-03-12 NOTE — CHART NOTE - NSCHARTNOTEFT_GEN_A_CORE
Patient will require a wheelchair due to severe lymphedema and impaired mobility.  The beneficiary has a mobility limitation that significantly impairs their ability to participate in one or more ADLs within the home.  Patient cannot safely ambulate with walker, cane or crutches. Use of the transport wheelchair will significantly improve the patient’s ability to participate in ADLs and the patient will use the wheelchair on a regular basis at home.    Gabbie Taylor MD  PGY-3  Internal Medicine  Available on TEAMS Patient will require a wheelchair due to severe lymphedema and impaired mobility.  The beneficiary has a mobility limitation that significantly impairs their ability to participate in one or more ADLs within the home.  Patient cannot safely ambulate with walker, cane or crutches. Use of the transport wheelchair will significantly improve the patient’s ability to participate in ADLs and the patient will use the wheelchair on a regular basis at home. The patient is able to self-propel and is agreeable to using the wheelchair.    Gabbie Taylor MD  PGY-3  Internal Medicine  Available on TEAMS Patient will require a wheelchair due to patient's deconditioning and generalized weakness, secondary to heart failure. Patient will require a transport chair. Patient is unable to self-propel in a standard wheelchair.  The beneficiary has a mobility limitation that significantly impairs their ability to participate in one or more ADLs within the home.  Patient cannot safely ambulate with walker, cane or crutches. Use of the transport wheelchair will significantly improve the patient’s ability to participate in ADLs and the patient will use the wheelchair on a regular basis at home. Patient and family are in agreement with transport chair use at home and assistance will be provided if needed.

## 2023-03-12 NOTE — PROGRESS NOTE ADULT - ASSESSMENT
89F w/ PMH Afib (on pradaxa), remote history MI, HTN, prior DVT presenting w/ intermittent dyspnea x1 week w/ acute worsening x24h, admitted w/ acute hypoxic and hypercapnic respiratory failure 2/2 urosepsis +/- CHF exacerbation.     Echo showing signs of pulm HTN and HFpEF (concentric hypertrophy). Patient also with chronic retention of CO2, worse in the AM, c/f KALIN vs OHS/chronic lung disease 89F w/ PMH Afib (on pradaxa), remote history MI, HTN, prior DVT presenting w/ intermittent dyspnea x1 week w/ acute worsening x24h, admitted w/ acute hypoxic and hypercapnic respiratory failure likely in s/o HFpEF exacerbation. Found to have hypercapnia likely undiagnosed KALIN. Now s/p IV diuresis and clinically euvolemic, pending home oxygen and BiPAP set up prior to dc.

## 2023-03-12 NOTE — PROGRESS NOTE ADULT - PROBLEM SELECTOR PLAN 1
Patient admitted w/ RICHARD x1week w/ acute worsening over last 24h; hypoxic at home, respiratory acidosis and started on BiPAP w/ improvement in CO2 levels. Trop WNL, BNP 2415; CT chest w/o edema, consolidations.  According to OP cardiology notes, has had periods of systolic dysfxn in past.   -Now on 2L NC  -Echo showing diastolic dysfunction with moderate pulm HTN and EF 72%--> c/w HFpEF and Group 2 Pulm HTN  -Hypoxia persistent and out of proportion to lung exam--> Pulmonology consult    -DC diuretics  -Signifcant wheezing on exam, likely cardiac in nature--> trial of duonebs  -Trial off BIPAP tonight and obtain ABG in AM     -showing signifcant hypercarbia, likely will need BIPAP at night Patient admitted w/ RICHARD x1week w/ acute worsening over last 24h; hypoxic at home, respiratory acidosis and started on BiPAP w/ improvement in CO2 levels. Trop WNL, BNP 2415; CT chest w/o edema, consolidations.  According to OP cardiology notes, has had periods of systolic dysfxn in past.   -Echo showing diastolic dysfunction with moderate pulm HTN and EF 72%--> c/w HFpEF and Group 2 Pulm HTN  -Hypoxia persistent and out of proportion to lung exam--> Pulmonology consulted. Does not think pt has COPD  -Throughout course had wheezing on exam, --> trial of duonebs and prednisone with resolution of wheezes   -Volume optimized via IV lasix; now transitioned to oral 40 qD   -Trial off BIPAP overnight with resultant am hypercarbia; meeting criteria for BiPAP at home  -Now on 2L NC, unable to wean and desats to high 80s without O2   -Plan to dc home with O2 and BiPAP. Needs Sleep study outpatient

## 2023-03-12 NOTE — PROGRESS NOTE ADULT - PROBLEM SELECTOR PLAN 5
Diet: Regular  DVT: Pradaxa  Dispo: pending course, PT consult    GOC: Patient's son, Akin Barber and daughter Emperatriz Barber are HCP. Wishes to remain FULL CODE.     Communication: results and plan discussed w/ patient and her daughter at bedside, 10pm. Diet: Regular  DVT: Pradaxa  Dispo: PT recs [facility] but family electing home. Will need oxygen and BiPAP     GOC: Patient's son, Akin Barber and daughter Emperatriz Barber are HCP. Wishes to remain FULL CODE.     Communication: results and plan discussed w/ patient and her family at bedside 3/12.

## 2023-03-12 NOTE — PROGRESS NOTE ADULT - ATTENDING COMMENTS
This is a 89F with h/o htn, CAD c/b MI, A.fib, DVT p/w worsening SOB/RICHARD and LE edema, lethargy, found to have acute resp failure with sepsis + afib RVR w Acute diastolic HF.    Cardiology & Pulmonary evaluated    1. Acute hypercapnic/hypoxemic respiratory failure, likely secondary to dCHF, Pulmonary HTN  2. Acute exacerbation of HFpEF  3. Afib RVR  4. Pulmonary HTN  5. LE Lymphedema    - Afebrile, still requiring O2 1-2L NC, sat 95%  - CT chest- small vessel disease, Echo- EF 72%, diastolic dysfunction with moderate pulm HTN   - appreciated Card and Pulmonary f/u plans- on PO lasix 40mg/d, Diamox 250mg x 1, pro-bnp in am  - c/w Pradexa, Metoprolol 75mg bid, statin  - Bronchodilators  - d/c planning .  ** spoke to daughter at bedside This is a 89F with h/o htn, CAD c/b MI, A.fib, DVT p/w worsening SOB/RICHARD and LE edema, lethargy, found to have acute resp failure with sepsis + afib RVR w Acute diastolic HF.    Cardiology & Pulmonary evaluated    1. Acute hypercapnic/hypoxemic respiratory failure, likely secondary to dCHF, Pulmonary HTN  2. Acute exacerbation of HFpEF  3. Afib RVR  4. Pulmonary HTN  5. LE Lymphedema    - Afebrile, still requiring O2 1-2L NC, sat 95%  - CT chest- small vessel disease, Echo- EF 72%, diastolic dysfunction with moderate pulm HTN   - appreciated Card and Pulmonary f/u plans- on PO lasix 40mg/d, Diamox 250mg x 1, pro-bnp in am  - c/w Pradexa, Metoprolol 75mg bid, statin  - Bronchodilators prn, LE ACE wrap  - d/c planning with possible O2 once Cardiology clears.  ** spoke to daughter at bedside

## 2023-03-12 NOTE — PROGRESS NOTE ADULT - SUBJECTIVE AND OBJECTIVE BOX
PROGRESS NOTE:   Authored by Dr. Gabbie Taylor MD (PGY-3). Available on TEAMS.    Patient is a 89y old  Female who presents with a chief complaint of SOB (11 Mar 2023 16:05)      SUBJECTIVE / OVERNIGHT EVENTS:  No acute events overnight. Patient seen and examined at bedside. Patient denies new symptoms or concerns.     ADDITIONAL REVIEW OF SYSTEMS:  Patient denies fevers, chills, chest pain, shortness of breath, nausea, abdominal pain, diarrhea, constipation, dysuria, leg swelling, headache, light headedness.    MEDICATIONS  (STANDING):  ammonium lactate 12% Lotion 1 Application(s) Topical two times a day  ascorbic acid 500 milliGRAM(s) Oral daily  atorvastatin 20 milliGRAM(s) Oral at bedtime  chlorhexidine 2% Cloths 1 Application(s) Topical <User Schedule>  dabigatran 150 milliGRAM(s) Oral every 12 hours  furosemide    Tablet 40 milliGRAM(s) Oral daily  metoprolol tartrate 75 milliGRAM(s) Oral two times a day  multivitamin 1 Tablet(s) Oral daily  pantoprazole    Tablet 40 milliGRAM(s) Oral before breakfast    MEDICATIONS  (PRN):  acetaminophen     Tablet .. 650 milliGRAM(s) Oral every 6 hours PRN Temp greater or equal to 38C (100.4F), Mild Pain (1 - 3)  albuterol/ipratropium for Nebulization 3 milliLiter(s) Nebulizer every 6 hours PRN Shortness of Breath and/or Wheezing  famotidine    Tablet 20 milliGRAM(s) Oral two times a day PRN Indigestion  melatonin 3 milliGRAM(s) Oral at bedtime PRN Insomnia      CAPILLARY BLOOD GLUCOSE        I&O's Summary    10 Mar 2023 07:01  -  11 Mar 2023 07:00  --------------------------------------------------------  IN: 840 mL / OUT: 500 mL / NET: 340 mL    11 Mar 2023 06:01  -  12 Mar 2023 06:11  --------------------------------------------------------  IN: 840 mL / OUT: 1450 mL / NET: -610 mL        PHYSICAL EXAM:  Vital Signs Last 24 Hrs  T(C): 36.7 (12 Mar 2023 04:17), Max: 36.7 (11 Mar 2023 11:26)  T(F): 98.1 (12 Mar 2023 04:17), Max: 98.1 (11 Mar 2023 11:26)  HR: 77 (12 Mar 2023 04:17) (74 - 96)  BP: 106/69 (12 Mar 2023 04:17) (102/64 - 114/73)  BP(mean): --  RR: 18 (12 Mar 2023 04:17) (18 - 20)  SpO2: 96% (12 Mar 2023 04:17) (94% - 98%)    Parameters below as of 12 Mar 2023 04:17  Patient On (Oxygen Delivery Method): BiPAP/CPAP        CONSTITUTIONAL: NAD, well-developed  RESPIRATORY: Normal respiratory effort; lungs are clear to auscultation bilaterally  CARDIOVASCULAR: Regular rate and rhythm, normal S1 and S2, no murmur/rub/gallop; No lower extremity edema; Peripheral pulses are 2+ bilaterally  ABDOMEN: Nontender to palpation, normoactive bowel sounds, no rebound/guarding; No hepatosplenomegaly  MUSCLOSKELETAL: no clubbing or cyanosis of digits; no joint swelling or tenderness to palpation  PSYCH: A+O to person, place, and time; affect appropriate    LABS:                        11.0   9.16  )-----------( 165      ( 11 Mar 2023 06:01 )             36.3     03-11    142  |  99  |  20  ----------------------------<  98  3.5   |  38<H>  |  0.86    Ca    8.9      11 Mar 2023 06:01  Phos  3.3     03-11  Mg     2.1     03-11                  Tele Reviewed:    RADIOLOGY & ADDITIONAL TESTS:  Results Reviewed:   Imaging Personally Reviewed:  Electrocardiogram Personally Reviewed:     PROGRESS NOTE:   Authored by Dr. Gabbie Taylor MD (PGY-3). Available on TEAMS.    Patient is a 89y old  Female who presents with a chief complaint of SOB (11 Mar 2023 16:05)      SUBJECTIVE / OVERNIGHT EVENTS:  No acute events overnight. Patient seen and examined at bedside. Patient denies new symptoms or concerns.     ADDITIONAL REVIEW OF SYSTEMS:  Patient denies fevers, chills, chest pain, shortness of breath, nausea, abdominal pain, diarrhea, constipation, dysuria, leg swelling, headache, light headedness.    MEDICATIONS  (STANDING):  ammonium lactate 12% Lotion 1 Application(s) Topical two times a day  ascorbic acid 500 milliGRAM(s) Oral daily  atorvastatin 20 milliGRAM(s) Oral at bedtime  chlorhexidine 2% Cloths 1 Application(s) Topical <User Schedule>  dabigatran 150 milliGRAM(s) Oral every 12 hours  furosemide    Tablet 40 milliGRAM(s) Oral daily  metoprolol tartrate 75 milliGRAM(s) Oral two times a day  multivitamin 1 Tablet(s) Oral daily  pantoprazole    Tablet 40 milliGRAM(s) Oral before breakfast    MEDICATIONS  (PRN):  acetaminophen     Tablet .. 650 milliGRAM(s) Oral every 6 hours PRN Temp greater or equal to 38C (100.4F), Mild Pain (1 - 3)  albuterol/ipratropium for Nebulization 3 milliLiter(s) Nebulizer every 6 hours PRN Shortness of Breath and/or Wheezing  famotidine    Tablet 20 milliGRAM(s) Oral two times a day PRN Indigestion  melatonin 3 milliGRAM(s) Oral at bedtime PRN Insomnia      CAPILLARY BLOOD GLUCOSE        I&O's Summary    10 Mar 2023 07:01  -  11 Mar 2023 07:00  --------------------------------------------------------  IN: 840 mL / OUT: 500 mL / NET: 340 mL    11 Mar 2023 06:01  -  12 Mar 2023 06:11  --------------------------------------------------------  IN: 840 mL / OUT: 1450 mL / NET: -610 mL        PHYSICAL EXAM:  Vital Signs Last 24 Hrs  T(C): 36.7 (12 Mar 2023 04:17), Max: 36.7 (11 Mar 2023 11:26)  T(F): 98.1 (12 Mar 2023 04:17), Max: 98.1 (11 Mar 2023 11:26)  HR: 77 (12 Mar 2023 04:17) (74 - 96)  BP: 106/69 (12 Mar 2023 04:17) (102/64 - 114/73)  BP(mean): --  RR: 18 (12 Mar 2023 04:17) (18 - 20)  SpO2: 96% (12 Mar 2023 04:17) (94% - 98%)    Parameters below as of 12 Mar 2023 04:17  Patient On (Oxygen Delivery Method): BiPAP/CPAP        CONSTITUTIONAL: NAD, well-developed  NECK: NO JVD   RESPIRATORY: Normal respiratory effort; lungs are clear to auscultation bilaterally without crackles   CARDIOVASCULAR: Regular rate and rhythm, normal S1 and S2, no murmur/rub/gallop; Peripheral pulses are 2+ bilaterally  ABDOMEN: Nontender to palpation, normoactive bowel sounds, no rebound/guarding; No hepatosplenomegaly  MUSCLOSKELETAL: no clubbing or cyanosis of digits; no joint swelling or tenderness to palpation  PSYCH: A+O to person, place, and time; affect appropriate  EXTREMITIES: LE b/l with lymphadema, compression wraps in place     LABS:                        11.0   9.16  )-----------( 165      ( 11 Mar 2023 06:01 )             36.3     03-11    142  |  99  |  20  ----------------------------<  98  3.5   |  38<H>  |  0.86    Ca    8.9      11 Mar 2023 06:01  Phos  3.3     03-11  Mg     2.1     03-11

## 2023-03-12 NOTE — CHART NOTE - NSCHARTNOTEFT_GEN_A_CORE
Patient requires bedside 3 in 1 commode. Patient unable to ambulate due to significant lymphedema and severely impaired mobility. She cannot ambulate safely to the bathroom-patient and is at high risk to fall.     Gabbie Taylor MD  PGY-3  Internal Medicine  Available on TEAMS

## 2023-03-13 LAB
ALBUMIN SERPL ELPH-MCNC: 2.7 G/DL — LOW (ref 3.3–5)
ALP SERPL-CCNC: 50 U/L — SIGNIFICANT CHANGE UP (ref 40–120)
ALT FLD-CCNC: 11 U/L — SIGNIFICANT CHANGE UP (ref 10–45)
ANION GAP SERPL CALC-SCNC: 8 MMOL/L — SIGNIFICANT CHANGE UP (ref 5–17)
AST SERPL-CCNC: 12 U/L — SIGNIFICANT CHANGE UP (ref 10–40)
BASOPHILS # BLD AUTO: 0.03 K/UL — SIGNIFICANT CHANGE UP (ref 0–0.2)
BASOPHILS NFR BLD AUTO: 0.4 % — SIGNIFICANT CHANGE UP (ref 0–2)
BILIRUB SERPL-MCNC: 0.7 MG/DL — SIGNIFICANT CHANGE UP (ref 0.2–1.2)
BUN SERPL-MCNC: 19 MG/DL — SIGNIFICANT CHANGE UP (ref 7–23)
CALCIUM SERPL-MCNC: 9 MG/DL — SIGNIFICANT CHANGE UP (ref 8.4–10.5)
CHLORIDE SERPL-SCNC: 102 MMOL/L — SIGNIFICANT CHANGE UP (ref 96–108)
CO2 SERPL-SCNC: 33 MMOL/L — HIGH (ref 22–31)
CREAT SERPL-MCNC: 0.89 MG/DL — SIGNIFICANT CHANGE UP (ref 0.5–1.3)
EGFR: 62 ML/MIN/1.73M2 — SIGNIFICANT CHANGE UP
EOSINOPHIL # BLD AUTO: 0.31 K/UL — SIGNIFICANT CHANGE UP (ref 0–0.5)
EOSINOPHIL NFR BLD AUTO: 4.3 % — SIGNIFICANT CHANGE UP (ref 0–6)
GLUCOSE SERPL-MCNC: 108 MG/DL — HIGH (ref 70–99)
HCT VFR BLD CALC: 37.2 % — SIGNIFICANT CHANGE UP (ref 34.5–45)
HGB BLD-MCNC: 11.6 G/DL — SIGNIFICANT CHANGE UP (ref 11.5–15.5)
IMM GRANULOCYTES NFR BLD AUTO: 0.5 % — SIGNIFICANT CHANGE UP (ref 0–0.9)
LYMPHOCYTES # BLD AUTO: 1.01 K/UL — SIGNIFICANT CHANGE UP (ref 1–3.3)
LYMPHOCYTES # BLD AUTO: 13.9 % — SIGNIFICANT CHANGE UP (ref 13–44)
MAGNESIUM SERPL-MCNC: 1.9 MG/DL — SIGNIFICANT CHANGE UP (ref 1.6–2.6)
MCHC RBC-ENTMCNC: 30.1 PG — SIGNIFICANT CHANGE UP (ref 27–34)
MCHC RBC-ENTMCNC: 31.2 GM/DL — LOW (ref 32–36)
MCV RBC AUTO: 96.4 FL — SIGNIFICANT CHANGE UP (ref 80–100)
MONOCYTES # BLD AUTO: 0.55 K/UL — SIGNIFICANT CHANGE UP (ref 0–0.9)
MONOCYTES NFR BLD AUTO: 7.6 % — SIGNIFICANT CHANGE UP (ref 2–14)
NEUTROPHILS # BLD AUTO: 5.34 K/UL — SIGNIFICANT CHANGE UP (ref 1.8–7.4)
NEUTROPHILS NFR BLD AUTO: 73.3 % — SIGNIFICANT CHANGE UP (ref 43–77)
NRBC # BLD: 0 /100 WBCS — SIGNIFICANT CHANGE UP (ref 0–0)
PHOSPHATE SERPL-MCNC: 3.5 MG/DL — SIGNIFICANT CHANGE UP (ref 2.5–4.5)
PLATELET # BLD AUTO: 141 K/UL — LOW (ref 150–400)
POTASSIUM SERPL-MCNC: 3.7 MMOL/L — SIGNIFICANT CHANGE UP (ref 3.5–5.3)
POTASSIUM SERPL-SCNC: 3.7 MMOL/L — SIGNIFICANT CHANGE UP (ref 3.5–5.3)
PROT SERPL-MCNC: 5.7 G/DL — LOW (ref 6–8.3)
RBC # BLD: 3.86 M/UL — SIGNIFICANT CHANGE UP (ref 3.8–5.2)
RBC # FLD: 15.2 % — HIGH (ref 10.3–14.5)
SODIUM SERPL-SCNC: 143 MMOL/L — SIGNIFICANT CHANGE UP (ref 135–145)
WBC # BLD: 7.28 K/UL — SIGNIFICANT CHANGE UP (ref 3.8–10.5)
WBC # FLD AUTO: 7.28 K/UL — SIGNIFICANT CHANGE UP (ref 3.8–10.5)

## 2023-03-13 PROCEDURE — 99232 SBSQ HOSP IP/OBS MODERATE 35: CPT | Mod: GC

## 2023-03-13 PROCEDURE — 99233 SBSQ HOSP IP/OBS HIGH 50: CPT

## 2023-03-13 PROCEDURE — 99232 SBSQ HOSP IP/OBS MODERATE 35: CPT

## 2023-03-13 RX ORDER — FUROSEMIDE 40 MG
40 TABLET ORAL
Refills: 0 | Status: DISCONTINUED | OUTPATIENT
Start: 2023-03-13 | End: 2023-03-15

## 2023-03-13 RX ORDER — FUROSEMIDE 40 MG
20 TABLET ORAL
Refills: 0 | Status: DISCONTINUED | OUTPATIENT
Start: 2023-03-13 | End: 2023-03-15

## 2023-03-13 RX ADMIN — DABIGATRAN ETEXILATE MESYLATE 150 MILLIGRAM(S): 150 CAPSULE ORAL at 05:47

## 2023-03-13 RX ADMIN — Medication 75 MILLIGRAM(S): at 17:02

## 2023-03-13 RX ADMIN — CHLORHEXIDINE GLUCONATE 1 APPLICATION(S): 213 SOLUTION TOPICAL at 05:47

## 2023-03-13 RX ADMIN — Medication 1 APPLICATION(S): at 17:03

## 2023-03-13 RX ADMIN — Medication 75 MILLIGRAM(S): at 05:46

## 2023-03-13 RX ADMIN — ATORVASTATIN CALCIUM 20 MILLIGRAM(S): 80 TABLET, FILM COATED ORAL at 21:57

## 2023-03-13 RX ADMIN — PANTOPRAZOLE SODIUM 40 MILLIGRAM(S): 20 TABLET, DELAYED RELEASE ORAL at 05:47

## 2023-03-13 RX ADMIN — Medication 1 TABLET(S): at 11:20

## 2023-03-13 RX ADMIN — Medication 500 MILLIGRAM(S): at 11:20

## 2023-03-13 RX ADMIN — Medication 40 MILLIGRAM(S): at 05:47

## 2023-03-13 RX ADMIN — DABIGATRAN ETEXILATE MESYLATE 150 MILLIGRAM(S): 150 CAPSULE ORAL at 17:01

## 2023-03-13 RX ADMIN — Medication 20 MILLIGRAM(S): at 21:57

## 2023-03-13 NOTE — PROGRESS NOTE ADULT - ATTENDING COMMENTS
This is a 89F with h/o htn, CAD c/b MI, A.fib, DVT p/w worsening SOB/RICHARD and LE edema, lethargy, found to have acute resp failure with sepsis + afib RVR w Acute diastolic HF.    Cardiology & Pulmonary evaluated    1. Acute hypercapnic/hypoxemic respiratory failure, likely secondary to dCHF, Pulmonary HTN  2. Acute exacerbation of HFpEF  3. Afib RVR  4. Pulmonary HTN  5. LE Lymphedema    - Afebrile, still requiring O2 1-2L NC, sat 95%  - CT chest- small vessel disease, trace pleural effusion, Echo- EF 72%, diastolic dysfunction with moderate pulm HTN   - spoke to Card fellow- wanted to know the diuretic dose on d/c. She was on Lasix 40mg bid at home  - spoke to Pulmonary, Ph 7.4, PCO2 63, no need of NIV, will arrange O2 if O2 sat on ambulation <88%. spoke to RN and CM  - currently on PO lasix 40mg/d, Diamox 250mg x 1 ordered per Pulmonary  - c/w Pradexa, Metoprolol 75mg bid, statin  - Bronchodilators prn, LE ACE wrap  - d/c planning with possible O2 once Cardiology clears.  ** spoke to daughter at bedside

## 2023-03-13 NOTE — PROGRESS NOTE ADULT - PROBLEM SELECTOR PLAN 1
Patient admitted w/ RICHARD x1week w/ acute worsening over last 24h; hypoxic at home, respiratory acidosis and started on BiPAP w/ improvement in CO2 levels. Trop WNL, BNP 2415; CT chest w/o edema, consolidations.  According to OP cardiology notes, has had periods of systolic dysfxn in past.   -Echo showing diastolic dysfunction with moderate pulm HTN and EF 72%--> c/w HFpEF and Group 2 Pulm HTN  -Hypoxia persistent and out of proportion to lung exam--> Pulmonology consulted. Does not think pt has COPD  -Throughout course had wheezing on exam, --> trial of duonebs and prednisone with resolution of wheezes   -Volume optimized via IV lasix; now transitioned to oral 40 qD   -Trial off BIPAP overnight with resultant am hypercarbia; meeting criteria for BiPAP at home  -Now on 2L NC, unable to wean and desats to high 80s without O2   -Plan to dc home with O2 and BiPAP. Needs Sleep study outpatient

## 2023-03-13 NOTE — PROGRESS NOTE ADULT - SUBJECTIVE AND OBJECTIVE BOX
Patient seen and examined at bedside.    Overnight Events:     No AEON     Reports mild SOB, denies chest pain, palpitations     Current Meds:  acetaminophen     Tablet .. 650 milliGRAM(s) Oral every 6 hours PRN  albuterol/ipratropium for Nebulization 3 milliLiter(s) Nebulizer every 6 hours PRN  ammonium lactate 12% Lotion 1 Application(s) Topical two times a day  ascorbic acid 500 milliGRAM(s) Oral daily  atorvastatin 20 milliGRAM(s) Oral at bedtime  chlorhexidine 2% Cloths 1 Application(s) Topical <User Schedule>  dabigatran 150 milliGRAM(s) Oral every 12 hours  famotidine    Tablet 20 milliGRAM(s) Oral two times a day PRN  furosemide    Tablet 40 milliGRAM(s) Oral daily  melatonin 3 milliGRAM(s) Oral at bedtime PRN  metoprolol tartrate 75 milliGRAM(s) Oral two times a day  multivitamin 1 Tablet(s) Oral daily  pantoprazole    Tablet 40 milliGRAM(s) Oral before breakfast      Vitals:  T(F): 97.2 (03-13), Max: 98.1 (03-12)  HR: 94 (03-13) (64 - 98)  BP: 112/71 (03-13) (102/66 - 121/76)  RR: 19 (03-13)  SpO2: 95% (03-13)  I&O's Summary    12 Mar 2023 07:01  -  13 Mar 2023 07:00  --------------------------------------------------------  IN: 1080 mL / OUT: 1050 mL / NET: 30 mL        Physical Exam:  Appearance: No acute distress   CHEST/LUNG: Bilateral coarse breath sounds   HEART: Irregularly irregular; No murmurs, rubs, or gallops.  EXTREMITIES:  LE edema noted   PSYCH: Normal affect.  SKIN: No rashes or lesions.                          11.6   7.28  )-----------( 141      ( 13 Mar 2023 06:15 )             37.2     03-13    143  |  102  |  19  ----------------------------<  108<H>  3.7   |  33<H>  |  0.89    Ca    9.0      13 Mar 2023 06:15  Phos  3.5     03-13  Mg     1.9     03-13    TPro  5.7<L>  /  Alb  2.7<L>  /  TBili  0.7  /  DBili  x   /  AST  12  /  ALT  11  /  AlkPhos  50  03-13                  New ECG(s):     Echo:    Stress Testing:     Cath:    Imaging:    Interpretation of Telemetry:

## 2023-03-13 NOTE — PROGRESS NOTE ADULT - ASSESSMENT
89F w/ PMH Afib (on pradaxa), prior DVT, remote history of MI, lymphedema, HTN, HLD presenting with acute dyspnea on day of presentation concerning for decompensated heart failure.     #HFpEF Patient presented with dyspnea, modest BNP elevation. TTE demonstrated diastolic dysfunction, due to body habitus it her volume status is not easily apparent.   - Initial BNP 2415  - Chest Xray and Chest CT with no radiographic evidence of pulmonary edema, no PE  Plan:   - Very difficult physical examination   - C/w PO lasix   - Elevated bicarb may be from chronic respiratory acidosis in addition to contraction  - Daily chemistries for electrolytes and creatinine   - Consider Jardiance, however concern since had recent urinary tract infection/urosepsis.  - Hypercarbia likely due to obstructive disease vs obesity hypoventilation syndrome (her hypercarbia appears chronic and compensated)  - Wean O2 as tolerated    #A.fib  - Continue metoprolol 75mg BID  - Pradaxa 150mg BID  - Telemetry  89F w/ PMH paroxsymal A-fib (on Pradaxa), prior DVT, remote history of MI, lymphedema, HTN, HLD presenting with acute dyspnea on day of presentation concerning for decompensated heart failure.     #HFpEF Patient presented with dyspnea, modest BNP elevation. TTE demonstrated diastolic dysfunction, due to body habitus it her volume status is not easily apparent.   - Initial BNP 2415  - Chest Xray and Chest CT with no radiographic evidence of pulmonary edema, no PE  Plan:   - Very difficult physical examination   - C/w PO lasix   - Elevated bicarb may be from chronic respiratory acidosis in addition to contraction  - Daily chemistries for electrolytes and creatinine   - Consider Jardiance, however concern since had recent urinary tract infection/urosepsis.  - Hypercarbia likely due to obstructive disease vs obesity hypoventilation syndrome (her hypercarbia appears chronic and compensated)  - Wean O2 as tolerated    #Paroxysmal A.fib  - Continue metoprolol 75mg BID  - Pradaxa 150mg BID  - Telemetry

## 2023-03-13 NOTE — PROGRESS NOTE ADULT - PROBLEM SELECTOR PLAN 2
RESOLVED   WBC elevated to 13.6 (was 7.8 on outpatient labs 3/3) and tachycardic, tachypneic, meeting criteria for sepsis w/ most likely  source. CT A/P w/ possible L-sided pyelonephritis. Completed 1-2 days of macrobid as outpatient for UTI, confirmed w/ UA.   - ceftriaxone 1g (3/5 - 3/7)  -UCx w/ normal jacque  -BCx NGTD  -No signs of infection

## 2023-03-13 NOTE — PROGRESS NOTE ADULT - SUBJECTIVE AND OBJECTIVE BOX
CHIEF COMPLAINT:    Interval Events:    REVIEW OF SYSTEMS:  Constitutional: [ ] negative [ ] fevers [ ] chills [ ] weight loss [ ] weight gain  HEENT: [ ] negative [ ] dry eyes [ ] eye irritation [ ] postnasal drip [ ] nasal congestion  CV: [ ] negative  [ ] chest pain [ ] orthopnea [ ] palpitations [ ] murmur  Resp: [ ] negative [ ] cough [ ] shortness of breath [ ] dyspnea [ ] wheezing [ ] sputum [ ] hemoptysis  GI: [ ] negative [ ] nausea [ ] vomiting [ ] diarrhea [ ] constipation [ ] abd pain [ ] dysphagia   : [ ] negative [ ] dysuria [ ] nocturia [ ] hematuria [ ] increased urinary frequency  Musculoskeletal: [ ] negative [ ] back pain [ ] myalgias [ ] arthralgias [ ] fracture  Skin: [ ] negative [ ] rash [ ] itch  Neurological: [ ] negative [ ] headache [ ] dizziness [ ] syncope [ ] weakness [ ] numbness  Psychiatric: [ ] negative [ ] anxiety [ ] depression  Endocrine: [ ] negative [ ] diabetes [ ] thyroid problem  Hematologic/Lymphatic: [ ] negative [ ] anemia [ ] bleeding problem  Allergic/Immunologic: [ ] negative [ ] itchy eyes [ ] nasal discharge [ ] hives [ ] angioedema  [ ] All other systems negative  [ ] Unable to assess ROS because ________    OBJECTIVE:  ICU Vital Signs Last 24 Hrs  T(C): 36.2 (13 Mar 2023 04:14), Max: 36.7 (12 Mar 2023 12:16)  T(F): 97.2 (13 Mar 2023 04:14), Max: 98.1 (12 Mar 2023 12:16)  HR: 94 (13 Mar 2023 04:50) (64 - 98)  BP: 112/71 (13 Mar 2023 04:14) (102/66 - 121/76)  BP(mean): --  ABP: --  ABP(mean): --  RR: 19 (13 Mar 2023 04:14) (18 - 20)  SpO2: 95% (13 Mar 2023 04:50) (93% - 98%)    O2 Parameters below as of 13 Mar 2023 04:14  Patient On (Oxygen Delivery Method): nasal cannula              03-12 @ 07:01  -  03-13 @ 07:00  --------------------------------------------------------  IN: 1080 mL / OUT: 1050 mL / NET: 30 mL      CAPILLARY BLOOD GLUCOSE          PHYSICAL EXAM:  General:   HEENT:   Lymph Nodes:  Neck:   Respiratory:   Cardiovascular:   Abdomen:   Extremities:   Skin:   Neurological:  Psychiatry:    HOSPITAL MEDICATIONS:  MEDICATIONS  (STANDING):  ammonium lactate 12% Lotion 1 Application(s) Topical two times a day  ascorbic acid 500 milliGRAM(s) Oral daily  atorvastatin 20 milliGRAM(s) Oral at bedtime  chlorhexidine 2% Cloths 1 Application(s) Topical <User Schedule>  dabigatran 150 milliGRAM(s) Oral every 12 hours  furosemide    Tablet 40 milliGRAM(s) Oral daily  metoprolol tartrate 75 milliGRAM(s) Oral two times a day  multivitamin 1 Tablet(s) Oral daily  pantoprazole    Tablet 40 milliGRAM(s) Oral before breakfast    MEDICATIONS  (PRN):  acetaminophen     Tablet .. 650 milliGRAM(s) Oral every 6 hours PRN Temp greater or equal to 38C (100.4F), Mild Pain (1 - 3)  albuterol/ipratropium for Nebulization 3 milliLiter(s) Nebulizer every 6 hours PRN Shortness of Breath and/or Wheezing  famotidine    Tablet 20 milliGRAM(s) Oral two times a day PRN Indigestion  melatonin 3 milliGRAM(s) Oral at bedtime PRN Insomnia      LABS:                        11.6   7.28  )-----------( 141      ( 13 Mar 2023 06:15 )             37.2     Hgb Trend: 11.6<--, 11.5<--, 11.0<--, 11.3<--, 10.7<--  03-13    143  |  102  |  19  ----------------------------<  108<H>  3.7   |  33<H>  |  0.89    Ca    9.0      13 Mar 2023 06:15  Phos  3.5     03-13  Mg     1.9     03-13    TPro  5.7<L>  /  Alb  2.7<L>  /  TBili  0.7  /  DBili  x   /  AST  12  /  ALT  11  /  AlkPhos  50  03-13    Creatinine Trend: 0.89<--, 0.83<--, 0.86<--, 0.89<--, 0.73<--, 0.78<--            MICROBIOLOGY:       RADIOLOGY:  [ ] Reviewed and interpreted by me    PULMONARY FUNCTION TESTS:    EKG: CHIEF COMPLAINT:    Interval Events:    REVIEW OF SYSTEMS:  Constitutional: [ ] negative [ ] fevers [ ] chills [ ] weight loss [ ] weight gain  HEENT: [ ] negative [ ] dry eyes [ ] eye irritation [ ] postnasal drip [ ] nasal congestion  CV: [ ] negative  [ ] chest pain [ ] orthopnea [ ] palpitations [ ] murmur  Resp: [ ] negative [ ] cough [ ] shortness of breath [ ] dyspnea [ ] wheezing [ ] sputum [ ] hemoptysis  GI: [ ] negative [ ] nausea [ ] vomiting [ ] diarrhea [ ] constipation [ ] abd pain [ ] dysphagia   : [ ] negative [ ] dysuria [ ] nocturia [ ] hematuria [ ] increased urinary frequency  Musculoskeletal: [ ] negative [ ] back pain [ ] myalgias [ ] arthralgias [ ] fracture  Skin: [ ] negative [ ] rash [ ] itch  Neurological: [ ] negative [ ] headache [ ] dizziness [ ] syncope [ ] weakness [ ] numbness  Psychiatric: [ ] negative [ ] anxiety [ ] depression  Endocrine: [ ] negative [ ] diabetes [ ] thyroid problem  Hematologic/Lymphatic: [ ] negative [ ] anemia [ ] bleeding problem  Allergic/Immunologic: [ ] negative [ ] itchy eyes [ ] nasal discharge [ ] hives [ ] angioedema  [ ] All other systems negative  [ ] Unable to assess ROS because ________    OBJECTIVE:  ICU Vital Signs Last 24 Hrs  T(C): 36.2 (13 Mar 2023 04:14), Max: 36.7 (12 Mar 2023 12:16)  T(F): 97.2 (13 Mar 2023 04:14), Max: 98.1 (12 Mar 2023 12:16)  HR: 94 (13 Mar 2023 04:50) (64 - 98)  BP: 112/71 (13 Mar 2023 04:14) (102/66 - 121/76)  BP(mean): --  ABP: --  ABP(mean): --  RR: 19 (13 Mar 2023 04:14) (18 - 20)  SpO2: 95% (13 Mar 2023 04:50) (93% - 98%)    O2 Parameters below as of 13 Mar 2023 04:14  Patient On (Oxygen Delivery Method): nasal cannula              03-12 @ 07:01  -  03-13 @ 07:00  --------------------------------------------------------  IN: 1080 mL / OUT: 1050 mL / NET: 30 mL      CAPILLARY BLOOD GLUCOSE          PHYSICAL EXAM:  General: Female sitting in chair in no acute distress  HEENT: Nasal canula in place  Respiratory: CTA  Cardiovascular: Regular rate and rhythm no m/r/g  Abdomen: Nontender nondistended  Extremities: Lymphedema in bilateral legs reportedly better than usual  Skin: No rashes  Neurological: No appreciable neurologic deficits  Psychiatry: Appropriate mood and affect      HOSPITAL MEDICATIONS:  MEDICATIONS  (STANDING):  ammonium lactate 12% Lotion 1 Application(s) Topical two times a day  ascorbic acid 500 milliGRAM(s) Oral daily  atorvastatin 20 milliGRAM(s) Oral at bedtime  chlorhexidine 2% Cloths 1 Application(s) Topical <User Schedule>  dabigatran 150 milliGRAM(s) Oral every 12 hours  furosemide    Tablet 40 milliGRAM(s) Oral daily  metoprolol tartrate 75 milliGRAM(s) Oral two times a day  multivitamin 1 Tablet(s) Oral daily  pantoprazole    Tablet 40 milliGRAM(s) Oral before breakfast    MEDICATIONS  (PRN):  acetaminophen     Tablet .. 650 milliGRAM(s) Oral every 6 hours PRN Temp greater or equal to 38C (100.4F), Mild Pain (1 - 3)  albuterol/ipratropium for Nebulization 3 milliLiter(s) Nebulizer every 6 hours PRN Shortness of Breath and/or Wheezing  famotidine    Tablet 20 milliGRAM(s) Oral two times a day PRN Indigestion  melatonin 3 milliGRAM(s) Oral at bedtime PRN Insomnia      LABS:                        11.6   7.28  )-----------( 141      ( 13 Mar 2023 06:15 )             37.2     Hgb Trend: 11.6<--, 11.5<--, 11.0<--, 11.3<--, 10.7<--  03-13    143  |  102  |  19  ----------------------------<  108<H>  3.7   |  33<H>  |  0.89    Ca    9.0      13 Mar 2023 06:15  Phos  3.5     03-13  Mg     1.9     03-13    TPro  5.7<L>  /  Alb  2.7<L>  /  TBili  0.7  /  DBili  x   /  AST  12  /  ALT  11  /  AlkPhos  50  03-13    Creatinine Trend: 0.89<--, 0.83<--, 0.86<--, 0.89<--, 0.73<--, 0.78<--            MICROBIOLOGY:       RADIOLOGY:  [ ] Reviewed and interpreted by me    PULMONARY FUNCTION TESTS:    EKG:   CHIEF COMPLAINT: SOB    Interval Events: She reports feeling better than admission    REVIEW OF SYSTEMS:  Constitutional: [ ] negative [ ] fevers [ ] chills [ ] weight loss [ ] weight gain  HEENT: [ ] negative [ ] dry eyes [ ] eye irritation [ ] postnasal drip [ ] nasal congestion  CV: [ ] negative  [ ] chest pain [ ] orthopnea [ ] palpitations [ ] murmur  Resp: [ ] negative [ ] cough [improving] shortness of breath [ ] dyspnea [ ] wheezing [ ] sputum [ ] hemoptysis  GI: [ ] negative [ ] nausea [ ] vomiting [ ] diarrhea [ ] constipation [ ] abd pain [ ] dysphagia   : [ ] negative [ ] dysuria [ ] nocturia [ ] hematuria [ ] increased urinary frequency  Musculoskeletal: [ ] negative [ ] back pain [ ] myalgias [ ] arthralgias [ ] fracture  Skin: [ ] negative [ ] rash [ ] itch  Neurological: [ ] negative [ ] headache [ ] dizziness [ ] syncope [ ] weakness [ ] numbness  Psychiatric: [ ] negative [ ] anxiety [ ] depression  Endocrine: [ ] negative [ ] diabetes [ ] thyroid problem  Hematologic/Lymphatic: [ ] negative [ ] anemia [ ] bleeding problem  Allergic/Immunologic: [ ] negative [ ] itchy eyes [ ] nasal discharge [ ] hives [ ] angioedema  [x ] All other systems negative  [ ] Unable to assess ROS because ________    OBJECTIVE:  ICU Vital Signs Last 24 Hrs  T(C): 36.2 (13 Mar 2023 04:14), Max: 36.7 (12 Mar 2023 12:16)  T(F): 97.2 (13 Mar 2023 04:14), Max: 98.1 (12 Mar 2023 12:16)  HR: 94 (13 Mar 2023 04:50) (64 - 98)  BP: 112/71 (13 Mar 2023 04:14) (102/66 - 121/76)  BP(mean): --  ABP: --  ABP(mean): --  RR: 19 (13 Mar 2023 04:14) (18 - 20)  SpO2: 95% (13 Mar 2023 04:50) (93% - 98%)    O2 Parameters below as of 13 Mar 2023 04:14  Patient On (Oxygen Delivery Method): nasal cannula              03-12 @ 07:01  -  03-13 @ 07:00  --------------------------------------------------------  IN: 1080 mL / OUT: 1050 mL / NET: 30 mL      CAPILLARY BLOOD GLUCOSE          PHYSICAL EXAM:  General: Female sitting in chair in no acute distress  HEENT: Nasal canula in place  Respiratory: CTA  Cardiovascular: Regular rate and rhythm no m/r/g  Abdomen: Nontender nondistended  Extremities: Lymphedema in bilateral legs reportedly better than usual  Skin: No rashes  Neurological: No appreciable neurologic deficits  Psychiatry: Appropriate mood and affect      HOSPITAL MEDICATIONS:  MEDICATIONS  (STANDING):  ammonium lactate 12% Lotion 1 Application(s) Topical two times a day  ascorbic acid 500 milliGRAM(s) Oral daily  atorvastatin 20 milliGRAM(s) Oral at bedtime  chlorhexidine 2% Cloths 1 Application(s) Topical <User Schedule>  dabigatran 150 milliGRAM(s) Oral every 12 hours  furosemide    Tablet 40 milliGRAM(s) Oral daily  metoprolol tartrate 75 milliGRAM(s) Oral two times a day  multivitamin 1 Tablet(s) Oral daily  pantoprazole    Tablet 40 milliGRAM(s) Oral before breakfast    MEDICATIONS  (PRN):  acetaminophen     Tablet .. 650 milliGRAM(s) Oral every 6 hours PRN Temp greater or equal to 38C (100.4F), Mild Pain (1 - 3)  albuterol/ipratropium for Nebulization 3 milliLiter(s) Nebulizer every 6 hours PRN Shortness of Breath and/or Wheezing  famotidine    Tablet 20 milliGRAM(s) Oral two times a day PRN Indigestion  melatonin 3 milliGRAM(s) Oral at bedtime PRN Insomnia      LABS:                        11.6   7.28  )-----------( 141      ( 13 Mar 2023 06:15 )             37.2     Hgb Trend: 11.6<--, 11.5<--, 11.0<--, 11.3<--, 10.7<--  03-13    143  |  102  |  19  ----------------------------<  108<H>  3.7   |  33<H>  |  0.89    Ca    9.0      13 Mar 2023 06:15  Phos  3.5     03-13  Mg     1.9     03-13    TPro  5.7<L>  /  Alb  2.7<L>  /  TBili  0.7  /  DBili  x   /  AST  12  /  ALT  11  /  AlkPhos  50  03-13    Creatinine Trend: 0.89<--, 0.83<--, 0.86<--, 0.89<--, 0.73<--, 0.78<--            MICROBIOLOGY:       RADIOLOGY:  [ ] Reviewed and interpreted by me    PULMONARY FUNCTION TESTS:    EKG:

## 2023-03-13 NOTE — PROGRESS NOTE ADULT - TIME BILLING
Personal review of data, imaging and discussion with medical team.
Personal review of data, images, old records  and coordination of care with medical  team.

## 2023-03-13 NOTE — PROGRESS NOTE ADULT - ASSESSMENT
89YO Female PMH Afib (on pradaxa), prior DVT, remote history of MI, HTN, HLD who was admitted 3/5 w/ dyspnea x1week.  CTA was done which showed no PE however did appear to have mosaicism and trace effusions. Additionally small nodules associated with small airways disease were also noted. Pulmonary consulted for Respiratory Failure.    #Hypoxemic and Hypercapnic Respiratory Failure  - Pt was initially noted to by hypercapnic in the setting of acute HF exacerbation  - VBG 7.25/89 which improved with Bilevel and diuresis  - Bicarbonate was notably elevated on admission to 33 and has been rising with diuresis to 37  - VBG done 3/9 shows 7.42/63 consistent with metabolic acidosis in addition to respiratory acidosis (which likely has been present for some time as pt's admission bicarb was elevated to 33)  - Echo shows HFpEF with concentric remodeling and increased LV filling pressures as well as moderate PH  - Pt never smoked and had no wheezing on exam- doubt COPD  - S/p Diamox 250mg x1 3/9, x2 3/10 and x1 3/11  - ABG 3/11 with improving Metabolic alkalosis (out of proportion to compensation) 7.40/62/86  - Bicarb today improved from 38 to 33  - Lasix was restarted however pt was net even yesterday  - Given body habitus w/ chronic lymph edema volume status more challenging to ascertain however pt has not developed PHAM despite diuresis and likely is not intravascularly depleted- additionally albumin is noted to be 2.7 so she is likely 3rd spacing   - C/w Strict In and Out  - C/w Daily Weights     89YO Female PMH Afib (on pradaxa), prior DVT, remote history of MI, HTN, HLD who was admitted 3/5 w/ dyspnea x1week.  CTA was done which showed no PE however did appear to have mosaicism and trace effusions. Additionally small nodules associated with small airways disease were also noted. Pulmonary consulted for Respiratory Failure.    #Hypoxemic and Hypercapnic Respiratory Failure  - Pt was initially noted to by hypercapnic in the setting of acute HF exacerbation  - VBG 7.25/89 which improved with Bilevel and diuresis  - Bicarbonate was notably elevated on admission to 33 and has been rising with diuresis to 37  - VBG done 3/9 shows 7.42/63 consistent with metabolic acidosis in addition to respiratory acidosis (which likely has been present for some time as pt's admission bicarb was elevated to 33)  - Echo shows HFpEF with concentric remodeling and increased LV filling pressures as well as moderate PH  - Pt never smoked and had no wheezing on exam- doubt COPD  - S/p Diamox 250mg x1 3/9, x2 3/10 and x1 3/11  - ABG 3/11 with improving Metabolic alkalosis (out of proportion to compensation) 7.40/62/86  - Bicarb today improved from 38 to 33  - Lasix was restarted however pt was net even yesterday  - Given body habitus w/ chronic lymph edema volume status more challenging to ascertain however pt has not developed PHAM despite diuresis and likely is not intravascularly depleted- additionally albumin is noted to be 2.7 so she is likely 3rd spacing   - Discharge diuretic regimen per cardiology  - If pt remains admitted would continue diuresing w/ diamox as long as pCO2 is improving, pH >7.36 and bicarb is >30  - Pt w/ ambulatory desaturation so she will be discharged with oxygen  - C/w Strict In and Out  - C/w Daily Weights    Case discussed with Dr. Canales

## 2023-03-13 NOTE — PROGRESS NOTE ADULT - ASSESSMENT
89F w/ PMH Afib (on pradaxa), remote history MI, HTN, prior DVT presenting w/ intermittent dyspnea x1 week w/ acute worsening x24h, admitted w/ acute hypoxic and hypercapnic respiratory failure likely in s/o HFpEF exacerbation. Found to have hypercapnia likely undiagnosed KALIN. Now s/p IV diuresis and clinically euvolemic, pending home oxygen and BiPAP set up prior to dc.

## 2023-03-13 NOTE — PROGRESS NOTE ADULT - ATTENDING COMMENTS
89YO Female PMH Afib (on pradaxa), prior DVT, remote history of MI, HTN, HLD who was admitted 3/5 w/ dyspnea x1week.  CTA was done which showed no PE however did appear to have mosaicism and trace effusions. Additionally small nodules associated with small airways disease were also noted. Pulmonary consulted for Respiratory Failure.  Blood gases are improving, specifically CO2 is improving as metabolic alkalosis is resolving.  Would assess continued need for oxygen at rest and if sats > 93% at rest at RA check saturation with ambulation.  Agree with plan as outlined above.  Please reconsult as needed.

## 2023-03-13 NOTE — CHART NOTE - NSCHARTNOTEFT_GEN_A_CORE
- Communicated with on call floor RN, appreciated.   - Patient remain in z-flow at all time in bed  - anterior ankle padding if necessary communicated with floor staff.

## 2023-03-13 NOTE — CHART NOTE - NSCHARTNOTEFT_GEN_A_CORE
Patient will require home oxygen based on a diagnosis of diastolic heart failure. Acute conditions and exacerbations have been treated and are currently resolved, and patient is in a chronic state. Room air saturation is 91%; Room air Oxygen saturation with ambulation is 86%; Oxygen saturation with ambulation on 2L is 92%. Patient will require home oxygen based on a diagnosis of diastolic heart failure. Acute conditions and exacerbations have been treated and are currently resolved, and patient is in a chronic state. Room air saturation at rest is 91%; Room air Oxygen saturation with ambulation is 86%; Oxygen saturation with ambulation on 2L is 92%.

## 2023-03-13 NOTE — PROGRESS NOTE ADULT - PROBLEM SELECTOR PLAN 5
Diet: Regular  DVT: Pradaxa  Dispo: PT recs [facility] but family electing home. Will need oxygen and BiPAP     GOC: Patient's son, Akin Barber and daughter Emperatriz Barber are HCP. Wishes to remain FULL CODE.     Communication: results and plan discussed w/ patient and her family at bedside 3/12.

## 2023-03-13 NOTE — PROGRESS NOTE ADULT - SUBJECTIVE AND OBJECTIVE BOX
INCOMPLETE NOTE    Roverto Peña | PGY1| Pager: [phone]  Interval Events:    REVIEW OF SYSTEMS:  CONSTITUTIONAL: No weakness, fevers or chills  EYES/ENT: No visual changes;  No vertigo or throat pain   NECK: No pain or stiffness  RESPIRATORY: No cough, wheezing, hemoptysis; No shortness of breath  CARDIOVASCULAR: No chest pain or palpitations  GASTROINTESTINAL: No abdominal or epigastric pain. No nausea, vomiting, or hematemesis; No diarrhea or constipation. No melena or hematochezia.  GENITOURINARY: No dysuria, frequency or hematuria  NEUROLOGICAL: No numbness or weakness  SKIN: No itching, burning, rashes, or lesions   All other review of systems is negative unless indicated above.    OBJECTIVE:  ICU Vital Signs Last 24 Hrs  T(C): 36.2 (13 Mar 2023 04:14), Max: 36.7 (12 Mar 2023 12:16)  T(F): 97.2 (13 Mar 2023 04:14), Max: 98.1 (12 Mar 2023 12:16)  HR: 94 (13 Mar 2023 04:50) (64 - 98)  BP: 112/71 (13 Mar 2023 04:14) (102/66 - 121/76)  BP(mean): --  ABP: --  ABP(mean): --  RR: 19 (13 Mar 2023 04:14) (18 - 20)  SpO2: 95% (13 Mar 2023 04:50) (93% - 98%)    O2 Parameters below as of 13 Mar 2023 04:14  Patient On (Oxygen Delivery Method): nasal cannula              03-12 @ 07:01  -  03-13 @ 07:00  --------------------------------------------------------  IN: 1080 mL / OUT: 1050 mL / NET: 30 mL      CAPILLARY BLOOD GLUCOSE          PHYSICAL EXAM:  General: WN/WD NAD  Neurology: A&Ox3, nonfocal, HANSON x 4  Eyes: PERRLA/ EOMI, Gross vision intact  ENT/Neck: Neck supple, trachea midline, No JVD, Gross hearing intact  Respiratory: CTA B/L, No wheezing, rales, rhonchi  CV: RRR, +S1/S2, -S3/S4, no murmurs, rubs or gallops  Abdominal: Soft, NT, ND +BS, No HSM  MSK: 5/5 strength UE/LE bilaterally  Extremities: No edema, 2+ peripheral pulses  Skin: No Rashes, Hematoma, Ecchymosis  Incisions:   Tubes:    HOSPITAL MEDICATIONS:  MEDICATIONS  (STANDING):  ammonium lactate 12% Lotion 1 Application(s) Topical two times a day  ascorbic acid 500 milliGRAM(s) Oral daily  atorvastatin 20 milliGRAM(s) Oral at bedtime  chlorhexidine 2% Cloths 1 Application(s) Topical <User Schedule>  dabigatran 150 milliGRAM(s) Oral every 12 hours  furosemide    Tablet 40 milliGRAM(s) Oral daily  metoprolol tartrate 75 milliGRAM(s) Oral two times a day  multivitamin 1 Tablet(s) Oral daily  pantoprazole    Tablet 40 milliGRAM(s) Oral before breakfast    MEDICATIONS  (PRN):  acetaminophen     Tablet .. 650 milliGRAM(s) Oral every 6 hours PRN Temp greater or equal to 38C (100.4F), Mild Pain (1 - 3)  albuterol/ipratropium for Nebulization 3 milliLiter(s) Nebulizer every 6 hours PRN Shortness of Breath and/or Wheezing  famotidine    Tablet 20 milliGRAM(s) Oral two times a day PRN Indigestion  melatonin 3 milliGRAM(s) Oral at bedtime PRN Insomnia      LABS:                        11.6   7.28  )-----------( 141      ( 13 Mar 2023 06:15 )             37.2     Hgb Trend: 11.6<--, 11.5<--, 11.0<--, 11.3<--, 10.7<--  03-13    143  |  102  |  19  ----------------------------<  108<H>  3.7   |  33<H>  |  0.89    Ca    9.0      13 Mar 2023 06:15  Phos  3.5     03-13  Mg     1.9     03-13    TPro  5.7<L>  /  Alb  2.7<L>  /  TBili  0.7  /  DBili  x   /  AST  12  /  ALT  11  /  AlkPhos  50  03-13    Creatinine Trend: 0.89<--, 0.83<--, 0.86<--, 0.89<--, 0.73<--, 0.78<--            MICROBIOLOGY:      Roverto Yangblack | PGY1| Pager: [phone]  Interval Events: NAEON, feels well this AM, family wants to establish transport and home care prior to dispo,     REVIEW OF SYSTEMS:  CONSTITUTIONAL: No weakness, fevers or chills  EYES/ENT: No visual changes;  No vertigo or throat pain   NECK: No pain or stiffness  RESPIRATORY: No cough, wheezing, hemoptysis; No shortness of breath  CARDIOVASCULAR: No chest pain or palpitations  GASTROINTESTINAL: No abdominal or epigastric pain. No nausea, vomiting, or hematemesis; No diarrhea or constipation. No melena or hematochezia.  GENITOURINARY: No dysuria, frequency or hematuria  NEUROLOGICAL: No numbness or weakness  SKIN: No itching, burning, rashes, or lesions   All other review of systems is negative unless indicated above.    OBJECTIVE:  ICU Vital Signs Last 24 Hrs  T(C): 36.2 (13 Mar 2023 04:14), Max: 36.7 (12 Mar 2023 12:16)  T(F): 97.2 (13 Mar 2023 04:14), Max: 98.1 (12 Mar 2023 12:16)  HR: 94 (13 Mar 2023 04:50) (64 - 98)  BP: 112/71 (13 Mar 2023 04:14) (102/66 - 121/76)  BP(mean): --  ABP: --  ABP(mean): --  RR: 19 (13 Mar 2023 04:14) (18 - 20)  SpO2: 95% (13 Mar 2023 04:50) (93% - 98%)    O2 Parameters below as of 13 Mar 2023 04:14  Patient On (Oxygen Delivery Method): nasal cannula              03-12 @ 07:01  -  03-13 @ 07:00  --------------------------------------------------------  IN: 1080 mL / OUT: 1050 mL / NET: 30 mL      CAPILLARY BLOOD GLUCOSE          PHYSICAL EXAM:  General: WN/WD NAD  Neurology: A&Ox3, nonfocal, HANSON x 4  Eyes: PERRLA/ EOMI, Gross vision intact  ENT/Neck: Neck supple, trachea midline, No JVD, Gross hearing intact  Respiratory: CTA B/L, No wheezing, rales, rhonchi  CV: RRR, +S1/S2, -S3/S4, no murmurs, rubs or gallops  Abdominal: Soft, NT, ND +BS, No HSM  MSK: 5/5 strength UE/LE bilaterally  Extremities: No edema, 2+ peripheral pulses  Skin: No Rashes, Hematoma, Ecchymosis  Incisions:   Tubes:    HOSPITAL MEDICATIONS:  MEDICATIONS  (STANDING):  ammonium lactate 12% Lotion 1 Application(s) Topical two times a day  ascorbic acid 500 milliGRAM(s) Oral daily  atorvastatin 20 milliGRAM(s) Oral at bedtime  chlorhexidine 2% Cloths 1 Application(s) Topical <User Schedule>  dabigatran 150 milliGRAM(s) Oral every 12 hours  furosemide    Tablet 40 milliGRAM(s) Oral daily  metoprolol tartrate 75 milliGRAM(s) Oral two times a day  multivitamin 1 Tablet(s) Oral daily  pantoprazole    Tablet 40 milliGRAM(s) Oral before breakfast    MEDICATIONS  (PRN):  acetaminophen     Tablet .. 650 milliGRAM(s) Oral every 6 hours PRN Temp greater or equal to 38C (100.4F), Mild Pain (1 - 3)  albuterol/ipratropium for Nebulization 3 milliLiter(s) Nebulizer every 6 hours PRN Shortness of Breath and/or Wheezing  famotidine    Tablet 20 milliGRAM(s) Oral two times a day PRN Indigestion  melatonin 3 milliGRAM(s) Oral at bedtime PRN Insomnia      LABS:                        11.6   7.28  )-----------( 141      ( 13 Mar 2023 06:15 )             37.2     Hgb Trend: 11.6<--, 11.5<--, 11.0<--, 11.3<--, 10.7<--  03-13    143  |  102  |  19  ----------------------------<  108<H>  3.7   |  33<H>  |  0.89    Ca    9.0      13 Mar 2023 06:15  Phos  3.5     03-13  Mg     1.9     03-13    TPro  5.7<L>  /  Alb  2.7<L>  /  TBili  0.7  /  DBili  x   /  AST  12  /  ALT  11  /  AlkPhos  50  03-13    Creatinine Trend: 0.89<--, 0.83<--, 0.86<--, 0.89<--, 0.73<--, 0.78<--            MICROBIOLOGY:

## 2023-03-14 ENCOUNTER — TRANSCRIPTION ENCOUNTER (OUTPATIENT)
Age: 88
End: 2023-03-14

## 2023-03-14 LAB
ANION GAP SERPL CALC-SCNC: 7 MMOL/L — SIGNIFICANT CHANGE UP (ref 5–17)
BUN SERPL-MCNC: 16 MG/DL — SIGNIFICANT CHANGE UP (ref 7–23)
CALCIUM SERPL-MCNC: 8.8 MG/DL — SIGNIFICANT CHANGE UP (ref 8.4–10.5)
CHLORIDE SERPL-SCNC: 103 MMOL/L — SIGNIFICANT CHANGE UP (ref 96–108)
CO2 SERPL-SCNC: 36 MMOL/L — HIGH (ref 22–31)
CREAT SERPL-MCNC: 0.8 MG/DL — SIGNIFICANT CHANGE UP (ref 0.5–1.3)
EGFR: 70 ML/MIN/1.73M2 — SIGNIFICANT CHANGE UP
GLUCOSE SERPL-MCNC: 105 MG/DL — HIGH (ref 70–99)
HCT VFR BLD CALC: 38 % — SIGNIFICANT CHANGE UP (ref 34.5–45)
HGB BLD-MCNC: 11.4 G/DL — LOW (ref 11.5–15.5)
MAGNESIUM SERPL-MCNC: 1.8 MG/DL — SIGNIFICANT CHANGE UP (ref 1.6–2.6)
MCHC RBC-ENTMCNC: 28.9 PG — SIGNIFICANT CHANGE UP (ref 27–34)
MCHC RBC-ENTMCNC: 30 GM/DL — LOW (ref 32–36)
MCV RBC AUTO: 96.2 FL — SIGNIFICANT CHANGE UP (ref 80–100)
NRBC # BLD: 0 /100 WBCS — SIGNIFICANT CHANGE UP (ref 0–0)
PHOSPHATE SERPL-MCNC: 3.1 MG/DL — SIGNIFICANT CHANGE UP (ref 2.5–4.5)
PLATELET # BLD AUTO: 141 K/UL — LOW (ref 150–400)
POTASSIUM SERPL-MCNC: 3.4 MMOL/L — LOW (ref 3.5–5.3)
POTASSIUM SERPL-SCNC: 3.4 MMOL/L — LOW (ref 3.5–5.3)
RBC # BLD: 3.95 M/UL — SIGNIFICANT CHANGE UP (ref 3.8–5.2)
RBC # FLD: 15.2 % — HIGH (ref 10.3–14.5)
SODIUM SERPL-SCNC: 146 MMOL/L — HIGH (ref 135–145)
WBC # BLD: 7.53 K/UL — SIGNIFICANT CHANGE UP (ref 3.8–10.5)
WBC # FLD AUTO: 7.53 K/UL — SIGNIFICANT CHANGE UP (ref 3.8–10.5)

## 2023-03-14 PROCEDURE — 93010 ELECTROCARDIOGRAM REPORT: CPT

## 2023-03-14 PROCEDURE — 99232 SBSQ HOSP IP/OBS MODERATE 35: CPT

## 2023-03-14 PROCEDURE — 99233 SBSQ HOSP IP/OBS HIGH 50: CPT

## 2023-03-14 RX ORDER — METOPROLOL TARTRATE 50 MG
1 TABLET ORAL
Qty: 0 | Refills: 0 | DISCHARGE

## 2023-03-14 RX ORDER — METOPROLOL TARTRATE 50 MG
1 TABLET ORAL
Qty: 120 | Refills: 2
Start: 2023-03-14 | End: 2023-09-09

## 2023-03-14 RX ORDER — SPIRONOLACTONE 25 MG/1
12.5 TABLET, FILM COATED ORAL DAILY
Refills: 0 | Status: DISCONTINUED | OUTPATIENT
Start: 2023-03-14 | End: 2023-03-14

## 2023-03-14 RX ORDER — FAMOTIDINE 10 MG/ML
1 INJECTION INTRAVENOUS
Qty: 60 | Refills: 0
Start: 2023-03-14 | End: 2023-04-12

## 2023-03-14 RX ORDER — POTASSIUM CHLORIDE 20 MEQ
40 PACKET (EA) ORAL ONCE
Refills: 0 | Status: COMPLETED | OUTPATIENT
Start: 2023-03-14 | End: 2023-03-14

## 2023-03-14 RX ORDER — FUROSEMIDE 40 MG
2 TABLET ORAL
Qty: 100 | Refills: 0
Start: 2023-03-14 | End: 2023-04-12

## 2023-03-14 RX ORDER — FUROSEMIDE 40 MG
2 TABLET ORAL
Qty: 0 | Refills: 0 | DISCHARGE

## 2023-03-14 RX ORDER — SPIRONOLACTONE 25 MG/1
0.5 TABLET, FILM COATED ORAL
Qty: 15 | Refills: 0
Start: 2023-03-14 | End: 2023-04-12

## 2023-03-14 RX ADMIN — CHLORHEXIDINE GLUCONATE 1 APPLICATION(S): 213 SOLUTION TOPICAL at 06:09

## 2023-03-14 RX ADMIN — Medication 500 MILLIGRAM(S): at 16:07

## 2023-03-14 RX ADMIN — Medication 20 MILLIGRAM(S): at 21:05

## 2023-03-14 RX ADMIN — ATORVASTATIN CALCIUM 20 MILLIGRAM(S): 80 TABLET, FILM COATED ORAL at 21:05

## 2023-03-14 RX ADMIN — Medication 75 MILLIGRAM(S): at 06:13

## 2023-03-14 RX ADMIN — Medication 40 MILLIGRAM(S): at 09:47

## 2023-03-14 RX ADMIN — DABIGATRAN ETEXILATE MESYLATE 150 MILLIGRAM(S): 150 CAPSULE ORAL at 06:13

## 2023-03-14 RX ADMIN — DABIGATRAN ETEXILATE MESYLATE 150 MILLIGRAM(S): 150 CAPSULE ORAL at 18:26

## 2023-03-14 RX ADMIN — Medication 40 MILLIEQUIVALENT(S): at 09:44

## 2023-03-14 RX ADMIN — Medication 1 TABLET(S): at 16:06

## 2023-03-14 RX ADMIN — Medication 75 MILLIGRAM(S): at 18:26

## 2023-03-14 RX ADMIN — PANTOPRAZOLE SODIUM 40 MILLIGRAM(S): 20 TABLET, DELAYED RELEASE ORAL at 06:13

## 2023-03-14 NOTE — PROVIDER CONTACT NOTE (OTHER) - SITUATION
Suspected deep tissue injury to sacrum and B/L heels
Patient C/O 5 out of 10 chest pain , Patient with tachypnea Respiratory rate 26 w/ mild sob
Patient w/ C/O mild SOB after eating, Patient with tachypnea Respiratory rate 28

## 2023-03-14 NOTE — PROGRESS NOTE ADULT - SUBJECTIVE AND OBJECTIVE BOX
Roverto Casey | PGY1| Pager: [phone]  Interval Events: Feeling well this AM, hesitant about dispo to home, advised strongly that [facility]UN[facility] would constitute a safer dispo plan.     REVIEW OF SYSTEMS:  CONSTITUTIONAL: No weakness, fevers or chills  EYES/ENT: No visual changes;  No vertigo or throat pain   NECK: No pain or stiffness  RESPIRATORY: No cough, wheezing, hemoptysis; No shortness of breath  CARDIOVASCULAR: No chest pain or palpitations  GASTROINTESTINAL: No abdominal or epigastric pain. No nausea, vomiting, or hematemesis; No diarrhea or constipation. No melena or hematochezia.  GENITOURINARY: No dysuria, frequency or hematuria  NEUROLOGICAL: No numbness or weakness  SKIN: No itching, burning, rashes, or lesions   All other review of systems is negative unless indicated above.    OBJECTIVE:  ICU Vital Signs Last 24 Hrs  T(C): 36.6 (14 Mar 2023 04:17), Max: 36.6 (13 Mar 2023 11:19)  T(F): 97.8 (14 Mar 2023 04:17), Max: 97.9 (13 Mar 2023 11:19)  HR: 94 (14 Mar 2023 04:17) (71 - 109)  BP: 113/74 (14 Mar 2023 04:17) (95/65 - 114/80)  BP(mean): --  ABP: --  ABP(mean): --  RR: 18 (14 Mar 2023 04:17) (18 - 18)  SpO2: 97% (14 Mar 2023 04:17) (86% - 97%)    O2 Parameters below as of 14 Mar 2023 04:17  Patient On (Oxygen Delivery Method): nasal cannula  O2 Flow (L/min): 2            03-13 @ 07:01  -  03-14 @ 07:00  --------------------------------------------------------  IN: 1310 mL / OUT: 900 mL / NET: 410 mL      CAPILLARY BLOOD GLUCOSE          PHYSICAL EXAM:  General: WN/WD NAD  Neurology: A&Ox3, nonfocal, HANSON x 4  Eyes: PERRLA/ EOMI, Gross vision intact  ENT/Neck: Neck supple, trachea midline, No JVD, Gross hearing intact  Respiratory: CTA B/L, No wheezing, rales, rhonchi  CV: RRR, +S1/S2, -S3/S4, no murmurs, rubs or gallops  Abdominal: Soft, NT, ND +BS, No HSM  MSK: 5/5 strength UE/LE bilaterally  Extremities: 4+ LE edema, 2+ peripheral pulses  Skin: No Rashes, Hematoma, Ecchymosis  Incisions:   Tubes:    HOSPITAL MEDICATIONS:  MEDICATIONS  (STANDING):  ammonium lactate 12% Lotion 1 Application(s) Topical two times a day  ascorbic acid 500 milliGRAM(s) Oral daily  atorvastatin 20 milliGRAM(s) Oral at bedtime  chlorhexidine 2% Cloths 1 Application(s) Topical <User Schedule>  dabigatran 150 milliGRAM(s) Oral every 12 hours  furosemide    Tablet 40 milliGRAM(s) Oral <User Schedule>  furosemide    Tablet 20 milliGRAM(s) Oral <User Schedule>  metoprolol tartrate 75 milliGRAM(s) Oral two times a day  multivitamin 1 Tablet(s) Oral daily  pantoprazole    Tablet 40 milliGRAM(s) Oral before breakfast  potassium chloride   Powder 40 milliEquivalent(s) Oral once    MEDICATIONS  (PRN):  acetaminophen     Tablet .. 650 milliGRAM(s) Oral every 6 hours PRN Temp greater or equal to 38C (100.4F), Mild Pain (1 - 3)  albuterol/ipratropium for Nebulization 3 milliLiter(s) Nebulizer every 6 hours PRN Shortness of Breath and/or Wheezing  famotidine    Tablet 20 milliGRAM(s) Oral two times a day PRN Indigestion  melatonin 3 milliGRAM(s) Oral at bedtime PRN Insomnia      LABS:                        11.4   7.53  )-----------( 141      ( 14 Mar 2023 06:52 )             38.0     Hgb Trend: 11.4<--, 11.6<--, 11.5<--, 11.0<--, 11.3<--  03-14    146<H>  |  103  |  16  ----------------------------<  105<H>  3.4<L>   |  36<H>  |  0.80    Ca    8.8      14 Mar 2023 06:52  Phos  3.1     03-14  Mg     1.8     03-14    TPro  5.7<L>  /  Alb  2.7<L>  /  TBili  0.7  /  DBili  x   /  AST  12  /  ALT  11  /  AlkPhos  50  03-13    Creatinine Trend: 0.80<--, 0.89<--, 0.83<--, 0.86<--, 0.89<--, 0.73<--            MICROBIOLOGY:

## 2023-03-14 NOTE — PROGRESS NOTE ADULT - ASSESSMENT
89F w/ PMH paroxsymal A-fib (on Pradaxa), prior DVT, remote history of MI, lymphedema, HTN, HLD presenting with acute dyspnea on day of presentation concerning for decompensated heart failure.     #HFpEF Patient presented with dyspnea, modest BNP elevation. TTE demonstrated diastolic dysfunction, due to body habitus it her volume status is not easily apparent.   - Initial BNP 2415  - Chest Xray and Chest CT with no radiographic evidence of pulmonary edema, no PE  - Elevated bicarb may be from chronic respiratory acidosis in addition to contraction  - Hypercarbia likely due to obstructive disease vs obesity hypoventilation syndrome (her hypercarbia appears chronic and compensated)  - Very difficult physical examination   - C/w PO lasix 40mg AM and 20mg PM (home regimen)  - add spironolactone for HFpEF and assist with diuresis.  - Consider Jardiance, however concern since had recent urinary tract infection/urosepsis.  - monitor electrolytes and Cr.  - Wean O2 as tolerated    #Paroxysmal A.fib  - Continue metoprolol 75mg BID  - Pradaxa 150mg BID  - Telemetry    89F w/ PMH paroxsymal A-fib (on Pradaxa), prior DVT, remote history of MI, lymphedema, HTN, HLD presenting with acute dyspnea on day of presentation concerning for decompensated heart failure.     #HFpEF Patient presented with dyspnea, modest BNP elevation. TTE demonstrated diastolic dysfunction, due to body habitus it her volume status is not easily apparent.   - Initial BNP 2415  - Chest Xray and Chest CT with no radiographic evidence of pulmonary edema, no PE  - Elevated bicarb may be from chronic respiratory acidosis in addition to contraction  - Hypercarbia likely due to obstructive disease vs obesity hypoventilation syndrome (her hypercarbia appears chronic and compensated)  - Very difficult physical examination   - C/w PO lasix 40mg AM and 20mg PM (home regimen)  - add spironolactone 12.5 mg daily for HFpEF  - Consider Jardiance, however concern since had recent urinary tract infection/urosepsis.  - monitor electrolytes and Cr.  - Wean O2 as tolerated  - repeat pro-BNP prior to discharge    #Paroxysmal A.fib  - Continue metoprolol 75mg BID  - Pradaxa 150mg BID  - Telemetry

## 2023-03-14 NOTE — PROGRESS NOTE ADULT - SUBJECTIVE AND OBJECTIVE BOX
Patient is a 89y old  Female who presents with a chief complaint of SOB (14 Mar 2023 07:39)      SUBJECTIVE / OVERNIGHT EVENTS:    MEDICATIONS  (STANDING):  ammonium lactate 12% Lotion 1 Application(s) Topical two times a day  ascorbic acid 500 milliGRAM(s) Oral daily  atorvastatin 20 milliGRAM(s) Oral at bedtime  chlorhexidine 2% Cloths 1 Application(s) Topical <User Schedule>  dabigatran 150 milliGRAM(s) Oral every 12 hours  furosemide    Tablet 40 milliGRAM(s) Oral <User Schedule>  furosemide    Tablet 20 milliGRAM(s) Oral <User Schedule>  metoprolol tartrate 75 milliGRAM(s) Oral two times a day  multivitamin 1 Tablet(s) Oral daily  pantoprazole    Tablet 40 milliGRAM(s) Oral before breakfast    MEDICATIONS  (PRN):  acetaminophen     Tablet .. 650 milliGRAM(s) Oral every 6 hours PRN Temp greater or equal to 38C (100.4F), Mild Pain (1 - 3)  albuterol/ipratropium for Nebulization 3 milliLiter(s) Nebulizer every 6 hours PRN Shortness of Breath and/or Wheezing  famotidine    Tablet 20 milliGRAM(s) Oral two times a day PRN Indigestion  melatonin 3 milliGRAM(s) Oral at bedtime PRN Insomnia      T(C): 37 (03-14-23 @ 09:40), Max: 37 (03-14-23 @ 09:40)  HR: 80 (03-14-23 @ 09:40) (71 - 109)  BP: 106/70 (03-14-23 @ 09:40) (95/65 - 114/80)  RR: 26 (03-14-23 @ 09:40) (18 - 26)  SpO2: 96% (03-14-23 @ 09:40) (86% - 97%)  CAPILLARY BLOOD GLUCOSE        I&O's Summary    13 Mar 2023 07:01  -  14 Mar 2023 07:00  --------------------------------------------------------  IN: 1310 mL / OUT: 900 mL / NET: 410 mL        PHYSICAL EXAM:  PHYSICAL EXAM:    Constitutional: NAD. well-developed; well-groomed; well-nourished;  HEENT: AT/NC, PERRLA; EOMI, MMM, no oropharyngeal lesions, no erythema, no exudates, Supple neck; normal thyroid gland, no cervical lymphadenopathy  Respiratory: CTAB. equal aeration bilaterally. no wheezing, no crackes, no rhonchi. no increase in WOB  Cardiovascular: RRR, no M/R/G. 2+ distal pulses. Cap refill < 2 seconds. no JVD  Gastrointestinal: soft; NT/ND, +BS, no rebounding tenderness / guarding / HSM / mass / ascites.  Genitourinary: not examined.  Extremities: no clubbing; no cyanosis; no pedal edema, non-tender to palpation, DP and Radial pulses intact.  Skin: warm and dry; color normal: no rash: no ulcers  Neurological: A&Ox 3; responds to pain; responds to verbal commands; epicritic and protopathic sensation intact: CN nerves grossly intact.   MSK/Back: no deformities. Active and passive ROM intact; strength intact, no CVA tenderness, No joint tenderness, swelling, erythema  Psychiatric: normal mood/affect. Denies SI/HI    LABS:                        11.4   7.53  )-----------( 141      ( 14 Mar 2023 06:52 )             38.0     03-14    146<H>  |  103  |  16  ----------------------------<  105<H>  3.4<L>   |  36<H>  |  0.80    Ca    8.8      14 Mar 2023 06:52  Phos  3.1     03-14  Mg     1.8     03-14    TPro  5.7<L>  /  Alb  2.7<L>  /  TBili  0.7  /  DBili  x   /  AST  12  /  ALT  11  /  AlkPhos  50  03-13              RADIOLOGY & ADDITIONAL TESTS:    Imaging Personally Reviewed: FRANCISCA    Consultant(s) Notes Reviewed:  FRANCISCA    Care Discussed with Consultants/Other Providers: FRANCISCA   Patient is a 89y old  Female who presents with a chief complaint of SOB (14 Mar 2023 07:39)      SUBJECTIVE / OVERNIGHT EVENTS:  No acute events overnight  Patient denies CP, palpitation, SOB, visual disturbance, dizziness, lightheadedness.  patient ambulated yesterday.    Tele: afib 70-100s    MEDICATIONS  (STANDING):  ammonium lactate 12% Lotion 1 Application(s) Topical two times a day  ascorbic acid 500 milliGRAM(s) Oral daily  atorvastatin 20 milliGRAM(s) Oral at bedtime  chlorhexidine 2% Cloths 1 Application(s) Topical <User Schedule>  dabigatran 150 milliGRAM(s) Oral every 12 hours  furosemide    Tablet 40 milliGRAM(s) Oral <User Schedule>  furosemide    Tablet 20 milliGRAM(s) Oral <User Schedule>  metoprolol tartrate 75 milliGRAM(s) Oral two times a day  multivitamin 1 Tablet(s) Oral daily  pantoprazole    Tablet 40 milliGRAM(s) Oral before breakfast    MEDICATIONS  (PRN):  acetaminophen     Tablet .. 650 milliGRAM(s) Oral every 6 hours PRN Temp greater or equal to 38C (100.4F), Mild Pain (1 - 3)  albuterol/ipratropium for Nebulization 3 milliLiter(s) Nebulizer every 6 hours PRN Shortness of Breath and/or Wheezing  famotidine    Tablet 20 milliGRAM(s) Oral two times a day PRN Indigestion  melatonin 3 milliGRAM(s) Oral at bedtime PRN Insomnia      T(C): 37 (03-14-23 @ 09:40), Max: 37 (03-14-23 @ 09:40)  HR: 80 (03-14-23 @ 09:40) (71 - 109)  BP: 106/70 (03-14-23 @ 09:40) (95/65 - 114/80)  RR: 26 (03-14-23 @ 09:40) (18 - 26)  SpO2: 96% (03-14-23 @ 09:40) (86% - 97%)  CAPILLARY BLOOD GLUCOSE        I&O's Summary    13 Mar 2023 07:01  -  14 Mar 2023 07:00  --------------------------------------------------------  IN: 1310 mL / OUT: 900 mL / NET: 410 mL        PHYSICAL EXAM:  Constitutional: NAD.   HEENT: AT/NC, EOMI, Supple neck;  Respiratory: no wheezing or crackles. no increase in WOB  Cardiovascular: irregularly irregular, S1, S2, no M/R/G. 2+ distal pulses. no JVD, b/l LE edema.  Gastrointestinal: soft; NT/ND, +BS  Extremities: no cyanosis; non-tender to palpation, DP and Radial pulses intact.  Neurological: A&Ox 3;  Psychiatric: normal mood/affect.      LABS:                        11.4   7.53  )-----------( 141      ( 14 Mar 2023 06:52 )             38.0     03-14    146<H>  |  103  |  16  ----------------------------<  105<H>  3.4<L>   |  36<H>  |  0.80    Ca    8.8      14 Mar 2023 06:52  Phos  3.1     03-14  Mg     1.8     03-14    TPro  5.7<L>  /  Alb  2.7<L>  /  TBili  0.7  /  DBili  x   /  AST  12  /  ALT  11  /  AlkPhos  50  03-13              RADIOLOGY & ADDITIONAL TESTS:    Imaging Personally Reviewed: FRANCISCA    Consultant(s) Notes Reviewed:  FRANCISCA    Care Discussed with Consultants/Other Providers: FRANCISCA

## 2023-03-14 NOTE — PROVIDER CONTACT NOTE (OTHER) - RECOMMENDATIONS
Team MD notified. Wound care consult, nutrition consult, turn and reposition [id number], pressure injury reducing techniques.
Team MD notified.
Team MD notified. 12 lead EKG. Administer 09:00 am dose furosemide 40 mg PO.

## 2023-03-14 NOTE — PROVIDER CONTACT NOTE (OTHER) - REASON
Patient C/O 5 out of 10 chest pain , Patient with tachypnea Respiratory rate 26 w/ mild sob
Suspected deep tissue injury to sacrum and B/L heels
Patient w/ C/O mild SOB after eating, patient with tachypnea Respiratory rate 28

## 2023-03-14 NOTE — DISCHARGE NOTE NURSING/CASE MANAGEMENT/SOCIAL WORK - NSDCPEFALRISK_GEN_ALL_CORE
For information on Fall & Injury Prevention, visit: [email or web address] OR  [email or web address] OR  https://www.cdc.gov/steadi/patient.html

## 2023-03-14 NOTE — PROVIDER CONTACT NOTE (OTHER) - ACTION/TREATMENT ORDERED:
Team MD notified.
Team MD notified. Wound care consult, nutrition consult, turn and reposition [id number], pressure injury reducing techniques.
Team MD notified.

## 2023-03-14 NOTE — PROGRESS NOTE ADULT - ATTENDING COMMENTS
This is a 89F with h/o htn, CAD c/b MI, A.fib, DVT p/w worsening SOB/RICHARD and LE edema, lethargy, found to have acute resp failure with sepsis + afib RVR w Acute diastolic HF.    Cardiology & Pulmonary evaluated    1. Acute hypercapnic/hypoxemic respiratory failure, likely secondary to dCHF, Pulmonary HTN  2. Acute exacerbation of HFpEF  3. Afib RVR  4. Pulmonary HTN  5. LE Lymphedema    - Feels botter, no acute SOB, afebrile, still requiring O2 1-2L NC, sat 95%. O2 sat RA sitting 91%, RA ambulation 86%, 2L ambulation 92%  - CT chest- small vessel disease, trace pleural effusion, Echo- EF 72%, diastolic dysfunction with moderate pulm HTN   - spoke to Card attending- Dr Whiting- he spoke to daughter of patient at bedside- adding Aldactone 12.5mg/d & c/w 40mg am, 20mg pm daily  - outpatient card f/i in 7-10days  - spoke to Pulmonary, Ph 7.4, PCO2 63, no need of NIV  - c/w Pradexa, Metoprolol 75mg bid, statin  - Bronchodilators prn, LE ACE wrap  - d/c planning today, cleared by Cardiology. Daughter refused Rehab. will d/c on home PT  - O2 set-up already  - d/c time 42 min

## 2023-03-14 NOTE — PROVIDER CONTACT NOTE (OTHER) - BACKGROUND
Admit diagnosis: Acute respiratory distress
Admit diagnosis: Acute respiratory failure
Admit diagnosis: Acute respiratory failure
EOAE (evoked otoacoustic emission)

## 2023-03-14 NOTE — DISCHARGE NOTE NURSING/CASE MANAGEMENT/SOCIAL WORK - PATIENT PORTAL LINK FT
You can access the FollowMyHealth Patient Portal offered by [facility] by registering at the following website: [email or web address]. By joining [facility]’s FollowMyHealth portal, you will also be able to view your health information using other applications (apps) compatible with our system.

## 2023-03-14 NOTE — PROVIDER CONTACT NOTE (OTHER) - ASSESSMENT
Patient A&O3, alert. Patient w/ C/O mild SOB after eating, patient with tachypnea Respiratory rate 28. Patient Afib  on cardiac monitor. Patient on 2 L NC. Patient appears comfortable with no SXS of respiratory distress noted. Patient states feeling mildly better after IV lasix this AM. other VSS.
Patient A&O4. Patient C/O 5 out of 10 chest pain.  , Patient with tachypnea Respiratory rate 26 w/ mild sob. VSS on 2 L NC. Patient appears comfortable with no distress noted.
Patient A&O3, alert. Patient denies pain, CP, SOB. 15X13 Suspected DTI to sacrum, 6X6 suspected DTI to bilateral heels. VSS.

## 2023-03-15 VITALS
SYSTOLIC BLOOD PRESSURE: 97 MMHG | DIASTOLIC BLOOD PRESSURE: 66 MMHG | RESPIRATION RATE: 18 BRPM | TEMPERATURE: 98 F | OXYGEN SATURATION: 98 % | HEART RATE: 76 BPM

## 2023-03-15 PROCEDURE — 84484 ASSAY OF TROPONIN QUANT: CPT

## 2023-03-15 PROCEDURE — 82947 ASSAY GLUCOSE BLOOD QUANT: CPT

## 2023-03-15 PROCEDURE — 97164 PT RE-EVAL EST PLAN CARE: CPT

## 2023-03-15 PROCEDURE — 83880 ASSAY OF NATRIURETIC PEPTIDE: CPT

## 2023-03-15 PROCEDURE — 93306 TTE W/DOPPLER COMPLETE: CPT

## 2023-03-15 PROCEDURE — 84443 ASSAY THYROID STIM HORMONE: CPT

## 2023-03-15 PROCEDURE — 97116 GAIT TRAINING THERAPY: CPT

## 2023-03-15 PROCEDURE — 81001 URINALYSIS AUTO W/SCOPE: CPT

## 2023-03-15 PROCEDURE — 99285 EMERGENCY DEPT VISIT HI MDM: CPT | Mod: 25

## 2023-03-15 PROCEDURE — 87086 URINE CULTURE/COLONY COUNT: CPT

## 2023-03-15 PROCEDURE — 80053 COMPREHEN METABOLIC PANEL: CPT

## 2023-03-15 PROCEDURE — 87637 SARSCOV2&INF A&B&RSV AMP PRB: CPT

## 2023-03-15 PROCEDURE — 74177 CT ABD & PELVIS W/CONTRAST: CPT | Mod: MA

## 2023-03-15 PROCEDURE — 93005 ELECTROCARDIOGRAM TRACING: CPT

## 2023-03-15 PROCEDURE — 82330 ASSAY OF CALCIUM: CPT

## 2023-03-15 PROCEDURE — 85018 HEMOGLOBIN: CPT

## 2023-03-15 PROCEDURE — 85610 PROTHROMBIN TIME: CPT

## 2023-03-15 PROCEDURE — 87040 BLOOD CULTURE FOR BACTERIA: CPT

## 2023-03-15 PROCEDURE — 96365 THER/PROPH/DIAG IV INF INIT: CPT

## 2023-03-15 PROCEDURE — 99239 HOSP IP/OBS DSCHRG MGMT >30: CPT | Mod: GC

## 2023-03-15 PROCEDURE — 82803 BLOOD GASES ANY COMBINATION: CPT

## 2023-03-15 PROCEDURE — 80048 BASIC METABOLIC PNL TOTAL CA: CPT

## 2023-03-15 PROCEDURE — 71045 X-RAY EXAM CHEST 1 VIEW: CPT

## 2023-03-15 PROCEDURE — 85025 COMPLETE CBC W/AUTO DIFF WBC: CPT

## 2023-03-15 PROCEDURE — 87640 STAPH A DNA AMP PROBE: CPT

## 2023-03-15 PROCEDURE — 84132 ASSAY OF SERUM POTASSIUM: CPT

## 2023-03-15 PROCEDURE — 94640 AIRWAY INHALATION TREATMENT: CPT

## 2023-03-15 PROCEDURE — 97161 PT EVAL LOW COMPLEX 20 MIN: CPT

## 2023-03-15 PROCEDURE — 86900 BLOOD TYPING SEROLOGIC ABO: CPT

## 2023-03-15 PROCEDURE — 84295 ASSAY OF SERUM SODIUM: CPT

## 2023-03-15 PROCEDURE — 85730 THROMBOPLASTIN TIME PARTIAL: CPT

## 2023-03-15 PROCEDURE — 71275 CT ANGIOGRAPHY CHEST: CPT | Mod: MA

## 2023-03-15 PROCEDURE — 97112 NEUROMUSCULAR REEDUCATION: CPT

## 2023-03-15 PROCEDURE — 83735 ASSAY OF MAGNESIUM: CPT

## 2023-03-15 PROCEDURE — 84439 ASSAY OF FREE THYROXINE: CPT

## 2023-03-15 PROCEDURE — 84100 ASSAY OF PHOSPHORUS: CPT

## 2023-03-15 PROCEDURE — 94660 CPAP INITIATION&MGMT: CPT

## 2023-03-15 PROCEDURE — 86850 RBC ANTIBODY SCREEN: CPT

## 2023-03-15 PROCEDURE — 96375 TX/PRO/DX INJ NEW DRUG ADDON: CPT

## 2023-03-15 PROCEDURE — 97602 WOUND(S) CARE NON-SELECTIVE: CPT

## 2023-03-15 PROCEDURE — 36415 COLL VENOUS BLD VENIPUNCTURE: CPT

## 2023-03-15 PROCEDURE — 97530 THERAPEUTIC ACTIVITIES: CPT

## 2023-03-15 PROCEDURE — 85027 COMPLETE CBC AUTOMATED: CPT

## 2023-03-15 PROCEDURE — 85014 HEMATOCRIT: CPT

## 2023-03-15 PROCEDURE — 82435 ASSAY OF BLOOD CHLORIDE: CPT

## 2023-03-15 PROCEDURE — 83605 ASSAY OF LACTIC ACID: CPT

## 2023-03-15 PROCEDURE — 93970 EXTREMITY STUDY: CPT

## 2023-03-15 PROCEDURE — 82565 ASSAY OF CREATININE: CPT

## 2023-03-15 PROCEDURE — 86901 BLOOD TYPING SEROLOGIC RH(D): CPT

## 2023-03-15 PROCEDURE — 0225U NFCT DS DNA&RNA 21 SARSCOV2: CPT

## 2023-03-15 PROCEDURE — 87641 MR-STAPH DNA AMP PROBE: CPT

## 2023-03-15 PROCEDURE — 97110 THERAPEUTIC EXERCISES: CPT

## 2023-03-15 RX ADMIN — Medication 75 MILLIGRAM(S): at 05:26

## 2023-03-15 RX ADMIN — DABIGATRAN ETEXILATE MESYLATE 150 MILLIGRAM(S): 150 CAPSULE ORAL at 05:26

## 2023-03-15 RX ADMIN — CHLORHEXIDINE GLUCONATE 1 APPLICATION(S): 213 SOLUTION TOPICAL at 05:28

## 2023-03-15 RX ADMIN — Medication 40 MILLIGRAM(S): at 09:38

## 2023-03-15 RX ADMIN — PANTOPRAZOLE SODIUM 40 MILLIGRAM(S): 20 TABLET, DELAYED RELEASE ORAL at 05:26

## 2023-03-15 NOTE — PROGRESS NOTE ADULT - PROBLEM SELECTOR PROBLEM 2
Sepsis, unspecified organism

## 2023-03-15 NOTE — PROGRESS NOTE ADULT - ATTENDING COMMENTS
This is a 89F with h/o htn, CAD c/b MI, A.fib, DVT p/w worsening SOB/RICHARD and LE edema, lethargy, found to have acute resp failure with sepsis + afib RVR w Acute diastolic HF.    Cardiology & Pulmonary evaluated    1. Acute hypercapnic/hypoxemic respiratory failure, likely secondary to dCHF, Pulmonary HTN  2. Acute exacerbation of HFpEF  3. Afib RVR  4. Pulmonary HTN  5. LE Lymphedema    - No acute SOB, afebrile, still requiring O2 1-2L NC, sat 95%. O2 sat RA sitting 91%, RA ambulation 86%, 2L ambulation 92%  - CT chest- small vessel disease, trace pleural effusion, Echo- EF 72%, diastolic dysfunction with moderate pulm HTN   - spoke to Card attending- Dr Whiting- he spoke to daughter of patient at bedside- adding Aldactone 12.5mg/d & c/w 40mg am, 20mg pm daily  - outpatient card f/u in 7-10days  - spoke to Pulmonary, Ph 7.4, PCO2 63, no need of NIV  - c/w Pradaxa, Metoprolol 75mg bid, statin  - Bronchodilators prn, LE ACE wrap prn  - d/c today, cleared by Cardiology. Daughter refused Rehab. will d/c on home PT  - O2 set-up already  - d/c time 42 min .

## 2023-03-15 NOTE — PROGRESS NOTE ADULT - SUBJECTIVE AND OBJECTIVE BOX
Roverto Casey | PGY1| Pager: [phone]  Interval Events: Patient discharged yesterday, 24hr notice given, patient arranging for transportation at 11am today.     OBJECTIVE:  ICU Vital Signs Last 24 Hrs  T(C): 36.3 (15 Mar 2023 04:15), Max: 37 (14 Mar 2023 09:40)  T(F): 97.4 (15 Mar 2023 04:15), Max: 98.6 (14 Mar 2023 09:40)  HR: 90 (15 Mar 2023 04:15) (70 - 90)  BP: 108/77 (15 Mar 2023 04:15) (97/61 - 109/77)  BP(mean): --  ABP: --  ABP(mean): --  RR: 18 (15 Mar 2023 04:15) (18 - 26)  SpO2: 98% (15 Mar 2023 04:15) (96% - 98%)    O2 Parameters below as of 15 Mar 2023 04:15  Patient On (Oxygen Delivery Method): nasal cannula  O2 Flow (L/min): 2            03-14 @ 07:01  -  03-15 @ 07:00  --------------------------------------------------------  IN: 630 mL / OUT: 1200 mL / NET: -570 mL      CAPILLARY BLOOD GLUCOSE          PHYSICAL EXAM:  General: WN/WD NAD  Neurology: A&Ox3, nonfocal, HANSON x 4  Eyes: PERRLA/ EOMI, Gross vision intact  ENT/Neck: Neck supple, trachea midline, No JVD, Gross hearing intact  Respiratory: CTA B/L, No wheezing, rales, rhonchi  CV: RRR, +S1/S2, -S3/S4, no murmurs, rubs or gallops  Abdominal: Soft, NT, ND +BS, No HSM  MSK: 5/5 strength UE/LE bilaterally  Extremities: 4+ LE edema, 2+ peripheral pulses  Skin: No Rashes, Hematoma, Ecchymosis  Incisions:   Tubes:    HOSPITAL MEDICATIONS:  MEDICATIONS  (STANDING):  ammonium lactate 12% Lotion 1 Application(s) Topical two times a day  ascorbic acid 500 milliGRAM(s) Oral daily  atorvastatin 20 milliGRAM(s) Oral at bedtime  chlorhexidine 2% Cloths 1 Application(s) Topical <User Schedule>  dabigatran 150 milliGRAM(s) Oral every 12 hours  furosemide    Tablet 40 milliGRAM(s) Oral <User Schedule>  furosemide    Tablet 20 milliGRAM(s) Oral <User Schedule>  metoprolol tartrate 75 milliGRAM(s) Oral two times a day  multivitamin 1 Tablet(s) Oral daily  pantoprazole    Tablet 40 milliGRAM(s) Oral before breakfast    MEDICATIONS  (PRN):  acetaminophen     Tablet .. 650 milliGRAM(s) Oral every 6 hours PRN Temp greater or equal to 38C (100.4F), Mild Pain (1 - 3)  albuterol/ipratropium for Nebulization 3 milliLiter(s) Nebulizer every 6 hours PRN Shortness of Breath and/or Wheezing  famotidine    Tablet 20 milliGRAM(s) Oral two times a day PRN Indigestion  melatonin 3 milliGRAM(s) Oral at bedtime PRN Insomnia      LABS:                        11.4   7.53  )-----------( 141      ( 14 Mar 2023 06:52 )             38.0     Hgb Trend: 11.4<--, 11.6<--, 11.5<--, 11.0<--, 11.3<--  03-14    146<H>  |  103  |  16  ----------------------------<  105<H>  3.4<L>   |  36<H>  |  0.80    Ca    8.8      14 Mar 2023 06:52  Phos  3.1     03-14  Mg     1.8     03-14      Creatinine Trend: 0.80<--, 0.89<--, 0.83<--, 0.86<--, 0.89<--, 0.73<--            MICROBIOLOGY:

## 2023-03-15 NOTE — PROGRESS NOTE ADULT - PROBLEM SELECTOR PLAN 1
Patient admitted w/ RICHARD x1week w/ acute worsening over last 24h; hypoxic at home, respiratory acidosis and started on BiPAP w/ improvement in CO2 levels. Trop WNL, BNP 2415; CT chest w/o edema, consolidations.  According to OP cardiology notes, has had periods of systolic dysfxn in past.   -Echo showing diastolic dysfunction with moderate pulm HTN and EF 72%--> c/w HFpEF and Group 2 Pulm HTN  -Hypoxia persistent and out of proportion to lung exam--> Pulmonology consulted. Does not think pt has COPD  -Throughout course had wheezing on exam, --> trial of duonebs and prednisone with resolution of wheezes   -Volume optimized via IV lasix; now transitioned to oral 40 qD   -Trial off BIPAP overnight with resultant am hypercarbia; meeting criteria for BiPAP at home  -Now on 2L NC, unable to wean and desats to high 80s without O2   -Plan to dc home with O2 Needs Sleep study outpatient

## 2023-03-15 NOTE — PROGRESS NOTE ADULT - ATTENDING SUPERVISION STATEMENT
Fellow
Resident

## 2023-03-15 NOTE — PROGRESS NOTE ADULT - PROVIDER SPECIALTY LIST ADULT
Cardiology
Internal Medicine
Pulmonology
Cardiology
Cardiology
Internal Medicine
Pulmonology
Internal Medicine

## 2023-03-15 NOTE — PROGRESS NOTE ADULT - PROBLEM SELECTOR PROBLEM 1
Acute respiratory failure with hypoxia and hypercapnia

## 2023-03-20 ENCOUNTER — NON-APPOINTMENT (OUTPATIENT)
Age: 88
End: 2023-03-20

## 2023-05-08 ENCOUNTER — RX RENEWAL (OUTPATIENT)
Age: 88
End: 2023-05-08

## 2023-05-08 RX ORDER — DABIGATRAN ETEXILATE 150 MG/1
150 CAPSULE ORAL
Qty: 180 | Refills: 3 | Status: ACTIVE | COMMUNITY
Start: 2023-05-08 | End: 1900-01-01

## 2023-10-09 ENCOUNTER — RX RENEWAL (OUTPATIENT)
Age: 88
End: 2023-10-09

## 2023-11-29 ENCOUNTER — RX RENEWAL (OUTPATIENT)
Age: 88
End: 2023-11-29

## 2023-11-29 RX ORDER — POTASSIUM CHLORIDE 1500 MG/1
20 TABLET, EXTENDED RELEASE ORAL
Qty: 90 | Refills: 3 | Status: ACTIVE | COMMUNITY
Start: 2020-12-29 | End: 1900-01-01

## 2024-01-01 ENCOUNTER — INPATIENT (INPATIENT)
Facility: HOSPITAL | Age: 89
LOS: 13 days | DRG: 813 | End: 2024-07-31
Attending: HOSPITALIST | Admitting: INTERNAL MEDICINE
Payer: MEDICARE

## 2024-01-01 VITALS
RESPIRATION RATE: 20 BRPM | HEART RATE: 107 BPM | DIASTOLIC BLOOD PRESSURE: 77 MMHG | TEMPERATURE: 98 F | WEIGHT: 201.06 LBS | SYSTOLIC BLOOD PRESSURE: 109 MMHG | HEIGHT: 62 IN | OXYGEN SATURATION: 96 %

## 2024-01-01 VITALS — HEART RATE: 130 BPM | RESPIRATION RATE: 30 BRPM | DIASTOLIC BLOOD PRESSURE: 65 MMHG | SYSTOLIC BLOOD PRESSURE: 110 MMHG

## 2024-01-01 DIAGNOSIS — I10 ESSENTIAL (PRIMARY) HYPERTENSION: ICD-10-CM

## 2024-01-01 DIAGNOSIS — Z29.9 ENCOUNTER FOR PROPHYLACTIC MEASURES, UNSPECIFIED: ICD-10-CM

## 2024-01-01 DIAGNOSIS — I95.9 HYPOTENSION, UNSPECIFIED: ICD-10-CM

## 2024-01-01 DIAGNOSIS — R41.0 DISORIENTATION, UNSPECIFIED: ICD-10-CM

## 2024-01-01 DIAGNOSIS — D64.9 ANEMIA, UNSPECIFIED: ICD-10-CM

## 2024-01-01 DIAGNOSIS — D69.6 THROMBOCYTOPENIA, UNSPECIFIED: ICD-10-CM

## 2024-01-01 DIAGNOSIS — J96.21 ACUTE AND CHRONIC RESPIRATORY FAILURE WITH HYPOXIA: ICD-10-CM

## 2024-01-01 DIAGNOSIS — Z71.89 OTHER SPECIFIED COUNSELING: ICD-10-CM

## 2024-01-01 DIAGNOSIS — I48.20 CHRONIC ATRIAL FIBRILLATION, UNSPECIFIED: ICD-10-CM

## 2024-01-01 DIAGNOSIS — G93.49 OTHER ENCEPHALOPATHY: ICD-10-CM

## 2024-01-01 DIAGNOSIS — I82.409 ACUTE EMBOLISM AND THROMBOSIS OF UNSPECIFIED DEEP VEINS OF UNSPECIFIED LOWER EXTREMITY: ICD-10-CM

## 2024-01-01 DIAGNOSIS — E78.5 HYPERLIPIDEMIA, UNSPECIFIED: ICD-10-CM

## 2024-01-01 DIAGNOSIS — D72.829 ELEVATED WHITE BLOOD CELL COUNT, UNSPECIFIED: ICD-10-CM

## 2024-01-01 DIAGNOSIS — L03.90 CELLULITIS, UNSPECIFIED: ICD-10-CM

## 2024-01-01 DIAGNOSIS — I89.0 LYMPHEDEMA, NOT ELSEWHERE CLASSIFIED: ICD-10-CM

## 2024-01-01 DIAGNOSIS — R06.00 DYSPNEA, UNSPECIFIED: ICD-10-CM

## 2024-01-01 DIAGNOSIS — A41.9 SEPSIS, UNSPECIFIED ORGANISM: ICD-10-CM

## 2024-01-01 DIAGNOSIS — R53.2 FUNCTIONAL QUADRIPLEGIA: ICD-10-CM

## 2024-01-01 DIAGNOSIS — Z51.5 ENCOUNTER FOR PALLIATIVE CARE: ICD-10-CM

## 2024-01-01 DIAGNOSIS — N17.9 ACUTE KIDNEY FAILURE, UNSPECIFIED: ICD-10-CM

## 2024-01-01 DIAGNOSIS — E87.3 ALKALOSIS: ICD-10-CM

## 2024-01-01 DIAGNOSIS — I50.30 UNSPECIFIED DIASTOLIC (CONGESTIVE) HEART FAILURE: ICD-10-CM

## 2024-01-01 DIAGNOSIS — I27.20 PULMONARY HYPERTENSION, UNSPECIFIED: ICD-10-CM

## 2024-01-01 LAB
-  AMOXICILLIN/CLAVULANIC ACID: SIGNIFICANT CHANGE UP
-  AMPICILLIN/SULBACTAM: SIGNIFICANT CHANGE UP
-  AMPICILLIN: SIGNIFICANT CHANGE UP
-  AMPICILLIN: SIGNIFICANT CHANGE UP
-  AZTREONAM: SIGNIFICANT CHANGE UP
-  CEFAZOLIN: SIGNIFICANT CHANGE UP
-  CEFEPIME: SIGNIFICANT CHANGE UP
-  CEFOXITIN: SIGNIFICANT CHANGE UP
-  CEFTRIAXONE: SIGNIFICANT CHANGE UP
-  CIPROFLOXACIN: SIGNIFICANT CHANGE UP
-  CIPROFLOXACIN: SIGNIFICANT CHANGE UP
-  ERTAPENEM: SIGNIFICANT CHANGE UP
-  GENTAMICIN: SIGNIFICANT CHANGE UP
-  IMIPENEM: SIGNIFICANT CHANGE UP
-  LEVOFLOXACIN: SIGNIFICANT CHANGE UP
-  LEVOFLOXACIN: SIGNIFICANT CHANGE UP
-  MEROPENEM: SIGNIFICANT CHANGE UP
-  NITROFURANTOIN: SIGNIFICANT CHANGE UP
-  NITROFURANTOIN: SIGNIFICANT CHANGE UP
-  PIPERACILLIN/TAZOBACTAM: SIGNIFICANT CHANGE UP
-  TETRACYCLINE: SIGNIFICANT CHANGE UP
-  TOBRAMYCIN: SIGNIFICANT CHANGE UP
-  TRIMETHOPRIM/SULFAMETHOXAZOLE: SIGNIFICANT CHANGE UP
-  VANCOMYCIN: SIGNIFICANT CHANGE UP
ADD ON TEST-SPECIMEN IN LAB: SIGNIFICANT CHANGE UP
ALBUMIN SERPL ELPH-MCNC: 2.5 G/DL — LOW (ref 3.3–5)
ALBUMIN SERPL ELPH-MCNC: 2.9 G/DL — LOW (ref 3.3–5)
ALBUMIN SERPL ELPH-MCNC: 3 G/DL — LOW (ref 3.3–5)
ALBUMIN SERPL ELPH-MCNC: 3.1 G/DL — LOW (ref 3.3–5)
ALBUMIN SERPL ELPH-MCNC: 3.2 G/DL — LOW (ref 3.3–5)
ALBUMIN SERPL ELPH-MCNC: 3.2 G/DL — LOW (ref 3.3–5)
ALP SERPL-CCNC: 47 U/L — SIGNIFICANT CHANGE UP (ref 40–120)
ALP SERPL-CCNC: 66 U/L — SIGNIFICANT CHANGE UP (ref 40–120)
ALP SERPL-CCNC: 69 U/L — SIGNIFICANT CHANGE UP (ref 40–120)
ALP SERPL-CCNC: 69 U/L — SIGNIFICANT CHANGE UP (ref 40–120)
ALP SERPL-CCNC: 77 U/L — SIGNIFICANT CHANGE UP (ref 40–120)
ALP SERPL-CCNC: 79 U/L — SIGNIFICANT CHANGE UP (ref 40–120)
ALT FLD-CCNC: 10 U/L — SIGNIFICANT CHANGE UP (ref 10–45)
ALT FLD-CCNC: 12 U/L — SIGNIFICANT CHANGE UP (ref 10–45)
ALT FLD-CCNC: 23 U/L — SIGNIFICANT CHANGE UP (ref 10–45)
ALT FLD-CCNC: 24 U/L — SIGNIFICANT CHANGE UP (ref 10–45)
ALT FLD-CCNC: 24 U/L — SIGNIFICANT CHANGE UP (ref 10–45)
ALT FLD-CCNC: 6 U/L — LOW (ref 10–45)
ANION GAP SERPL CALC-SCNC: 12 MMOL/L — SIGNIFICANT CHANGE UP (ref 5–17)
ANION GAP SERPL CALC-SCNC: 12 MMOL/L — SIGNIFICANT CHANGE UP (ref 5–17)
ANION GAP SERPL CALC-SCNC: 13 MMOL/L — SIGNIFICANT CHANGE UP (ref 5–17)
ANION GAP SERPL CALC-SCNC: 13 MMOL/L — SIGNIFICANT CHANGE UP (ref 5–17)
ANION GAP SERPL CALC-SCNC: 15 MMOL/L — SIGNIFICANT CHANGE UP (ref 5–17)
ANION GAP SERPL CALC-SCNC: 16 MMOL/L — SIGNIFICANT CHANGE UP (ref 5–17)
ANION GAP SERPL CALC-SCNC: 18 MMOL/L — HIGH (ref 5–17)
ANION GAP SERPL CALC-SCNC: 18 MMOL/L — HIGH (ref 5–17)
ANION GAP SERPL CALC-SCNC: 19 MMOL/L — HIGH (ref 5–17)
ANION GAP SERPL CALC-SCNC: 22 MMOL/L — HIGH (ref 5–17)
ANION GAP SERPL CALC-SCNC: 22 MMOL/L — HIGH (ref 5–17)
ANION GAP SERPL CALC-SCNC: 7 MMOL/L — SIGNIFICANT CHANGE UP (ref 5–17)
ANION GAP SERPL CALC-SCNC: 9 MMOL/L — SIGNIFICANT CHANGE UP (ref 5–17)
ANISOCYTOSIS BLD QL: SLIGHT — SIGNIFICANT CHANGE UP
APPEARANCE UR: ABNORMAL
APTT BLD: 35.2 SEC — SIGNIFICANT CHANGE UP (ref 24.5–35.6)
AST SERPL-CCNC: 19 U/L — SIGNIFICANT CHANGE UP (ref 10–40)
AST SERPL-CCNC: 21 U/L — SIGNIFICANT CHANGE UP (ref 10–40)
AST SERPL-CCNC: 30 U/L — SIGNIFICANT CHANGE UP (ref 10–40)
AST SERPL-CCNC: 48 U/L — HIGH (ref 10–40)
AST SERPL-CCNC: 56 U/L — HIGH (ref 10–40)
AST SERPL-CCNC: 67 U/L — HIGH (ref 10–40)
BACTERIA # UR AUTO: ABNORMAL /HPF
BASE EXCESS BLDV CALC-SCNC: 13.1 MMOL/L — HIGH (ref -2–3)
BASE EXCESS BLDV CALC-SCNC: 5.1 MMOL/L — HIGH (ref -2–3)
BASE EXCESS BLDV CALC-SCNC: 6.4 MMOL/L — HIGH (ref -2–3)
BASE EXCESS BLDV CALC-SCNC: 6.9 MMOL/L — HIGH (ref -2–3)
BASE EXCESS BLDV CALC-SCNC: 8.7 MMOL/L — HIGH (ref -2–3)
BASOPHILS # BLD AUTO: 0 K/UL — SIGNIFICANT CHANGE UP (ref 0–0.2)
BASOPHILS # BLD AUTO: 0 K/UL — SIGNIFICANT CHANGE UP (ref 0–0.2)
BASOPHILS # BLD AUTO: 0.02 K/UL — SIGNIFICANT CHANGE UP (ref 0–0.2)
BASOPHILS # BLD AUTO: 0.02 K/UL — SIGNIFICANT CHANGE UP (ref 0–0.2)
BASOPHILS # BLD AUTO: 0.03 K/UL — SIGNIFICANT CHANGE UP (ref 0–0.2)
BASOPHILS NFR BLD AUTO: 0 % — SIGNIFICANT CHANGE UP (ref 0–2)
BASOPHILS NFR BLD AUTO: 0 % — SIGNIFICANT CHANGE UP (ref 0–2)
BASOPHILS NFR BLD AUTO: 0.1 % — SIGNIFICANT CHANGE UP (ref 0–2)
BASOPHILS NFR BLD AUTO: 0.2 % — SIGNIFICANT CHANGE UP (ref 0–2)
BASOPHILS NFR BLD AUTO: 0.4 % — SIGNIFICANT CHANGE UP (ref 0–2)
BILIRUB DIRECT SERPL-MCNC: 0.6 MG/DL — HIGH (ref 0–0.3)
BILIRUB DIRECT SERPL-MCNC: <0.1 MG/DL — SIGNIFICANT CHANGE UP (ref 0–0.3)
BILIRUB INDIRECT FLD-MCNC: 0.8 MG/DL — SIGNIFICANT CHANGE UP (ref 0.2–1)
BILIRUB INDIRECT FLD-MCNC: >0.3 MG/DL — SIGNIFICANT CHANGE UP (ref 0.2–1)
BILIRUB SERPL-MCNC: 0.4 MG/DL — SIGNIFICANT CHANGE UP (ref 0.2–1.2)
BILIRUB SERPL-MCNC: 0.9 MG/DL — SIGNIFICANT CHANGE UP (ref 0.2–1.2)
BILIRUB SERPL-MCNC: 1.4 MG/DL — HIGH (ref 0.2–1.2)
BILIRUB SERPL-MCNC: 1.7 MG/DL — HIGH (ref 0.2–1.2)
BILIRUB SERPL-MCNC: 1.8 MG/DL — HIGH (ref 0.2–1.2)
BILIRUB SERPL-MCNC: 2 MG/DL — HIGH (ref 0.2–1.2)
BILIRUB UR-MCNC: ABNORMAL
BLD GP AB SCN SERPL QL: NEGATIVE — SIGNIFICANT CHANGE UP
BUN SERPL-MCNC: 23 MG/DL — SIGNIFICANT CHANGE UP (ref 7–23)
BUN SERPL-MCNC: 24 MG/DL — HIGH (ref 7–23)
BUN SERPL-MCNC: 32 MG/DL — HIGH (ref 7–23)
BUN SERPL-MCNC: 34 MG/DL — HIGH (ref 7–23)
BUN SERPL-MCNC: 36 MG/DL — HIGH (ref 7–23)
BUN SERPL-MCNC: 40 MG/DL — HIGH (ref 7–23)
BUN SERPL-MCNC: 44 MG/DL — HIGH (ref 7–23)
BUN SERPL-MCNC: 49 MG/DL — HIGH (ref 7–23)
BUN SERPL-MCNC: 58 MG/DL — HIGH (ref 7–23)
BUN SERPL-MCNC: 69 MG/DL — HIGH (ref 7–23)
BUN SERPL-MCNC: 70 MG/DL — HIGH (ref 7–23)
BUN SERPL-MCNC: 77 MG/DL — HIGH (ref 7–23)
BUN SERPL-MCNC: 88 MG/DL — HIGH (ref 7–23)
BUN SERPL-MCNC: 92 MG/DL — HIGH (ref 7–23)
BUN SERPL-MCNC: 95 MG/DL — HIGH (ref 7–23)
BUN SERPL-MCNC: 98 MG/DL — HIGH (ref 7–23)
CA-I SERPL-SCNC: 1.08 MMOL/L — LOW (ref 1.15–1.33)
CA-I SERPL-SCNC: 1.14 MMOL/L — LOW (ref 1.15–1.33)
CA-I SERPL-SCNC: 1.21 MMOL/L — SIGNIFICANT CHANGE UP (ref 1.15–1.33)
CALCIUM SERPL-MCNC: 8.3 MG/DL — LOW (ref 8.4–10.5)
CALCIUM SERPL-MCNC: 8.4 MG/DL — SIGNIFICANT CHANGE UP (ref 8.4–10.5)
CALCIUM SERPL-MCNC: 8.4 MG/DL — SIGNIFICANT CHANGE UP (ref 8.4–10.5)
CALCIUM SERPL-MCNC: 8.5 MG/DL — SIGNIFICANT CHANGE UP (ref 8.4–10.5)
CALCIUM SERPL-MCNC: 8.6 MG/DL — SIGNIFICANT CHANGE UP (ref 8.4–10.5)
CALCIUM SERPL-MCNC: 8.6 MG/DL — SIGNIFICANT CHANGE UP (ref 8.4–10.5)
CALCIUM SERPL-MCNC: 8.8 MG/DL — SIGNIFICANT CHANGE UP (ref 8.4–10.5)
CALCIUM SERPL-MCNC: 8.9 MG/DL — SIGNIFICANT CHANGE UP (ref 8.4–10.5)
CALCIUM SERPL-MCNC: 9.1 MG/DL — SIGNIFICANT CHANGE UP (ref 8.4–10.5)
CALCIUM SERPL-MCNC: 9.1 MG/DL — SIGNIFICANT CHANGE UP (ref 8.4–10.5)
CALCIUM SERPL-MCNC: 9.2 MG/DL — SIGNIFICANT CHANGE UP (ref 8.4–10.5)
CALCIUM SERPL-MCNC: 9.5 MG/DL — SIGNIFICANT CHANGE UP (ref 8.4–10.5)
CALCIUM SERPL-MCNC: 9.5 MG/DL — SIGNIFICANT CHANGE UP (ref 8.4–10.5)
CALCIUM SERPL-MCNC: 9.6 MG/DL — SIGNIFICANT CHANGE UP (ref 8.4–10.5)
CAST: 18 /LPF — HIGH (ref 0–4)
CHLORIDE BLDV-SCNC: 100 MMOL/L — SIGNIFICANT CHANGE UP (ref 96–108)
CHLORIDE BLDV-SCNC: 100 MMOL/L — SIGNIFICANT CHANGE UP (ref 96–108)
CHLORIDE BLDV-SCNC: 97 MMOL/L — SIGNIFICANT CHANGE UP (ref 96–108)
CHLORIDE BLDV-SCNC: 99 MMOL/L — SIGNIFICANT CHANGE UP (ref 96–108)
CHLORIDE BLDV-SCNC: 99 MMOL/L — SIGNIFICANT CHANGE UP (ref 96–108)
CHLORIDE SERPL-SCNC: 100 MMOL/L — SIGNIFICANT CHANGE UP (ref 96–108)
CHLORIDE SERPL-SCNC: 95 MMOL/L — LOW (ref 96–108)
CHLORIDE SERPL-SCNC: 96 MMOL/L — SIGNIFICANT CHANGE UP (ref 96–108)
CHLORIDE SERPL-SCNC: 97 MMOL/L — SIGNIFICANT CHANGE UP (ref 96–108)
CHLORIDE SERPL-SCNC: 99 MMOL/L — SIGNIFICANT CHANGE UP (ref 96–108)
CO2 BLDV-SCNC: 35 MMOL/L — HIGH (ref 22–26)
CO2 BLDV-SCNC: 36 MMOL/L — HIGH (ref 22–26)
CO2 BLDV-SCNC: 36 MMOL/L — HIGH (ref 22–26)
CO2 BLDV-SCNC: 38 MMOL/L — HIGH (ref 22–26)
CO2 BLDV-SCNC: 44 MMOL/L — HIGH (ref 22–26)
CO2 SERPL-SCNC: 25 MMOL/L — SIGNIFICANT CHANGE UP (ref 22–31)
CO2 SERPL-SCNC: 28 MMOL/L — SIGNIFICANT CHANGE UP (ref 22–31)
CO2 SERPL-SCNC: 29 MMOL/L — SIGNIFICANT CHANGE UP (ref 22–31)
CO2 SERPL-SCNC: 30 MMOL/L — SIGNIFICANT CHANGE UP (ref 22–31)
CO2 SERPL-SCNC: 30 MMOL/L — SIGNIFICANT CHANGE UP (ref 22–31)
CO2 SERPL-SCNC: 31 MMOL/L — SIGNIFICANT CHANGE UP (ref 22–31)
CO2 SERPL-SCNC: 32 MMOL/L — HIGH (ref 22–31)
CO2 SERPL-SCNC: 32 MMOL/L — HIGH (ref 22–31)
CO2 SERPL-SCNC: 33 MMOL/L — HIGH (ref 22–31)
CO2 SERPL-SCNC: 35 MMOL/L — HIGH (ref 22–31)
CO2 SERPL-SCNC: 36 MMOL/L — HIGH (ref 22–31)
CO2 SERPL-SCNC: 37 MMOL/L — HIGH (ref 22–31)
COD CRY URNS QL: PRESENT
COLOR SPEC: SIGNIFICANT CHANGE UP
CREAT ?TM UR-MCNC: 127 MG/DL — SIGNIFICANT CHANGE UP
CREAT SERPL-MCNC: 0.95 MG/DL — SIGNIFICANT CHANGE UP (ref 0.5–1.3)
CREAT SERPL-MCNC: 0.97 MG/DL — SIGNIFICANT CHANGE UP (ref 0.5–1.3)
CREAT SERPL-MCNC: 1.01 MG/DL — SIGNIFICANT CHANGE UP (ref 0.5–1.3)
CREAT SERPL-MCNC: 1.06 MG/DL — SIGNIFICANT CHANGE UP (ref 0.5–1.3)
CREAT SERPL-MCNC: 1.07 MG/DL — SIGNIFICANT CHANGE UP (ref 0.5–1.3)
CREAT SERPL-MCNC: 1.08 MG/DL — SIGNIFICANT CHANGE UP (ref 0.5–1.3)
CREAT SERPL-MCNC: 1.22 MG/DL — SIGNIFICANT CHANGE UP (ref 0.5–1.3)
CREAT SERPL-MCNC: 1.26 MG/DL — SIGNIFICANT CHANGE UP (ref 0.5–1.3)
CREAT SERPL-MCNC: 1.58 MG/DL — HIGH (ref 0.5–1.3)
CREAT SERPL-MCNC: 2.35 MG/DL — HIGH (ref 0.5–1.3)
CREAT SERPL-MCNC: 2.79 MG/DL — HIGH (ref 0.5–1.3)
CREAT SERPL-MCNC: 3.32 MG/DL — HIGH (ref 0.5–1.3)
CREAT SERPL-MCNC: 4 MG/DL — HIGH (ref 0.5–1.3)
CREAT SERPL-MCNC: 4.33 MG/DL — HIGH (ref 0.5–1.3)
CREAT SERPL-MCNC: 4.79 MG/DL — HIGH (ref 0.5–1.3)
CREAT SERPL-MCNC: 4.81 MG/DL — HIGH (ref 0.5–1.3)
CULTURE RESULTS: ABNORMAL
CULTURE RESULTS: SIGNIFICANT CHANGE UP
CULTURE RESULTS: SIGNIFICANT CHANGE UP
DACRYOCYTES BLD QL SMEAR: SLIGHT — SIGNIFICANT CHANGE UP
DIFF PNL FLD: ABNORMAL
EGFR: 10 ML/MIN/1.73M2 — LOW
EGFR: 13 ML/MIN/1.73M2 — LOW
EGFR: 16 ML/MIN/1.73M2 — LOW
EGFR: 19 ML/MIN/1.73M2 — LOW
EGFR: 31 ML/MIN/1.73M2 — LOW
EGFR: 40 ML/MIN/1.73M2 — LOW
EGFR: 42 ML/MIN/1.73M2 — LOW
EGFR: 48 ML/MIN/1.73M2 — LOW
EGFR: 49 ML/MIN/1.73M2 — LOW
EGFR: 50 ML/MIN/1.73M2 — LOW
EGFR: 53 ML/MIN/1.73M2 — LOW
EGFR: 55 ML/MIN/1.73M2 — LOW
EGFR: 57 ML/MIN/1.73M2 — LOW
EGFR: 8 ML/MIN/1.73M2 — LOW
EGFR: 8 ML/MIN/1.73M2 — LOW
EGFR: 9 ML/MIN/1.73M2 — LOW
ELLIPTOCYTES BLD QL SMEAR: SLIGHT — SIGNIFICANT CHANGE UP
EOSINOPHIL # BLD AUTO: 0 K/UL — SIGNIFICANT CHANGE UP (ref 0–0.5)
EOSINOPHIL # BLD AUTO: 0.12 K/UL — SIGNIFICANT CHANGE UP (ref 0–0.5)
EOSINOPHIL NFR BLD AUTO: 0 % — SIGNIFICANT CHANGE UP (ref 0–6)
EOSINOPHIL NFR BLD AUTO: 1.4 % — SIGNIFICANT CHANGE UP (ref 0–6)
FACT VIII ACT/NOR PPP: 171 % — HIGH (ref 60–125)
FERRITIN SERPL-MCNC: 36 NG/ML — SIGNIFICANT CHANGE UP (ref 13–330)
FLUAV AG NPH QL: SIGNIFICANT CHANGE UP
FLUAV AG NPH QL: SIGNIFICANT CHANGE UP
FLUBV AG NPH QL: SIGNIFICANT CHANGE UP
FLUBV AG NPH QL: SIGNIFICANT CHANGE UP
GAS PNL BLDA: SIGNIFICANT CHANGE UP
GAS PNL BLDA: SIGNIFICANT CHANGE UP
GAS PNL BLDV: 134 MMOL/L — LOW (ref 136–145)
GAS PNL BLDV: 138 MMOL/L — SIGNIFICANT CHANGE UP (ref 136–145)
GAS PNL BLDV: 139 MMOL/L — SIGNIFICANT CHANGE UP (ref 136–145)
GAS PNL BLDV: 140 MMOL/L — SIGNIFICANT CHANGE UP (ref 136–145)
GAS PNL BLDV: 141 MMOL/L — SIGNIFICANT CHANGE UP (ref 136–145)
GAS PNL BLDV: SIGNIFICANT CHANGE UP
GLUCOSE BLDC GLUCOMTR-MCNC: 107 MG/DL — HIGH (ref 70–99)
GLUCOSE BLDC GLUCOMTR-MCNC: 135 MG/DL — HIGH (ref 70–99)
GLUCOSE BLDC GLUCOMTR-MCNC: 47 MG/DL — CRITICAL LOW (ref 70–99)
GLUCOSE BLDV-MCNC: 101 MG/DL — HIGH (ref 70–99)
GLUCOSE BLDV-MCNC: 109 MG/DL — HIGH (ref 70–99)
GLUCOSE BLDV-MCNC: 110 MG/DL — HIGH (ref 70–99)
GLUCOSE BLDV-MCNC: 77 MG/DL — SIGNIFICANT CHANGE UP (ref 70–99)
GLUCOSE BLDV-MCNC: 96 MG/DL — SIGNIFICANT CHANGE UP (ref 70–99)
GLUCOSE SERPL-MCNC: 101 MG/DL — HIGH (ref 70–99)
GLUCOSE SERPL-MCNC: 108 MG/DL — HIGH (ref 70–99)
GLUCOSE SERPL-MCNC: 108 MG/DL — HIGH (ref 70–99)
GLUCOSE SERPL-MCNC: 114 MG/DL — HIGH (ref 70–99)
GLUCOSE SERPL-MCNC: 117 MG/DL — HIGH (ref 70–99)
GLUCOSE SERPL-MCNC: 117 MG/DL — HIGH (ref 70–99)
GLUCOSE SERPL-MCNC: 121 MG/DL — HIGH (ref 70–99)
GLUCOSE SERPL-MCNC: 128 MG/DL — HIGH (ref 70–99)
GLUCOSE SERPL-MCNC: 138 MG/DL — HIGH (ref 70–99)
GLUCOSE SERPL-MCNC: 159 MG/DL — HIGH (ref 70–99)
GLUCOSE SERPL-MCNC: 69 MG/DL — LOW (ref 70–99)
GLUCOSE SERPL-MCNC: 90 MG/DL — SIGNIFICANT CHANGE UP (ref 70–99)
GLUCOSE SERPL-MCNC: 90 MG/DL — SIGNIFICANT CHANGE UP (ref 70–99)
GLUCOSE SERPL-MCNC: 92 MG/DL — SIGNIFICANT CHANGE UP (ref 70–99)
GLUCOSE SERPL-MCNC: 94 MG/DL — SIGNIFICANT CHANGE UP (ref 70–99)
GLUCOSE SERPL-MCNC: 97 MG/DL — SIGNIFICANT CHANGE UP (ref 70–99)
GLUCOSE UR QL: NEGATIVE MG/DL — SIGNIFICANT CHANGE UP
HAPTOGLOB SERPL-MCNC: 123 MG/DL — SIGNIFICANT CHANGE UP (ref 34–200)
HCO3 BLDV-SCNC: 33 MMOL/L — HIGH (ref 22–29)
HCO3 BLDV-SCNC: 34 MMOL/L — HIGH (ref 22–29)
HCO3 BLDV-SCNC: 34 MMOL/L — HIGH (ref 22–29)
HCO3 BLDV-SCNC: 36 MMOL/L — HIGH (ref 22–29)
HCO3 BLDV-SCNC: 41 MMOL/L — HIGH (ref 22–29)
HCT VFR BLD CALC: 24.1 % — LOW (ref 34.5–45)
HCT VFR BLD CALC: 26.5 % — LOW (ref 34.5–45)
HCT VFR BLD CALC: 28.4 % — LOW (ref 34.5–45)
HCT VFR BLD CALC: 28.5 % — LOW (ref 34.5–45)
HCT VFR BLD CALC: 28.8 % — LOW (ref 34.5–45)
HCT VFR BLD CALC: 29.5 % — LOW (ref 34.5–45)
HCT VFR BLD CALC: 30.6 % — LOW (ref 34.5–45)
HCT VFR BLD CALC: 32 % — LOW (ref 34.5–45)
HCT VFR BLD CALC: 33.1 % — LOW (ref 34.5–45)
HCT VFR BLD CALC: 33.6 % — LOW (ref 34.5–45)
HCT VFR BLD CALC: 34 % — LOW (ref 34.5–45)
HCT VFR BLD CALC: 34.1 % — LOW (ref 34.5–45)
HCT VFR BLD CALC: 34.7 % — SIGNIFICANT CHANGE UP (ref 34.5–45)
HCT VFR BLD CALC: 35.3 % — SIGNIFICANT CHANGE UP (ref 34.5–45)
HCT VFR BLD CALC: 35.5 % — SIGNIFICANT CHANGE UP (ref 34.5–45)
HCT VFR BLD CALC: 35.9 % — SIGNIFICANT CHANGE UP (ref 34.5–45)
HCT VFR BLDA CALC: 26 % — LOW (ref 34.5–46.5)
HCT VFR BLDA CALC: 29 % — LOW (ref 34.5–46.5)
HCT VFR BLDA CALC: 30 % — LOW (ref 34.5–46.5)
HCT VFR BLDA CALC: 30 % — LOW (ref 34.5–46.5)
HCT VFR BLDA CALC: 31 % — LOW (ref 34.5–46.5)
HGB BLD CALC-MCNC: 10.3 G/DL — LOW (ref 11.7–16.1)
HGB BLD CALC-MCNC: 8.5 G/DL — LOW (ref 11.7–16.1)
HGB BLD CALC-MCNC: 9.8 G/DL — LOW (ref 11.7–16.1)
HGB BLD CALC-MCNC: 9.9 G/DL — LOW (ref 11.7–16.1)
HGB BLD CALC-MCNC: 9.9 G/DL — LOW (ref 11.7–16.1)
HGB BLD-MCNC: 10 G/DL — LOW (ref 11.5–15.5)
HGB BLD-MCNC: 10.2 G/DL — LOW (ref 11.5–15.5)
HGB BLD-MCNC: 6.7 G/DL — CRITICAL LOW (ref 11.5–15.5)
HGB BLD-MCNC: 7.6 G/DL — LOW (ref 11.5–15.5)
HGB BLD-MCNC: 8.1 G/DL — LOW (ref 11.5–15.5)
HGB BLD-MCNC: 8.2 G/DL — LOW (ref 11.5–15.5)
HGB BLD-MCNC: 8.2 G/DL — LOW (ref 11.5–15.5)
HGB BLD-MCNC: 8.4 G/DL — LOW (ref 11.5–15.5)
HGB BLD-MCNC: 9.1 G/DL — LOW (ref 11.5–15.5)
HGB BLD-MCNC: 9.5 G/DL — LOW (ref 11.5–15.5)
HGB BLD-MCNC: 9.5 G/DL — LOW (ref 11.5–15.5)
HGB BLD-MCNC: 9.6 G/DL — LOW (ref 11.5–15.5)
HGB BLD-MCNC: 9.8 G/DL — LOW (ref 11.5–15.5)
HGB BLD-MCNC: 9.9 G/DL — LOW (ref 11.5–15.5)
HYPOCHROMIA BLD QL: SLIGHT — SIGNIFICANT CHANGE UP
HYPOCHROMIA BLD QL: SLIGHT — SIGNIFICANT CHANGE UP
IMM GRANULOCYTES NFR BLD AUTO: 0.4 % — SIGNIFICANT CHANGE UP (ref 0–0.9)
IMM GRANULOCYTES NFR BLD AUTO: 1 % — HIGH (ref 0–0.9)
IMM GRANULOCYTES NFR BLD AUTO: 1.5 % — HIGH (ref 0–0.9)
INR BLD: 1.07 RATIO — SIGNIFICANT CHANGE UP (ref 0.85–1.18)
IRON SATN MFR SERPL: 28 UG/DL — LOW (ref 30–160)
IRON SATN MFR SERPL: 9 % — LOW (ref 14–50)
KETONES UR-MCNC: NEGATIVE MG/DL — SIGNIFICANT CHANGE UP
LACTATE BLDV-MCNC: 2.2 MMOL/L — HIGH (ref 0.5–2)
LACTATE BLDV-MCNC: 2.6 MMOL/L — HIGH (ref 0.5–2)
LACTATE BLDV-MCNC: 2.9 MMOL/L — HIGH (ref 0.5–2)
LACTATE BLDV-MCNC: 2.9 MMOL/L — HIGH (ref 0.5–2)
LACTATE BLDV-MCNC: 3.7 MMOL/L — HIGH (ref 0.5–2)
LDH SERPL L TO P-CCNC: 426 U/L — HIGH (ref 50–242)
LEUKOCYTE ESTERASE UR-ACNC: ABNORMAL
LYMPHOCYTES # BLD AUTO: 0.13 K/UL — LOW (ref 1–3.3)
LYMPHOCYTES # BLD AUTO: 0.77 K/UL — LOW (ref 1–3.3)
LYMPHOCYTES # BLD AUTO: 0.81 K/UL — LOW (ref 1–3.3)
LYMPHOCYTES # BLD AUTO: 0.91 K/UL — LOW (ref 1–3.3)
LYMPHOCYTES # BLD AUTO: 1.56 K/UL — SIGNIFICANT CHANGE UP (ref 1–3.3)
LYMPHOCYTES # BLD AUTO: 1.7 % — LOW (ref 13–44)
LYMPHOCYTES # BLD AUTO: 18.8 % — SIGNIFICANT CHANGE UP (ref 13–44)
LYMPHOCYTES # BLD AUTO: 4.3 % — LOW (ref 13–44)
LYMPHOCYTES # BLD AUTO: 5.3 % — LOW (ref 13–44)
LYMPHOCYTES # BLD AUTO: 7 % — LOW (ref 13–44)
MACROCYTES BLD QL: SLIGHT — SIGNIFICANT CHANGE UP
MAGNESIUM SERPL-MCNC: 1.8 MG/DL — SIGNIFICANT CHANGE UP (ref 1.6–2.6)
MAGNESIUM SERPL-MCNC: 1.9 MG/DL — SIGNIFICANT CHANGE UP (ref 1.6–2.6)
MAGNESIUM SERPL-MCNC: 2.1 MG/DL — SIGNIFICANT CHANGE UP (ref 1.6–2.6)
MAGNESIUM SERPL-MCNC: 2.2 MG/DL — SIGNIFICANT CHANGE UP (ref 1.6–2.6)
MAGNESIUM SERPL-MCNC: 2.2 MG/DL — SIGNIFICANT CHANGE UP (ref 1.6–2.6)
MAGNESIUM SERPL-MCNC: 2.3 MG/DL — SIGNIFICANT CHANGE UP (ref 1.6–2.6)
MAGNESIUM SERPL-MCNC: 2.4 MG/DL — SIGNIFICANT CHANGE UP (ref 1.6–2.6)
MANUAL SMEAR VERIFICATION: SIGNIFICANT CHANGE UP
MANUAL SMEAR VERIFICATION: SIGNIFICANT CHANGE UP
MCHC RBC-ENTMCNC: 24.4 PG — LOW (ref 27–34)
MCHC RBC-ENTMCNC: 24.5 PG — LOW (ref 27–34)
MCHC RBC-ENTMCNC: 24.9 PG — LOW (ref 27–34)
MCHC RBC-ENTMCNC: 25.2 PG — LOW (ref 27–34)
MCHC RBC-ENTMCNC: 25.2 PG — LOW (ref 27–34)
MCHC RBC-ENTMCNC: 25.3 PG — LOW (ref 27–34)
MCHC RBC-ENTMCNC: 25.5 PG — LOW (ref 27–34)
MCHC RBC-ENTMCNC: 26 PG — LOW (ref 27–34)
MCHC RBC-ENTMCNC: 26.1 PG — LOW (ref 27–34)
MCHC RBC-ENTMCNC: 26.1 PG — LOW (ref 27–34)
MCHC RBC-ENTMCNC: 26.4 PG — LOW (ref 27–34)
MCHC RBC-ENTMCNC: 26.5 PG — LOW (ref 27–34)
MCHC RBC-ENTMCNC: 26.6 PG — LOW (ref 27–34)
MCHC RBC-ENTMCNC: 27.1 PG — SIGNIFICANT CHANGE UP (ref 27–34)
MCHC RBC-ENTMCNC: 27.8 GM/DL — LOW (ref 32–36)
MCHC RBC-ENTMCNC: 27.8 GM/DL — LOW (ref 32–36)
MCHC RBC-ENTMCNC: 27.9 GM/DL — LOW (ref 32–36)
MCHC RBC-ENTMCNC: 28.1 GM/DL — LOW (ref 32–36)
MCHC RBC-ENTMCNC: 28.3 GM/DL — LOW (ref 32–36)
MCHC RBC-ENTMCNC: 28.4 GM/DL — LOW (ref 32–36)
MCHC RBC-ENTMCNC: 28.5 GM/DL — LOW (ref 32–36)
MCHC RBC-ENTMCNC: 28.5 GM/DL — LOW (ref 32–36)
MCHC RBC-ENTMCNC: 28.7 GM/DL — LOW (ref 32–36)
MCHC RBC-ENTMCNC: 28.7 GM/DL — LOW (ref 32–36)
MCHC RBC-ENTMCNC: 28.8 GM/DL — LOW (ref 32–36)
MCHC RBC-ENTMCNC: 28.9 GM/DL — LOW (ref 32–36)
MCHC RBC-ENTMCNC: 29.1 GM/DL — LOW (ref 32–36)
MCHC RBC-ENTMCNC: 29.3 GM/DL — LOW (ref 32–36)
MCHC RBC-ENTMCNC: 29.7 GM/DL — LOW (ref 32–36)
MCHC RBC-ENTMCNC: 30 GM/DL — LOW (ref 32–36)
MCV RBC AUTO: 86.7 FL — SIGNIFICANT CHANGE UP (ref 80–100)
MCV RBC AUTO: 86.9 FL — SIGNIFICANT CHANGE UP (ref 80–100)
MCV RBC AUTO: 87.3 FL — SIGNIFICANT CHANGE UP (ref 80–100)
MCV RBC AUTO: 87.4 FL — SIGNIFICANT CHANGE UP (ref 80–100)
MCV RBC AUTO: 87.7 FL — SIGNIFICANT CHANGE UP (ref 80–100)
MCV RBC AUTO: 88.3 FL — SIGNIFICANT CHANGE UP (ref 80–100)
MCV RBC AUTO: 89 FL — SIGNIFICANT CHANGE UP (ref 80–100)
MCV RBC AUTO: 89.1 FL — SIGNIFICANT CHANGE UP (ref 80–100)
MCV RBC AUTO: 89.2 FL — SIGNIFICANT CHANGE UP (ref 80–100)
MCV RBC AUTO: 89.6 FL — SIGNIFICANT CHANGE UP (ref 80–100)
MCV RBC AUTO: 90 FL — SIGNIFICANT CHANGE UP (ref 80–100)
MCV RBC AUTO: 90.4 FL — SIGNIFICANT CHANGE UP (ref 80–100)
MCV RBC AUTO: 91.3 FL — SIGNIFICANT CHANGE UP (ref 80–100)
MCV RBC AUTO: 91.4 FL — SIGNIFICANT CHANGE UP (ref 80–100)
MCV RBC AUTO: 91.8 FL — SIGNIFICANT CHANGE UP (ref 80–100)
MCV RBC AUTO: 93.2 FL — SIGNIFICANT CHANGE UP (ref 80–100)
METHOD TYPE: SIGNIFICANT CHANGE UP
METHOD TYPE: SIGNIFICANT CHANGE UP
MICROCYTES BLD QL: SLIGHT — SIGNIFICANT CHANGE UP
MONOCYTES # BLD AUTO: 0.07 K/UL — SIGNIFICANT CHANGE UP (ref 0–0.9)
MONOCYTES # BLD AUTO: 0.61 K/UL — SIGNIFICANT CHANGE UP (ref 0–0.9)
MONOCYTES # BLD AUTO: 0.64 K/UL — SIGNIFICANT CHANGE UP (ref 0–0.9)
MONOCYTES # BLD AUTO: 0.69 K/UL — SIGNIFICANT CHANGE UP (ref 0–0.9)
MONOCYTES # BLD AUTO: 0.77 K/UL — SIGNIFICANT CHANGE UP (ref 0–0.9)
MONOCYTES NFR BLD AUTO: 0.9 % — LOW (ref 2–14)
MONOCYTES NFR BLD AUTO: 4.2 % — SIGNIFICANT CHANGE UP (ref 2–14)
MONOCYTES NFR BLD AUTO: 4.3 % — SIGNIFICANT CHANGE UP (ref 2–14)
MONOCYTES NFR BLD AUTO: 5.3 % — SIGNIFICANT CHANGE UP (ref 2–14)
MONOCYTES NFR BLD AUTO: 7.3 % — SIGNIFICANT CHANGE UP (ref 2–14)
MRSA PCR RESULT.: SIGNIFICANT CHANGE UP
NEUTROPHILS # BLD AUTO: 11.19 K/UL — HIGH (ref 1.8–7.4)
NEUTROPHILS # BLD AUTO: 13.65 K/UL — HIGH (ref 1.8–7.4)
NEUTROPHILS # BLD AUTO: 16.35 K/UL — HIGH (ref 1.8–7.4)
NEUTROPHILS # BLD AUTO: 5.96 K/UL — SIGNIFICANT CHANGE UP (ref 1.8–7.4)
NEUTROPHILS # BLD AUTO: 7.31 K/UL — SIGNIFICANT CHANGE UP (ref 1.8–7.4)
NEUTROPHILS NFR BLD AUTO: 71.7 % — SIGNIFICANT CHANGE UP (ref 43–77)
NEUTROPHILS NFR BLD AUTO: 86 % — HIGH (ref 43–77)
NEUTROPHILS NFR BLD AUTO: 89.4 % — HIGH (ref 43–77)
NEUTROPHILS NFR BLD AUTO: 91.4 % — HIGH (ref 43–77)
NEUTROPHILS NFR BLD AUTO: 95.7 % — HIGH (ref 43–77)
NEUTS BAND # BLD: 1.7 % — SIGNIFICANT CHANGE UP (ref 0–8)
NITRITE UR-MCNC: NEGATIVE — SIGNIFICANT CHANGE UP
NRBC # BLD: 0 /100 WBCS — SIGNIFICANT CHANGE UP (ref 0–0)
NRBC # BLD: 1 /100 WBCS — HIGH (ref 0–0)
NRBC # BLD: 1 /100 WBCS — HIGH (ref 0–0)
NRBC # BLD: 2 /100 WBCS — HIGH (ref 0–0)
NRBC # BLD: 3 /100 WBCS — HIGH (ref 0–0)
NT-PROBNP SERPL-SCNC: 1994 PG/ML — HIGH (ref 0–300)
NT-PROBNP SERPL-SCNC: 7124 PG/ML — HIGH (ref 0–300)
ORGANISM # SPEC MICROSCOPIC CNT: ABNORMAL
OSMOLALITY UR: 471 MOS/KG — SIGNIFICANT CHANGE UP (ref 300–900)
OVALOCYTES BLD QL SMEAR: SLIGHT — SIGNIFICANT CHANGE UP
PCO2 BLDV: 65 MMHG — HIGH (ref 39–42)
PCO2 BLDV: 68 MMHG — HIGH (ref 39–42)
PCO2 BLDV: 69 MMHG — HIGH (ref 39–42)
PCO2 BLDV: 69 MMHG — HIGH (ref 39–42)
PCO2 BLDV: 78 MMHG — CRITICAL HIGH (ref 39–42)
PH BLDV: 7.29 — LOW (ref 7.32–7.43)
PH BLDV: 7.31 — LOW (ref 7.32–7.43)
PH BLDV: 7.33 — SIGNIFICANT CHANGE UP (ref 7.32–7.43)
PH UR: 5.5 — SIGNIFICANT CHANGE UP (ref 5–8)
PHOSPHATE SERPL-MCNC: 1.7 MG/DL — LOW (ref 2.5–4.5)
PHOSPHATE SERPL-MCNC: 2.6 MG/DL — SIGNIFICANT CHANGE UP (ref 2.5–4.5)
PHOSPHATE SERPL-MCNC: 3.2 MG/DL — SIGNIFICANT CHANGE UP (ref 2.5–4.5)
PHOSPHATE SERPL-MCNC: 3.6 MG/DL — SIGNIFICANT CHANGE UP (ref 2.5–4.5)
PHOSPHATE SERPL-MCNC: 3.6 MG/DL — SIGNIFICANT CHANGE UP (ref 2.5–4.5)
PHOSPHATE SERPL-MCNC: 3.7 MG/DL — SIGNIFICANT CHANGE UP (ref 2.5–4.5)
PHOSPHATE SERPL-MCNC: 4.6 MG/DL — HIGH (ref 2.5–4.5)
PHOSPHATE SERPL-MCNC: 5 MG/DL — HIGH (ref 2.5–4.5)
PHOSPHATE SERPL-MCNC: 5.4 MG/DL — HIGH (ref 2.5–4.5)
PHOSPHATE SERPL-MCNC: 5.6 MG/DL — HIGH (ref 2.5–4.5)
PHOSPHATE SERPL-MCNC: 5.7 MG/DL — HIGH (ref 2.5–4.5)
PHOSPHATE SERPL-MCNC: 6.3 MG/DL — HIGH (ref 2.5–4.5)
PHOSPHATE SERPL-MCNC: 6.3 MG/DL — HIGH (ref 2.5–4.5)
PLAT MORPH BLD: NORMAL — SIGNIFICANT CHANGE UP
PLAT MORPH BLD: NORMAL — SIGNIFICANT CHANGE UP
PLATELET # BLD AUTO: 114 K/UL — LOW (ref 150–400)
PLATELET # BLD AUTO: 13 K/UL — CRITICAL LOW (ref 150–400)
PLATELET # BLD AUTO: 149 K/UL — LOW (ref 150–400)
PLATELET # BLD AUTO: 162 K/UL — SIGNIFICANT CHANGE UP (ref 150–400)
PLATELET # BLD AUTO: 203 K/UL — SIGNIFICANT CHANGE UP (ref 150–400)
PLATELET # BLD AUTO: 213 K/UL — SIGNIFICANT CHANGE UP (ref 150–400)
PLATELET # BLD AUTO: 240 K/UL — SIGNIFICANT CHANGE UP (ref 150–400)
PLATELET # BLD AUTO: 249 K/UL — SIGNIFICANT CHANGE UP (ref 150–400)
PLATELET # BLD AUTO: 270 K/UL — SIGNIFICANT CHANGE UP (ref 150–400)
PLATELET # BLD AUTO: 276 K/UL — SIGNIFICANT CHANGE UP (ref 150–400)
PLATELET # BLD AUTO: 323 K/UL — SIGNIFICANT CHANGE UP (ref 150–400)
PLATELET # BLD AUTO: 337 K/UL — SIGNIFICANT CHANGE UP (ref 150–400)
PLATELET # BLD AUTO: 351 K/UL — SIGNIFICANT CHANGE UP (ref 150–400)
PLATELET # BLD AUTO: 4 K/UL — CRITICAL LOW (ref 150–400)
PLATELET # BLD AUTO: 60 K/UL — LOW (ref 150–400)
PLATELET # BLD AUTO: 81 K/UL — LOW (ref 150–400)
PO2 BLDV: 101 MMHG — HIGH (ref 25–45)
PO2 BLDV: 161 MMHG — HIGH (ref 25–45)
PO2 BLDV: 30 MMHG — SIGNIFICANT CHANGE UP (ref 25–45)
PO2 BLDV: 57 MMHG — HIGH (ref 25–45)
PO2 BLDV: 80 MMHG — HIGH (ref 25–45)
POIKILOCYTOSIS BLD QL AUTO: SLIGHT — SIGNIFICANT CHANGE UP
POIKILOCYTOSIS BLD QL AUTO: SLIGHT — SIGNIFICANT CHANGE UP
POLYCHROMASIA BLD QL SMEAR: SLIGHT — SIGNIFICANT CHANGE UP
POLYCHROMASIA BLD QL SMEAR: SLIGHT — SIGNIFICANT CHANGE UP
POTASSIUM BLDV-SCNC: 3.6 MMOL/L — SIGNIFICANT CHANGE UP (ref 3.5–5.1)
POTASSIUM BLDV-SCNC: 5.1 MMOL/L — SIGNIFICANT CHANGE UP (ref 3.5–5.1)
POTASSIUM BLDV-SCNC: 5.2 MMOL/L — HIGH (ref 3.5–5.1)
POTASSIUM BLDV-SCNC: 5.2 MMOL/L — HIGH (ref 3.5–5.1)
POTASSIUM BLDV-SCNC: 5.5 MMOL/L — HIGH (ref 3.5–5.1)
POTASSIUM SERPL-MCNC: 3.4 MMOL/L — LOW (ref 3.5–5.3)
POTASSIUM SERPL-MCNC: 3.8 MMOL/L — SIGNIFICANT CHANGE UP (ref 3.5–5.3)
POTASSIUM SERPL-MCNC: 4.1 MMOL/L — SIGNIFICANT CHANGE UP (ref 3.5–5.3)
POTASSIUM SERPL-MCNC: 4.1 MMOL/L — SIGNIFICANT CHANGE UP (ref 3.5–5.3)
POTASSIUM SERPL-MCNC: 4.4 MMOL/L — SIGNIFICANT CHANGE UP (ref 3.5–5.3)
POTASSIUM SERPL-MCNC: 4.6 MMOL/L — SIGNIFICANT CHANGE UP (ref 3.5–5.3)
POTASSIUM SERPL-MCNC: 4.7 MMOL/L — SIGNIFICANT CHANGE UP (ref 3.5–5.3)
POTASSIUM SERPL-MCNC: 4.7 MMOL/L — SIGNIFICANT CHANGE UP (ref 3.5–5.3)
POTASSIUM SERPL-MCNC: 4.9 MMOL/L — SIGNIFICANT CHANGE UP (ref 3.5–5.3)
POTASSIUM SERPL-MCNC: 5 MMOL/L — SIGNIFICANT CHANGE UP (ref 3.5–5.3)
POTASSIUM SERPL-MCNC: 5.1 MMOL/L — SIGNIFICANT CHANGE UP (ref 3.5–5.3)
POTASSIUM SERPL-MCNC: 5.4 MMOL/L — HIGH (ref 3.5–5.3)
POTASSIUM SERPL-MCNC: 5.5 MMOL/L — HIGH (ref 3.5–5.3)
POTASSIUM SERPL-MCNC: 5.6 MMOL/L — HIGH (ref 3.5–5.3)
POTASSIUM SERPL-SCNC: 3.4 MMOL/L — LOW (ref 3.5–5.3)
POTASSIUM SERPL-SCNC: 3.8 MMOL/L — SIGNIFICANT CHANGE UP (ref 3.5–5.3)
POTASSIUM SERPL-SCNC: 4.1 MMOL/L — SIGNIFICANT CHANGE UP (ref 3.5–5.3)
POTASSIUM SERPL-SCNC: 4.1 MMOL/L — SIGNIFICANT CHANGE UP (ref 3.5–5.3)
POTASSIUM SERPL-SCNC: 4.4 MMOL/L — SIGNIFICANT CHANGE UP (ref 3.5–5.3)
POTASSIUM SERPL-SCNC: 4.6 MMOL/L — SIGNIFICANT CHANGE UP (ref 3.5–5.3)
POTASSIUM SERPL-SCNC: 4.7 MMOL/L — SIGNIFICANT CHANGE UP (ref 3.5–5.3)
POTASSIUM SERPL-SCNC: 4.7 MMOL/L — SIGNIFICANT CHANGE UP (ref 3.5–5.3)
POTASSIUM SERPL-SCNC: 4.9 MMOL/L — SIGNIFICANT CHANGE UP (ref 3.5–5.3)
POTASSIUM SERPL-SCNC: 5 MMOL/L — SIGNIFICANT CHANGE UP (ref 3.5–5.3)
POTASSIUM SERPL-SCNC: 5.1 MMOL/L — SIGNIFICANT CHANGE UP (ref 3.5–5.3)
POTASSIUM SERPL-SCNC: 5.4 MMOL/L — HIGH (ref 3.5–5.3)
POTASSIUM SERPL-SCNC: 5.5 MMOL/L — HIGH (ref 3.5–5.3)
POTASSIUM SERPL-SCNC: 5.6 MMOL/L — HIGH (ref 3.5–5.3)
POTASSIUM UR-SCNC: 51 MMOL/L — SIGNIFICANT CHANGE UP
PROT ?TM UR-MCNC: 87 MG/DL — HIGH (ref 0–12)
PROT SERPL-MCNC: 6.1 G/DL — SIGNIFICANT CHANGE UP (ref 6–8.3)
PROT SERPL-MCNC: 6.4 G/DL — SIGNIFICANT CHANGE UP (ref 6–8.3)
PROT SERPL-MCNC: 6.5 G/DL — SIGNIFICANT CHANGE UP (ref 6–8.3)
PROT SERPL-MCNC: 6.6 G/DL — SIGNIFICANT CHANGE UP (ref 6–8.3)
PROT SERPL-MCNC: 6.8 G/DL — SIGNIFICANT CHANGE UP (ref 6–8.3)
PROT SERPL-MCNC: 6.8 G/DL — SIGNIFICANT CHANGE UP (ref 6–8.3)
PROT UR-MCNC: 300 MG/DL
PROT/CREAT UR-RTO: 0.7 RATIO — HIGH (ref 0–0.2)
PROTHROM AB SERPL-ACNC: 11.7 SEC — SIGNIFICANT CHANGE UP (ref 9.5–13)
RBC # BLD: 2.73 M/UL — LOW (ref 3.8–5.2)
RBC # BLD: 3.05 M/UL — LOW (ref 3.8–5.2)
RBC # BLD: 3.24 M/UL — LOW (ref 3.8–5.2)
RBC # BLD: 3.26 M/UL — LOW (ref 3.8–5.2)
RBC # BLD: 3.32 M/UL — LOW (ref 3.8–5.2)
RBC # BLD: 3.38 M/UL — LOW (ref 3.8–5.2)
RBC # BLD: 3.43 M/UL — LOW (ref 3.8–5.2)
RBC # BLD: 3.54 M/UL — LOW (ref 3.8–5.2)
RBC # BLD: 3.72 M/UL — LOW (ref 3.8–5.2)
RBC # BLD: 3.72 M/UL — LOW (ref 3.8–5.2)
RBC # BLD: 3.77 M/UL — LOW (ref 3.8–5.2)
RBC # BLD: 3.79 M/UL — LOW (ref 3.8–5.2)
RBC # BLD: 3.8 M/UL — SIGNIFICANT CHANGE UP (ref 3.8–5.2)
RBC # BLD: 3.81 M/UL — SIGNIFICANT CHANGE UP (ref 3.8–5.2)
RBC # BLD: 3.91 M/UL — SIGNIFICANT CHANGE UP (ref 3.8–5.2)
RBC # BLD: 3.94 M/UL — SIGNIFICANT CHANGE UP (ref 3.8–5.2)
RBC # FLD: 17 % — HIGH (ref 10.3–14.5)
RBC # FLD: 17.1 % — HIGH (ref 10.3–14.5)
RBC # FLD: 17.1 % — HIGH (ref 10.3–14.5)
RBC # FLD: 17.3 % — HIGH (ref 10.3–14.5)
RBC # FLD: 17.5 % — HIGH (ref 10.3–14.5)
RBC # FLD: 17.7 % — HIGH (ref 10.3–14.5)
RBC # FLD: 18.4 % — HIGH (ref 10.3–14.5)
RBC # FLD: 19.3 % — HIGH (ref 10.3–14.5)
RBC # FLD: 19.8 % — HIGH (ref 10.3–14.5)
RBC # FLD: 20.9 % — HIGH (ref 10.3–14.5)
RBC # FLD: 21.1 % — HIGH (ref 10.3–14.5)
RBC # FLD: 22.1 % — HIGH (ref 10.3–14.5)
RBC # FLD: 22.9 % — HIGH (ref 10.3–14.5)
RBC # FLD: 23.4 % — HIGH (ref 10.3–14.5)
RBC BLD AUTO: ABNORMAL
RBC BLD AUTO: ABNORMAL
RBC CASTS # UR COMP ASSIST: 34 /HPF — HIGH (ref 0–4)
RETICS #: 76.1 K/UL — SIGNIFICANT CHANGE UP (ref 25–125)
RETICS/RBC NFR: 2.9 % — HIGH (ref 0.5–2.5)
REVIEW: SIGNIFICANT CHANGE UP
RH IG SCN BLD-IMP: POSITIVE — SIGNIFICANT CHANGE UP
RSV RNA NPH QL NAA+NON-PROBE: SIGNIFICANT CHANGE UP
RSV RNA NPH QL NAA+NON-PROBE: SIGNIFICANT CHANGE UP
S AUREUS DNA NOSE QL NAA+PROBE: SIGNIFICANT CHANGE UP
SAO2 % BLDV: 41 % — LOW (ref 67–88)
SAO2 % BLDV: 88.2 % — HIGH (ref 67–88)
SAO2 % BLDV: 93.8 % — HIGH (ref 67–88)
SAO2 % BLDV: 98.3 % — HIGH (ref 67–88)
SAO2 % BLDV: 99.7 % — HIGH (ref 67–88)
SARS-COV-2 RNA SPEC QL NAA+PROBE: SIGNIFICANT CHANGE UP
SARS-COV-2 RNA SPEC QL NAA+PROBE: SIGNIFICANT CHANGE UP
SODIUM SERPL-SCNC: 135 MMOL/L — SIGNIFICANT CHANGE UP (ref 135–145)
SODIUM SERPL-SCNC: 138 MMOL/L — SIGNIFICANT CHANGE UP (ref 135–145)
SODIUM SERPL-SCNC: 139 MMOL/L — SIGNIFICANT CHANGE UP (ref 135–145)
SODIUM SERPL-SCNC: 141 MMOL/L — SIGNIFICANT CHANGE UP (ref 135–145)
SODIUM SERPL-SCNC: 143 MMOL/L — SIGNIFICANT CHANGE UP (ref 135–145)
SODIUM SERPL-SCNC: 144 MMOL/L — SIGNIFICANT CHANGE UP (ref 135–145)
SODIUM SERPL-SCNC: 145 MMOL/L — SIGNIFICANT CHANGE UP (ref 135–145)
SODIUM SERPL-SCNC: 146 MMOL/L — HIGH (ref 135–145)
SODIUM SERPL-SCNC: 146 MMOL/L — HIGH (ref 135–145)
SODIUM UR-SCNC: 10 MMOL/L — SIGNIFICANT CHANGE UP
SP GR SPEC: >1.03 — HIGH (ref 1–1.03)
SPECIMEN SOURCE: SIGNIFICANT CHANGE UP
SQUAMOUS # UR AUTO: 7 /HPF — HIGH (ref 0–5)
STOMATOCYTES BLD QL SMEAR: SLIGHT — SIGNIFICANT CHANGE UP
TIBC SERPL-MCNC: 311 UG/DL — SIGNIFICANT CHANGE UP (ref 220–430)
TROPONIN T, HIGH SENSITIVITY RESULT: 49 NG/L — SIGNIFICANT CHANGE UP (ref 0–51)
UIBC SERPL-MCNC: 283 UG/DL — SIGNIFICANT CHANGE UP (ref 110–370)
UROBILINOGEN FLD QL: 1 MG/DL — SIGNIFICANT CHANGE UP (ref 0.2–1)
UUN UR-MCNC: 387 MG/DL — SIGNIFICANT CHANGE UP
VWF AG ACT/NOR PPP IA: 245 % — HIGH (ref 63–170)
VWF:RCO ACT/NOR PPP PL AGG: 265 % — HIGH (ref 45–133)
WBC # BLD: 10.42 K/UL — SIGNIFICANT CHANGE UP (ref 3.8–10.5)
WBC # BLD: 10.54 K/UL — HIGH (ref 3.8–10.5)
WBC # BLD: 10.91 K/UL — HIGH (ref 3.8–10.5)
WBC # BLD: 11.23 K/UL — HIGH (ref 3.8–10.5)
WBC # BLD: 11.46 K/UL — HIGH (ref 3.8–10.5)
WBC # BLD: 12.24 K/UL — HIGH (ref 3.8–10.5)
WBC # BLD: 12.86 K/UL — HIGH (ref 3.8–10.5)
WBC # BLD: 13 K/UL — HIGH (ref 3.8–10.5)
WBC # BLD: 13.05 K/UL — HIGH (ref 3.8–10.5)
WBC # BLD: 13.22 K/UL — HIGH (ref 3.8–10.5)
WBC # BLD: 14.69 K/UL — HIGH (ref 3.8–10.5)
WBC # BLD: 15.28 K/UL — HIGH (ref 3.8–10.5)
WBC # BLD: 17.89 K/UL — HIGH (ref 3.8–10.5)
WBC # BLD: 7.51 K/UL — SIGNIFICANT CHANGE UP (ref 3.8–10.5)
WBC # BLD: 8.31 K/UL — SIGNIFICANT CHANGE UP (ref 3.8–10.5)
WBC # BLD: 8.59 K/UL — SIGNIFICANT CHANGE UP (ref 3.8–10.5)
WBC # FLD AUTO: 10.42 K/UL — SIGNIFICANT CHANGE UP (ref 3.8–10.5)
WBC # FLD AUTO: 10.54 K/UL — HIGH (ref 3.8–10.5)
WBC # FLD AUTO: 10.91 K/UL — HIGH (ref 3.8–10.5)
WBC # FLD AUTO: 11.23 K/UL — HIGH (ref 3.8–10.5)
WBC # FLD AUTO: 11.46 K/UL — HIGH (ref 3.8–10.5)
WBC # FLD AUTO: 12.24 K/UL — HIGH (ref 3.8–10.5)
WBC # FLD AUTO: 12.86 K/UL — HIGH (ref 3.8–10.5)
WBC # FLD AUTO: 13 K/UL — HIGH (ref 3.8–10.5)
WBC # FLD AUTO: 13.05 K/UL — HIGH (ref 3.8–10.5)
WBC # FLD AUTO: 13.22 K/UL — HIGH (ref 3.8–10.5)
WBC # FLD AUTO: 14.69 K/UL — HIGH (ref 3.8–10.5)
WBC # FLD AUTO: 15.28 K/UL — HIGH (ref 3.8–10.5)
WBC # FLD AUTO: 17.89 K/UL — HIGH (ref 3.8–10.5)
WBC # FLD AUTO: 7.51 K/UL — SIGNIFICANT CHANGE UP (ref 3.8–10.5)
WBC # FLD AUTO: 8.31 K/UL — SIGNIFICANT CHANGE UP (ref 3.8–10.5)
WBC # FLD AUTO: 8.59 K/UL — SIGNIFICANT CHANGE UP (ref 3.8–10.5)
WBC UR QL: 74 /HPF — HIGH (ref 0–5)

## 2024-01-01 PROCEDURE — 99285 EMERGENCY DEPT VISIT HI MDM: CPT | Mod: 25

## 2024-01-01 PROCEDURE — 88275 CYTOGENETICS 100-300: CPT

## 2024-01-01 PROCEDURE — 85018 HEMOGLOBIN: CPT

## 2024-01-01 PROCEDURE — 99233 SBSQ HOSP IP/OBS HIGH 50: CPT | Mod: GC

## 2024-01-01 PROCEDURE — 86850 RBC ANTIBODY SCREEN: CPT

## 2024-01-01 PROCEDURE — 99222 1ST HOSP IP/OBS MODERATE 55: CPT

## 2024-01-01 PROCEDURE — 82435 ASSAY OF BLOOD CHLORIDE: CPT

## 2024-01-01 PROCEDURE — 88280 CHROMOSOME KARYOTYPE STUDY: CPT

## 2024-01-01 PROCEDURE — 99232 SBSQ HOSP IP/OBS MODERATE 35: CPT

## 2024-01-01 PROCEDURE — 88271 CYTOGENETICS DNA PROBE: CPT

## 2024-01-01 PROCEDURE — 99232 SBSQ HOSP IP/OBS MODERATE 35: CPT | Mod: GC

## 2024-01-01 PROCEDURE — 80076 HEPATIC FUNCTION PANEL: CPT

## 2024-01-01 PROCEDURE — 87040 BLOOD CULTURE FOR BACTERIA: CPT

## 2024-01-01 PROCEDURE — 36600 WITHDRAWAL OF ARTERIAL BLOOD: CPT

## 2024-01-01 PROCEDURE — 82962 GLUCOSE BLOOD TEST: CPT

## 2024-01-01 PROCEDURE — 92610 EVALUATE SWALLOWING FUNCTION: CPT

## 2024-01-01 PROCEDURE — 82728 ASSAY OF FERRITIN: CPT

## 2024-01-01 PROCEDURE — 94660 CPAP INITIATION&MGMT: CPT

## 2024-01-01 PROCEDURE — 82330 ASSAY OF CALCIUM: CPT

## 2024-01-01 PROCEDURE — 76770 US EXAM ABDO BACK WALL COMP: CPT | Mod: 26

## 2024-01-01 PROCEDURE — 71045 X-RAY EXAM CHEST 1 VIEW: CPT | Mod: 26

## 2024-01-01 PROCEDURE — 97110 THERAPEUTIC EXERCISES: CPT

## 2024-01-01 PROCEDURE — 97161 PT EVAL LOW COMPLEX 20 MIN: CPT

## 2024-01-01 PROCEDURE — 84300 ASSAY OF URINE SODIUM: CPT

## 2024-01-01 PROCEDURE — 88264 CHROMOSOME ANALYSIS 20-25: CPT

## 2024-01-01 PROCEDURE — 87637 SARSCOV2&INF A&B&RSV AMP PRB: CPT

## 2024-01-01 PROCEDURE — 93306 TTE W/DOPPLER COMPLETE: CPT | Mod: 26

## 2024-01-01 PROCEDURE — 85025 COMPLETE CBC W/AUTO DIFF WBC: CPT

## 2024-01-01 PROCEDURE — 83935 ASSAY OF URINE OSMOLALITY: CPT

## 2024-01-01 PROCEDURE — 36415 COLL VENOUS BLD VENIPUNCTURE: CPT

## 2024-01-01 PROCEDURE — 84100 ASSAY OF PHOSPHORUS: CPT

## 2024-01-01 PROCEDURE — 97530 THERAPEUTIC ACTIVITIES: CPT

## 2024-01-01 PROCEDURE — 80048 BASIC METABOLIC PNL TOTAL CA: CPT

## 2024-01-01 PROCEDURE — 83550 IRON BINDING TEST: CPT

## 2024-01-01 PROCEDURE — 85245 CLOT FACTOR VIII VW RISTOCTN: CPT

## 2024-01-01 PROCEDURE — 99238 HOSP IP/OBS DSCHRG MGMT 30/<: CPT | Mod: GC

## 2024-01-01 PROCEDURE — 99223 1ST HOSP IP/OBS HIGH 75: CPT

## 2024-01-01 PROCEDURE — 85240 CLOT FACTOR VIII AHG 1 STAGE: CPT

## 2024-01-01 PROCEDURE — 87186 SC STD MICRODIL/AGAR DIL: CPT

## 2024-01-01 PROCEDURE — 82570 ASSAY OF URINE CREATININE: CPT

## 2024-01-01 PROCEDURE — 82803 BLOOD GASES ANY COMBINATION: CPT

## 2024-01-01 PROCEDURE — 83735 ASSAY OF MAGNESIUM: CPT

## 2024-01-01 PROCEDURE — 85027 COMPLETE CBC AUTOMATED: CPT

## 2024-01-01 PROCEDURE — 87077 CULTURE AEROBIC IDENTIFY: CPT

## 2024-01-01 PROCEDURE — 86923 COMPATIBILITY TEST ELECTRIC: CPT

## 2024-01-01 PROCEDURE — 83605 ASSAY OF LACTIC ACID: CPT

## 2024-01-01 PROCEDURE — P9047: CPT

## 2024-01-01 PROCEDURE — 99233 SBSQ HOSP IP/OBS HIGH 50: CPT

## 2024-01-01 PROCEDURE — 86900 BLOOD TYPING SEROLOGIC ABO: CPT

## 2024-01-01 PROCEDURE — 93306 TTE W/DOPPLER COMPLETE: CPT

## 2024-01-01 PROCEDURE — 99223 1ST HOSP IP/OBS HIGH 75: CPT | Mod: GC

## 2024-01-01 PROCEDURE — 84295 ASSAY OF SERUM SODIUM: CPT

## 2024-01-01 PROCEDURE — 87086 URINE CULTURE/COLONY COUNT: CPT

## 2024-01-01 PROCEDURE — P9016: CPT

## 2024-01-01 PROCEDURE — 84132 ASSAY OF SERUM POTASSIUM: CPT

## 2024-01-01 PROCEDURE — 93005 ELECTROCARDIOGRAM TRACING: CPT

## 2024-01-01 PROCEDURE — 83880 ASSAY OF NATRIURETIC PEPTIDE: CPT

## 2024-01-01 PROCEDURE — 76770 US EXAM ABDO BACK WALL COMP: CPT

## 2024-01-01 PROCEDURE — 85014 HEMATOCRIT: CPT

## 2024-01-01 PROCEDURE — 84156 ASSAY OF PROTEIN URINE: CPT

## 2024-01-01 PROCEDURE — 71045 X-RAY EXAM CHEST 1 VIEW: CPT

## 2024-01-01 PROCEDURE — 83615 LACTATE (LD) (LDH) ENZYME: CPT

## 2024-01-01 PROCEDURE — 85045 AUTOMATED RETICULOCYTE COUNT: CPT

## 2024-01-01 PROCEDURE — 93970 EXTREMITY STUDY: CPT | Mod: 26

## 2024-01-01 PROCEDURE — 71275 CT ANGIOGRAPHY CHEST: CPT | Mod: 26

## 2024-01-01 PROCEDURE — 88189 FLOWCYTOMETRY/READ 16 & >: CPT

## 2024-01-01 PROCEDURE — 87641 MR-STAPH DNA AMP PROBE: CPT

## 2024-01-01 PROCEDURE — 83010 ASSAY OF HAPTOGLOBIN QUANT: CPT

## 2024-01-01 PROCEDURE — 84540 ASSAY OF URINE/UREA-N: CPT

## 2024-01-01 PROCEDURE — 84484 ASSAY OF TROPONIN QUANT: CPT

## 2024-01-01 PROCEDURE — 87205 SMEAR GRAM STAIN: CPT

## 2024-01-01 PROCEDURE — 93970 EXTREMITY STUDY: CPT

## 2024-01-01 PROCEDURE — 86901 BLOOD TYPING SEROLOGIC RH(D): CPT

## 2024-01-01 PROCEDURE — 82947 ASSAY GLUCOSE BLOOD QUANT: CPT

## 2024-01-01 PROCEDURE — 85730 THROMBOPLASTIN TIME PARTIAL: CPT

## 2024-01-01 PROCEDURE — 88291 CYTO/MOLECULAR REPORT: CPT

## 2024-01-01 PROCEDURE — 88185 FLOWCYTOMETRY/TC ADD-ON: CPT

## 2024-01-01 PROCEDURE — 80053 COMPREHEN METABOLIC PANEL: CPT

## 2024-01-01 PROCEDURE — 88184 FLOWCYTOMETRY/ TC 1 MARKER: CPT

## 2024-01-01 PROCEDURE — 81001 URINALYSIS AUTO W/SCOPE: CPT

## 2024-01-01 PROCEDURE — 83540 ASSAY OF IRON: CPT

## 2024-01-01 PROCEDURE — 87640 STAPH A DNA AMP PROBE: CPT

## 2024-01-01 PROCEDURE — 84133 ASSAY OF URINE POTASSIUM: CPT

## 2024-01-01 PROCEDURE — 99291 CRITICAL CARE FIRST HOUR: CPT

## 2024-01-01 PROCEDURE — 88237 TISSUE CULTURE BONE MARROW: CPT

## 2024-01-01 PROCEDURE — 99497 ADVNCD CARE PLAN 30 MIN: CPT | Mod: 25

## 2024-01-01 PROCEDURE — 71275 CT ANGIOGRAPHY CHEST: CPT | Mod: MC

## 2024-01-01 PROCEDURE — 85247 CLOT FACTOR VIII MULTIMETRIC: CPT

## 2024-01-01 PROCEDURE — 93010 ELECTROCARDIOGRAM REPORT: CPT

## 2024-01-01 PROCEDURE — 36430 TRANSFUSION BLD/BLD COMPNT: CPT

## 2024-01-01 PROCEDURE — 85246 CLOT FACTOR VIII VW ANTIGEN: CPT

## 2024-01-01 PROCEDURE — 85610 PROTHROMBIN TIME: CPT

## 2024-01-01 RX ORDER — BUMETANIDE 0.5 MG/1
2 TABLET ORAL ONCE
Refills: 0 | Status: COMPLETED | OUTPATIENT
Start: 2024-01-01 | End: 2024-01-01

## 2024-01-01 RX ORDER — ASPIRIN 325 MG
10 TABLET ORAL ONCE
Refills: 0 | Status: COMPLETED | OUTPATIENT
Start: 2024-01-01 | End: 2024-01-01

## 2024-01-01 RX ORDER — FUROSEMIDE 10 MG/ML
40 INJECTION, SOLUTION INTRAVENOUS DAILY
Refills: 0 | Status: DISCONTINUED | OUTPATIENT
Start: 2024-01-01 | End: 2024-01-01

## 2024-01-01 RX ORDER — DEXAMETHASONE 1.5 MG/1
40 TABLET ORAL DAILY
Refills: 0 | Status: COMPLETED | OUTPATIENT
Start: 2024-01-01 | End: 2024-01-01

## 2024-01-01 RX ORDER — LORATADINE 10 MG
17 TABLET,DISINTEGRATING ORAL ONCE
Refills: 0 | Status: COMPLETED | OUTPATIENT
Start: 2024-01-01 | End: 2024-01-01

## 2024-01-01 RX ORDER — IRON SUCROSE 20 MG/ML
200 INJECTION, SOLUTION INTRAVENOUS
Refills: 0 | Status: DISCONTINUED | OUTPATIENT
Start: 2024-01-01 | End: 2024-01-01

## 2024-01-01 RX ORDER — METOPROLOL TARTRATE 100 MG
2.5 TABLET ORAL ONCE
Refills: 0 | Status: COMPLETED | OUTPATIENT
Start: 2024-01-01 | End: 2024-01-01

## 2024-01-01 RX ORDER — DEXTROSE MONOHYDRATE, SODIUM CHLORIDE, SODIUM LACTATE, CALCIUM CHLORIDE, MAGNESIUM CHLORIDE 1.5; 538; 448; 18.4; 5.08 G/100ML; MG/100ML; MG/100ML; MG/100ML; MG/100ML
250 SOLUTION INTRAPERITONEAL ONCE
Refills: 0 | Status: COMPLETED | OUTPATIENT
Start: 2024-01-01 | End: 2024-01-01

## 2024-01-01 RX ORDER — FUROSEMIDE 10 MG/ML
40 INJECTION, SOLUTION INTRAVENOUS ONCE
Refills: 0 | Status: COMPLETED | OUTPATIENT
Start: 2024-01-01 | End: 2024-01-01

## 2024-01-01 RX ORDER — PANTOPRAZOLE SODIUM 20 MG/1
40 TABLET, DELAYED RELEASE ORAL ONCE
Refills: 0 | Status: COMPLETED | OUTPATIENT
Start: 2024-01-01 | End: 2024-01-01

## 2024-01-01 RX ORDER — METOPROLOL TARTRATE 100 MG
2.5 TABLET ORAL ONCE
Refills: 0 | Status: DISCONTINUED | OUTPATIENT
Start: 2024-01-01 | End: 2024-01-01

## 2024-01-01 RX ORDER — SODIUM PHOSPHATE,MONO-DIBASIC
1 SOLUTION, ORAL ORAL ONCE
Refills: 0 | Status: DISCONTINUED | OUTPATIENT
Start: 2024-01-01 | End: 2024-01-01

## 2024-01-01 RX ORDER — LORATADINE 10 MG
17 TABLET,DISINTEGRATING ORAL
Refills: 0 | Status: DISCONTINUED | OUTPATIENT
Start: 2024-01-01 | End: 2024-01-01

## 2024-01-01 RX ORDER — BUMETANIDE 0.5 MG/1
2 TABLET ORAL
Refills: 0 | Status: DISCONTINUED | OUTPATIENT
Start: 2024-01-01 | End: 2024-01-01

## 2024-01-01 RX ORDER — PIPERACILLIN SODIUM, TAZOBACTAM SODIUM 3; .375 G/15ML; G/15ML
3.38 INJECTION, POWDER, LYOPHILIZED, FOR SOLUTION INTRAVENOUS ONCE
Refills: 0 | Status: DISCONTINUED | OUTPATIENT
Start: 2024-01-01 | End: 2024-01-01

## 2024-01-01 RX ORDER — PANTOPRAZOLE SODIUM 20 MG/1
40 TABLET, DELAYED RELEASE ORAL
Refills: 0 | Status: DISCONTINUED | OUTPATIENT
Start: 2024-01-01 | End: 2024-01-01

## 2024-01-01 RX ORDER — ALBUMIN HUMAN 25 %
50 INTRAVENOUS SOLUTION INTRAVENOUS ONCE
Refills: 0 | Status: COMPLETED | OUTPATIENT
Start: 2024-01-01 | End: 2024-01-01

## 2024-01-01 RX ORDER — PIPERACILLIN SODIUM, TAZOBACTAM SODIUM 3; .375 G/15ML; G/15ML
3.38 INJECTION, POWDER, LYOPHILIZED, FOR SOLUTION INTRAVENOUS EVERY 12 HOURS
Refills: 0 | Status: DISCONTINUED | OUTPATIENT
Start: 2024-01-01 | End: 2024-01-01

## 2024-01-01 RX ORDER — SODIUM ZIRCONIUM CYCLOSILICATE 10 G/10G
5 POWDER, FOR SUSPENSION ORAL ONCE
Refills: 0 | Status: DISCONTINUED | OUTPATIENT
Start: 2024-01-01 | End: 2024-01-01

## 2024-01-01 RX ORDER — METOPROLOL TARTRATE 100 MG
5 TABLET ORAL ONCE
Refills: 0 | Status: COMPLETED | OUTPATIENT
Start: 2024-01-01 | End: 2024-01-01

## 2024-01-01 RX ORDER — FUROSEMIDE 10 MG/ML
40 INJECTION, SOLUTION INTRAVENOUS
Refills: 0 | Status: DISCONTINUED | OUTPATIENT
Start: 2024-01-01 | End: 2024-01-01

## 2024-01-01 RX ORDER — ACETAMINOPHEN 500 MG
975 TABLET ORAL DAILY
Refills: 0 | Status: DISCONTINUED | OUTPATIENT
Start: 2024-01-01 | End: 2024-01-01

## 2024-01-01 RX ORDER — DIPHENHYDRAMINE HCL 25 MG
25 CAPSULE ORAL DAILY
Refills: 0 | Status: DISCONTINUED | OUTPATIENT
Start: 2024-01-01 | End: 2024-01-01

## 2024-01-01 RX ORDER — HEPARIN SODIUM 1000 [USP'U]/ML
5000 INJECTION, SOLUTION INTRAVENOUS; SUBCUTANEOUS EVERY 12 HOURS
Refills: 0 | Status: DISCONTINUED | OUTPATIENT
Start: 2024-01-01 | End: 2024-01-01

## 2024-01-01 RX ORDER — METOPROLOL TARTRATE 100 MG
50 TABLET ORAL THREE TIMES A DAY
Refills: 0 | Status: DISCONTINUED | OUTPATIENT
Start: 2024-01-01 | End: 2024-01-01

## 2024-01-01 RX ORDER — SEVELAMER CARBONATE 800 MG/1
800 TABLET, FILM COATED ORAL
Refills: 0 | Status: DISCONTINUED | OUTPATIENT
Start: 2024-01-01 | End: 2024-01-01

## 2024-01-01 RX ORDER — SODIUM ZIRCONIUM CYCLOSILICATE 10 G/10G
5 POWDER, FOR SUSPENSION ORAL ONCE
Refills: 0 | Status: COMPLETED | OUTPATIENT
Start: 2024-01-01 | End: 2024-01-01

## 2024-01-01 RX ORDER — ALBUMIN HUMAN 25 %
50 INTRAVENOUS SOLUTION INTRAVENOUS EVERY 8 HOURS
Refills: 0 | Status: COMPLETED | OUTPATIENT
Start: 2024-01-01 | End: 2024-01-01

## 2024-01-01 RX ORDER — PANTOPRAZOLE SODIUM 20 MG/1
1 TABLET, DELAYED RELEASE ORAL
Qty: 0 | Refills: 0 | DISCHARGE
Start: 2024-01-01

## 2024-01-01 RX ORDER — METOPROLOL TARTRATE 100 MG
1 TABLET ORAL
Qty: 0 | Refills: 0 | DISCHARGE
Start: 2024-01-01

## 2024-01-01 RX ORDER — SODIUM ZIRCONIUM CYCLOSILICATE 10 G/10G
10 POWDER, FOR SUSPENSION ORAL ONCE
Refills: 0 | Status: DISCONTINUED | OUTPATIENT
Start: 2024-01-01 | End: 2024-01-01

## 2024-01-01 RX ORDER — ACETAMINOPHEN 500 MG
1000 TABLET ORAL ONCE
Refills: 0 | Status: COMPLETED | OUTPATIENT
Start: 2024-01-01 | End: 2024-01-01

## 2024-01-01 RX ORDER — POTASSIUM PHOSPHATE, MONOBASIC AND POTASSIUM PHOSPHATE, DIBASIC 224; 236 MG/ML; MG/ML
30 INJECTION, SOLUTION INTRAVENOUS ONCE
Refills: 0 | Status: COMPLETED | OUTPATIENT
Start: 2024-01-01 | End: 2024-01-01

## 2024-01-01 RX ORDER — SOD PHOS DI, MONO/K PHOS MONO 250 MG
1 TABLET ORAL
Refills: 0 | Status: DISCONTINUED | OUTPATIENT
Start: 2024-01-01 | End: 2024-01-01

## 2024-01-01 RX ORDER — FUROSEMIDE 10 MG/ML
40 INJECTION, SOLUTION INTRAVENOUS ONCE
Refills: 0 | Status: DISCONTINUED | OUTPATIENT
Start: 2024-01-01 | End: 2024-01-01

## 2024-01-01 RX ORDER — MELATONIN 3 MG
3 TABLET ORAL AT BEDTIME
Refills: 0 | Status: DISCONTINUED | OUTPATIENT
Start: 2024-01-01 | End: 2024-01-01

## 2024-01-01 RX ORDER — CEFAZOLIN SODIUM 10 G
1000 VIAL (EA) INJECTION EVERY 8 HOURS
Refills: 0 | Status: COMPLETED | OUTPATIENT
Start: 2024-01-01 | End: 2024-01-01

## 2024-01-01 RX ORDER — PIPERACILLIN SODIUM, TAZOBACTAM SODIUM 3; .375 G/15ML; G/15ML
4.5 INJECTION, POWDER, LYOPHILIZED, FOR SOLUTION INTRAVENOUS ONCE
Refills: 0 | Status: DISCONTINUED | OUTPATIENT
Start: 2024-01-01 | End: 2024-01-01

## 2024-01-01 RX ORDER — IMMUNE GLOBULIN (HUMAN) 10 G/100ML
50 INJECTION INTRAVENOUS; SUBCUTANEOUS EVERY 24 HOURS
Refills: 0 | Status: COMPLETED | OUTPATIENT
Start: 2024-01-01 | End: 2024-01-01

## 2024-01-01 RX ORDER — METOPROLOL TARTRATE 100 MG
100 TABLET ORAL THREE TIMES A DAY
Refills: 0 | Status: DISCONTINUED | OUTPATIENT
Start: 2024-01-01 | End: 2024-01-01

## 2024-01-01 RX ORDER — BUMETANIDE 0.5 MG/1
2 TABLET ORAL EVERY 8 HOURS
Refills: 0 | Status: DISCONTINUED | OUTPATIENT
Start: 2024-01-01 | End: 2024-01-01

## 2024-01-01 RX ORDER — DEXTROSE 4 G
50 TABLET,CHEWABLE ORAL ONCE
Refills: 0 | Status: COMPLETED | OUTPATIENT
Start: 2024-01-01 | End: 2024-01-01

## 2024-01-01 RX ORDER — DEXTROSE MONOHYDRATE, SODIUM CHLORIDE, SODIUM LACTATE, CALCIUM CHLORIDE, MAGNESIUM CHLORIDE 1.5; 538; 448; 18.4; 5.08 G/100ML; MG/100ML; MG/100ML; MG/100ML; MG/100ML
250 SOLUTION INTRAPERITONEAL ONCE
Refills: 0 | Status: DISCONTINUED | OUTPATIENT
Start: 2024-01-01 | End: 2024-01-01

## 2024-01-01 RX ORDER — SODIUM PHOSPHATE,MONO-DIBASIC
1 SOLUTION, ORAL ORAL ONCE
Refills: 0 | Status: COMPLETED | OUTPATIENT
Start: 2024-01-01 | End: 2024-01-01

## 2024-01-01 RX ORDER — MAGNESIUM SULFATE 500 MG/ML
2 VIAL (ML) INJECTION ONCE
Refills: 0 | Status: COMPLETED | OUTPATIENT
Start: 2024-01-01 | End: 2024-01-01

## 2024-01-01 RX ORDER — SENNOSIDES 8.6 MG/1
2 TABLET ORAL AT BEDTIME
Refills: 0 | Status: DISCONTINUED | OUTPATIENT
Start: 2024-01-01 | End: 2024-01-01

## 2024-01-01 RX ORDER — PIPERACILLIN SODIUM, TAZOBACTAM SODIUM 3; .375 G/15ML; G/15ML
3.38 INJECTION, POWDER, LYOPHILIZED, FOR SOLUTION INTRAVENOUS ONCE
Refills: 0 | Status: COMPLETED | OUTPATIENT
Start: 2024-01-01 | End: 2024-01-01

## 2024-01-01 RX ORDER — METOPROLOL TARTRATE 100 MG
25 TABLET ORAL EVERY 6 HOURS
Refills: 0 | Status: DISCONTINUED | OUTPATIENT
Start: 2024-01-01 | End: 2024-01-01

## 2024-01-01 RX ORDER — IRON SUCROSE 20 MG/ML
200 INJECTION, SOLUTION INTRAVENOUS
Refills: 0 | Status: COMPLETED | OUTPATIENT
Start: 2024-01-01 | End: 2024-01-01

## 2024-01-01 RX ORDER — POTASSIUM CHLORIDE 1500 MG/1
40 TABLET, EXTENDED RELEASE ORAL EVERY 4 HOURS
Refills: 0 | Status: DISCONTINUED | OUTPATIENT
Start: 2024-01-01 | End: 2024-01-01

## 2024-01-01 RX ORDER — CALCIUM ACETATE 667 MG/5ML
667 SOLUTION ORAL
Refills: 0 | Status: DISCONTINUED | OUTPATIENT
Start: 2024-01-01 | End: 2024-01-01

## 2024-01-01 RX ORDER — POTASSIUM CHLORIDE 1500 MG/1
40 TABLET, EXTENDED RELEASE ORAL EVERY 4 HOURS
Refills: 0 | Status: COMPLETED | OUTPATIENT
Start: 2024-01-01 | End: 2024-01-01

## 2024-01-01 RX ORDER — ALBUMIN HUMAN 25 %
50 INTRAVENOUS SOLUTION INTRAVENOUS EVERY 6 HOURS
Refills: 0 | Status: COMPLETED | OUTPATIENT
Start: 2024-01-01 | End: 2024-01-01

## 2024-01-01 RX ORDER — ATORVASTATIN CALCIUM 40 MG/1
20 TABLET, FILM COATED ORAL AT BEDTIME
Refills: 0 | Status: DISCONTINUED | OUTPATIENT
Start: 2024-01-01 | End: 2024-01-01

## 2024-01-01 RX ORDER — CEFAZOLIN SODIUM 10 G
VIAL (EA) INJECTION
Refills: 0 | Status: COMPLETED | OUTPATIENT
Start: 2024-01-01 | End: 2024-01-01

## 2024-01-01 RX ORDER — METOPROLOL TARTRATE 100 MG
25 TABLET ORAL ONCE
Refills: 0 | Status: DISCONTINUED | OUTPATIENT
Start: 2024-01-01 | End: 2024-01-01

## 2024-01-01 RX ORDER — CEFAZOLIN SODIUM 10 G
1000 VIAL (EA) INJECTION ONCE
Refills: 0 | Status: COMPLETED | OUTPATIENT
Start: 2024-01-01 | End: 2024-01-01

## 2024-01-01 RX ORDER — DEXTROSE MONOHYDRATE, SODIUM CHLORIDE, SODIUM LACTATE, CALCIUM CHLORIDE, MAGNESIUM CHLORIDE 1.5; 538; 448; 18.4; 5.08 G/100ML; MG/100ML; MG/100ML; MG/100ML; MG/100ML
1000 SOLUTION INTRAPERITONEAL
Refills: 0 | Status: DISCONTINUED | OUTPATIENT
Start: 2024-01-01 | End: 2024-01-01

## 2024-01-01 RX ORDER — BUMETANIDE 0.5 MG/1
2 TABLET ORAL ONCE
Refills: 0 | Status: DISCONTINUED | OUTPATIENT
Start: 2024-01-01 | End: 2024-01-01

## 2024-01-01 RX ORDER — METOPROLOL TARTRATE 100 MG
75 TABLET ORAL THREE TIMES A DAY
Refills: 0 | Status: DISCONTINUED | OUTPATIENT
Start: 2024-01-01 | End: 2024-01-01

## 2024-01-01 RX ORDER — MIDODRINE HYDROCHLORIDE 2.5 MG/1
20 TABLET ORAL THREE TIMES A DAY
Refills: 0 | Status: DISCONTINUED | OUTPATIENT
Start: 2024-01-01 | End: 2024-01-01

## 2024-01-01 RX ADMIN — BUMETANIDE 2 MILLIGRAM(S): 0.5 TABLET ORAL at 18:24

## 2024-01-01 RX ADMIN — HEPARIN SODIUM 5000 UNIT(S): 1000 INJECTION, SOLUTION INTRAVENOUS; SUBCUTANEOUS at 06:03

## 2024-01-01 RX ADMIN — Medication 975 MILLIGRAM(S): at 20:25

## 2024-01-01 RX ADMIN — Medication 75 MILLIGRAM(S): at 14:45

## 2024-01-01 RX ADMIN — HEPARIN SODIUM 5000 UNIT(S): 1000 INJECTION, SOLUTION INTRAVENOUS; SUBCUTANEOUS at 06:05

## 2024-01-01 RX ADMIN — FUROSEMIDE 40 MILLIGRAM(S): 10 INJECTION, SOLUTION INTRAVENOUS at 06:05

## 2024-01-01 RX ADMIN — Medication 5 MILLIGRAM(S): at 06:05

## 2024-01-01 RX ADMIN — Medication 100 MILLIGRAM(S): at 05:46

## 2024-01-01 RX ADMIN — SENNOSIDES 2 TABLET(S): 8.6 TABLET ORAL at 21:59

## 2024-01-01 RX ADMIN — Medication 100 MILLIGRAM(S): at 22:42

## 2024-01-01 RX ADMIN — FUROSEMIDE 40 MILLIGRAM(S): 10 INJECTION, SOLUTION INTRAVENOUS at 05:39

## 2024-01-01 RX ADMIN — MIDODRINE HYDROCHLORIDE 20 MILLIGRAM(S): 2.5 TABLET ORAL at 16:18

## 2024-01-01 RX ADMIN — PANTOPRAZOLE SODIUM 40 MILLIGRAM(S): 20 TABLET, DELAYED RELEASE ORAL at 06:03

## 2024-01-01 RX ADMIN — IRON SUCROSE 110 MILLIGRAM(S): 20 INJECTION, SOLUTION INTRAVENOUS at 13:40

## 2024-01-01 RX ADMIN — Medication 100 MILLIGRAM(S): at 13:38

## 2024-01-01 RX ADMIN — PANTOPRAZOLE SODIUM 40 MILLIGRAM(S): 20 TABLET, DELAYED RELEASE ORAL at 05:41

## 2024-01-01 RX ADMIN — DEXTROSE MONOHYDRATE, SODIUM CHLORIDE, SODIUM LACTATE, CALCIUM CHLORIDE, MAGNESIUM CHLORIDE 250 MILLILITER(S): 1.5; 538; 448; 18.4; 5.08 SOLUTION INTRAPERITONEAL at 02:33

## 2024-01-01 RX ADMIN — HEPARIN SODIUM 5000 UNIT(S): 1000 INJECTION, SOLUTION INTRAVENOUS; SUBCUTANEOUS at 17:28

## 2024-01-01 RX ADMIN — Medication 25 GRAM(S): at 10:20

## 2024-01-01 RX ADMIN — ATORVASTATIN CALCIUM 20 MILLIGRAM(S): 40 TABLET, FILM COATED ORAL at 22:03

## 2024-01-01 RX ADMIN — Medication 75 MILLIGRAM(S): at 05:23

## 2024-01-01 RX ADMIN — HEPARIN SODIUM 5000 UNIT(S): 1000 INJECTION, SOLUTION INTRAVENOUS; SUBCUTANEOUS at 05:05

## 2024-01-01 RX ADMIN — Medication 75 MILLIGRAM(S): at 15:07

## 2024-01-01 RX ADMIN — ATORVASTATIN CALCIUM 20 MILLIGRAM(S): 40 TABLET, FILM COATED ORAL at 21:08

## 2024-01-01 RX ADMIN — SENNOSIDES 2 TABLET(S): 8.6 TABLET ORAL at 21:40

## 2024-01-01 RX ADMIN — Medication 1 PACKET(S): at 10:11

## 2024-01-01 RX ADMIN — Medication 100 MILLIGRAM(S): at 06:02

## 2024-01-01 RX ADMIN — ATORVASTATIN CALCIUM 20 MILLIGRAM(S): 40 TABLET, FILM COATED ORAL at 21:49

## 2024-01-01 RX ADMIN — FUROSEMIDE 40 MILLIGRAM(S): 10 INJECTION, SOLUTION INTRAVENOUS at 05:45

## 2024-01-01 RX ADMIN — Medication 3 MILLIGRAM(S): at 21:48

## 2024-01-01 RX ADMIN — ATORVASTATIN CALCIUM 20 MILLIGRAM(S): 40 TABLET, FILM COATED ORAL at 21:40

## 2024-01-01 RX ADMIN — Medication 10 MILLIGRAM(S): at 22:05

## 2024-01-01 RX ADMIN — IMMUNE GLOBULIN (HUMAN) 166.67 GRAM(S): 10 INJECTION INTRAVENOUS; SUBCUTANEOUS at 22:10

## 2024-01-01 RX ADMIN — ATORVASTATIN CALCIUM 20 MILLIGRAM(S): 40 TABLET, FILM COATED ORAL at 21:42

## 2024-01-01 RX ADMIN — Medication 75 MILLIGRAM(S): at 05:45

## 2024-01-01 RX ADMIN — Medication 1 APPLICATION(S): at 06:03

## 2024-01-01 RX ADMIN — SENNOSIDES 2 TABLET(S): 8.6 TABLET ORAL at 22:06

## 2024-01-01 RX ADMIN — DEXTROSE MONOHYDRATE, SODIUM CHLORIDE, SODIUM LACTATE, CALCIUM CHLORIDE, MAGNESIUM CHLORIDE 50 MILLILITER(S): 1.5; 538; 448; 18.4; 5.08 SOLUTION INTRAPERITONEAL at 08:28

## 2024-01-01 RX ADMIN — ATORVASTATIN CALCIUM 20 MILLIGRAM(S): 40 TABLET, FILM COATED ORAL at 22:42

## 2024-01-01 RX ADMIN — SODIUM ZIRCONIUM CYCLOSILICATE 5 GRAM(S): 10 POWDER, FOR SUSPENSION ORAL at 12:13

## 2024-01-01 RX ADMIN — PANTOPRAZOLE SODIUM 40 MILLIGRAM(S): 20 TABLET, DELAYED RELEASE ORAL at 06:07

## 2024-01-01 RX ADMIN — Medication 50 MILLILITER(S): at 11:31

## 2024-01-01 RX ADMIN — PIPERACILLIN SODIUM, TAZOBACTAM SODIUM 25 GRAM(S): 3; .375 INJECTION, POWDER, LYOPHILIZED, FOR SOLUTION INTRAVENOUS at 06:03

## 2024-01-01 RX ADMIN — Medication 50 MILLILITER(S): at 13:57

## 2024-01-01 RX ADMIN — FUROSEMIDE 40 MILLIGRAM(S): 10 INJECTION, SOLUTION INTRAVENOUS at 11:00

## 2024-01-01 RX ADMIN — Medication 100 MILLIGRAM(S): at 06:37

## 2024-01-01 RX ADMIN — ATORVASTATIN CALCIUM 20 MILLIGRAM(S): 40 TABLET, FILM COATED ORAL at 23:18

## 2024-01-01 RX ADMIN — HEPARIN SODIUM 5000 UNIT(S): 1000 INJECTION, SOLUTION INTRAVENOUS; SUBCUTANEOUS at 17:09

## 2024-01-01 RX ADMIN — PIPERACILLIN SODIUM, TAZOBACTAM SODIUM 25 GRAM(S): 3; .375 INJECTION, POWDER, LYOPHILIZED, FOR SOLUTION INTRAVENOUS at 06:02

## 2024-01-01 RX ADMIN — Medication 17 GRAM(S): at 05:22

## 2024-01-01 RX ADMIN — MIDODRINE HYDROCHLORIDE 20 MILLIGRAM(S): 2.5 TABLET ORAL at 18:05

## 2024-01-01 RX ADMIN — HEPARIN SODIUM 5000 UNIT(S): 1000 INJECTION, SOLUTION INTRAVENOUS; SUBCUTANEOUS at 18:05

## 2024-01-01 RX ADMIN — IMMUNE GLOBULIN (HUMAN) 166.67 GRAM(S): 10 INJECTION INTRAVENOUS; SUBCUTANEOUS at 20:45

## 2024-01-01 RX ADMIN — Medication 3 MILLIGRAM(S): at 21:08

## 2024-01-01 RX ADMIN — FUROSEMIDE 40 MILLIGRAM(S): 10 INJECTION, SOLUTION INTRAVENOUS at 06:02

## 2024-01-01 RX ADMIN — Medication 50 MILLILITER(S): at 06:03

## 2024-01-01 RX ADMIN — Medication 25 MILLIGRAM(S): at 19:18

## 2024-01-01 RX ADMIN — Medication 50 MILLILITER(S): at 21:43

## 2024-01-01 RX ADMIN — HEPARIN SODIUM 5000 UNIT(S): 1000 INJECTION, SOLUTION INTRAVENOUS; SUBCUTANEOUS at 17:34

## 2024-01-01 RX ADMIN — Medication 100 MILLIGRAM(S): at 18:44

## 2024-01-01 RX ADMIN — HEPARIN SODIUM 5000 UNIT(S): 1000 INJECTION, SOLUTION INTRAVENOUS; SUBCUTANEOUS at 18:02

## 2024-01-01 RX ADMIN — Medication 50 MILLILITER(S): at 12:13

## 2024-01-01 RX ADMIN — IRON SUCROSE 110 MILLIGRAM(S): 20 INJECTION, SOLUTION INTRAVENOUS at 16:19

## 2024-01-01 RX ADMIN — SODIUM ZIRCONIUM CYCLOSILICATE 5 GRAM(S): 10 POWDER, FOR SUSPENSION ORAL at 20:31

## 2024-01-01 RX ADMIN — IRON SUCROSE 110 MILLIGRAM(S): 20 INJECTION, SOLUTION INTRAVENOUS at 17:49

## 2024-01-01 RX ADMIN — HEPARIN SODIUM 5000 UNIT(S): 1000 INJECTION, SOLUTION INTRAVENOUS; SUBCUTANEOUS at 18:33

## 2024-01-01 RX ADMIN — PANTOPRAZOLE SODIUM 40 MILLIGRAM(S): 20 TABLET, DELAYED RELEASE ORAL at 05:46

## 2024-01-01 RX ADMIN — HEPARIN SODIUM 5000 UNIT(S): 1000 INJECTION, SOLUTION INTRAVENOUS; SUBCUTANEOUS at 05:23

## 2024-01-01 RX ADMIN — Medication 1 APPLICATION(S): at 05:20

## 2024-01-01 RX ADMIN — PIPERACILLIN SODIUM, TAZOBACTAM SODIUM 25 GRAM(S): 3; .375 INJECTION, POWDER, LYOPHILIZED, FOR SOLUTION INTRAVENOUS at 18:26

## 2024-01-01 RX ADMIN — PANTOPRAZOLE SODIUM 40 MILLIGRAM(S): 20 TABLET, DELAYED RELEASE ORAL at 05:19

## 2024-01-01 RX ADMIN — Medication 1 APPLICATION(S): at 05:42

## 2024-01-01 RX ADMIN — PANTOPRAZOLE SODIUM 40 MILLIGRAM(S): 20 TABLET, DELAYED RELEASE ORAL at 05:05

## 2024-01-01 RX ADMIN — FUROSEMIDE 40 MILLIGRAM(S): 10 INJECTION, SOLUTION INTRAVENOUS at 05:44

## 2024-01-01 RX ADMIN — MIDODRINE HYDROCHLORIDE 20 MILLIGRAM(S): 2.5 TABLET ORAL at 06:07

## 2024-01-01 RX ADMIN — Medication 25 MILLIGRAM(S): at 20:25

## 2024-01-01 RX ADMIN — Medication 50 MILLIGRAM(S): at 21:52

## 2024-01-01 RX ADMIN — ATORVASTATIN CALCIUM 20 MILLIGRAM(S): 40 TABLET, FILM COATED ORAL at 22:21

## 2024-01-01 RX ADMIN — Medication 75 MILLIGRAM(S): at 23:40

## 2024-01-01 RX ADMIN — POTASSIUM PHOSPHATE, MONOBASIC AND POTASSIUM PHOSPHATE, DIBASIC 83.33 MILLIMOLE(S): 224; 236 INJECTION, SOLUTION INTRAVENOUS at 23:46

## 2024-01-01 RX ADMIN — Medication 1 APPLICATION(S): at 22:46

## 2024-01-01 RX ADMIN — Medication 100 MILLIGRAM(S): at 22:20

## 2024-01-01 RX ADMIN — Medication 100 MILLIGRAM(S): at 21:07

## 2024-01-01 RX ADMIN — Medication 17 GRAM(S): at 06:31

## 2024-01-01 RX ADMIN — ATORVASTATIN CALCIUM 20 MILLIGRAM(S): 40 TABLET, FILM COATED ORAL at 22:33

## 2024-01-01 RX ADMIN — Medication 50 MILLILITER(S): at 16:01

## 2024-01-01 RX ADMIN — Medication 75 MILLIGRAM(S): at 14:48

## 2024-01-01 RX ADMIN — Medication 1 APPLICATION(S): at 21:41

## 2024-01-01 RX ADMIN — Medication 50 MILLILITER(S): at 05:37

## 2024-01-01 RX ADMIN — Medication 100 MILLIGRAM(S): at 00:40

## 2024-01-01 RX ADMIN — ATORVASTATIN CALCIUM 20 MILLIGRAM(S): 40 TABLET, FILM COATED ORAL at 23:40

## 2024-01-01 RX ADMIN — ATORVASTATIN CALCIUM 20 MILLIGRAM(S): 40 TABLET, FILM COATED ORAL at 22:07

## 2024-01-01 RX ADMIN — Medication 5 MILLIGRAM(S): at 23:47

## 2024-01-01 RX ADMIN — Medication 50 MILLILITER(S): at 17:25

## 2024-01-01 RX ADMIN — Medication 10 MILLIGRAM(S): at 14:52

## 2024-01-01 RX ADMIN — MIDODRINE HYDROCHLORIDE 20 MILLIGRAM(S): 2.5 TABLET ORAL at 17:28

## 2024-01-01 RX ADMIN — Medication 75 MILLIGRAM(S): at 13:40

## 2024-01-01 RX ADMIN — FUROSEMIDE 40 MILLIGRAM(S): 10 INJECTION, SOLUTION INTRAVENOUS at 14:25

## 2024-01-01 RX ADMIN — Medication 75 MILLIGRAM(S): at 22:34

## 2024-01-01 RX ADMIN — Medication 100 MILLIGRAM(S): at 22:06

## 2024-01-01 RX ADMIN — Medication 5 UNIT(S): at 11:31

## 2024-01-01 RX ADMIN — Medication 75 MILLIGRAM(S): at 14:52

## 2024-01-01 RX ADMIN — IRON SUCROSE 110 MILLIGRAM(S): 20 INJECTION, SOLUTION INTRAVENOUS at 15:19

## 2024-01-01 RX ADMIN — FUROSEMIDE 40 MILLIGRAM(S): 10 INJECTION, SOLUTION INTRAVENOUS at 11:03

## 2024-01-01 RX ADMIN — MIDODRINE HYDROCHLORIDE 20 MILLIGRAM(S): 2.5 TABLET ORAL at 12:23

## 2024-01-01 RX ADMIN — SODIUM ZIRCONIUM CYCLOSILICATE 5 GRAM(S): 10 POWDER, FOR SUSPENSION ORAL at 08:31

## 2024-01-01 RX ADMIN — Medication 75 MILLIGRAM(S): at 05:49

## 2024-01-01 RX ADMIN — Medication 25 MILLIGRAM(S): at 17:27

## 2024-01-01 RX ADMIN — Medication 75 MILLIGRAM(S): at 06:05

## 2024-01-01 RX ADMIN — Medication 975 MILLIGRAM(S): at 21:19

## 2024-01-01 RX ADMIN — PIPERACILLIN SODIUM, TAZOBACTAM SODIUM 25 GRAM(S): 3; .375 INJECTION, POWDER, LYOPHILIZED, FOR SOLUTION INTRAVENOUS at 05:38

## 2024-01-01 RX ADMIN — Medication 3 MILLIGRAM(S): at 22:34

## 2024-01-01 RX ADMIN — Medication 1 APPLICATION(S): at 16:55

## 2024-01-01 RX ADMIN — DEXAMETHASONE 108 MILLIGRAM(S): 1.5 TABLET ORAL at 05:56

## 2024-01-01 RX ADMIN — Medication 100 MILLIGRAM(S): at 17:20

## 2024-01-01 RX ADMIN — Medication 2.5 MILLIGRAM(S): at 04:18

## 2024-01-01 RX ADMIN — POTASSIUM CHLORIDE 40 MILLIEQUIVALENT(S): 1500 TABLET, EXTENDED RELEASE ORAL at 18:44

## 2024-01-01 RX ADMIN — DEXAMETHASONE 108 MILLIGRAM(S): 1.5 TABLET ORAL at 05:39

## 2024-01-01 RX ADMIN — Medication 100 MILLIGRAM(S): at 06:18

## 2024-01-01 RX ADMIN — Medication 1 APPLICATION(S): at 05:06

## 2024-01-01 RX ADMIN — Medication 1 APPLICATION(S): at 17:21

## 2024-01-01 RX ADMIN — Medication 1 APPLICATION(S): at 18:25

## 2024-01-01 RX ADMIN — Medication 100 MILLIGRAM(S): at 14:45

## 2024-01-01 RX ADMIN — Medication 1 APPLICATION(S): at 06:05

## 2024-01-01 RX ADMIN — Medication 100 MILLIGRAM(S): at 08:11

## 2024-01-01 RX ADMIN — ATORVASTATIN CALCIUM 20 MILLIGRAM(S): 40 TABLET, FILM COATED ORAL at 21:19

## 2024-01-01 RX ADMIN — HEPARIN SODIUM 5000 UNIT(S): 1000 INJECTION, SOLUTION INTRAVENOUS; SUBCUTANEOUS at 05:19

## 2024-01-01 RX ADMIN — Medication 100 MILLIGRAM(S): at 21:19

## 2024-01-01 RX ADMIN — HEPARIN SODIUM 5000 UNIT(S): 1000 INJECTION, SOLUTION INTRAVENOUS; SUBCUTANEOUS at 05:46

## 2024-01-01 RX ADMIN — Medication 1 APPLICATION(S): at 23:48

## 2024-01-01 RX ADMIN — Medication 3 MILLIGRAM(S): at 21:40

## 2024-01-01 RX ADMIN — PIPERACILLIN SODIUM, TAZOBACTAM SODIUM 25 GRAM(S): 3; .375 INJECTION, POWDER, LYOPHILIZED, FOR SOLUTION INTRAVENOUS at 18:05

## 2024-01-01 RX ADMIN — Medication 75 MILLIGRAM(S): at 05:57

## 2024-01-01 RX ADMIN — Medication 400 MILLIGRAM(S): at 12:23

## 2024-01-01 RX ADMIN — POTASSIUM CHLORIDE 40 MILLIEQUIVALENT(S): 1500 TABLET, EXTENDED RELEASE ORAL at 23:40

## 2024-01-01 RX ADMIN — PIPERACILLIN SODIUM, TAZOBACTAM SODIUM 25 GRAM(S): 3; .375 INJECTION, POWDER, LYOPHILIZED, FOR SOLUTION INTRAVENOUS at 16:54

## 2024-01-01 RX ADMIN — Medication 1 ENEMA: at 07:52

## 2024-01-01 RX ADMIN — Medication 100 MILLIGRAM(S): at 13:23

## 2024-01-01 RX ADMIN — HEPARIN SODIUM 5000 UNIT(S): 1000 INJECTION, SOLUTION INTRAVENOUS; SUBCUTANEOUS at 19:37

## 2024-01-01 RX ADMIN — SENNOSIDES 2 TABLET(S): 8.6 TABLET ORAL at 21:52

## 2024-01-01 RX ADMIN — HEPARIN SODIUM 5000 UNIT(S): 1000 INJECTION, SOLUTION INTRAVENOUS; SUBCUTANEOUS at 16:54

## 2024-01-01 RX ADMIN — HEPARIN SODIUM 5000 UNIT(S): 1000 INJECTION, SOLUTION INTRAVENOUS; SUBCUTANEOUS at 05:50

## 2024-01-01 RX ADMIN — DEXTROSE MONOHYDRATE, SODIUM CHLORIDE, SODIUM LACTATE, CALCIUM CHLORIDE, MAGNESIUM CHLORIDE 500 MILLILITER(S): 1.5; 538; 448; 18.4; 5.08 SOLUTION INTRAPERITONEAL at 11:32

## 2024-01-01 RX ADMIN — Medication 75 MILLIGRAM(S): at 21:08

## 2024-01-01 RX ADMIN — Medication 25 MILLIGRAM(S): at 21:19

## 2024-01-01 RX ADMIN — Medication 75 MILLIGRAM(S): at 21:41

## 2024-01-01 RX ADMIN — MIDODRINE HYDROCHLORIDE 20 MILLIGRAM(S): 2.5 TABLET ORAL at 12:13

## 2024-01-01 RX ADMIN — Medication 75 MILLIGRAM(S): at 14:19

## 2024-01-01 RX ADMIN — Medication 75 MILLIGRAM(S): at 06:03

## 2024-01-01 RX ADMIN — Medication 25 MILLIGRAM(S): at 05:39

## 2024-01-01 RX ADMIN — Medication 100 MILLIGRAM(S): at 15:05

## 2024-01-01 RX ADMIN — Medication 100 MILLIGRAM(S): at 21:59

## 2024-01-01 RX ADMIN — PANTOPRAZOLE SODIUM 40 MILLIGRAM(S): 20 TABLET, DELAYED RELEASE ORAL at 05:49

## 2024-01-01 RX ADMIN — Medication 50 MILLILITER(S): at 23:47

## 2024-01-01 RX ADMIN — DEXTROSE MONOHYDRATE, SODIUM CHLORIDE, SODIUM LACTATE, CALCIUM CHLORIDE, MAGNESIUM CHLORIDE 250 MILLILITER(S): 1.5; 538; 448; 18.4; 5.08 SOLUTION INTRAPERITONEAL at 22:15

## 2024-01-01 RX ADMIN — PIPERACILLIN SODIUM, TAZOBACTAM SODIUM 200 GRAM(S): 3; .375 INJECTION, POWDER, LYOPHILIZED, FOR SOLUTION INTRAVENOUS at 11:59

## 2024-01-01 RX ADMIN — Medication 2.5 MILLIGRAM(S): at 01:05

## 2024-01-01 RX ADMIN — Medication 75 MILLIGRAM(S): at 22:42

## 2024-01-01 RX ADMIN — HEPARIN SODIUM 5000 UNIT(S): 1000 INJECTION, SOLUTION INTRAVENOUS; SUBCUTANEOUS at 17:21

## 2024-01-01 RX ADMIN — Medication 100 MILLIGRAM(S): at 05:39

## 2024-01-01 RX ADMIN — PANTOPRAZOLE SODIUM 40 MILLIGRAM(S): 20 TABLET, DELAYED RELEASE ORAL at 06:24

## 2024-01-01 RX ADMIN — MIDODRINE HYDROCHLORIDE 20 MILLIGRAM(S): 2.5 TABLET ORAL at 05:39

## 2024-01-01 RX ADMIN — Medication 50 MILLILITER(S): at 13:00

## 2024-01-01 RX ADMIN — Medication 100 MILLIGRAM(S): at 14:19

## 2024-01-01 RX ADMIN — BUMETANIDE 2 MILLIGRAM(S): 0.5 TABLET ORAL at 11:59

## 2024-01-01 RX ADMIN — PIPERACILLIN SODIUM, TAZOBACTAM SODIUM 25 GRAM(S): 3; .375 INJECTION, POWDER, LYOPHILIZED, FOR SOLUTION INTRAVENOUS at 18:22

## 2024-01-01 RX ADMIN — FUROSEMIDE 40 MILLIGRAM(S): 10 INJECTION, SOLUTION INTRAVENOUS at 14:45

## 2024-01-01 RX ADMIN — SODIUM ZIRCONIUM CYCLOSILICATE 5 GRAM(S): 10 POWDER, FOR SUSPENSION ORAL at 10:54

## 2024-01-01 RX ADMIN — FUROSEMIDE 40 MILLIGRAM(S): 10 INJECTION, SOLUTION INTRAVENOUS at 05:57

## 2024-01-01 RX ADMIN — ATORVASTATIN CALCIUM 20 MILLIGRAM(S): 40 TABLET, FILM COATED ORAL at 21:59

## 2024-01-01 RX ADMIN — DEXAMETHASONE 108 MILLIGRAM(S): 1.5 TABLET ORAL at 05:44

## 2024-01-01 RX ADMIN — HEPARIN SODIUM 5000 UNIT(S): 1000 INJECTION, SOLUTION INTRAVENOUS; SUBCUTANEOUS at 05:38

## 2024-01-01 RX ADMIN — Medication 75 MILLIGRAM(S): at 22:03

## 2024-01-01 RX ADMIN — IRON SUCROSE 110 MILLIGRAM(S): 20 INJECTION, SOLUTION INTRAVENOUS at 15:21

## 2024-01-01 RX ADMIN — FUROSEMIDE 40 MILLIGRAM(S): 10 INJECTION, SOLUTION INTRAVENOUS at 15:07

## 2024-01-01 RX ADMIN — DEXAMETHASONE 108 MILLIGRAM(S): 1.5 TABLET ORAL at 20:04

## 2024-01-10 ENCOUNTER — INPATIENT (INPATIENT)
Facility: HOSPITAL | Age: 89
LOS: 5 days | Discharge: HOME | DRG: 389 | End: 2024-01-16
Attending: SURGERY | Admitting: SURGERY
Payer: MEDICARE

## 2024-01-10 VITALS
OXYGEN SATURATION: 99 % | RESPIRATION RATE: 16 BRPM | WEIGHT: 199.08 LBS | HEIGHT: 62 IN | TEMPERATURE: 98 F | SYSTOLIC BLOOD PRESSURE: 104 MMHG | DIASTOLIC BLOOD PRESSURE: 72 MMHG | HEART RATE: 90 BPM

## 2024-01-10 PROBLEM — I89.0 LYMPHEDEMA, NOT ELSEWHERE CLASSIFIED: Chronic | Status: ACTIVE | Noted: 2023-03-05

## 2024-01-10 LAB
ALBUMIN SERPL ELPH-MCNC: 4 G/DL — SIGNIFICANT CHANGE UP (ref 3.3–5)
ALBUMIN SERPL ELPH-MCNC: 4 G/DL — SIGNIFICANT CHANGE UP (ref 3.3–5)
ALP SERPL-CCNC: 53 U/L — SIGNIFICANT CHANGE UP (ref 40–120)
ALP SERPL-CCNC: 53 U/L — SIGNIFICANT CHANGE UP (ref 40–120)
ALT FLD-CCNC: 11 U/L — SIGNIFICANT CHANGE UP (ref 10–45)
ALT FLD-CCNC: 11 U/L — SIGNIFICANT CHANGE UP (ref 10–45)
ANION GAP SERPL CALC-SCNC: 13 MMOL/L — SIGNIFICANT CHANGE UP (ref 5–17)
ANION GAP SERPL CALC-SCNC: 13 MMOL/L — SIGNIFICANT CHANGE UP (ref 5–17)
AST SERPL-CCNC: 24 U/L — SIGNIFICANT CHANGE UP (ref 10–40)
AST SERPL-CCNC: 24 U/L — SIGNIFICANT CHANGE UP (ref 10–40)
BASE EXCESS BLDV CALC-SCNC: 14.9 MMOL/L — HIGH (ref -2–3)
BASE EXCESS BLDV CALC-SCNC: 14.9 MMOL/L — HIGH (ref -2–3)
BASOPHILS # BLD AUTO: 0.02 K/UL — SIGNIFICANT CHANGE UP (ref 0–0.2)
BASOPHILS # BLD AUTO: 0.02 K/UL — SIGNIFICANT CHANGE UP (ref 0–0.2)
BASOPHILS NFR BLD AUTO: 0.2 % — SIGNIFICANT CHANGE UP (ref 0–2)
BASOPHILS NFR BLD AUTO: 0.2 % — SIGNIFICANT CHANGE UP (ref 0–2)
BILIRUB SERPL-MCNC: 1.2 MG/DL — SIGNIFICANT CHANGE UP (ref 0.2–1.2)
BILIRUB SERPL-MCNC: 1.2 MG/DL — SIGNIFICANT CHANGE UP (ref 0.2–1.2)
BUN SERPL-MCNC: 14 MG/DL — SIGNIFICANT CHANGE UP (ref 7–23)
BUN SERPL-MCNC: 14 MG/DL — SIGNIFICANT CHANGE UP (ref 7–23)
CA-I SERPL-SCNC: 1.17 MMOL/L — SIGNIFICANT CHANGE UP (ref 1.15–1.33)
CA-I SERPL-SCNC: 1.17 MMOL/L — SIGNIFICANT CHANGE UP (ref 1.15–1.33)
CALCIUM SERPL-MCNC: 10.2 MG/DL — SIGNIFICANT CHANGE UP (ref 8.4–10.5)
CALCIUM SERPL-MCNC: 10.2 MG/DL — SIGNIFICANT CHANGE UP (ref 8.4–10.5)
CHLORIDE BLDV-SCNC: 94 MMOL/L — LOW (ref 96–108)
CHLORIDE BLDV-SCNC: 94 MMOL/L — LOW (ref 96–108)
CHLORIDE SERPL-SCNC: 93 MMOL/L — LOW (ref 96–108)
CHLORIDE SERPL-SCNC: 93 MMOL/L — LOW (ref 96–108)
CO2 BLDV-SCNC: 45 MMOL/L — HIGH (ref 22–26)
CO2 BLDV-SCNC: 45 MMOL/L — HIGH (ref 22–26)
CO2 SERPL-SCNC: 37 MMOL/L — HIGH (ref 22–31)
CO2 SERPL-SCNC: 37 MMOL/L — HIGH (ref 22–31)
CREAT SERPL-MCNC: 1.05 MG/DL — SIGNIFICANT CHANGE UP (ref 0.5–1.3)
CREAT SERPL-MCNC: 1.05 MG/DL — SIGNIFICANT CHANGE UP (ref 0.5–1.3)
EGFR: 50 ML/MIN/1.73M2 — LOW
EGFR: 50 ML/MIN/1.73M2 — LOW
EOSINOPHIL # BLD AUTO: 0.06 K/UL — SIGNIFICANT CHANGE UP (ref 0–0.5)
EOSINOPHIL # BLD AUTO: 0.06 K/UL — SIGNIFICANT CHANGE UP (ref 0–0.5)
EOSINOPHIL NFR BLD AUTO: 0.6 % — SIGNIFICANT CHANGE UP (ref 0–6)
EOSINOPHIL NFR BLD AUTO: 0.6 % — SIGNIFICANT CHANGE UP (ref 0–6)
GAS PNL BLDV: 137 MMOL/L — SIGNIFICANT CHANGE UP (ref 136–145)
GAS PNL BLDV: 137 MMOL/L — SIGNIFICANT CHANGE UP (ref 136–145)
GAS PNL BLDV: SIGNIFICANT CHANGE UP
GAS PNL BLDV: SIGNIFICANT CHANGE UP
GLUCOSE BLDV-MCNC: 85 MG/DL — SIGNIFICANT CHANGE UP (ref 70–99)
GLUCOSE BLDV-MCNC: 85 MG/DL — SIGNIFICANT CHANGE UP (ref 70–99)
GLUCOSE SERPL-MCNC: 94 MG/DL — SIGNIFICANT CHANGE UP (ref 70–99)
GLUCOSE SERPL-MCNC: 94 MG/DL — SIGNIFICANT CHANGE UP (ref 70–99)
HCO3 BLDV-SCNC: 43 MMOL/L — HIGH (ref 22–29)
HCO3 BLDV-SCNC: 43 MMOL/L — HIGH (ref 22–29)
HCT VFR BLD CALC: 37.9 % — SIGNIFICANT CHANGE UP (ref 34.5–45)
HCT VFR BLD CALC: 37.9 % — SIGNIFICANT CHANGE UP (ref 34.5–45)
HCT VFR BLDA CALC: 36 % — SIGNIFICANT CHANGE UP (ref 34.5–46.5)
HCT VFR BLDA CALC: 36 % — SIGNIFICANT CHANGE UP (ref 34.5–46.5)
HGB BLD CALC-MCNC: 12 G/DL — SIGNIFICANT CHANGE UP (ref 11.7–16.1)
HGB BLD CALC-MCNC: 12 G/DL — SIGNIFICANT CHANGE UP (ref 11.7–16.1)
HGB BLD-MCNC: 11.6 G/DL — SIGNIFICANT CHANGE UP (ref 11.5–15.5)
HGB BLD-MCNC: 11.6 G/DL — SIGNIFICANT CHANGE UP (ref 11.5–15.5)
IMM GRANULOCYTES NFR BLD AUTO: 0.5 % — SIGNIFICANT CHANGE UP (ref 0–0.9)
IMM GRANULOCYTES NFR BLD AUTO: 0.5 % — SIGNIFICANT CHANGE UP (ref 0–0.9)
LACTATE BLDV-MCNC: 2 MMOL/L — SIGNIFICANT CHANGE UP (ref 0.5–2)
LACTATE BLDV-MCNC: 2 MMOL/L — SIGNIFICANT CHANGE UP (ref 0.5–2)
LYMPHOCYTES # BLD AUTO: 0.92 K/UL — LOW (ref 1–3.3)
LYMPHOCYTES # BLD AUTO: 0.92 K/UL — LOW (ref 1–3.3)
LYMPHOCYTES # BLD AUTO: 9.2 % — LOW (ref 13–44)
LYMPHOCYTES # BLD AUTO: 9.2 % — LOW (ref 13–44)
MAGNESIUM SERPL-MCNC: 1.9 MG/DL — SIGNIFICANT CHANGE UP (ref 1.6–2.6)
MAGNESIUM SERPL-MCNC: 1.9 MG/DL — SIGNIFICANT CHANGE UP (ref 1.6–2.6)
MCHC RBC-ENTMCNC: 28 PG — SIGNIFICANT CHANGE UP (ref 27–34)
MCHC RBC-ENTMCNC: 28 PG — SIGNIFICANT CHANGE UP (ref 27–34)
MCHC RBC-ENTMCNC: 30.6 GM/DL — LOW (ref 32–36)
MCHC RBC-ENTMCNC: 30.6 GM/DL — LOW (ref 32–36)
MCV RBC AUTO: 91.5 FL — SIGNIFICANT CHANGE UP (ref 80–100)
MCV RBC AUTO: 91.5 FL — SIGNIFICANT CHANGE UP (ref 80–100)
MONOCYTES # BLD AUTO: 0.51 K/UL — SIGNIFICANT CHANGE UP (ref 0–0.9)
MONOCYTES # BLD AUTO: 0.51 K/UL — SIGNIFICANT CHANGE UP (ref 0–0.9)
MONOCYTES NFR BLD AUTO: 5.1 % — SIGNIFICANT CHANGE UP (ref 2–14)
MONOCYTES NFR BLD AUTO: 5.1 % — SIGNIFICANT CHANGE UP (ref 2–14)
NEUTROPHILS # BLD AUTO: 8.39 K/UL — HIGH (ref 1.8–7.4)
NEUTROPHILS # BLD AUTO: 8.39 K/UL — HIGH (ref 1.8–7.4)
NEUTROPHILS NFR BLD AUTO: 84.4 % — HIGH (ref 43–77)
NEUTROPHILS NFR BLD AUTO: 84.4 % — HIGH (ref 43–77)
NRBC # BLD: 0 /100 WBCS — SIGNIFICANT CHANGE UP (ref 0–0)
NRBC # BLD: 0 /100 WBCS — SIGNIFICANT CHANGE UP (ref 0–0)
NT-PROBNP SERPL-SCNC: 2004 PG/ML — HIGH (ref 0–300)
NT-PROBNP SERPL-SCNC: 2004 PG/ML — HIGH (ref 0–300)
PCO2 BLDV: 71 MMHG — HIGH (ref 39–42)
PCO2 BLDV: 71 MMHG — HIGH (ref 39–42)
PH BLDV: 7.39 — SIGNIFICANT CHANGE UP (ref 7.32–7.43)
PH BLDV: 7.39 — SIGNIFICANT CHANGE UP (ref 7.32–7.43)
PHOSPHATE SERPL-MCNC: 3.3 MG/DL — SIGNIFICANT CHANGE UP (ref 2.5–4.5)
PHOSPHATE SERPL-MCNC: 3.3 MG/DL — SIGNIFICANT CHANGE UP (ref 2.5–4.5)
PLATELET # BLD AUTO: 159 K/UL — SIGNIFICANT CHANGE UP (ref 150–400)
PLATELET # BLD AUTO: 159 K/UL — SIGNIFICANT CHANGE UP (ref 150–400)
PO2 BLDV: 33 MMHG — SIGNIFICANT CHANGE UP (ref 25–45)
PO2 BLDV: 33 MMHG — SIGNIFICANT CHANGE UP (ref 25–45)
POTASSIUM BLDV-SCNC: 4.9 MMOL/L — SIGNIFICANT CHANGE UP (ref 3.5–5.1)
POTASSIUM BLDV-SCNC: 4.9 MMOL/L — SIGNIFICANT CHANGE UP (ref 3.5–5.1)
POTASSIUM SERPL-MCNC: 3.8 MMOL/L — SIGNIFICANT CHANGE UP (ref 3.5–5.3)
POTASSIUM SERPL-MCNC: 3.8 MMOL/L — SIGNIFICANT CHANGE UP (ref 3.5–5.3)
POTASSIUM SERPL-SCNC: 3.8 MMOL/L — SIGNIFICANT CHANGE UP (ref 3.5–5.3)
POTASSIUM SERPL-SCNC: 3.8 MMOL/L — SIGNIFICANT CHANGE UP (ref 3.5–5.3)
PROT SERPL-MCNC: 7.4 G/DL — SIGNIFICANT CHANGE UP (ref 6–8.3)
PROT SERPL-MCNC: 7.4 G/DL — SIGNIFICANT CHANGE UP (ref 6–8.3)
RBC # BLD: 4.14 M/UL — SIGNIFICANT CHANGE UP (ref 3.8–5.2)
RBC # BLD: 4.14 M/UL — SIGNIFICANT CHANGE UP (ref 3.8–5.2)
RBC # FLD: 14.8 % — HIGH (ref 10.3–14.5)
RBC # FLD: 14.8 % — HIGH (ref 10.3–14.5)
SAO2 % BLDV: 49.2 % — LOW (ref 67–88)
SAO2 % BLDV: 49.2 % — LOW (ref 67–88)
SODIUM SERPL-SCNC: 143 MMOL/L — SIGNIFICANT CHANGE UP (ref 135–145)
SODIUM SERPL-SCNC: 143 MMOL/L — SIGNIFICANT CHANGE UP (ref 135–145)
WBC # BLD: 9.95 K/UL — SIGNIFICANT CHANGE UP (ref 3.8–10.5)
WBC # BLD: 9.95 K/UL — SIGNIFICANT CHANGE UP (ref 3.8–10.5)
WBC # FLD AUTO: 9.95 K/UL — SIGNIFICANT CHANGE UP (ref 3.8–10.5)
WBC # FLD AUTO: 9.95 K/UL — SIGNIFICANT CHANGE UP (ref 3.8–10.5)

## 2024-01-10 PROCEDURE — 71045 X-RAY EXAM CHEST 1 VIEW: CPT | Mod: 26

## 2024-01-10 PROCEDURE — 74177 CT ABD & PELVIS W/CONTRAST: CPT | Mod: 26,MA

## 2024-01-10 PROCEDURE — 99285 EMERGENCY DEPT VISIT HI MDM: CPT

## 2024-01-10 RX ORDER — CALCIUM GLUCONATE 100 MG/ML
1 VIAL (ML) INTRAVENOUS ONCE
Refills: 0 | Status: COMPLETED | OUTPATIENT
Start: 2024-01-10 | End: 2024-01-10

## 2024-01-10 RX ORDER — PIPERACILLIN AND TAZOBACTAM 4; .5 G/20ML; G/20ML
3.38 INJECTION, POWDER, LYOPHILIZED, FOR SOLUTION INTRAVENOUS ONCE
Refills: 0 | Status: COMPLETED | OUTPATIENT
Start: 2024-01-10 | End: 2024-01-10

## 2024-01-10 RX ADMIN — PIPERACILLIN AND TAZOBACTAM 3.38 GRAM(S): 4; .5 INJECTION, POWDER, LYOPHILIZED, FOR SOLUTION INTRAVENOUS at 23:25

## 2024-01-10 RX ADMIN — Medication 100 GRAM(S): at 18:29

## 2024-01-10 RX ADMIN — Medication 1 GRAM(S): at 19:00

## 2024-01-10 RX ADMIN — PIPERACILLIN AND TAZOBACTAM 200 GRAM(S): 4; .5 INJECTION, POWDER, LYOPHILIZED, FOR SOLUTION INTRAVENOUS at 22:55

## 2024-01-10 NOTE — ED PROVIDER NOTE - CLINICAL SUMMARY MEDICAL DECISION MAKING FREE TEXT BOX
Cortes Christina, DO: 89 yo F PMHx paroxsymal a-fib (on Pradaxa), prior DVT, remote history of MI, lymphedema, HTN, HLD Presenting the ER for concern for hyperkalemia on outpatient lab work.  Reported to be 9.  Patient recently treated for UTI with course of levofloxacin.  Diagnosed on last Friday.  Presentation then proceeded by foul-smelling urine, hematuria, and nausea.  Patient now improved in terms of urination, however with ongoing nausea and inability to take all of her medications.  Family report gagging but denies vomiting.  Family states she has not been taking her Lasix or spironolactone for the past 3 days.  Patient also has not had a bowel movement for 3 days.  Patient denies chest pain, shortness of breath, or abdominal pain.  On 2 L nasal cannula oxygen at home.  Patient with history of dementia on exam per patient, oriented to self and location.  No acute distress.  Crackles bilaterally.  Chronically edematous lower extremities.  Heart irregularly irregular.  Abdomen soft nontender palpation.  EKG rate 96, A-fib, .  QTc 475.  Consider hyperkalemia, will provide 1 g calcium gluconate pending lab work.  Will obtain screening lab work.  Given nausea and no bowel movement for past 3 days, will obtain CT abdomen pelvis.  Consider vascular congestion, will obtain chest x-ray.  Patient hemodynamically stable at time initial examination.  Will reassess following initial ED workup.

## 2024-01-10 NOTE — ED ADULT NURSE REASSESSMENT NOTE - NS ED NURSE REASSESS COMMENT FT1
Patient found in stretcher breathing spontaneously and unlabored on NC 2L. No signs of respiratory distress @ this time. The patient is alert and orientated x4 and responds appropriately. The patient presents with a Left 20G AC PIV that is C/D/I and saline locked @ this time. Pt denies chest pain, palpitations, shortness of breath, headache, visual disturbances, numbness/tingling, fever, chills, diaphoresis,  nausea, vomiting, constipation, diarrhea, or urinary symptoms. Safety and comfort measures provided, bed locked and in lowest position, side rails up for safety. VS to order and Awaiting Transport to [place].

## 2024-01-10 NOTE — ED ADULT NURSE NOTE - OBJECTIVE STATEMENT
Pt is a 89 yo female A&OX4 PMH CHF, HTN, afib on coumadin presenting to the ED from home by EMS for elevated potassium. As per EMS, pt had a f/u visit with PCP today at home, had blood work drawn, and was found to have a potassium of 9.0. Pt recently dx with UTI, has two antibiotics left. Pt reports x1 week of decreased PO and fluid intake. Pt denies dizziness, SOB, chest pain, palpitations. Denies fever/chills, SOB, abd pain, n/v/diarrhea, urinary symptoms. 20G placed in LAC. Placed in gown.

## 2024-01-10 NOTE — ED PROVIDER NOTE - OBJECTIVE STATEMENT
89 y/o female with PMHx Afib (on pradaxa), prior DVT, remote history of MI, HTN, HLD, CHF on home 02 presents to the ED sent in by home care nurse for potassium of 9. As per daughter patient is currently being treated with ABX for UTI. She states that she has two more days left of meds. Patient has had no appetite for past three days. She states that she called RN to come to the house and give IVF. They checked labs this evening and found her potassium to be 9. Patient was sent to the ED for evaluation. Patient currently has no complaints. Denies any headache, fever, chills, chest pain, cough, n/v/d.

## 2024-01-10 NOTE — ED ADULT NURSE NOTE - NSFALLUNIVINTERV_ED_ALL_ED
Bed/Stretcher in lowest position, wheels locked, appropriate side rails in place/Call bell, personal items and telephone in reach/Instruct patient to call for assistance before getting out of bed/chair/stretcher/Non-slip footwear applied when patient is off stretcher/[place] to call system/Physically safe environment - no spills, clutter or unnecessary equipment/Purposeful proactive rounding/Room/bathroom lighting operational, light cord in reach Bed/Stretcher in lowest position, wheels locked, appropriate side rails in place/Call bell, personal items and telephone in reach/Instruct patient to call for assistance before getting out of bed/chair/stretcher/Non-slip footwear applied when patient is off stretcher/[place] to call system/Physically safe environment - no spills, clutter or unnecessary equipment/Purposeful proactive rounding/Room/bathroom lighting operational, light cord in reach

## 2024-01-10 NOTE — ED PROVIDER NOTE - ATTENDING APP SHARED VISIT CONTRIBUTION OF CARE
I, Cortes Christina, have performed a history and physical exam on this patient, and discussed their management with the PEGGY. I have fully participated in the care of this patient. I agree with the history, physical exam, and plan as documented by the PEGGY

## 2024-01-10 NOTE — ED ADULT TRIAGE NOTE - HEIGHT IN FEET
[facility] [address], [address]  Consent for Procedure/Sedation    Date: 7/26/21   Time: 1600  1. I authorize the performance upon Lucía Meli the following:  Cardiac Ablation    2.  I authorize Dr. Asuncion Meyers  (and whomever is de Record #: [id number]

## 2024-01-10 NOTE — ED PROVIDER NOTE - PHYSICAL EXAMINATION
CONSTITUTIONAL: Patient is awake, alert and oriented x 3. Patient is well appearing and in no acute distress.  HEAD: NCAT  EYES: PERRL bilaterally,   ENT: Airway patent, dry oral mucosa   NECK: Supple,   LUNGS: CTA B/L,   HEART: RRR.+S1S2   ABDOMEN: Soft, non-tender to palpation throughout all four quadrants  MSK: FROM upper and lower ext b/l,   SKIN: No rash or lesions  NEURO: No focal deficits,

## 2024-01-11 DIAGNOSIS — I10 ESSENTIAL (PRIMARY) HYPERTENSION: ICD-10-CM

## 2024-01-11 DIAGNOSIS — E78.5 HYPERLIPIDEMIA, UNSPECIFIED: ICD-10-CM

## 2024-01-11 DIAGNOSIS — Z29.9 ENCOUNTER FOR PROPHYLACTIC MEASURES, UNSPECIFIED: ICD-10-CM

## 2024-01-11 DIAGNOSIS — I50.32 CHRONIC DIASTOLIC (CONGESTIVE) HEART FAILURE: ICD-10-CM

## 2024-01-11 DIAGNOSIS — I48.20 CHRONIC ATRIAL FIBRILLATION, UNSPECIFIED: ICD-10-CM

## 2024-01-11 DIAGNOSIS — K56.609 UNSPECIFIED INTESTINAL OBSTRUCTION, UNSPECIFIED AS TO PARTIAL VERSUS COMPLETE OBSTRUCTION: ICD-10-CM

## 2024-01-11 LAB
ANION GAP SERPL CALC-SCNC: 12 MMOL/L — SIGNIFICANT CHANGE UP (ref 5–17)
ANION GAP SERPL CALC-SCNC: 12 MMOL/L — SIGNIFICANT CHANGE UP (ref 5–17)
BUN SERPL-MCNC: 13 MG/DL — SIGNIFICANT CHANGE UP (ref 7–23)
BUN SERPL-MCNC: 13 MG/DL — SIGNIFICANT CHANGE UP (ref 7–23)
CALCIUM SERPL-MCNC: 9.4 MG/DL — SIGNIFICANT CHANGE UP (ref 8.4–10.5)
CALCIUM SERPL-MCNC: 9.4 MG/DL — SIGNIFICANT CHANGE UP (ref 8.4–10.5)
CHLORIDE SERPL-SCNC: 94 MMOL/L — LOW (ref 96–108)
CHLORIDE SERPL-SCNC: 94 MMOL/L — LOW (ref 96–108)
CO2 SERPL-SCNC: 39 MMOL/L — HIGH (ref 22–31)
CO2 SERPL-SCNC: 39 MMOL/L — HIGH (ref 22–31)
CREAT SERPL-MCNC: 1.04 MG/DL — SIGNIFICANT CHANGE UP (ref 0.5–1.3)
CREAT SERPL-MCNC: 1.04 MG/DL — SIGNIFICANT CHANGE UP (ref 0.5–1.3)
EGFR: 51 ML/MIN/1.73M2 — LOW
EGFR: 51 ML/MIN/1.73M2 — LOW
GLUCOSE SERPL-MCNC: 101 MG/DL — HIGH (ref 70–99)
GLUCOSE SERPL-MCNC: 101 MG/DL — HIGH (ref 70–99)
HCT VFR BLD CALC: 34.4 % — LOW (ref 34.5–45)
HCT VFR BLD CALC: 34.4 % — LOW (ref 34.5–45)
HGB BLD-MCNC: 10.5 G/DL — LOW (ref 11.5–15.5)
HGB BLD-MCNC: 10.5 G/DL — LOW (ref 11.5–15.5)
MAGNESIUM SERPL-MCNC: 1.8 MG/DL — SIGNIFICANT CHANGE UP (ref 1.6–2.6)
MAGNESIUM SERPL-MCNC: 1.8 MG/DL — SIGNIFICANT CHANGE UP (ref 1.6–2.6)
MCHC RBC-ENTMCNC: 28.3 PG — SIGNIFICANT CHANGE UP (ref 27–34)
MCHC RBC-ENTMCNC: 28.3 PG — SIGNIFICANT CHANGE UP (ref 27–34)
MCHC RBC-ENTMCNC: 30.5 GM/DL — LOW (ref 32–36)
MCHC RBC-ENTMCNC: 30.5 GM/DL — LOW (ref 32–36)
MCV RBC AUTO: 92.7 FL — SIGNIFICANT CHANGE UP (ref 80–100)
MCV RBC AUTO: 92.7 FL — SIGNIFICANT CHANGE UP (ref 80–100)
NRBC # BLD: 0 /100 WBCS — SIGNIFICANT CHANGE UP (ref 0–0)
NRBC # BLD: 0 /100 WBCS — SIGNIFICANT CHANGE UP (ref 0–0)
PHOSPHATE SERPL-MCNC: 3.9 MG/DL — SIGNIFICANT CHANGE UP (ref 2.5–4.5)
PHOSPHATE SERPL-MCNC: 3.9 MG/DL — SIGNIFICANT CHANGE UP (ref 2.5–4.5)
PLATELET # BLD AUTO: 143 K/UL — LOW (ref 150–400)
PLATELET # BLD AUTO: 143 K/UL — LOW (ref 150–400)
POTASSIUM SERPL-MCNC: 3.6 MMOL/L — SIGNIFICANT CHANGE UP (ref 3.5–5.3)
POTASSIUM SERPL-MCNC: 3.6 MMOL/L — SIGNIFICANT CHANGE UP (ref 3.5–5.3)
POTASSIUM SERPL-SCNC: 3.6 MMOL/L — SIGNIFICANT CHANGE UP (ref 3.5–5.3)
POTASSIUM SERPL-SCNC: 3.6 MMOL/L — SIGNIFICANT CHANGE UP (ref 3.5–5.3)
RBC # BLD: 3.71 M/UL — LOW (ref 3.8–5.2)
RBC # BLD: 3.71 M/UL — LOW (ref 3.8–5.2)
RBC # FLD: 15 % — HIGH (ref 10.3–14.5)
RBC # FLD: 15 % — HIGH (ref 10.3–14.5)
SODIUM SERPL-SCNC: 145 MMOL/L — SIGNIFICANT CHANGE UP (ref 135–145)
SODIUM SERPL-SCNC: 145 MMOL/L — SIGNIFICANT CHANGE UP (ref 135–145)
WBC # BLD: 8.46 K/UL — SIGNIFICANT CHANGE UP (ref 3.8–10.5)
WBC # BLD: 8.46 K/UL — SIGNIFICANT CHANGE UP (ref 3.8–10.5)
WBC # FLD AUTO: 8.46 K/UL — SIGNIFICANT CHANGE UP (ref 3.8–10.5)
WBC # FLD AUTO: 8.46 K/UL — SIGNIFICANT CHANGE UP (ref 3.8–10.5)

## 2024-01-11 PROCEDURE — 99223 1ST HOSP IP/OBS HIGH 75: CPT

## 2024-01-11 PROCEDURE — 71045 X-RAY EXAM CHEST 1 VIEW: CPT | Mod: 26,77

## 2024-01-11 PROCEDURE — 93010 ELECTROCARDIOGRAM REPORT: CPT

## 2024-01-11 PROCEDURE — 71045 X-RAY EXAM CHEST 1 VIEW: CPT | Mod: 26

## 2024-01-11 RX ORDER — PANTOPRAZOLE SODIUM 20 MG/1
40 TABLET, DELAYED RELEASE ORAL EVERY 12 HOURS
Refills: 0 | Status: DISCONTINUED | OUTPATIENT
Start: 2024-01-11 | End: 2024-01-12

## 2024-01-11 RX ORDER — ENOXAPARIN SODIUM 100 MG/ML
40 INJECTION SUBCUTANEOUS EVERY 24 HOURS
Refills: 0 | Status: DISCONTINUED | OUTPATIENT
Start: 2024-01-11 | End: 2024-01-11

## 2024-01-11 RX ORDER — METOPROLOL TARTRATE 50 MG
5 TABLET ORAL EVERY 6 HOURS
Refills: 0 | Status: DISCONTINUED | OUTPATIENT
Start: 2024-01-11 | End: 2024-01-12

## 2024-01-11 RX ORDER — METOPROLOL TARTRATE 50 MG
5 TABLET ORAL ONCE
Refills: 0 | Status: COMPLETED | OUTPATIENT
Start: 2024-01-11 | End: 2024-01-11

## 2024-01-11 RX ORDER — ENOXAPARIN SODIUM 100 MG/ML
90 INJECTION SUBCUTANEOUS EVERY 12 HOURS
Refills: 0 | Status: DISCONTINUED | OUTPATIENT
Start: 2024-01-11 | End: 2024-01-12

## 2024-01-11 RX ORDER — SODIUM CHLORIDE 9 MG/ML
1000 INJECTION, SOLUTION INTRAVENOUS
Refills: 0 | Status: DISCONTINUED | OUTPATIENT
Start: 2024-01-11 | End: 2024-01-12

## 2024-01-11 RX ORDER — FUROSEMIDE 40 MG
40 TABLET ORAL
Refills: 0 | Status: DISCONTINUED | OUTPATIENT
Start: 2024-01-11 | End: 2024-01-11

## 2024-01-11 RX ORDER — INFLUENZA VIRUS VACCINE 15; 15; 15; 15 UG/.5ML; UG/.5ML; UG/.5ML; UG/.5ML
0.7 SUSPENSION INTRAMUSCULAR ONCE
Refills: 0 | Status: DISCONTINUED | OUTPATIENT
Start: 2024-01-11 | End: 2024-01-16

## 2024-01-11 RX ADMIN — Medication 5 MILLIGRAM(S): at 17:59

## 2024-01-11 RX ADMIN — ENOXAPARIN SODIUM 90 MILLIGRAM(S): 100 INJECTION SUBCUTANEOUS at 17:59

## 2024-01-11 RX ADMIN — Medication 5 MILLIGRAM(S): at 12:00

## 2024-01-11 RX ADMIN — PANTOPRAZOLE SODIUM 40 MILLIGRAM(S): 20 TABLET, DELAYED RELEASE ORAL at 17:59

## 2024-01-11 RX ADMIN — Medication 5 MILLIGRAM(S): at 16:35

## 2024-01-11 RX ADMIN — Medication 5 MILLIGRAM(S): at 17:07

## 2024-01-11 RX ADMIN — SODIUM CHLORIDE 100 MILLILITER(S): 9 INJECTION, SOLUTION INTRAVENOUS at 08:15

## 2024-01-11 RX ADMIN — Medication 5 MILLIGRAM(S): at 05:49

## 2024-01-11 NOTE — CONSULT NOTE ADULT - PROBLEM SELECTOR RECOMMENDATION 6
DVT PPX : Lovenox 40mg SC q 24h DVT PPX : on Lovenox 40mg SC q 24h  would start therapeutic Lovenox dosing for Afib while NPO

## 2024-01-11 NOTE — PATIENT PROFILE ADULT - FALL HARM RISK - HARM RISK INTERVENTIONS
Assistance with ambulation/Assistance OOB with selected safe patient handling equipment/Communicate Risk of Fall with Harm to all staff/Discuss with provider need for PT consult/Monitor gait and stability/Provide patient with walking aids - walker, cane, crutches/Reinforce activity limits and safety measures with patient and family/Tailored Fall Risk Interventions/Visual Cue: Yellow wristband and red socks/Bed in lowest position, wheels locked, appropriate side rails in place/Call bell, personal items and telephone in reach/Instruct patient to call for assistance before getting out of bed or chair/Non-slip footwear when patient is out of bed/[place] to call system/Physically safe environment - no spills, clutter or unnecessary equipment/Purposeful Proactive Rounding/Room/bathroom lighting operational, light cord in reach Assistance with ambulation/Assistance OOB with selected safe patient handling equipment/Communicate Risk of Fall with Harm to all staff/Discuss with provider need for PT consult/Monitor gait and stability/Provide patient with walking aids - walker, cane, crutches/Reinforce activity limits and safety measures with patient and family/Tailored Fall Risk Interventions/Visual Cue: Yellow wristband and red socks/Bed in lowest position, wheels locked, appropriate side rails in place/Call bell, personal items and telephone in reach/Instruct patient to call for assistance before getting out of bed or chair/Non-slip footwear when patient is out of bed/[place] to call system/Physically safe environment - no spills, clutter or unnecessary equipment/Purposeful Proactive Rounding/Room/bathroom lighting operational, light cord in reach

## 2024-01-11 NOTE — CONSULT NOTE ADULT - PROBLEM SELECTOR RECOMMENDATION 3
- home meds are  Metoprolol 75mg po bid  - Lasix 40mg po bid  - Aldactone 12.5mg po qd  - c/w IV Lopressor as above  - hold off diuretics for now while NPO on IVF  - c/w supplemental home O2 2L NC  - monitor volume status closely

## 2024-01-11 NOTE — CONSULT NOTE ADULT - ASSESSMENT
89F w/ PMHx Afib on Pradaxa, prior DVT, HTN, HLD, CHF on home O2 2L NC, PSHx hysterectomy ~40yrs ago who presented to the ED due to home care lab results of hyperkalemia to 9. Found to have proximal SBO.

## 2024-01-11 NOTE — ED ADULT NURSE REASSESSMENT NOTE - NS ED NURSE REASSESS COMMENT FT1
verbal report to Sherry on 2 [facility] pt with 16 Greenlandic koehler inserted aseptically  md paged for off tele order for transport to [address] [place] [address] verbal report to Sherry on 2 [facility] pt with 16 Arabic koehler inserted aseptically  md paged for off tele order for transport to [address] [place] [address]

## 2024-01-11 NOTE — H&P ADULT - ASSESSMENT
89F w/ PMHx Afib on pradaxa, prior DVT, HTN, HLD, CHF on home O2/lasix, PSHx hysterectomy ~40yrs ago presents to the ED due to home care lab results of hyperkalemia to 9. Workup w/ SBO w/ R lower abdominal TP.    PLAN:  -Admit to [facility], Dr. Colt Gu  -NPO/IVF  -NGT LCWS  -DVT ppx  -AM labs  -Med rec completed      Patient discussed with chief resident on behalf of Dr. Gu.    Sarah Celaya, PGY-2  ACS  [phone]   89F w/ PMHx Afib on pradaxa, prior DVT, HTN, HLD, CHF on home O2/lasix, PSHx hysterectomy ~40yrs ago presents to the ED due to home care lab results of hyperkalemia to 9 and nausea. CT w/ findings SBO w/ RLQ TP. NGT placed w/ 400cc immediate bilious output.    PLAN:  -Admit to [facility], Dr. Colt Gu  -NPO/IVF  -NGT to LCWS  -Exam before pain meds  -AROBF  -DVT ppx  -AM labs  -Med rec completed      Patient discussed with chief resident on behalf of Dr. Gu.    Sarah Celaya, PGY-2  ACS  [phone] 89F w/ PMHx Afib on pradaxa, prior DVT, HTN, HLD, CHF on home O2/lasix, PSHx hysterectomy ~40yrs ago presents to the ED due to home care lab results of hyperkalemia to 9 and nausea. CT w/ findings SBO w/ RLQ TP. NGT placed w/ 400cc immediate bilious output.    PLAN:  -Admit to [facility], Dr. Colt Gu  -NPO/IVF  -NGT to LCWS  -Exam before pain meds  -AROBF  -DVT ppx  -IV levaquin x1day to finish UTI abx course  -AM labs  -Med rec completed      Patient discussed with chief resident on behalf of Dr. Gu.    Sarah Celaya, PGY-2  ACS  [phone] 89F w/ PMHx Afib on pradaxa, prior DVT, HTN, HLD, CHF on home O2/lasix, PSHx hysterectomy ~40yrs ago presents to the ED due to home care lab results of hyperkalemia to 9 and nausea. CT w/ findings SBO w/ RLQ TP. NGT placed w/ 400cc immediate bilious output.    PLAN:  -Admit to [facility], Dr. Colt uG  -NPO/IVF  -NGT to LCWS  -Exam before pain meds  -AROBF  -DVT ppx  -IV levaquin x1day to finish UTI abx course  -AM labs  -Med rec completed      Patient discussed with chief resident on behalf of Dr. Gu.    Sarah Celaya, PGY-2  ACS  [phone]

## 2024-01-11 NOTE — CONSULT NOTE ADULT - PROBLEM SELECTOR RECOMMENDATION 2
- on Metoprolol 75mg po bid and Pradaxa 150mg po bid ; on hold while NPO  - agree with Lopressor 5mg IVP q 6 while NPO  - CHADVASC score 5 - on Metoprolol 75mg po bid and Pradaxa 150mg po bid ; on hold while NPO  - agree with Lopressor 5mg IVP q 6 while NPO  - CHADVASC score 5  - would start therapeutic Lovenox dosing while NPO

## 2024-01-11 NOTE — CONSULT NOTE ADULT - SUBJECTIVE AND OBJECTIVE BOX
TRAUMA/ACUTE CARE SURGERY - HOSPITAL MEDICINE CO-MANAGEMENT INITIAL VISIT NOTE    CHIEF COMPLAINT: Patient is a 89y old  Female who presents with a chief complaint of SBO (11 Jan 2024 12:16)      HPI: 89F w/ PMHx Afib on Pradaxa, prior DVT, HTN, HLD, CHF on home O2 2L NC, PSHx hysterectomy ~40yrs ago who presented to the ED due to home care lab results of hyperkalemia to 9. Patient seen with daughter at bedside. Daughter reports that patient has been feeling nauseous for the past week but attributed it to recent UTI dx and abx. Patient has had 3 episodes of small volume, clear emesis over the last week, last episode 1day ago in the AM. Denies abdominal pain. Patient last had BM 4 days ago, normally has a BM every 3 days. Last passed gas ~24hrs ago. Denies fevers, chills, chest pain, SOB, HA, dizziness.  CT showed proximal SBO w/ abrupt transition point in the R lower abdomen w/ mild mesenteric edema and small volume ascites. Also w/ possible mild esophagitis and concern for mid sigmoid diverticulitis w/ fistula formation to bladder fundus.   Currently pt denies cp, sob, abd pain.      Allergies    No Known Allergies    Intolerances        HOME MEDICATIONS: [x ] Reviewed  Home Medications:  dabigatran 150 mg oral capsule: 1 cap(s) orally 2 times a day (11 Jan 2024 03:20)  furosemide 20 mg oral tablet: 2 tab(s) orally 2 times a day (11 Jan 2024 03:19)  Klor-Con M20 oral tablet, extended release: 1 tab(s) orally once a day (11 Jan 2024 03:20)  levoFLOXacin 500 mg oral tablet: 1 tab(s) orally 2 times a day (11 Jan 2024 03:37)  rosuvastatin 5 mg oral tablet: 1 tab(s) orally once a day (at bedtime) (11 Jan 2024 03:20)  Spironolactone 12.5mg po daily      MEDICATIONS  (STANDING):  enoxaparin Injectable 40 milliGRAM(s) SubCutaneous every 24 hours  lactated ringers. 1000 milliLiter(s) (100 mL/Hr) IV Continuous <Continuous>  metoprolol tartrate Injectable 5 milliGRAM(s) IV Push every 6 hours    MEDICATIONS  (PRN):      PAST MEDICAL & SURGICAL HISTORY:  Hypertension      Dyslipidemia      Myocardial Infarction  15-20 years ago      Hypercalcemia      Lymphedema      S/P Parathyroidectomy      S/P Hysterectomy      Bilateral Cataracts      [ ] Reviewed     Functional Assessment: [ ] Independent  [ x] Assistance  [ ] Total care  [ ] Non-ambulatory    SOCIAL HISTORY:  Residence: [ ] [facility]  [ ] SNF  [ x] Community ; has 24/7 HHA  Denies smoking/Etoh/IVDU    FAMILY HISTORY:  [X ] No pertinent family history in first degree relatives     REVIEW OF SYSTEMS:    CONSTITUTIONAL: No fever, weight loss, or fatigue  RESPIRATORY: No cough, wheezing, chills or hemoptysis; No shortness of breath  CARDIOVASCULAR: No chest pain, palpitations, dizziness, or leg swelling  GASTROINTESTINAL: No abdominal or epigastric pain. No nausea, vomiting, or hematemesis; No diarrhea or constipation. No melena or hematochezia.  GENITOURINARY: No dysuria, frequency, hematuria, or incontinence  NEUROLOGICAL: No headaches, memory loss, loss of strength, numbness, or tremors  SKIN: No itching, burning, rashes, or lesions   ENDOCRINE: No heat or cold intolerance; No hair loss  MUSCULOSKELETAL: No muscle or back pain  PSYCHIATRIC: No depression, anxiety, mood swings, or difficulty sleeping  HEME/LYMPH: No easy bruising, or bleeding gums    [ X ] All other ROS negative  [  ] Unable to obtain due to poor mental status    PHYSICAL EXAM:    Vital Signs Last 24 Hrs  T(C): 36.9 (11 Jan 2024 12:14), Max: 37.8 (11 Jan 2024 01:37)  T(F): 98.4 (11 Jan 2024 12:14), Max: 100 (11 Jan 2024 01:37)  HR: 124 (11 Jan 2024 12:14) (89 - 124)  BP: 105/61 (11 Jan 2024 12:14) (103/62 - 133/73)  BP(mean): 71 (11 Jan 2024 01:37) (71 - 95)  RR: 22 (11 Jan 2024 12:03) (15 - 24)  SpO2: 98% (11 Jan 2024 12:03) (96% - 99%)    Parameters below as of 11 Jan 2024 05:28  Patient On (Oxygen Delivery Method): room air        GENERAL: NAD, well-groomed  HEAD:  Atraumatic, Normocephalic  EYES: conjunctiva and sclera clear  ENMT: Moist mucous membranes  NECK: Supple, No JVD  RESPIRATORY: Clear to auscultation bilaterally; No rales, rhonchi, wheezing, or rubs  CARDIOVASCULAR: irregular rate and rhythm; No murmurs, rubs, or gallops  GASTROINTESTINAL: NGT in place ; +distended, Nontender ,scant bowel sounds present  GENITOURINARY: koehler catheter in place  EXTREMITIES:  3+ non pitting LE edema  NERVOUS SYSTEM:  Alert & Oriented X3; Moving all 4 extremities; No gross sensory deficits  SKIN: No rashes or lesions        LABS:                        10.5   8.46  )-----------( 143      ( 11 Jan 2024 05:51 )             34.4     Hemoglobin: 10.5 g/dL (01-11 @ 05:51)  Hemoglobin: 11.6 g/dL (01-10 @ 18:06)    01-11    145  |  94<L>  |  13  ----------------------------<  101<H>  3.6   |  39<H>  |  1.04    Ca    9.4      11 Jan 2024 05:51  Phos  3.9     01-11  Mg     1.8     01-11    TPro  7.4  /  Alb  4.0  /  TBili  1.2  /  DBili  x   /  AST  24  /  ALT  11  /  AlkPhos  53  01-10      Urinalysis Basic - ( 11 Jan 2024 05:51 )    Color: x / Appearance: x / SG: x / pH: x  Gluc: 101 mg/dL / Ketone: x  / Bili: x / Urobili: x   Blood: x / Protein: x / Nitrite: x   Leuk Esterase: x / RBC: x / WBC x   Sq Epi: x / Non Sq Epi: x / Bacteria: x      CAPILLARY BLOOD GLUCOSE            RADIOLOGY & ADDITIONAL STUDIES:      Imaging:   Personally Reviewed:  [ x] YES   CT ABD/PELVIS 1/10/2024  Proximal small bowel obstruction with abrupt transition point in the   right lower abdomen (series 301:63). Mild associated mesenteric edema and   small volume ascites. Mild thickening of the distal esophageal walls   which may reflect a mild esophagitis.    Pancolonic diverticulosis with thickening of the walls of the mid sigmoid   colon with mild surrounding inflammatory change and track like soft   tissue extending towards the bladder fundus which is thickened concerning   for diverticulitis with fistula formation.    TTE March 2023  EF (Austin Rule): 72 %  1. Calcified trileaflet aortic valve with decreased  opening. Peak transaortic valve gradient equals 13 mm Hg,  mean transaortic valve gradient equals 7 mm Hg, estimated  aortic valve area equals 1.7 sqcm (by continuity equation),  aortic valve velocity time integral equals 31 cm,  consistent with mild aortic stenosis.  2. Increased relative wall thickness with normal left  ventricular mass index, consistent with concentric left  ventricular remodeling.  3. Normal left ventricular systolic function. No segmental  wall motion abnormalities.  4. Increased E/e'  is consistent with elevated left  ventricular filling pressure.  5. Moderate right atrial enlargement.  6. Normal right ventricular size and function.  7. Normal tricuspid valve. Moderate-severe tricuspid  regurgitation.  8. Estimated pulmonary artery systolic pressure equals 56  mm Hg, assuming right atrial pressure equals 12-15 mm Hg,  consistent with moderate pulmonary pressures.      [x] Care Discussed with Consultants/Other Providers: Trauma Team    [X ] Increased delirium risk    [X ] Delirium and other risks can be reduced by:          -early ambulation          -minimizing "tethers" - IV, oxygen, catheters, etc          -avoiding hypnotics and sedatives          -maintaining hydration/nutrition          -avoid anticholinergics - diphenhydramine, etc          -pain control          -supportive environment

## 2024-01-11 NOTE — H&P ADULT - NSHPLABSRESULTS_GEN_ALL_CORE
LABS:                          11.6   9.95  )-----------( 159      ( 10 Dre 2024 18:06 )             37.9       01-10    143  |  93<L>  |  14  ----------------------------<  94  3.8   |  37<H>  |  1.05    Ca    10.2      10 Dre 2024 18:06  Phos  3.3     01-10  Mg     1.9     01-10    TPro  7.4  /  Alb  4.0  /  TBili  1.2  /  DBili  x   /  AST  24  /  ALT  11  /  AlkPhos  53  01-10              Urinalysis Basic - ( 10 Dre 2024 18:06 )    Color: x / Appearance: x / SG: x / pH: x  Gluc: 94 mg/dL / Ketone: x  / Bili: x / Urobili: x   Blood: x / Protein: x / Nitrite: x   Leuk Esterase: x / RBC: x / WBC x   Sq Epi: x / Non Sq Epi: x / Bacteria: x      _______________________________________  RADIOLOGY & ADDITIONAL STUDIES:  < from: CT Abdomen and Pelvis w/ IV Cont (01.10.24 @ 20:38) >    FINDINGS:  LOWER CHEST: Platelike atelectasis at the lung bases. Heart is enlarged.   Mild thickening of the distal esophageal walls.    LIVER: Numerous scattered hepatic cysts.  BILE DUCTS: Normal caliber.  GALLBLADDER: Normally distended. No radiodense gallstones.  SPLEEN: Within normal limits.  PANCREAS: Within normal limits.  ADRENALS: Mild nonspecific thickening of the left adrenal gland.  KIDNEYS/URETERS: Kidneys enhance symmetrically without hydronephrosis.   Nonobstructing right intrarenalcalculi. Small bilateral renal cysts.    BLADDER: Thickening of the walls of the bladder fundus.  REPRODUCTIVE ORGANS: Uterus is absent.    BOWEL: Numerous dilated proximal fluid-filled small bowel loops with   abrupt transition in the right lower abdomen (series 301:63). Associated   mild mesenteric edema. Appendix is normal. Pancolonic diverticulosis with   thickening of the walls of the mid sigmoid colon with mild surrounding   inflammatory change and track like soft tissue extending towards the   bladder fundus.  PERITONEUM: Small volume of perihepatic and perisplenic ascites and small   volume pelvic free fluid. No pneumoperitoneum or loculated collection to   suggest abscess. No mesenteric lymphadenopathy.  VESSELS: Atherosclerotic calcifications of the aortoiliac tree. Normal   caliber abdominal aorta.  RETROPERITONEUM/LYMPH NODES: No lymphadenopathy.  ABDOMINAL WALL: Within normal limits.  BONES: Degenerative changes of the spine. Sacral nerve root sheath cysts.    IMPRESSION:  Proximal small bowel obstruction with abrupt transition point in the   right lower abdomen (series 301:63). Mild associated mesenteric edema and   small volume ascites. Mild thickening of the distal esophageal walls   which may reflect a mild esophagitis.    Pancolonic diverticulosis with thickening of the walls of the mid sigmoid   colon with mild surrounding inflammatory change and track like soft   tissue extending towards the bladder fundus which is thickened concerning   for diverticulitis withfistula formation.    < end of copied text >

## 2024-01-11 NOTE — ED ADULT NURSE REASSESSMENT NOTE - NS ED NURSE REASSESS COMMENT FT1
pt with NGT to low suction adjusted by surgical resident was found with partially out pt pending cxray continued black liquid draining to cannister daughter at bedside

## 2024-01-11 NOTE — ED ADULT NURSE REASSESSMENT NOTE - NS ED NURSE REASSESS COMMENT FT1
Patient placed on Primafit @ this time. She is noted to have breakdown to the sacrum. Patient is noted to feel warm to the touch, Rectal Temp noted to be 100.0 and Surgery Team made aware. Patient has both her family and care aide @ bedside and report PT has lymphoedema @ baseline , however bilateral lower extremities are hot to the touch, reddened, and present with severe edema which family states did not present similarly earlier today. Surgery Team paged via [employer] @ this time.

## 2024-01-11 NOTE — ED ADULT NURSE REASSESSMENT NOTE - NS ED NURSE REASSESS COMMENT FT1
pt received in pink area awake alert pt with NGT to suction 900ml black liquid in cannister pt denies any abd pain pt given antibiotics as ordered via pump daughter at bedside pt vitals stable

## 2024-01-11 NOTE — H&P ADULT - NSHPPHYSICALEXAM_GEN_ALL_CORE
Physical Exam:  General: NAD, Lying in bed comfortably  Neuro: Alert and answering questions appropriately   Cardio: Regular rate  Resp: Good effort, no signs of respiratory distress, 2L NC  GI/Abd: Soft, mild-moderate distention, nontender, tympanic, no rebound or guarding  Vascular: All 4 extremities warm  Musculoskeletal: All 4 extremities moving spontaneously, no limitations

## 2024-01-11 NOTE — CONSULT NOTE ADULT - NSCONSULTADDITIONALINFOA_GEN_ALL_CORE
time spent reviewing prior charts, meds, discussing plan with patient= 76 minutes    d/w Trauma team

## 2024-01-11 NOTE — CONSULT NOTE ADULT - PROBLEM SELECTOR RECOMMENDATION 9
- recent n/v and poor PO intake  - CT abd showing proximal SBO w/ abrupt transition point in the R lower abdomen w/ mild mesenteric edema and small volume ascites.  - currently NPO/NGT/IVF  - mgt per primary team

## 2024-01-11 NOTE — H&P ADULT - HISTORY OF PRESENT ILLNESS
89F w/ PMHx Afib on pra 89F w/ PMHx Afib on pradaxa, prior DVT, HTN, HLD, CHF on home O2/lasix, PSHx hysterectomy ~40yrs ago presents to the ED due to home care lab results of hyperkalemia to 9. Patient seen with daughter and aide at bedside. Daughter reports that patient has been feeling nauseous for the past week but attributed it to recent UTI dx and abx. Patient has had 3 episodes of small volume, clear emesis over the last week. Denies abdominal pain. Patient last had BM 4 days ago, normally has a BM every 3 days. Last passed gas ~24hrs ago.    In the ED, patient is afebrile, HDS. Labs w/ WBC 9.95, K 3.8, lactate 2.0. CT w/ proximal SBO w/ abrupt TP in the R lower abdomen w/ mild mesenteric edema and small volume ascites. Also w/ possible mild esophagitis and concern for mid sigmoid diverticulitis w/ fistula formation to bladder fundus. 89F w/ PMHx Afib on pradaxa, prior DVT, HTN, HLD, CHF on home O2/lasix, PSHx hysterectomy ~40yrs ago presents to the ED due to home care lab results of hyperkalemia to 9. Patient seen with daughter and aide at bedside. Daughter reports that patient has been feeling nauseous for the past week but attributed it to recent UTI dx and abx. Patient has had 3 episodes of small volume, clear emesis over the last week, last episode 1day ago in the AM. Denies abdominal pain. Patient last had BM 4 days ago, normally has a BM every 3 days. Last passed gas ~24hrs ago. Denies fevers, chills, chest pain, SOB, HA, dizziness.    In the ED, patient is afebrile, HDS. Labs w/ WBC 9.95, K 3.8, lactate 2.0. CT w/ proximal SBO w/ abrupt TP in the R lower abdomen w/ mild mesenteric edema and small volume ascites. Also w/ possible mild esophagitis and concern for mid sigmoid diverticulitis w/ fistula formation to bladder fundus.

## 2024-01-11 NOTE — H&P ADULT - ATTENDING COMMENTS
Pt seen and examined. Agree with A/P. Admit to [facility], floor with adhesive SBO. NPO, IVF, NGT, strict I/Os, koehler, serial exams, am labs.

## 2024-01-12 ENCOUNTER — TRANSCRIPTION ENCOUNTER (OUTPATIENT)
Age: 89
End: 2024-01-12

## 2024-01-12 LAB
ANION GAP SERPL CALC-SCNC: 9 MMOL/L — SIGNIFICANT CHANGE UP (ref 5–17)
BUN SERPL-MCNC: 12 MG/DL — SIGNIFICANT CHANGE UP (ref 7–23)
BUN SERPL-MCNC: 12 MG/DL — SIGNIFICANT CHANGE UP (ref 7–23)
BUN SERPL-MCNC: 13 MG/DL — SIGNIFICANT CHANGE UP (ref 7–23)
BUN SERPL-MCNC: 13 MG/DL — SIGNIFICANT CHANGE UP (ref 7–23)
CALCIUM SERPL-MCNC: 8.7 MG/DL — SIGNIFICANT CHANGE UP (ref 8.4–10.5)
CHLORIDE SERPL-SCNC: 97 MMOL/L — SIGNIFICANT CHANGE UP (ref 96–108)
CO2 SERPL-SCNC: 39 MMOL/L — HIGH (ref 22–31)
CO2 SERPL-SCNC: 39 MMOL/L — HIGH (ref 22–31)
CO2 SERPL-SCNC: 40 MMOL/L — HIGH (ref 22–31)
CO2 SERPL-SCNC: 40 MMOL/L — HIGH (ref 22–31)
CREAT SERPL-MCNC: 0.9 MG/DL — SIGNIFICANT CHANGE UP (ref 0.5–1.3)
CREAT SERPL-MCNC: 0.9 MG/DL — SIGNIFICANT CHANGE UP (ref 0.5–1.3)
CREAT SERPL-MCNC: 0.96 MG/DL — SIGNIFICANT CHANGE UP (ref 0.5–1.3)
CREAT SERPL-MCNC: 0.96 MG/DL — SIGNIFICANT CHANGE UP (ref 0.5–1.3)
EGFR: 56 ML/MIN/1.73M2 — LOW
EGFR: 56 ML/MIN/1.73M2 — LOW
EGFR: 60 ML/MIN/1.73M2 — SIGNIFICANT CHANGE UP
EGFR: 60 ML/MIN/1.73M2 — SIGNIFICANT CHANGE UP
GLUCOSE SERPL-MCNC: 77 MG/DL — SIGNIFICANT CHANGE UP (ref 70–99)
HCT VFR BLD CALC: 33.1 % — LOW (ref 34.5–45)
HCT VFR BLD CALC: 33.1 % — LOW (ref 34.5–45)
HGB BLD-MCNC: 9.9 G/DL — LOW (ref 11.5–15.5)
HGB BLD-MCNC: 9.9 G/DL — LOW (ref 11.5–15.5)
MAGNESIUM SERPL-MCNC: 1.9 MG/DL — SIGNIFICANT CHANGE UP (ref 1.6–2.6)
MCHC RBC-ENTMCNC: 28.3 PG — SIGNIFICANT CHANGE UP (ref 27–34)
MCHC RBC-ENTMCNC: 28.3 PG — SIGNIFICANT CHANGE UP (ref 27–34)
MCHC RBC-ENTMCNC: 29.9 GM/DL — LOW (ref 32–36)
MCHC RBC-ENTMCNC: 29.9 GM/DL — LOW (ref 32–36)
MCV RBC AUTO: 94.6 FL — SIGNIFICANT CHANGE UP (ref 80–100)
MCV RBC AUTO: 94.6 FL — SIGNIFICANT CHANGE UP (ref 80–100)
NRBC # BLD: 0 /100 WBCS — SIGNIFICANT CHANGE UP (ref 0–0)
NRBC # BLD: 0 /100 WBCS — SIGNIFICANT CHANGE UP (ref 0–0)
PHOSPHATE SERPL-MCNC: 3.4 MG/DL — SIGNIFICANT CHANGE UP (ref 2.5–4.5)
PHOSPHATE SERPL-MCNC: 3.4 MG/DL — SIGNIFICANT CHANGE UP (ref 2.5–4.5)
PHOSPHATE SERPL-MCNC: 3.5 MG/DL — SIGNIFICANT CHANGE UP (ref 2.5–4.5)
PHOSPHATE SERPL-MCNC: 3.5 MG/DL — SIGNIFICANT CHANGE UP (ref 2.5–4.5)
PLATELET # BLD AUTO: 131 K/UL — LOW (ref 150–400)
PLATELET # BLD AUTO: 131 K/UL — LOW (ref 150–400)
POTASSIUM SERPL-MCNC: 3.3 MMOL/L — LOW (ref 3.5–5.3)
POTASSIUM SERPL-MCNC: 3.3 MMOL/L — LOW (ref 3.5–5.3)
POTASSIUM SERPL-MCNC: 3.4 MMOL/L — LOW (ref 3.5–5.3)
POTASSIUM SERPL-MCNC: 3.4 MMOL/L — LOW (ref 3.5–5.3)
POTASSIUM SERPL-SCNC: 3.3 MMOL/L — LOW (ref 3.5–5.3)
POTASSIUM SERPL-SCNC: 3.3 MMOL/L — LOW (ref 3.5–5.3)
POTASSIUM SERPL-SCNC: 3.4 MMOL/L — LOW (ref 3.5–5.3)
POTASSIUM SERPL-SCNC: 3.4 MMOL/L — LOW (ref 3.5–5.3)
RBC # BLD: 3.5 M/UL — LOW (ref 3.8–5.2)
RBC # BLD: 3.5 M/UL — LOW (ref 3.8–5.2)
RBC # FLD: 15.1 % — HIGH (ref 10.3–14.5)
RBC # FLD: 15.1 % — HIGH (ref 10.3–14.5)
SODIUM SERPL-SCNC: 145 MMOL/L — SIGNIFICANT CHANGE UP (ref 135–145)
SODIUM SERPL-SCNC: 145 MMOL/L — SIGNIFICANT CHANGE UP (ref 135–145)
SODIUM SERPL-SCNC: 146 MMOL/L — HIGH (ref 135–145)
SODIUM SERPL-SCNC: 146 MMOL/L — HIGH (ref 135–145)
WBC # BLD: 7.41 K/UL — SIGNIFICANT CHANGE UP (ref 3.8–10.5)
WBC # BLD: 7.41 K/UL — SIGNIFICANT CHANGE UP (ref 3.8–10.5)
WBC # FLD AUTO: 7.41 K/UL — SIGNIFICANT CHANGE UP (ref 3.8–10.5)
WBC # FLD AUTO: 7.41 K/UL — SIGNIFICANT CHANGE UP (ref 3.8–10.5)

## 2024-01-12 PROCEDURE — 74018 RADEX ABDOMEN 1 VIEW: CPT | Mod: 26

## 2024-01-12 PROCEDURE — 99233 SBSQ HOSP IP/OBS HIGH 50: CPT

## 2024-01-12 PROCEDURE — 74018 RADEX ABDOMEN 1 VIEW: CPT | Mod: 26,77

## 2024-01-12 RX ORDER — METOPROLOL TARTRATE 50 MG
75 TABLET ORAL EVERY 12 HOURS
Refills: 0 | Status: DISCONTINUED | OUTPATIENT
Start: 2024-01-12 | End: 2024-01-15

## 2024-01-12 RX ORDER — SPIRONOLACTONE 25 MG/1
12.5 TABLET, FILM COATED ORAL DAILY
Refills: 0 | Status: DISCONTINUED | OUTPATIENT
Start: 2024-01-12 | End: 2024-01-16

## 2024-01-12 RX ORDER — POTASSIUM CHLORIDE 20 MEQ
10 PACKET (EA) ORAL
Refills: 0 | Status: COMPLETED | OUTPATIENT
Start: 2024-01-12 | End: 2024-01-12

## 2024-01-12 RX ORDER — ACETAMINOPHEN 500 MG
650 TABLET ORAL EVERY 6 HOURS
Refills: 0 | Status: DISCONTINUED | OUTPATIENT
Start: 2024-01-12 | End: 2024-01-16

## 2024-01-12 RX ORDER — ATORVASTATIN CALCIUM 80 MG/1
20 TABLET, FILM COATED ORAL AT BEDTIME
Refills: 0 | Status: DISCONTINUED | OUTPATIENT
Start: 2024-01-12 | End: 2024-01-16

## 2024-01-12 RX ORDER — SODIUM CHLORIDE 9 MG/ML
1000 INJECTION, SOLUTION INTRAVENOUS
Refills: 0 | Status: DISCONTINUED | OUTPATIENT
Start: 2024-01-12 | End: 2024-01-13

## 2024-01-12 RX ORDER — DABIGATRAN ETEXILATE MESYLATE 150 MG/1
150 CAPSULE ORAL EVERY 12 HOURS
Refills: 0 | Status: DISCONTINUED | OUTPATIENT
Start: 2024-01-12 | End: 2024-01-16

## 2024-01-12 RX ORDER — MAGNESIUM SULFATE 500 MG/ML
1 VIAL (ML) INJECTION ONCE
Refills: 0 | Status: COMPLETED | OUTPATIENT
Start: 2024-01-12 | End: 2024-01-12

## 2024-01-12 RX ADMIN — Medication 650 MILLIGRAM(S): at 18:14

## 2024-01-12 RX ADMIN — Medication 100 MILLIEQUIVALENT(S): at 09:49

## 2024-01-12 RX ADMIN — SODIUM CHLORIDE 100 MILLILITER(S): 9 INJECTION, SOLUTION INTRAVENOUS at 06:38

## 2024-01-12 RX ADMIN — DABIGATRAN ETEXILATE MESYLATE 150 MILLIGRAM(S): 150 CAPSULE ORAL at 22:07

## 2024-01-12 RX ADMIN — Medication 5 MILLIGRAM(S): at 06:28

## 2024-01-12 RX ADMIN — ENOXAPARIN SODIUM 90 MILLIGRAM(S): 100 INJECTION SUBCUTANEOUS at 17:01

## 2024-01-12 RX ADMIN — ATORVASTATIN CALCIUM 20 MILLIGRAM(S): 80 TABLET, FILM COATED ORAL at 22:06

## 2024-01-12 RX ADMIN — Medication 100 MILLIEQUIVALENT(S): at 17:00

## 2024-01-12 RX ADMIN — PANTOPRAZOLE SODIUM 40 MILLIGRAM(S): 20 TABLET, DELAYED RELEASE ORAL at 17:00

## 2024-01-12 RX ADMIN — SODIUM CHLORIDE 40 MILLILITER(S): 9 INJECTION, SOLUTION INTRAVENOUS at 22:09

## 2024-01-12 RX ADMIN — Medication 100 MILLIEQUIVALENT(S): at 13:31

## 2024-01-12 RX ADMIN — ENOXAPARIN SODIUM 90 MILLIGRAM(S): 100 INJECTION SUBCUTANEOUS at 06:20

## 2024-01-12 RX ADMIN — Medication 100 GRAM(S): at 09:48

## 2024-01-12 RX ADMIN — Medication 650 MILLIGRAM(S): at 19:14

## 2024-01-12 RX ADMIN — PANTOPRAZOLE SODIUM 40 MILLIGRAM(S): 20 TABLET, DELAYED RELEASE ORAL at 06:22

## 2024-01-12 NOTE — PROGRESS NOTE ADULT - ASSESSMENT
89F w/ PMHx Afib on pradaxa, prior DVT, HTN, HLD, CHF on home O2/lasix, PSHx hysterectomy ~40yrs ago presents to the ED due to home care lab results of hyperkalemia to 9 and nausea. CT w/ findings SBO w/ RLQ TP. NGT placed w/ 400cc immediate bilious output.    PLAN:  -NPO/IVF  -NGT to LCWS, gastrograffin challenge today  -Give lasix and f/u electrolytes  -Exam before pain meds  -DVT ppx  -Med rec completed  -PT consult    ACS/Trauma [id number]

## 2024-01-12 NOTE — CHART NOTE - NSCHARTNOTEFT_GEN_A_CORE
Patient will require a polyfly wheelchair due to deconditioning. The beneficiary has a mobility limitation that significantly impairs his ability to participate in one or more MRADLs such as toileting, feeding, dressing, grooming, and bathing in customary locations in the home. The patient’s mobility limitation cannot be sufficiently resolved by the use of an appropriately fitted cane or walker. The patient is unable to ambulated with a walker. Use of a manual wheelchair will significantly improve the beneficiary’s ability to participate in MRADLs and the beneficiary will use it on a regular basis in the home. The beneficiary is able and willing to use the wheelchair in the home. The beneficiary cannot self-propel in a standard wheelchair. The patient can self-propel in a polyfly wheelchair. The beneficiary has sufficient upper extremity function and other physical and mental capabilities needed to safely self-propel the manual lightweight wheelchair that is provided in the home during a typical day      ACS/Trauma Sx  [id number]

## 2024-01-12 NOTE — DISCHARGE NOTE PROVIDER - PROVIDER TOKENS
PROVIDER:[TOKEN:[[id number]]] PROVIDER:[TOKEN:[[id number]]] PROVIDER:[TOKEN:[[id number]]] PROVIDER:[TOKEN:[[id number]]] PROVIDER:[TOKEN:[[id number]]] PROVIDER:[TOKEN:[[id number]]] PROVIDER:[TOKEN:[[id number]]] PROVIDER:[TOKEN:[[id number]]]

## 2024-01-12 NOTE — PHYSICAL THERAPY INITIAL EVALUATION ADULT - ADDITIONAL COMMENTS
Pt lives alone in a private house with 3 steps to enter, no steps inside. Pt has HHA 12 hours day however recently has been assisting 24/7. Pt required assistance with ADLs and functional mobility, was able to ambulate independently with a rollator. Pt is on 2LO2 at home, owns a shower chair.

## 2024-01-12 NOTE — DISCHARGE NOTE PROVIDER - HOSPITAL COURSE
MAN LINDSAY is a 89y Female w/ PMHx Afib on pradaxa, prior DVT, HTN, HLD, CHF on home O2/lasix, PSHx hysterectomy ~40yrs ago presents to the ED due to home care lab results of hyperkalemia to 9. Daughter reports that patient has been feeling nauseous for the past week but attributed it to recent UTI dx and abx. Patient has had 3 episodes of small volume, clear emesis over the last week, last episode 1day ago in the AM. In the ED, labs w/ WBC 9.95, K 3.8, lactate 2.0 and CT noted proximal SBO w/ abrupt TP in the R lower abdomen w/ mild mesenteric edema and small volume ascites. Also w/ possible mild esophagitis and concern for mid sigmoid diverticulitis. On HD0, patient got a NGT with bilious output, given PMHx of Afib, patient was moved to a tele floor and medication was transitioned to IV. On HD1, GG challenge was performed and on repeat AXR, contrast was noted in the colon with subsequent resumption of bowel function. Decision was made to remove the NGT and advance diet as tolerated. On HD2, pt was tolerating a regular diet, voiding/stooling spontaneously, and pain was well-controlled. Patient and family felt ready for discharge. NEFTALI CONWAY is a 89y Female w/ PMHx Afib on pradaxa, prior DVT, HTN, HLD, CHF on home O2/lasix, PSHx hysterectomy ~40yrs ago presents to the ED due to home care lab results of hyperkalemia to 9. Daughter reports that patient has been feeling nauseous for the past week but attributed it to recent UTI dx and abx. Patient has had 3 episodes of small volume, clear emesis over the last week, last episode 1day ago in the AM. In the ED, labs w/ WBC 9.95, K 3.8, lactate 2.0 and CT noted proximal SBO w/ abrupt TP in the R lower abdomen w/ mild mesenteric edema and small volume ascites. Also w/ possible mild esophagitis and concern for mid sigmoid diverticulitis. On HD0, patient got a NGT with bilious output, given PMHx of Afib, patient was moved to a tele floor and medication was transitioned to IV. On HD1, GG challenge was performed and on repeat AXR, contrast was noted in the colon with subsequent resumption of bowel function. Decision was made to remove the NGT and advance diet as tolerated. On HD2, pt was tolerating a regular diet, voiding/stooling spontaneously, and pain was well-controlled. HD3 patient had a bowel movement with possible bleed. H&H and vitals were stable and bleeding stopped per patient. HD4 patient's bleeding resolved with no more incidents. HD5 on day of discharge pt's BMP CO2 was elevated >45. Repeat lab was the same. Discussed with hospitalist, whom recommended to hold lasix and aldactone as it may be volume related. Patient to hold diuretics on discharge and to follow up on Thursday after discharge with their PCP for repeat labs and see if can be resumed. PT recommended home PT with wheelchair. Patient and family felt ready for discharge.

## 2024-01-12 NOTE — PHYSICAL THERAPY INITIAL EVALUATION ADULT - GENERAL OBSERVATIONS, REHAB EVAL
Rec'd semi supine in bed, NAD, VSS, A/Ox2, +IV, +NGT clamped, daughter at bedside, agreeable to PT. +2LO2 via NC,

## 2024-01-12 NOTE — DISCHARGE NOTE PROVIDER - NSDCMRMEDTOKEN_GEN_ALL_CORE_FT
Aldactone 25 mg oral tablet: 0.5 tab(s) orally once a day  dabigatran 150 mg oral capsule: 1 cap(s) orally 2 times a day  furosemide 20 mg oral tablet: 2 tab(s) orally 2 times a day  Klor-Con M20 oral tablet, extended release: 1 tab(s) orally once a day  levoFLOXacin 500 mg oral tablet: 1 tab(s) orally 2 times a day  metoprolol tartrate 75 mg oral tablet: 1 tab(s) orally 2 times a day  Polyfly Wheelchair: Dx: R26.2  rosuvastatin 5 mg oral tablet: 1 tab(s) orally once a day (at bedtime)  Transport Wheelchair: Dx: R26.2   Aldactone 25 mg oral tablet: 0.5 tab(s) orally once a day  dabigatran 150 mg oral capsule: 1 cap(s) orally 2 times a day  furosemide 20 mg oral tablet: 2 tab(s) orally 2 times a day  Home PT: Please continue care with home PT  Mario M20 oral tablet, extended release: 1 tab(s) orally once a day  levoFLOXacin 500 mg oral tablet: 1 tab(s) orally 2 times a day  metoprolol tartrate 75 mg oral tablet: 1 tab(s) orally 2 times a day  Polyfly Wheelchair: Dx: R26.2  rosuvastatin 5 mg oral tablet: 1 tab(s) orally once a day (at bedtime)  Transport Wheelchair: Dx: R26.2   acetaminophen 325 mg oral tablet: 2 tab(s) orally every 6 hours  dabigatran 150 mg oral capsule: 1 cap(s) orally 2 times a day  Home PT: Please continue care with home PT  Mario M20 oral tablet, extended release: 1 tab(s) orally once a day  metoprolol tartrate 75 mg oral tablet: 1 tab(s) orally 2 times a day  Polyfly Wheelchair: Dx: R26.2  rosuvastatin 5 mg oral tablet: 1 tab(s) orally once a day (at bedtime)  Transport Wheelchair: Dx: R26.2

## 2024-01-12 NOTE — DISCHARGE NOTE PROVIDER - CARE PROVIDER_API CALL
Nafisa Luther  Surgical Critical Care  [address] [place] [address], [address]  [place] [address]  Phone: [phone]  Fax: [phone]  Follow Up Time:    Nafisa Luther  Surgical Critical Care  [address] [place] [address], [address]  [place] [address]  Phone: [phone]  Fax: [phone]  Follow Up Time:    Nafisa Luther  Surgical Critical Care  [address] [place] [address], [address]  [place] [address]  Phone: [phone]  Fax: [phone]  Follow Up Time:    Nafisa Luther  Surgical Critical Care  [address] [place] [address], [address]  [place] [address]  Phone: [phone]  Fax: [phone]  Follow Up Time:    Cortes King  Surgery  [address] [place] [address], [facility] [address]  [place] [address]  Phone: [phone]  Fax: [phone]  Follow Up Time:    Cortes King  Surgery  [address] [place] [address], [facility] [address]  [place] [address]  Phone: [phone]  Fax: [phone]  Follow Up Time:    Cortes King  Surgery  [address] [place] [address], [facility] [address]  [place] [address]  Phone: [phone]  Fax: [phone]  Follow Up Time:    Cortes King  Surgery  [address] [place] [address], [facility] [address]  [place] [address]  Phone: [phone]  Fax: [phone]  Follow Up Time:

## 2024-01-12 NOTE — PHYSICAL THERAPY INITIAL EVALUATION ADULT - PERTINENT HX OF CURRENT PROBLEM, REHAB EVAL
89F w/ PMHx Afib on pradaxa, prior DVT, HTN, HLD, CHF on home O2/lasix, PSHx hysterectomy ~40yrs ago presents to the ED due to home care lab results of hyperkalemia to 9. Patient seen with daughter and aide at bedside. Daughter reports that patient has been feeling nauseous for the past week but attributed it to recent UTI dx and abx. Patient has had 3 episodes of small volume, clear emesis over the last week, last episode 1day ago in the AM. Denies abdominal pain. Patient last had BM 4 days ago, normally has a BM every 3 days. Last passed gas ~24hrs ago. Denies fevers, chills, chest pain, SOB, HA, dizziness. In the ED, patient is afebrile, HDS. Labs w/ WBC 9.95, K 3.8, lactate 2.0. CT w/ proximal SBO w/ abrupt TP in the R lower abdomen w/ mild mesenteric edema and small volume ascites. Also w/ possible mild esophagitis and concern for mid sigmoid diverticulitis w/ fistula formation to bladder fundus.    CXR: Redemonstrated cardiomegaly. No focal consolidations. No evidence of acute pulmonary disease.  CT A/P: Proximal small bowel obstruction with abrupt transition point in the right lower abdomen (series 301:63). Mild associated mesenteric edema and small volume ascites. Mild thickening of the distal esophageal walls which may reflect a mild esophagitis. Pancolonic diverticulosis with thickening of the walls of the mid sigmoid colon with mild surrounding inflammatory change and track like soft tissue extending towards the bladder fundus which is thickened concerning

## 2024-01-12 NOTE — DISCHARGE NOTE PROVIDER - NPI NUMBER (FOR SYSADMIN USE ONLY) :
[[id number]] [[id number]] [[id number]] [[id number]] [[id number]] [[id number]] [[id number]] [[id number]]

## 2024-01-12 NOTE — DISCHARGE NOTE PROVIDER - NSDCFUADDAPPT_GEN_ALL_CORE_FT
You do not need to follow up with Dr. Luther from [facility] surgery.  Follow with your PCP in 1-2 weeks regarding your hospitalization.  You do not need to follow up with Dr. King from [facility] surgery.    Please hold lasix and aldactone on discharge. Please follow up on Thursday, 1/18 with your PCP after discharge for repeat labs and see if meds can be resumed.   Please hold lasix and aldactone on discharge. Please follow up on Thursday, 1/18 with your PCP after discharge for repeat labs and see if meds can be resumed.

## 2024-01-12 NOTE — DISCHARGE NOTE PROVIDER - NSDCCPCAREPLAN_GEN_ALL_CORE_FT
PRINCIPAL DISCHARGE DIAGNOSIS  Diagnosis: Diverticulitis with obstruction  Assessment and Plan of Treatment: ACTIVITY: No heavy lifting anything more than 10-15lbs or straining. Otherwise, you may return to your usual level of physical activity. If you are taking narcotic pain medication (such as Oxycodone) do NOT drive a car, operate machinery or make important decisions.  DIET: Maintain oral Fiber Diet until your next appointment.  PAIN: For mild or moderate pain, you may take 975mg of tylenol every 6 hours. Do not exceed more than 4G tylenol per day.   NOTIFY YOUR SURGEON IF: You have any bleeding that does not stop, any pus draining from a wound, any fever (over 100.4 F) or chills, persistent nausea/vomiting with inability to tolerate food or liquids, persistent diarrhea, or if your pain is not controlled on your discharge pain medications.  FOLLOW-UP:  1. Please call to make a follow-up appointment within one to two weeks of discharge with Dr. King  2. Please follow up with your primary care physician in on Thursday, 1/18, regarding your hospitalization and repeat labs and if to restart lasix and aldactone.        PRINCIPAL DISCHARGE DIAGNOSIS  Diagnosis: SBO (small bowel obstruction)  Assessment and Plan of Treatment: 1. Activity- Ambulate as tolerated; no heavy lifting   2. Diet- Low fiber diet  3. Follow-up with Dr. King in 1 week, and your PMD within 1-2 weeks. Please call office for appointment.   Call office sooner or return to emergency room if you have worsening/severe abdominal pain, nausea/vomiting, excessive diarrhea, fever/chills, inabilty to tolerate food or liquids, stop passing gas or having bowel movements.      SECONDARY DISCHARGE DIAGNOSES  Diagnosis: High serum bicarbonate  Assessment and Plan of Treatment: You were found to have a high serum bicarbonate on your blood work, which could be due to volume contraction as diuretics were just resumed vs low potassium. Your potassium was repleted. Your diuretics (lasix and aldactone) were stopped, and you received some IV fluids.  Please follow up with your primary care physician in on Thursday, 1/18, regarding your hospitalization and repeat labs and if to restart lasix and aldactone.     PRINCIPAL DISCHARGE DIAGNOSIS  Diagnosis: SBO (small bowel obstruction)  Assessment and Plan of Treatment: 1. Activity- Ambulate as tolerated; no heavy lifting   2. Diet- Low fiber diet  3. Follow-up with Dr. Kign in 1 week, and your PMD within 1-2 weeks. Please call office for appointment.   Call office sooner or return to emergency room if you have worsening/severe abdominal pain, nausea/vomiting, excessive diarrhea, fever/chills, inabilty to tolerate food or liquids, stop passing gas or having bowel movements.      SECONDARY DISCHARGE DIAGNOSES  Diagnosis: High serum bicarbonate  Assessment and Plan of Treatment: You were found to have a high serum bicarbonate on your blood work, which could be due to volume contraction as diuretics were just resumed vs low potassium. Your potassium was repleted. Your diuretics (lasix and aldactone) were stopped, and you received some IV fluids.  Please follow up with your primary care physician in on Thursday, 1/18, regarding your hospitalization and repeat labs and if to restart lasix and aldactone.

## 2024-01-12 NOTE — PROGRESS NOTE ADULT - PROBLEM SELECTOR PLAN 3
- home meds are  Metoprolol 75mg po bid, Lasix 40mg po bid, Aldactone 12.5mg po qd  - c/w IV Lopressor as above  - hold off diuretics for now while NPO on IVF  - c/w supplemental home O2 2L NC  - monitor volume status closely ; currently euvolemic on exam - home meds are  Metoprolol 75mg po bid, Lasix 40mg po bid, Aldactone 12.5mg po qd  - c/w IV Lopressor as above  - hold off diuretics for now while NPO on IVF  - c/w supplemental home O2 2L NC  - monitor volume status closely ; currently euvolemic on exam  - hyperkalemic on AM labs ; would replete  - c/t monitor I/Os   - for TOV today

## 2024-01-12 NOTE — PHYSICAL THERAPY INITIAL EVALUATION ADULT - NSPTDISCHREC_GEN_A_CORE
However pt/family prefer to go home. If pt d/c home would require home PT, assist with all mobility/ADLs, & DME: 3:1 commode due to pt confined to single room without bathroom, & transport wheelchair/poly fly to navigate community distances. Pt will require transportation into the home/Sub-acute Rehab However pt/family prefer to go home. If pt d/c home would require home PT, resumption of HHA services, assist with ALL mobility/ADLs, & DME: 3:1 commode due to pt confined to single room without bathroom, & transport wheelchair/poly fly to navigate community distances. Pt will require transportation into the home./Sub-acute Rehab

## 2024-01-12 NOTE — CHART NOTE - NSCHARTNOTEFT_GEN_A_CORE
Patient will require a transport wheelchair due deconditioning. The beneficiary has a mobility limitation that significantly impairs his ability to participate in one or more MRADLs such as toileting, feeding, dressing, grooming, and bathing in customary locations in the home. The patient’s mobility limitation cannot be sufficiently resolved by the use of an appropriately fitted cane or walker. The patient is unable to ambulate with a walker. Use of a transport wheelchair will significantly improve the beneficiary’s ability to participate in MRADLs and the beneficiary will use it on a regular basis in the home. The beneficiary is able and willing to use the wheelchair in the home. The beneficiary has a caregiver who is available, willing, and able to provide assistance with the wheelchair. The patient is not able to self propel a standard or lightweight wheelchair.      ACS/Trauma Sx  [id number] Patient will require a transport wheelchair due deconditioning. The beneficiary has a mobility limitation that significantly impairs his ability to participate in one or more MRADLs such as toileting, feeding, dressing, grooming, and bathing in customary locations in the home. The patient’s mobility limitation cannot be sufficiently resolved by the use of an appropriately fitted cane or walker. The patient is unable to ambulate with a walker. Use of a transport wheelchair will significantly improve the beneficiary’s ability to participate in MRADLs and the beneficiary will use it on a regular basis in the home. The beneficiary is able and willing to use the wheelchair in the home. The beneficiary has a caregiver who is available, willing, and able to provide assistance with the wheelchair. The patient is not able to self propel a standard or lightweight wheelchair.      ACS/Trauma Sx  [id number]

## 2024-01-13 LAB
ANION GAP SERPL CALC-SCNC: 11 MMOL/L — SIGNIFICANT CHANGE UP (ref 5–17)
ANION GAP SERPL CALC-SCNC: 11 MMOL/L — SIGNIFICANT CHANGE UP (ref 5–17)
BUN SERPL-MCNC: 13 MG/DL — SIGNIFICANT CHANGE UP (ref 7–23)
BUN SERPL-MCNC: 13 MG/DL — SIGNIFICANT CHANGE UP (ref 7–23)
CALCIUM SERPL-MCNC: 8.7 MG/DL — SIGNIFICANT CHANGE UP (ref 8.4–10.5)
CALCIUM SERPL-MCNC: 8.7 MG/DL — SIGNIFICANT CHANGE UP (ref 8.4–10.5)
CHLORIDE SERPL-SCNC: 99 MMOL/L — SIGNIFICANT CHANGE UP (ref 96–108)
CHLORIDE SERPL-SCNC: 99 MMOL/L — SIGNIFICANT CHANGE UP (ref 96–108)
CO2 SERPL-SCNC: 38 MMOL/L — HIGH (ref 22–31)
CO2 SERPL-SCNC: 38 MMOL/L — HIGH (ref 22–31)
CREAT SERPL-MCNC: 0.86 MG/DL — SIGNIFICANT CHANGE UP (ref 0.5–1.3)
CREAT SERPL-MCNC: 0.86 MG/DL — SIGNIFICANT CHANGE UP (ref 0.5–1.3)
EGFR: 64 ML/MIN/1.73M2 — SIGNIFICANT CHANGE UP
EGFR: 64 ML/MIN/1.73M2 — SIGNIFICANT CHANGE UP
GLUCOSE SERPL-MCNC: 80 MG/DL — SIGNIFICANT CHANGE UP (ref 70–99)
GLUCOSE SERPL-MCNC: 80 MG/DL — SIGNIFICANT CHANGE UP (ref 70–99)
HCT VFR BLD CALC: 32.4 % — LOW (ref 34.5–45)
HCT VFR BLD CALC: 32.4 % — LOW (ref 34.5–45)
HGB BLD-MCNC: 9.7 G/DL — LOW (ref 11.5–15.5)
HGB BLD-MCNC: 9.7 G/DL — LOW (ref 11.5–15.5)
MAGNESIUM SERPL-MCNC: 2 MG/DL — SIGNIFICANT CHANGE UP (ref 1.6–2.6)
MAGNESIUM SERPL-MCNC: 2 MG/DL — SIGNIFICANT CHANGE UP (ref 1.6–2.6)
MCHC RBC-ENTMCNC: 28.5 PG — SIGNIFICANT CHANGE UP (ref 27–34)
MCHC RBC-ENTMCNC: 28.5 PG — SIGNIFICANT CHANGE UP (ref 27–34)
MCHC RBC-ENTMCNC: 29.9 GM/DL — LOW (ref 32–36)
MCHC RBC-ENTMCNC: 29.9 GM/DL — LOW (ref 32–36)
MCV RBC AUTO: 95.3 FL — SIGNIFICANT CHANGE UP (ref 80–100)
MCV RBC AUTO: 95.3 FL — SIGNIFICANT CHANGE UP (ref 80–100)
NRBC # BLD: 0 /100 WBCS — SIGNIFICANT CHANGE UP (ref 0–0)
NRBC # BLD: 0 /100 WBCS — SIGNIFICANT CHANGE UP (ref 0–0)
PHOSPHATE SERPL-MCNC: 2.7 MG/DL — SIGNIFICANT CHANGE UP (ref 2.5–4.5)
PHOSPHATE SERPL-MCNC: 2.7 MG/DL — SIGNIFICANT CHANGE UP (ref 2.5–4.5)
PLATELET # BLD AUTO: 135 K/UL — LOW (ref 150–400)
PLATELET # BLD AUTO: 135 K/UL — LOW (ref 150–400)
POTASSIUM SERPL-MCNC: 3.5 MMOL/L — SIGNIFICANT CHANGE UP (ref 3.5–5.3)
POTASSIUM SERPL-MCNC: 3.5 MMOL/L — SIGNIFICANT CHANGE UP (ref 3.5–5.3)
POTASSIUM SERPL-SCNC: 3.5 MMOL/L — SIGNIFICANT CHANGE UP (ref 3.5–5.3)
POTASSIUM SERPL-SCNC: 3.5 MMOL/L — SIGNIFICANT CHANGE UP (ref 3.5–5.3)
RBC # BLD: 3.4 M/UL — LOW (ref 3.8–5.2)
RBC # BLD: 3.4 M/UL — LOW (ref 3.8–5.2)
RBC # FLD: 15 % — HIGH (ref 10.3–14.5)
RBC # FLD: 15 % — HIGH (ref 10.3–14.5)
SODIUM SERPL-SCNC: 148 MMOL/L — HIGH (ref 135–145)
SODIUM SERPL-SCNC: 148 MMOL/L — HIGH (ref 135–145)
WBC # BLD: 8.57 K/UL — SIGNIFICANT CHANGE UP (ref 3.8–10.5)
WBC # BLD: 8.57 K/UL — SIGNIFICANT CHANGE UP (ref 3.8–10.5)
WBC # FLD AUTO: 8.57 K/UL — SIGNIFICANT CHANGE UP (ref 3.8–10.5)
WBC # FLD AUTO: 8.57 K/UL — SIGNIFICANT CHANGE UP (ref 3.8–10.5)

## 2024-01-13 PROCEDURE — 93010 ELECTROCARDIOGRAM REPORT: CPT

## 2024-01-13 RX ORDER — FUROSEMIDE 40 MG
20 TABLET ORAL
Refills: 0 | Status: DISCONTINUED | OUTPATIENT
Start: 2024-01-13 | End: 2024-01-13

## 2024-01-13 RX ORDER — FUROSEMIDE 40 MG
40 TABLET ORAL
Refills: 0 | Status: DISCONTINUED | OUTPATIENT
Start: 2024-01-13 | End: 2024-01-16

## 2024-01-13 RX ORDER — METOPROLOL TARTRATE 50 MG
5 TABLET ORAL ONCE
Refills: 0 | Status: COMPLETED | OUTPATIENT
Start: 2024-01-13 | End: 2024-01-13

## 2024-01-13 RX ADMIN — Medication 650 MILLIGRAM(S): at 19:12

## 2024-01-13 RX ADMIN — Medication 650 MILLIGRAM(S): at 06:51

## 2024-01-13 RX ADMIN — Medication 650 MILLIGRAM(S): at 11:41

## 2024-01-13 RX ADMIN — DABIGATRAN ETEXILATE MESYLATE 150 MILLIGRAM(S): 150 CAPSULE ORAL at 06:27

## 2024-01-13 RX ADMIN — Medication 75 MILLIGRAM(S): at 06:21

## 2024-01-13 RX ADMIN — SPIRONOLACTONE 12.5 MILLIGRAM(S): 25 TABLET, FILM COATED ORAL at 06:21

## 2024-01-13 RX ADMIN — DABIGATRAN ETEXILATE MESYLATE 150 MILLIGRAM(S): 150 CAPSULE ORAL at 18:13

## 2024-01-13 RX ADMIN — Medication 75 MILLIGRAM(S): at 18:13

## 2024-01-13 RX ADMIN — Medication 650 MILLIGRAM(S): at 12:41

## 2024-01-13 RX ADMIN — Medication 650 MILLIGRAM(S): at 18:12

## 2024-01-13 RX ADMIN — Medication 5 MILLIGRAM(S): at 00:31

## 2024-01-13 RX ADMIN — ATORVASTATIN CALCIUM 20 MILLIGRAM(S): 80 TABLET, FILM COATED ORAL at 21:35

## 2024-01-13 RX ADMIN — Medication 650 MILLIGRAM(S): at 06:21

## 2024-01-13 RX ADMIN — Medication 40 MILLIGRAM(S): at 13:31

## 2024-01-13 NOTE — PROVIDER CONTACT NOTE (OTHER) - ASSESSMENT
Pt on tele and saw that her HR was unsteady between 130-160. It showed she was in A-fib. Upon assessing the patient she was resting comfortably and denied any CP, SOB, wheezing, or any additional symptoms. VS were 122/74 and HR on tele was 140, Oxygen was 97and temp was 97.4. Last EKG was done 1/11 and showed A-fib RVR. Pt also didn't receive her metoprolol at 1800. Pt on tele and tele tech alerted RN that patient was in Afib w/ HR going up the 170's. Upon assessing the patient, she was resting comfortably and denied any CP, SOB, difficulty breathing, or any additional symptoms. VS were stable: /74, O2: 97%, T: 97.4F. EKG performed and showed Afib w/ RVR.

## 2024-01-13 NOTE — PROVIDER CONTACT NOTE (OTHER) - BACKGROUND
Pt came in for N/V on 1/11. ON 1/11 she was admitted to  and then on tele they saw she had rapid A-fib and transferred her to 9M for monitoring. Pt has history of A-fib. Pt A&Ox4, admitted to 2M n for N/V on 1/11 and transferred to 9M for tele as pt. has history of A-fib.

## 2024-01-13 NOTE — PROGRESS NOTE ADULT - ASSESSMENT
89F w/ PMHx Afib on pradaxa, prior DVT, HTN, HLD, CHF on home O2/lasix, PSHx hysterectomy ~40yrs ago presents to the ED due to home care lab results of hyperkalemia to 9 and nausea. CT w/ findings SBO w/ RLQ TP. NGT placed w/ 400cc immediate bilious output.    PLAN:  - Discharge with home  - Reg diet  - Monitor I/Os  - Home meds PO  - Dispo: Discharge planning      ACS/Trauma [id number]

## 2024-01-13 NOTE — PROVIDER CONTACT NOTE (OTHER) - ASSESSMENT
Pt denies any pain or distress.  Pt was being cleaned when dark red bleeding noted.  Pt urinated and had bowel movement.

## 2024-01-14 LAB
ANION GAP SERPL CALC-SCNC: 11 MMOL/L — SIGNIFICANT CHANGE UP (ref 5–17)
ANION GAP SERPL CALC-SCNC: 11 MMOL/L — SIGNIFICANT CHANGE UP (ref 5–17)
ANION GAP SERPL CALC-SCNC: 8 MMOL/L — SIGNIFICANT CHANGE UP (ref 5–17)
ANION GAP SERPL CALC-SCNC: 8 MMOL/L — SIGNIFICANT CHANGE UP (ref 5–17)
BUN SERPL-MCNC: 14 MG/DL — SIGNIFICANT CHANGE UP (ref 7–23)
BUN SERPL-MCNC: 14 MG/DL — SIGNIFICANT CHANGE UP (ref 7–23)
BUN SERPL-MCNC: 15 MG/DL — SIGNIFICANT CHANGE UP (ref 7–23)
BUN SERPL-MCNC: 15 MG/DL — SIGNIFICANT CHANGE UP (ref 7–23)
CALCIUM SERPL-MCNC: 8.1 MG/DL — LOW (ref 8.4–10.5)
CALCIUM SERPL-MCNC: 8.1 MG/DL — LOW (ref 8.4–10.5)
CALCIUM SERPL-MCNC: 8.2 MG/DL — LOW (ref 8.4–10.5)
CALCIUM SERPL-MCNC: 8.2 MG/DL — LOW (ref 8.4–10.5)
CHLORIDE SERPL-SCNC: 98 MMOL/L — SIGNIFICANT CHANGE UP (ref 96–108)
CHLORIDE SERPL-SCNC: 98 MMOL/L — SIGNIFICANT CHANGE UP (ref 96–108)
CHLORIDE SERPL-SCNC: 99 MMOL/L — SIGNIFICANT CHANGE UP (ref 96–108)
CHLORIDE SERPL-SCNC: 99 MMOL/L — SIGNIFICANT CHANGE UP (ref 96–108)
CO2 SERPL-SCNC: 40 MMOL/L — HIGH (ref 22–31)
CREAT SERPL-MCNC: 0.82 MG/DL — SIGNIFICANT CHANGE UP (ref 0.5–1.3)
CREAT SERPL-MCNC: 0.82 MG/DL — SIGNIFICANT CHANGE UP (ref 0.5–1.3)
CREAT SERPL-MCNC: 0.83 MG/DL — SIGNIFICANT CHANGE UP (ref 0.5–1.3)
CREAT SERPL-MCNC: 0.83 MG/DL — SIGNIFICANT CHANGE UP (ref 0.5–1.3)
EGFR: 67 ML/MIN/1.73M2 — SIGNIFICANT CHANGE UP
GLUCOSE SERPL-MCNC: 103 MG/DL — HIGH (ref 70–99)
GLUCOSE SERPL-MCNC: 103 MG/DL — HIGH (ref 70–99)
GLUCOSE SERPL-MCNC: 124 MG/DL — HIGH (ref 70–99)
GLUCOSE SERPL-MCNC: 124 MG/DL — HIGH (ref 70–99)
HCT VFR BLD CALC: 33.1 % — LOW (ref 34.5–45)
HCT VFR BLD CALC: 33.1 % — LOW (ref 34.5–45)
HCT VFR BLD CALC: 34.3 % — LOW (ref 34.5–45)
HCT VFR BLD CALC: 34.3 % — LOW (ref 34.5–45)
HGB BLD-MCNC: 10 G/DL — LOW (ref 11.5–15.5)
MAGNESIUM SERPL-MCNC: 1.7 MG/DL — SIGNIFICANT CHANGE UP (ref 1.6–2.6)
MAGNESIUM SERPL-MCNC: 1.7 MG/DL — SIGNIFICANT CHANGE UP (ref 1.6–2.6)
MCHC RBC-ENTMCNC: 27.9 PG — SIGNIFICANT CHANGE UP (ref 27–34)
MCHC RBC-ENTMCNC: 27.9 PG — SIGNIFICANT CHANGE UP (ref 27–34)
MCHC RBC-ENTMCNC: 28.8 PG — SIGNIFICANT CHANGE UP (ref 27–34)
MCHC RBC-ENTMCNC: 28.8 PG — SIGNIFICANT CHANGE UP (ref 27–34)
MCHC RBC-ENTMCNC: 29.2 GM/DL — LOW (ref 32–36)
MCHC RBC-ENTMCNC: 29.2 GM/DL — LOW (ref 32–36)
MCHC RBC-ENTMCNC: 30.2 GM/DL — LOW (ref 32–36)
MCHC RBC-ENTMCNC: 30.2 GM/DL — LOW (ref 32–36)
MCV RBC AUTO: 95.4 FL — SIGNIFICANT CHANGE UP (ref 80–100)
MCV RBC AUTO: 95.4 FL — SIGNIFICANT CHANGE UP (ref 80–100)
MCV RBC AUTO: 95.8 FL — SIGNIFICANT CHANGE UP (ref 80–100)
MCV RBC AUTO: 95.8 FL — SIGNIFICANT CHANGE UP (ref 80–100)
NRBC # BLD: 0 /100 WBCS — SIGNIFICANT CHANGE UP (ref 0–0)
PHOSPHATE SERPL-MCNC: 2.5 MG/DL — SIGNIFICANT CHANGE UP (ref 2.5–4.5)
PHOSPHATE SERPL-MCNC: 2.5 MG/DL — SIGNIFICANT CHANGE UP (ref 2.5–4.5)
PLATELET # BLD AUTO: 138 K/UL — LOW (ref 150–400)
PLATELET # BLD AUTO: 138 K/UL — LOW (ref 150–400)
PLATELET # BLD AUTO: 141 K/UL — LOW (ref 150–400)
PLATELET # BLD AUTO: 141 K/UL — LOW (ref 150–400)
POTASSIUM SERPL-MCNC: 3.2 MMOL/L — LOW (ref 3.5–5.3)
POTASSIUM SERPL-SCNC: 3.2 MMOL/L — LOW (ref 3.5–5.3)
RBC # BLD: 3.47 M/UL — LOW (ref 3.8–5.2)
RBC # BLD: 3.47 M/UL — LOW (ref 3.8–5.2)
RBC # BLD: 3.58 M/UL — LOW (ref 3.8–5.2)
RBC # BLD: 3.58 M/UL — LOW (ref 3.8–5.2)
RBC # FLD: 14.7 % — HIGH (ref 10.3–14.5)
RBC # FLD: 14.7 % — HIGH (ref 10.3–14.5)
RBC # FLD: 15 % — HIGH (ref 10.3–14.5)
RBC # FLD: 15 % — HIGH (ref 10.3–14.5)
SODIUM SERPL-SCNC: 146 MMOL/L — HIGH (ref 135–145)
SODIUM SERPL-SCNC: 146 MMOL/L — HIGH (ref 135–145)
SODIUM SERPL-SCNC: 150 MMOL/L — HIGH (ref 135–145)
SODIUM SERPL-SCNC: 150 MMOL/L — HIGH (ref 135–145)
WBC # BLD: 10.96 K/UL — HIGH (ref 3.8–10.5)
WBC # BLD: 10.96 K/UL — HIGH (ref 3.8–10.5)
WBC # BLD: 11.41 K/UL — HIGH (ref 3.8–10.5)
WBC # BLD: 11.41 K/UL — HIGH (ref 3.8–10.5)
WBC # FLD AUTO: 10.96 K/UL — HIGH (ref 3.8–10.5)
WBC # FLD AUTO: 10.96 K/UL — HIGH (ref 3.8–10.5)
WBC # FLD AUTO: 11.41 K/UL — HIGH (ref 3.8–10.5)
WBC # FLD AUTO: 11.41 K/UL — HIGH (ref 3.8–10.5)

## 2024-01-14 RX ORDER — POTASSIUM CHLORIDE 20 MEQ
20 PACKET (EA) ORAL ONCE
Refills: 0 | Status: COMPLETED | OUTPATIENT
Start: 2024-01-14 | End: 2024-01-14

## 2024-01-14 RX ORDER — POTASSIUM CHLORIDE 20 MEQ
40 PACKET (EA) ORAL EVERY 4 HOURS
Refills: 0 | Status: COMPLETED | OUTPATIENT
Start: 2024-01-14 | End: 2024-01-14

## 2024-01-14 RX ORDER — CALCIUM CARBONATE 500(1250)
1 TABLET ORAL ONCE
Refills: 0 | Status: COMPLETED | OUTPATIENT
Start: 2024-01-14 | End: 2024-01-14

## 2024-01-14 RX ADMIN — Medication 40 MILLIEQUIVALENT(S): at 10:42

## 2024-01-14 RX ADMIN — Medication 75 MILLIGRAM(S): at 17:49

## 2024-01-14 RX ADMIN — Medication 40 MILLIGRAM(S): at 13:22

## 2024-01-14 RX ADMIN — SPIRONOLACTONE 12.5 MILLIGRAM(S): 25 TABLET, FILM COATED ORAL at 06:12

## 2024-01-14 RX ADMIN — Medication 650 MILLIGRAM(S): at 13:31

## 2024-01-14 RX ADMIN — Medication 650 MILLIGRAM(S): at 18:49

## 2024-01-14 RX ADMIN — DABIGATRAN ETEXILATE MESYLATE 150 MILLIGRAM(S): 150 CAPSULE ORAL at 06:12

## 2024-01-14 RX ADMIN — Medication 1 TABLET(S): at 13:23

## 2024-01-14 RX ADMIN — Medication 650 MILLIGRAM(S): at 12:31

## 2024-01-14 RX ADMIN — Medication 75 MILLIGRAM(S): at 06:12

## 2024-01-14 RX ADMIN — Medication 40 MILLIGRAM(S): at 06:12

## 2024-01-14 RX ADMIN — Medication 650 MILLIGRAM(S): at 17:49

## 2024-01-14 RX ADMIN — Medication 50 MILLIEQUIVALENT(S): at 13:23

## 2024-01-14 RX ADMIN — ATORVASTATIN CALCIUM 20 MILLIGRAM(S): 80 TABLET, FILM COATED ORAL at 22:56

## 2024-01-14 RX ADMIN — DABIGATRAN ETEXILATE MESYLATE 150 MILLIGRAM(S): 150 CAPSULE ORAL at 20:16

## 2024-01-14 NOTE — PROVIDER CONTACT NOTE (OTHER) - BACKGROUND
PMH Lymphedema, HTN, MI, Hypercalcemia, DVT, aFib (on pradaxa). Patient presented with a small bowel obstruction.

## 2024-01-14 NOTE — PROGRESS NOTE ADULT - ATTENDING COMMENTS
ATTENDING ATTESTATION:    89F history of afib on pradaxa, HTN, HFpEF on home O2 and lasix, previous hysterectomy with adhesive SBO.     On 2L NC  Abd soft, NT/ND    WBC = 11  Na = 150  Cr 0.8    A/P:   #Postoperative adhesive SBO, resolved  - continue regular diet    #HFpEF  - continue home lasix 40mg BID and spironolactone  - appears euvolemic    #afib  - continue home metoprolol  - continue pradaxa    DISPO - home PT, pending delivery of wheelchair      Total time spent in the care of this patient today (excluding critical care, teaching & procedures): 35 minutes    Over 50% of the total time was spent in discussion and coordination of care with consulting services, dietary and rehab services.    Nafisa Luther MD  Acute Care Surgery

## 2024-01-14 NOTE — PROGRESS NOTE ADULT - ASSESSMENT
89F w/ PMHx Afib on pradaxa, prior DVT, HTN, HLD, CHF on home O2/lasix, PSHx hysterectomy ~40yrs ago presents to the ED due to home care lab results of hyperkalemia to 9 and nausea. CT w/ findings SBO w/ RLQ TP. NGT placed w/ 400cc immediate bilious output.    PLAN:  - Discharge with home  - Reg diet  - Monitor I/Os  - Home meds PO  - f/u PT (daughter requested pt to be seen by PT tmrw AM)  - Dispo: Discharge planning (CM will f/u wheelchair status tmrw)      ACS/Trauma [id number]

## 2024-01-14 NOTE — PROVIDER CONTACT NOTE (OTHER) - ASSESSMENT
Pt asymptomatic and denies any pain or distress at this time.  Pt had two loose BMs early this AM and at 5 am with dark red color noted.

## 2024-01-15 LAB
ANION GAP SERPL CALC-SCNC: 9 MMOL/L — SIGNIFICANT CHANGE UP (ref 5–17)
ANION GAP SERPL CALC-SCNC: 9 MMOL/L — SIGNIFICANT CHANGE UP (ref 5–17)
BUN SERPL-MCNC: 14 MG/DL — SIGNIFICANT CHANGE UP (ref 7–23)
BUN SERPL-MCNC: 14 MG/DL — SIGNIFICANT CHANGE UP (ref 7–23)
CALCIUM SERPL-MCNC: 8.3 MG/DL — LOW (ref 8.4–10.5)
CALCIUM SERPL-MCNC: 8.3 MG/DL — LOW (ref 8.4–10.5)
CHLORIDE SERPL-SCNC: 97 MMOL/L — SIGNIFICANT CHANGE UP (ref 96–108)
CHLORIDE SERPL-SCNC: 97 MMOL/L — SIGNIFICANT CHANGE UP (ref 96–108)
CO2 SERPL-SCNC: 40 MMOL/L — HIGH (ref 22–31)
CO2 SERPL-SCNC: 40 MMOL/L — HIGH (ref 22–31)
CREAT SERPL-MCNC: 0.78 MG/DL — SIGNIFICANT CHANGE UP (ref 0.5–1.3)
CREAT SERPL-MCNC: 0.78 MG/DL — SIGNIFICANT CHANGE UP (ref 0.5–1.3)
EGFR: 72 ML/MIN/1.73M2 — SIGNIFICANT CHANGE UP
EGFR: 72 ML/MIN/1.73M2 — SIGNIFICANT CHANGE UP
GLUCOSE SERPL-MCNC: 99 MG/DL — SIGNIFICANT CHANGE UP (ref 70–99)
GLUCOSE SERPL-MCNC: 99 MG/DL — SIGNIFICANT CHANGE UP (ref 70–99)
HCT VFR BLD CALC: 36.1 % — SIGNIFICANT CHANGE UP (ref 34.5–45)
HCT VFR BLD CALC: 36.1 % — SIGNIFICANT CHANGE UP (ref 34.5–45)
HGB BLD-MCNC: 10.7 G/DL — LOW (ref 11.5–15.5)
HGB BLD-MCNC: 10.7 G/DL — LOW (ref 11.5–15.5)
MAGNESIUM SERPL-MCNC: 1.4 MG/DL — LOW (ref 1.6–2.6)
MAGNESIUM SERPL-MCNC: 1.4 MG/DL — LOW (ref 1.6–2.6)
MCHC RBC-ENTMCNC: 28.2 PG — SIGNIFICANT CHANGE UP (ref 27–34)
MCHC RBC-ENTMCNC: 28.2 PG — SIGNIFICANT CHANGE UP (ref 27–34)
MCHC RBC-ENTMCNC: 29.6 GM/DL — LOW (ref 32–36)
MCHC RBC-ENTMCNC: 29.6 GM/DL — LOW (ref 32–36)
MCV RBC AUTO: 95.3 FL — SIGNIFICANT CHANGE UP (ref 80–100)
MCV RBC AUTO: 95.3 FL — SIGNIFICANT CHANGE UP (ref 80–100)
NRBC # BLD: 0 /100 WBCS — SIGNIFICANT CHANGE UP (ref 0–0)
NRBC # BLD: 0 /100 WBCS — SIGNIFICANT CHANGE UP (ref 0–0)
PHOSPHATE SERPL-MCNC: 2.5 MG/DL — SIGNIFICANT CHANGE UP (ref 2.5–4.5)
PHOSPHATE SERPL-MCNC: 2.5 MG/DL — SIGNIFICANT CHANGE UP (ref 2.5–4.5)
PLATELET # BLD AUTO: 116 K/UL — LOW (ref 150–400)
PLATELET # BLD AUTO: 116 K/UL — LOW (ref 150–400)
POTASSIUM SERPL-MCNC: 3.4 MMOL/L — LOW (ref 3.5–5.3)
POTASSIUM SERPL-MCNC: 3.4 MMOL/L — LOW (ref 3.5–5.3)
POTASSIUM SERPL-SCNC: 3.4 MMOL/L — LOW (ref 3.5–5.3)
POTASSIUM SERPL-SCNC: 3.4 MMOL/L — LOW (ref 3.5–5.3)
RBC # BLD: 3.79 M/UL — LOW (ref 3.8–5.2)
RBC # BLD: 3.79 M/UL — LOW (ref 3.8–5.2)
RBC # FLD: 14.9 % — HIGH (ref 10.3–14.5)
RBC # FLD: 14.9 % — HIGH (ref 10.3–14.5)
SODIUM SERPL-SCNC: 146 MMOL/L — HIGH (ref 135–145)
SODIUM SERPL-SCNC: 146 MMOL/L — HIGH (ref 135–145)
WBC # BLD: 10.66 K/UL — HIGH (ref 3.8–10.5)
WBC # BLD: 10.66 K/UL — HIGH (ref 3.8–10.5)
WBC # FLD AUTO: 10.66 K/UL — HIGH (ref 3.8–10.5)
WBC # FLD AUTO: 10.66 K/UL — HIGH (ref 3.8–10.5)

## 2024-01-15 PROCEDURE — 99232 SBSQ HOSP IP/OBS MODERATE 35: CPT

## 2024-01-15 PROCEDURE — 99233 SBSQ HOSP IP/OBS HIGH 50: CPT | Mod: GC

## 2024-01-15 RX ORDER — METOPROLOL TARTRATE 50 MG
75 TABLET ORAL EVERY 12 HOURS
Refills: 0 | Status: DISCONTINUED | OUTPATIENT
Start: 2024-01-15 | End: 2024-01-16

## 2024-01-15 RX ORDER — POTASSIUM CHLORIDE 20 MEQ
20 PACKET (EA) ORAL
Refills: 0 | Status: COMPLETED | OUTPATIENT
Start: 2024-01-15 | End: 2024-01-15

## 2024-01-15 RX ORDER — MAGNESIUM SULFATE 500 MG/ML
2 VIAL (ML) INJECTION ONCE
Refills: 0 | Status: COMPLETED | OUTPATIENT
Start: 2024-01-15 | End: 2024-01-15

## 2024-01-15 RX ADMIN — Medication 75 MILLIGRAM(S): at 17:53

## 2024-01-15 RX ADMIN — Medication 40 MILLIGRAM(S): at 14:40

## 2024-01-15 RX ADMIN — DABIGATRAN ETEXILATE MESYLATE 150 MILLIGRAM(S): 150 CAPSULE ORAL at 06:08

## 2024-01-15 RX ADMIN — Medication 20 MILLIEQUIVALENT(S): at 12:17

## 2024-01-15 RX ADMIN — Medication 40 MILLIGRAM(S): at 05:11

## 2024-01-15 RX ADMIN — SPIRONOLACTONE 12.5 MILLIGRAM(S): 25 TABLET, FILM COATED ORAL at 05:11

## 2024-01-15 RX ADMIN — Medication 75 MILLIGRAM(S): at 05:11

## 2024-01-15 RX ADMIN — ATORVASTATIN CALCIUM 20 MILLIGRAM(S): 80 TABLET, FILM COATED ORAL at 22:36

## 2024-01-15 RX ADMIN — DABIGATRAN ETEXILATE MESYLATE 150 MILLIGRAM(S): 150 CAPSULE ORAL at 18:17

## 2024-01-15 RX ADMIN — Medication 25 GRAM(S): at 11:54

## 2024-01-15 RX ADMIN — Medication 20 MILLIEQUIVALENT(S): at 11:51

## 2024-01-15 NOTE — PROGRESS NOTE ADULT - TIME BILLING
I saw and evaluated patient and agree with above note  resting comfortably  reviewed plan of care with daughter  formulating disposition plan

## 2024-01-15 NOTE — PROGRESS NOTE ADULT - ASSESSMENT
89F w/ PMHx Afib on pradaxa, prior DVT, HTN, HLD, CHF on home O2/lasix, PSHx hysterectomy ~40yrs ago presents to the ED due to home care lab results of hyperkalemia to 9 and nausea. CT w/ findings SBO w/ RLQ TP. NGT placed w/ 400cc immediate bilious output, SBO now resolved, pending discharge home     PLAN:  - Reg diet  - Monitor I/Os  - Home meds PO  - f/u PT (daughter requested pt to be seen by PT tmrw AM)  - Dispo: Discharge planning (CM will f/u wheelchair status tmrw)      ACS/Trauma [id number]

## 2024-01-16 ENCOUNTER — TRANSCRIPTION ENCOUNTER (OUTPATIENT)
Age: 89
End: 2024-01-16

## 2024-01-16 VITALS
OXYGEN SATURATION: 96 % | SYSTOLIC BLOOD PRESSURE: 120 MMHG | HEART RATE: 100 BPM | RESPIRATION RATE: 18 BRPM | DIASTOLIC BLOOD PRESSURE: 75 MMHG

## 2024-01-16 DIAGNOSIS — R79.89 OTHER SPECIFIED ABNORMAL FINDINGS OF BLOOD CHEMISTRY: ICD-10-CM

## 2024-01-16 LAB
ANION GAP SERPL CALC-SCNC: 5 MMOL/L — SIGNIFICANT CHANGE UP (ref 5–17)
ANION GAP SERPL CALC-SCNC: 6 MMOL/L — SIGNIFICANT CHANGE UP (ref 5–17)
BUN SERPL-MCNC: 14 MG/DL — SIGNIFICANT CHANGE UP (ref 7–23)
BUN SERPL-MCNC: 14 MG/DL — SIGNIFICANT CHANGE UP (ref 7–23)
CALCIUM SERPL-MCNC: 8.7 MG/DL — SIGNIFICANT CHANGE UP (ref 8.4–10.5)
CALCIUM SERPL-MCNC: 9.3 MG/DL — SIGNIFICANT CHANGE UP (ref 8.4–10.5)
CHLORIDE SERPL-SCNC: 87 MMOL/L — LOW (ref 96–108)
CHLORIDE SERPL-SCNC: 89 MMOL/L — LOW (ref 96–108)
CO2 SERPL-SCNC: >45 (ref 22–31)
CO2 SERPL-SCNC: >45 MMOL/L — CRITICAL HIGH (ref 22–31)
CREAT SERPL-MCNC: 0.8 MG/DL — SIGNIFICANT CHANGE UP (ref 0.5–1.3)
CREAT SERPL-MCNC: 0.84 MG/DL — SIGNIFICANT CHANGE UP (ref 0.5–1.3)
EGFR: 66 ML/MIN/1.73M2 — SIGNIFICANT CHANGE UP
EGFR: 70 ML/MIN/1.73M2 — SIGNIFICANT CHANGE UP
GLUCOSE SERPL-MCNC: 109 MG/DL — HIGH (ref 70–99)
GLUCOSE SERPL-MCNC: 157 MG/DL — HIGH (ref 70–99)
HCT VFR BLD CALC: 36.1 % — SIGNIFICANT CHANGE UP (ref 34.5–45)
HGB BLD-MCNC: 10.7 G/DL — LOW (ref 11.5–15.5)
MAGNESIUM SERPL-MCNC: 1.6 MG/DL — SIGNIFICANT CHANGE UP (ref 1.6–2.6)
MAGNESIUM SERPL-MCNC: 1.6 MG/DL — SIGNIFICANT CHANGE UP (ref 1.6–2.6)
MCHC RBC-ENTMCNC: 28.2 PG — SIGNIFICANT CHANGE UP (ref 27–34)
MCHC RBC-ENTMCNC: 29.6 GM/DL — LOW (ref 32–36)
MCV RBC AUTO: 95.3 FL — SIGNIFICANT CHANGE UP (ref 80–100)
NRBC # BLD: 0 /100 WBCS — SIGNIFICANT CHANGE UP (ref 0–0)
PHOSPHATE SERPL-MCNC: 2.6 MG/DL — SIGNIFICANT CHANGE UP (ref 2.5–4.5)
PHOSPHATE SERPL-MCNC: 2.6 MG/DL — SIGNIFICANT CHANGE UP (ref 2.5–4.5)
PLATELET # BLD AUTO: 136 K/UL — LOW (ref 150–400)
POTASSIUM SERPL-MCNC: 3.1 MMOL/L — LOW (ref 3.5–5.3)
POTASSIUM SERPL-MCNC: 3.6 MMOL/L — SIGNIFICANT CHANGE UP (ref 3.5–5.3)
POTASSIUM SERPL-SCNC: 3.1 MMOL/L — LOW (ref 3.5–5.3)
POTASSIUM SERPL-SCNC: 3.6 MMOL/L — SIGNIFICANT CHANGE UP (ref 3.5–5.3)
RBC # BLD: 3.79 M/UL — LOW (ref 3.8–5.2)
RBC # FLD: 14.6 % — HIGH (ref 10.3–14.5)
SODIUM SERPL-SCNC: 144 MMOL/L — SIGNIFICANT CHANGE UP (ref 135–145)
SODIUM SERPL-SCNC: 147 MMOL/L — HIGH (ref 135–145)
WBC # BLD: 10.39 K/UL — SIGNIFICANT CHANGE UP (ref 3.8–10.5)
WBC # FLD AUTO: 10.39 K/UL — SIGNIFICANT CHANGE UP (ref 3.8–10.5)

## 2024-01-16 PROCEDURE — 83880 ASSAY OF NATRIURETIC PEPTIDE: CPT

## 2024-01-16 PROCEDURE — 82803 BLOOD GASES ANY COMBINATION: CPT

## 2024-01-16 PROCEDURE — 97116 GAIT TRAINING THERAPY: CPT

## 2024-01-16 PROCEDURE — 83605 ASSAY OF LACTIC ACID: CPT

## 2024-01-16 PROCEDURE — 93005 ELECTROCARDIOGRAM TRACING: CPT

## 2024-01-16 PROCEDURE — 83735 ASSAY OF MAGNESIUM: CPT

## 2024-01-16 PROCEDURE — 85027 COMPLETE CBC AUTOMATED: CPT

## 2024-01-16 PROCEDURE — 99232 SBSQ HOSP IP/OBS MODERATE 35: CPT

## 2024-01-16 PROCEDURE — 96365 THER/PROPH/DIAG IV INF INIT: CPT

## 2024-01-16 PROCEDURE — 71045 X-RAY EXAM CHEST 1 VIEW: CPT

## 2024-01-16 PROCEDURE — 82947 ASSAY GLUCOSE BLOOD QUANT: CPT

## 2024-01-16 PROCEDURE — 85014 HEMATOCRIT: CPT

## 2024-01-16 PROCEDURE — 99285 EMERGENCY DEPT VISIT HI MDM: CPT | Mod: 25

## 2024-01-16 PROCEDURE — 82435 ASSAY OF BLOOD CHLORIDE: CPT

## 2024-01-16 PROCEDURE — 85025 COMPLETE CBC W/AUTO DIFF WBC: CPT

## 2024-01-16 PROCEDURE — 80053 COMPREHEN METABOLIC PANEL: CPT

## 2024-01-16 PROCEDURE — 74177 CT ABD & PELVIS W/CONTRAST: CPT | Mod: MA

## 2024-01-16 PROCEDURE — 36415 COLL VENOUS BLD VENIPUNCTURE: CPT

## 2024-01-16 PROCEDURE — 85018 HEMOGLOBIN: CPT

## 2024-01-16 PROCEDURE — 84295 ASSAY OF SERUM SODIUM: CPT

## 2024-01-16 PROCEDURE — 84100 ASSAY OF PHOSPHORUS: CPT

## 2024-01-16 PROCEDURE — 80048 BASIC METABOLIC PNL TOTAL CA: CPT

## 2024-01-16 PROCEDURE — 82330 ASSAY OF CALCIUM: CPT

## 2024-01-16 PROCEDURE — 74018 RADEX ABDOMEN 1 VIEW: CPT

## 2024-01-16 PROCEDURE — 97161 PT EVAL LOW COMPLEX 20 MIN: CPT

## 2024-01-16 PROCEDURE — 84132 ASSAY OF SERUM POTASSIUM: CPT

## 2024-01-16 PROCEDURE — 96367 TX/PROPH/DG ADDL SEQ IV INF: CPT

## 2024-01-16 PROCEDURE — 97530 THERAPEUTIC ACTIVITIES: CPT

## 2024-01-16 RX ORDER — ACETAMINOPHEN 500 MG
2 TABLET ORAL
Qty: 0 | Refills: 0 | DISCHARGE
Start: 2024-01-16

## 2024-01-16 RX ORDER — LEVOFLOXACIN 5 MG/ML
1 INJECTION, SOLUTION INTRAVENOUS
Refills: 0 | DISCHARGE

## 2024-01-16 RX ORDER — SODIUM CHLORIDE 9 MG/ML
1000 INJECTION, SOLUTION INTRAVENOUS
Refills: 0 | Status: DISCONTINUED | OUTPATIENT
Start: 2024-01-16 | End: 2024-01-16

## 2024-01-16 RX ORDER — FUROSEMIDE 40 MG
2 TABLET ORAL
Refills: 0 | DISCHARGE

## 2024-01-16 RX ORDER — POTASSIUM CHLORIDE 20 MEQ
40 PACKET (EA) ORAL ONCE
Refills: 0 | Status: COMPLETED | OUTPATIENT
Start: 2024-01-16 | End: 2024-01-16

## 2024-01-16 RX ORDER — MAGNESIUM SULFATE 500 MG/ML
2 VIAL (ML) INJECTION ONCE
Refills: 0 | Status: COMPLETED | OUTPATIENT
Start: 2024-01-16 | End: 2024-01-16

## 2024-01-16 RX ADMIN — DABIGATRAN ETEXILATE MESYLATE 150 MILLIGRAM(S): 150 CAPSULE ORAL at 06:20

## 2024-01-16 RX ADMIN — SPIRONOLACTONE 12.5 MILLIGRAM(S): 25 TABLET, FILM COATED ORAL at 06:19

## 2024-01-16 RX ADMIN — Medication 40 MILLIEQUIVALENT(S): at 12:56

## 2024-01-16 RX ADMIN — Medication 75 MILLIGRAM(S): at 06:20

## 2024-01-16 RX ADMIN — Medication 40 MILLIGRAM(S): at 06:20

## 2024-01-16 RX ADMIN — Medication 650 MILLIGRAM(S): at 13:55

## 2024-01-16 RX ADMIN — Medication 650 MILLIGRAM(S): at 12:57

## 2024-01-16 RX ADMIN — Medication 25 GRAM(S): at 12:56

## 2024-01-16 NOTE — PROGRESS NOTE ADULT - PROBLEM SELECTOR PLAN 4
- home meds is  Metoprolol 75mg po bid  - c/w IV Lopressor as above.
Elevation in serum bicarb on initial BMP this am. Could be 2/2 lab error vs volume contraction given diuretics just resumed vs 2/2 hypokalemia  - Repeat BMP pending  - Replete potassium  - If bicarb remains elevated would hold diuretics.

## 2024-01-16 NOTE — DISCHARGE NOTE NURSING/CASE MANAGEMENT/SOCIAL WORK - PATIENT PORTAL LINK FT
You can access the FollowMyHealth Patient Portal offered by [facility] by registering at the following website: [email or web address]. By joining [facility]’s FollowMyHealth portal, you will also be able to view your health information using other applications (apps) compatible with our system. You can access the FollowMyHealth Patient Portal offered by [facility] by registering at the following website: [email or web address]. By joining [facility]’s FollowMyHealth portal, you will also be able to view your health information using other applications (apps) compatible with our system.

## 2024-01-16 NOTE — PROGRESS NOTE ADULT - PROBLEM SELECTOR PLAN 1
CT abd showing proximal SBO w/ abrupt transition point in the R lower abdomen w/ mild mesenteric edema and small volume ascites  - s/p gastrograffin challenge  - Now tolerating diet, having bowel function  - mgt per primary team.
- recent n/v and poor PO intake  - CT abd showing proximal SBO w/ abrupt transition point in the R lower abdomen w/ mild mesenteric edema and small volume ascites.  - currently NPO/NGT/IVF  - s/p gastrograffin challenge  - mgt per primary team.

## 2024-01-16 NOTE — PROGRESS NOTE ADULT - NSPROGADDITIONALINFOA_GEN_ALL_CORE
time spent reviewing prior charts, meds, discussing plan with patient= 50 minutes    d/w Trauma team
d/w daughter and aide at bedside.

## 2024-01-16 NOTE — PROGRESS NOTE ADULT - SUBJECTIVE AND OBJECTIVE BOX
ACS/Trauma Team (General Surgery) Daily Progress Note    SUBJECTIVE:  Pt seen and examined, and was having pain while being moved around for cleaning. No acute events overnight.    OBJECTIVE:  Vital Signs Last 24 Hrs  T(C): 36.2 (12 Jan 2024 09:06), Max: 37.1 (11 Jan 2024 17:36)  T(F): 97.2 (12 Jan 2024 09:06), Max: 98.8 (11 Jan 2024 17:36)  HR: 101 (12 Jan 2024 09:06) (60 - 136)  BP: 104/69 (12 Jan 2024 09:06) (99/66 - 117/68)  BP(mean): --  RR: 18 (12 Jan 2024 09:06) (18 - 22)  SpO2: 97% (12 Jan 2024 09:06) (96% - 100%)    Parameters below as of 12 Jan 2024 09:06  Patient On (Oxygen Delivery Method): nasal cannula  O2 Flow (L/min): 2      I&O's Detail    11 Jan 2024 07:01  -  12 Jan 2024 07:00  --------------------------------------------------------  IN:    Lactated Ringers: 1400 mL  Total IN: 1400 mL    OUT:    Indwelling Catheter - Urethral (mL): 375 mL    Nasogastric/Oral tube (mL): 1450 mL    Oral Fluid: 0 mL  Total OUT: 1825 mL    Total NET: -425 mL      12 Jan 2024 07:01  -  12 Jan 2024 09:18  --------------------------------------------------------  IN:  Total IN: 0 mL    OUT:    Indwelling Catheter - Urethral (mL): 150 mL    Oral Fluid: 0 mL  Total OUT: 150 mL    Total NET: -150 mL        Exam:  Physical Exam:   General: NAD  Cardio: Regular rate  Resp: Good effort, no signs of respiratory distress, 2L NC  GI/Abd: Soft, mild-moderate distention, nontender, tympanic, no rebound or guarding  Musculoskeletal: All 4 extremities moving spontaneously, no limitations                          9.9    7.41  )-----------( 131      ( 12 Jan 2024 07:24 )             33.1       01-12    146<H>  |  97  |  13  ----------------------------<  77  3.3<L>   |  40<H>  |  0.96    Ca    8.7      12 Jan 2024 07:25  Phos  3.5     01-12  Mg     1.9     01-12    TPro  7.4  /  Alb  4.0  /  TBili  1.2  /  DBili  x   /  AST  24  /  ALT  11  /  AlkPhos  53  01-10            
ACS TEAM SURGERY DAILY PROGRESS NOTE:     INTERVAL EVENTS: NGT removed, CLD, Afib w/ RVR needed metoprolol 5mg IV    SUBJECTIVE/ROS: Patient seen and examined at bedside by surgical team.     OBJECTIVE:  Vital Signs Last 24 Hrs  T(C): 36.5 (13 Jan 2024 08:36), Max: 37.1 (12 Jan 2024 21:05)  T(F): 97.7 (13 Jan 2024 08:36), Max: 98.8 (12 Jan 2024 21:05)  HR: 80 (13 Jan 2024 08:36) (80 - 140)  BP: 107/81 (13 Jan 2024 08:36) (104/69 - 122/74)  BP(mean): --  RR: 18 (13 Jan 2024 08:36) (18 - 18)  SpO2: 98% (13 Jan 2024 08:36) (94% - 98%)    Parameters below as of 13 Jan 2024 05:49  Patient On (Oxygen Delivery Method): nasal cannula  O2 Flow (L/min): 2                          9.7    8.57  )-----------( 135      ( 13 Jan 2024 07:02 )             32.4     01-13    148<H>  |  99  |  13  ----------------------------<  80  3.5   |  38<H>  |  0.86    Ca    8.7      13 Jan 2024 07:00  Phos  2.7     01-13  Mg     2.0     01-13       I&O's Detail    12 Jan 2024 07:01  -  13 Jan 2024 07:00  --------------------------------------------------------  IN:    Lactated Ringers: 1000 mL    Lactated Ringers: 560 mL    Oral Fluid: 510 mL  Total IN: 2070 mL    OUT:    Indwelling Catheter - Urethral (mL): 200 mL  Total OUT: 200 mL    Total NET: 1870 mL          IMAGING:      PHYSICAL EXAM:  Constitutional: NAD  Respiratory: non-labored breathing, patent airway  Gastrointestinal: abdomen soft, nontender, nondistended  Extremities: warm  Neurological: intact          
ACS TEAM SURGERY DAILY PROGRESS NOTE:     INTERVAL EVENTS: None    SUBJECTIVE/ROS: Patient seen and examined at bedside by surgical team.      MEDICATIONS  (STANDING):  acetaminophen     Tablet .. 650 milliGRAM(s) Oral every 6 hours  atorvastatin 20 milliGRAM(s) Oral at bedtime  dabigatran 150 milliGRAM(s) Oral every 12 hours  furosemide    Tablet 40 milliGRAM(s) Oral two times a day  influenza  Vaccine (HIGH DOSE) 0.7 milliLiter(s) IntraMuscular once  metoprolol tartrate 75 milliGRAM(s) Oral every 12 hours  spironolactone 12.5 milliGRAM(s) Oral daily    MEDICATIONS  (PRN):      LABS:                        10.0   10.96 )-----------( 138      ( 14 Jan 2024 07:53 )             33.1     01-14    146<H>  |  98  |  14  ----------------------------<  124<H>  3.2<L>   |  40<H>  |  0.83    Ca    8.1<L>      14 Jan 2024 19:07  Phos  2.5     01-14  Mg     1.7     01-14        Urinalysis Basic - ( 14 Jan 2024 19:07 )    Color: x / Appearance: x / SG: x / pH: x  Gluc: 124 mg/dL / Ketone: x  / Bili: x / Urobili: x   Blood: x / Protein: x / Nitrite: x   Leuk Esterase: x / RBC: x / WBC x   Sq Epi: x / Non Sq Epi: x / Bacteria: x          Vital Signs Last 24 Hrs  T(C): 36.9 (14 Jan 2024 13:52), Max: 36.9 (14 Jan 2024 13:52)  T(F): 98.5 (14 Jan 2024 13:52), Max: 98.5 (14 Jan 2024 13:52)  HR: 90 (14 Jan 2024 13:52) (74 - 97)  BP: 100/54 (14 Jan 2024 13:52) (100/54 - 132/82)  BP(mean): --  RR: 18 (14 Jan 2024 13:52) (18 - 18)  SpO2: 98% (14 Jan 2024 13:52) (97% - 99%)    Parameters below as of 14 Jan 2024 13:52  Patient On (Oxygen Delivery Method): nasal cannula  O2 Flow (L/min): 2        I&O's Summary    13 Jan 2024 07:01  -  14 Jan 2024 07:00  --------------------------------------------------------  IN: 1120 mL / OUT: 1100 mL / NET: 20 mL    14 Jan 2024 07:01  -  14 Jan 2024 21:01  --------------------------------------------------------  IN: 680 mL / OUT: 1250 mL / NET: -570 mL      I&O's Detail    13 Jan 2024 07:01  -  14 Jan 2024 07:00  --------------------------------------------------------  IN:    Oral Fluid: 1120 mL  Total IN: 1120 mL    OUT:    Voided (mL): 1100 mL  Total OUT: 1100 mL    Total NET: 20 mL      14 Jan 2024 07:01  -  14 Jan 2024 21:01  --------------------------------------------------------  IN:    Oral Fluid: 680 mL  Total IN: 680 mL    OUT:    Voided (mL): 1250 mL  Total OUT: 1250 mL    Total NET: -570 mL          General Appearance: Appears well, NAD  Neck: Supple  Chest: Equal expansion bilaterally, equal breath sounds  CV: Pulse regular presently  Abdomen: Soft, nontense, nodistended  Extremities: warm, well perfused, Grossly symmetric, SCD's in place   .  .  .  .  .
SURGERY DAILY PROGRESS NOTE:     Overnight Events:  No acute events overnight.    SUBJECTIVE: Patient seen and evaluated on AM rounds. Pt is resting comfortably in bed with no complaints. Denies fever, chills, N/V, chest pain, or shortness of breath. Patient is passing gas and having bowel movements. Tolerating diet.    OBJECTIVE:  Vital Signs Last 24 Hrs  T(C): 36.4 (16 Jan 2024 05:34), Max: 36.8 (16 Jan 2024 01:33)  T(F): 97.6 (16 Jan 2024 05:34), Max: 98.2 (16 Jan 2024 01:33)  HR: 94 (16 Jan 2024 05:34) (83 - 94)  BP: 103/73 (16 Jan 2024 05:34) (97/65 - 105/72)  BP(mean): --  RR: 18 (16 Jan 2024 05:34) (18 - 18)  SpO2: 97% (16 Jan 2024 05:34) (97% - 99%)    Parameters below as of 16 Jan 2024 05:34  Patient On (Oxygen Delivery Method): nasal cannula  O2 Flow (L/min): 2    I&O's Detail    15 Dre 2024 07:01  -  16 Jan 2024 07:00  --------------------------------------------------------  IN:    Oral Fluid: 690 mL  Total IN: 690 mL    OUT:    Voided (mL): 3200 mL  Total OUT: 3200 mL    Total NET: -2510 mL        Daily     Daily     LABS:                        10.7   10.39 )-----------( 136      ( 16 Jan 2024 06:50 )             36.1     01-15    146<H>  |  97  |  14  ----------------------------<  99  3.4<L>   |  40<H>  |  0.78    Ca    8.3<L>      15 Dre 2024 07:57  Phos  2.5     01-15  Mg     1.4     01-15        Urinalysis Basic - ( 15 Dre 2024 07:57 )    Color: x / Appearance: x / SG: x / pH: x  Gluc: 99 mg/dL / Ketone: x  / Bili: x / Urobili: x   Blood: x / Protein: x / Nitrite: x   Leuk Esterase: x / RBC: x / WBC x   Sq Epi: x / Non Sq Epi: x / Bacteria: x        PHYSICAL EXAM  General Appearance: Appears well, NAD  Neck: Supple  Chest: Equal expansion bilaterally, equal breath sounds  CV: Pulse regular presently  Abdomen: Soft, nontense, nondistended nontender to palpation, no longer having any rectal/vaginal bleeding  Extremities: warm, well perfused, Grossly symmetric, SCD's in place
TEAM [ ACS ] Surgery Daily Progress Note  =====================================================    SUBJECTIVE: Patient seen and examined at bedside on AM rounds. Patient reports that they're feeling well. Patient reports that they are passing gas and having bowel movements. Patient is tolerating a regular diet with no nausea or vomiting.     PMH:  PAST MEDICAL & SURGICAL HISTORY:  Hypertension      Dyslipidemia      Myocardial Infarction  15-20 years ago      Hypercalcemia      Lymphedema      S/P Parathyroidectomy      S/P Hysterectomy      Bilateral Cataracts      ALLERGIES:  No Known Allergies    --------------------------------------------------------------------------------------    VITAL SIGNS:  Vital Signs Last 24 Hrs  T(C): 36.5 (15 Dre 2024 10:59), Max: 37.1 (14 Jan 2024 21:23)  T(F): 97.7 (15 Dre 2024 10:59), Max: 98.7 (14 Jan 2024 21:23)  HR: 90 (15 Dre 2024 10:59) (88 - 97)  BP: 101/72 (15 Dre 2024 10:59) (93/66 - 121/86)  BP(mean): --  RR: 18 (15 Dre 2024 10:59) (18 - 18)  SpO2: 99% (15 Dre 2024 10:59) (96% - 99%)    Parameters below as of 15 Dre 2024 10:59  Patient On (Oxygen Delivery Method): nasal cannula  O2 Flow (L/min): 2    --------------------------------------------------------------------------------------    EXAM    General Appearance: Appears well, NAD  Neck: Supple  Chest: Equal expansion bilaterally, equal breath sounds  CV: Pulse regular presently  Abdomen: Soft, nontense, nodistended  Extremities: warm, well perfused, Grossly symmetric, SCD's in place     --------------------------------------------------------------------------------------    LABS                        10.7   10.66 )-----------( 116      ( 15 Dre 2024 07:55 )             36.1   01-15    146<H>  |  97  |  14  ----------------------------<  99  3.4<L>   |  40<H>  |  0.78    Ca    8.3<L>      15 Dre 2024 07:57  Phos  2.5     01-15  Mg     1.4     01-15        --------------------------------------------------------------------------------------    INS AND OUTS:  I&O's Detail    14 Jan 2024 07:01  -  15 Dre 2024 07:00  --------------------------------------------------------  IN:    Oral Fluid: 980 mL  Total IN: 980 mL    OUT:    Voided (mL): 2250 mL  Total OUT: 2250 mL    Total NET: -1270 mL      15 Dre 2024 07:01  -  15 Dre 2024 13:03  --------------------------------------------------------  IN:    Oral Fluid: 200 mL  Total IN: 200 mL    OUT:    Voided (mL): 500 mL  Total OUT: 500 mL    Total NET: -300 mL        --------------------------------------------------------------------------------------
Patient is a 89y old  Female who presents with a chief complaint of SBO (12 Jan 2024 12:26)      SUBJECTIVE / OVERNIGHT EVENTS:  Pt seen and examined. No acute events overnight. She denies CP, SOB, abd pain. Has not passed flatus or bm.    MEDICATIONS  (STANDING):  enoxaparin Injectable 90 milliGRAM(s) SubCutaneous every 12 hours  influenza  Vaccine (HIGH DOSE) 0.7 milliLiter(s) IntraMuscular once  lactated ringers. 1000 milliLiter(s) (100 mL/Hr) IV Continuous <Continuous>  metoprolol tartrate Injectable 5 milliGRAM(s) IV Push every 6 hours  pantoprazole  Injectable 40 milliGRAM(s) IV Push every 12 hours  potassium chloride  10 mEq/100 mL IVPB 10 milliEquivalent(s) IV Intermittent every 1 hour    MEDICATIONS  (PRN):      Vital Signs Last 24 Hrs  T(C): 36.4 (12 Jan 2024 13:55), Max: 37.1 (11 Jan 2024 17:36)  T(F): 97.6 (12 Jan 2024 13:55), Max: 98.8 (11 Jan 2024 17:36)  HR: 120 (12 Jan 2024 13:55) (60 - 136)  BP: 105/74 (12 Jan 2024 13:55) (99/66 - 117/68)  BP(mean): --  RR: 18 (12 Jan 2024 13:55) (18 - 20)  SpO2: 94% (12 Jan 2024 13:55) (94% - 100%)    Parameters below as of 12 Jan 2024 13:55  Patient On (Oxygen Delivery Method): nasal cannula  O2 Flow (L/min): 2    CAPILLARY BLOOD GLUCOSE        I&O's Summary    11 Jan 2024 07:01  -  12 Jan 2024 07:00  --------------------------------------------------------  IN: 1400 mL / OUT: 1825 mL / NET: -425 mL    12 Jan 2024 07:01  -  12 Jan 2024 15:10  --------------------------------------------------------  IN: 0 mL / OUT: 200 mL / NET: -200 mL        PHYSICAL EXAM:  GENERAL: NAD, well-groomed  HEAD:  Atraumatic, Normocephalic  EYES: conjunctiva and sclera clear  ENMT: Moist mucous membranes  NECK: Supple, No JVD  RESPIRATORY: Clear to auscultation bilaterally; No rales, rhonchi, wheezing, or rubs  CARDIOVASCULAR: irregular rate and rhythm; No murmurs, rubs, or gallops  GASTROINTESTINAL: NGT in place ; +distended, Nontender ,scant bowel sounds present  GENITOURINARY: koehler catheter in place  EXTREMITIES:  2+ non pitting LE edema; ace wrap in place  NERVOUS SYSTEM:  Alert & Oriented X3; Moving all 4 extremities; No gross sensory deficits  SKIN: No rashes or lesions      LABS:                        9.9    7.41  )-----------( 131      ( 12 Jan 2024 07:24 )             33.1     01-12    145  |  97  |  12  ----------------------------<  77  3.4<L>   |  39<H>  |  0.90    Ca    8.7      12 Jan 2024 10:50  Phos  3.4     01-12  Mg     1.9     01-12    TPro  7.4  /  Alb  4.0  /  TBili  1.2  /  DBili  x   /  AST  24  /  ALT  11  /  AlkPhos  53  01-10          Urinalysis Basic - ( 12 Jan 2024 10:50 )    Color: x / Appearance: x / SG: x / pH: x  Gluc: 77 mg/dL / Ketone: x  / Bili: x / Urobili: x   Blood: x / Protein: x / Nitrite: x   Leuk Esterase: x / RBC: x / WBC x   Sq Epi: x / Non Sq Epi: x / Bacteria: x        
Alonzo Acuna MD  Division of Hospital Medicine  Available via MS teams  ---------------------------------------------------------    MAN LINDSAY  89y  Female      Patient is a 89y old  Female who presents with a chief complaint of SBO (16 Jan 2024 07:19)      INTERVAL HPI/OVERNIGHT EVENTS:  Seen at bedside. Aide and daughter present. Overall feels okay. Having bowel function      REVIEW OF SYSTEMS: 10 point ROS negative unless listed above    T(C): 36.5 (01-16-24 @ 10:16), Max: 36.8 (01-16-24 @ 01:33)  HR: 89 (01-16-24 @ 10:16) (83 - 94)  BP: 122/83 (01-16-24 @ 10:16) (97/65 - 122/83)  RR: 18 (01-16-24 @ 10:16) (18 - 18)  SpO2: 96% (01-16-24 @ 10:16) (96% - 98%)  Wt(kg): --Vital Signs Last 24 Hrs  T(C): 36.5 (16 Jan 2024 10:16), Max: 36.8 (16 Jan 2024 01:33)  T(F): 97.7 (16 Jan 2024 10:16), Max: 98.2 (16 Jan 2024 01:33)  HR: 89 (16 Jan 2024 10:16) (83 - 94)  BP: 122/83 (16 Jan 2024 10:16) (97/65 - 122/83)  BP(mean): --  RR: 18 (16 Jan 2024 10:16) (18 - 18)  SpO2: 96% (16 Jan 2024 10:16) (96% - 98%)    Parameters below as of 16 Jan 2024 10:16  Patient On (Oxygen Delivery Method): nasal cannula  O2 Flow (L/min): 2      PHYSICAL EXAM:  GENERAL: NAD, well-groomed, well-developed  HEAD:  Atraumatic, Normocephalic  NECK: Supple, No JVD  CHEST/LUNG: Faint wheeze in upper portion of left lower lobe  HEART: Regular rate and rhythm; No murmurs, rubs, or gallops  ABDOMEN: Soft, Nontender, Nondistended; Bowel sounds present.    EXTREMITIES:  2+ Peripheral Pulses, No clubbing, cyanosis, + lymphedema  SKIN: No rashes or lesions  PSYCH: Alert & Oriented x3          LABS:                        10.7   10.39 )-----------( 136      ( 16 Jan 2024 06:50 )             36.1     01-16    144  |  89<L>  |  14  ----------------------------<  109<H>  3.1<L>   |  >45<!!>  |  0.80    Ca    8.7      16 Jan 2024 06:46  Phos  2.6     01-16  Mg     1.6     01-16        Urinalysis Basic - ( 16 Jan 2024 06:46 )    Color: x / Appearance: x / SG: x / pH: x  Gluc: 109 mg/dL / Ketone: x  / Bili: x / Urobili: x   Blood: x / Protein: x / Nitrite: x   Leuk Esterase: x / RBC: x / WBC x   Sq Epi: x / Non Sq Epi: x / Bacteria: x      CAPILLARY BLOOD GLUCOSE            Urinalysis Basic - ( 16 Jan 2024 06:46 )    Color: x / Appearance: x / SG: x / pH: x  Gluc: 109 mg/dL / Ketone: x  / Bili: x / Urobili: x   Blood: x / Protein: x / Nitrite: x   Leuk Esterase: x / RBC: x / WBC x   Sq Epi: x / Non Sq Epi: x / Bacteria: x        RADIOLOGY & ADDITIONAL TESTS:    Imaging Personally Reviewed:  [ ] YES  [ ] NO

## 2024-01-16 NOTE — DISCHARGE NOTE NURSING/CASE MANAGEMENT/SOCIAL WORK - NSDCPEFALRISK_GEN_ALL_CORE
For information on Fall & Injury Prevention, visit: [email or web address] OR  [email or web address] OR  https://www.cdc.gov/steadi/patient.html For information on Fall & Injury Prevention, visit: [email or web address] OR  [email or web address]/[email or web address]-[email or web address]-injury OR  https://www.cdc.gov/steadi/patient.html

## 2024-01-16 NOTE — PROGRESS NOTE ADULT - PROBLEM SELECTOR PLAN 2
- on Metoprolol 75mg po bid and Pradaxa 150mg po bid ; on hold while NPO  - c/w  Lopressor 5mg IVP q 6 while NPO  - CHADVASC score 5  - c/w therapeutic Lovenox dosing while NPO.
- on Metoprolol 75mg po bid and Pradaxa 150mg po bid   - CHADVASC score 5.  - c/w home med's

## 2024-01-16 NOTE — PROGRESS NOTE ADULT - PROBLEM SELECTOR PLAN 3
- home meds are  Metoprolol 75mg po bid, Lasix 40mg po bid, Aldactone 12.5mg po qd  - Medications now resumed.   - c/w supplemental home O2 2L NC  - monitor volume status closely ; currently euvolemic on exam  - hypokalemic on AM labs ; would replete  - c/t monitor I/Os

## 2024-01-16 NOTE — DISCHARGE NOTE NURSING/CASE MANAGEMENT/SOCIAL WORK - NSDCFUADDAPPT_GEN_ALL_CORE_FT
You do not need to follow up with Dr. King from [facility] surgery.    Please hold lasix and aldactone on discharge. Please follow up on Thursday, 1/18 with your PCP after discharge for repeat labs and see if meds can be resumed.

## 2024-01-16 NOTE — PROGRESS NOTE ADULT - ASSESSMENT
89F w/ PMHx Afib on pradaxa, prior DVT, HTN, HLD, CHF on home O2/lasix, PSHx hysterectomy ~40yrs ago presents to the ED due to home care lab results of hyperkalemia to 9 and nausea. CT w/ findings SBO w/ RLQ TP. NGT placed w/ 400cc immediate bilious output, SBO now resolved, pending discharge home.    PLAN:  - Reg diet  - Monitor I/Os  - Home meds PO  - f/u PT (daughter requested pt to be seen by PT today AM)  - Dispo: Discharge planning (CM will f/u wheelchair status tmrw)  - JUNIOR vs HPT w/ rolling walker      Trauma Surgery  [phone] 89F w/ PMHx Afib on pradaxa, prior DVT, HTN, HLD, CHF on home O2/lasix, PSHx hysterectomy ~40yrs ago presents to the ED due to home care lab results of hyperkalemia to 9 and nausea. CT w/ findings SBO w/ RLQ TP. NGT placed w/ 400cc immediate bilious output, SBO now resolved, pending discharge home.    PLAN:  - Reg diet  - Monitor I/Os  - Home meds PO  - f/u PT (daughter requested pt to be seen by PT today AM)  - Dispo: Discharge planning (CM will f/u wheelchair status tmrw)  - JUNIOR vs HPT w/ rolling walker      Trauma Surgery  [phone]

## 2024-06-27 ENCOUNTER — INPATIENT (INPATIENT)
Facility: HOSPITAL | Age: 89
LOS: 6 days | Discharge: HOME HEALTH | DRG: 812 | End: 2024-07-04
Attending: STUDENT IN AN ORGANIZED HEALTH CARE EDUCATION/TRAINING PROGRAM | Admitting: HOSPITALIST
Payer: MEDICARE

## 2024-06-27 VITALS
DIASTOLIC BLOOD PRESSURE: 77 MMHG | TEMPERATURE: 98 F | HEART RATE: 129 BPM | SYSTOLIC BLOOD PRESSURE: 122 MMHG | RESPIRATION RATE: 20 BRPM | WEIGHT: 119.93 LBS

## 2024-06-27 DIAGNOSIS — D64.9 ANEMIA, UNSPECIFIED: ICD-10-CM

## 2024-06-27 LAB
ALBUMIN SERPL ELPH-MCNC: 3.1 G/DL — LOW (ref 3.3–5)
ALP SERPL-CCNC: 47 U/L — SIGNIFICANT CHANGE UP (ref 40–120)
ALT FLD-CCNC: 9 U/L — LOW (ref 10–45)
ANION GAP SERPL CALC-SCNC: 11 MMOL/L — SIGNIFICANT CHANGE UP (ref 5–17)
ANISOCYTOSIS BLD QL: SLIGHT — SIGNIFICANT CHANGE UP
APTT BLD: 86.5 SEC — HIGH (ref 24.5–35.6)
AST SERPL-CCNC: 19 U/L — SIGNIFICANT CHANGE UP (ref 10–40)
BASE EXCESS BLDV CALC-SCNC: 10.2 MMOL/L — HIGH (ref -2–3)
BASOPHILS # BLD AUTO: 0 K/UL — SIGNIFICANT CHANGE UP (ref 0–0.2)
BASOPHILS NFR BLD AUTO: 0 % — SIGNIFICANT CHANGE UP (ref 0–2)
BILIRUB SERPL-MCNC: 0.9 MG/DL — SIGNIFICANT CHANGE UP (ref 0.2–1.2)
BLD GP AB SCN SERPL QL: NEGATIVE — SIGNIFICANT CHANGE UP
BUN SERPL-MCNC: 44 MG/DL — HIGH (ref 7–23)
CA-I SERPL-SCNC: 1.24 MMOL/L — SIGNIFICANT CHANGE UP (ref 1.15–1.33)
CALCIUM SERPL-MCNC: 9.1 MG/DL — SIGNIFICANT CHANGE UP (ref 8.4–10.5)
CHLORIDE BLDV-SCNC: 100 MMOL/L — SIGNIFICANT CHANGE UP (ref 96–108)
CHLORIDE SERPL-SCNC: 97 MMOL/L — SIGNIFICANT CHANGE UP (ref 96–108)
CO2 BLDV-SCNC: 38 MMOL/L — HIGH (ref 22–26)
CO2 SERPL-SCNC: 33 MMOL/L — HIGH (ref 22–31)
CREAT SERPL-MCNC: 1.12 MG/DL — SIGNIFICANT CHANGE UP (ref 0.5–1.3)
EGFR: 46 ML/MIN/1.73M2 — LOW
EOSINOPHIL # BLD AUTO: 0 K/UL — SIGNIFICANT CHANGE UP (ref 0–0.5)
EOSINOPHIL NFR BLD AUTO: 0 % — SIGNIFICANT CHANGE UP (ref 0–6)
GAS PNL BLDV: 138 MMOL/L — SIGNIFICANT CHANGE UP (ref 136–145)
GAS PNL BLDV: SIGNIFICANT CHANGE UP
GAS PNL BLDV: SIGNIFICANT CHANGE UP
GLUCOSE BLDV-MCNC: 104 MG/DL — HIGH (ref 70–99)
GLUCOSE SERPL-MCNC: 108 MG/DL — HIGH (ref 70–99)
HCO3 BLDV-SCNC: 36 MMOL/L — HIGH (ref 22–29)
HCT VFR BLD CALC: 17.2 % — CRITICAL LOW (ref 34.5–45)
HCT VFR BLDA CALC: 16 % — CRITICAL LOW (ref 34.5–46.5)
HGB BLD CALC-MCNC: 5.4 G/DL — CRITICAL LOW (ref 11.7–16.1)
HGB BLD-MCNC: 5 G/DL — CRITICAL LOW (ref 11.5–15.5)
HYPOCHROMIA BLD QL: SLIGHT — SIGNIFICANT CHANGE UP
INR BLD: 2.41 RATIO — HIGH (ref 0.85–1.18)
LACTATE BLDV-MCNC: 2.1 MMOL/L — HIGH (ref 0.5–2)
LYMPHOCYTES # BLD AUTO: 1.68 K/UL — SIGNIFICANT CHANGE UP (ref 1–3.3)
LYMPHOCYTES # BLD AUTO: 18.3 % — SIGNIFICANT CHANGE UP (ref 13–44)
MANUAL SMEAR VERIFICATION: SIGNIFICANT CHANGE UP
MCHC RBC-ENTMCNC: 24.9 PG — LOW (ref 27–34)
MCHC RBC-ENTMCNC: 29.1 GM/DL — LOW (ref 32–36)
MCV RBC AUTO: 85.6 FL — SIGNIFICANT CHANGE UP (ref 80–100)
MICROCYTES BLD QL: SLIGHT — SIGNIFICANT CHANGE UP
MONOCYTES # BLD AUTO: 0.16 K/UL — SIGNIFICANT CHANGE UP (ref 0–0.9)
MONOCYTES NFR BLD AUTO: 1.7 % — LOW (ref 2–14)
NEUTROPHILS # BLD AUTO: 7.35 K/UL — SIGNIFICANT CHANGE UP (ref 1.8–7.4)
NEUTROPHILS NFR BLD AUTO: 80 % — HIGH (ref 43–77)
NT-PROBNP SERPL-SCNC: 3060 PG/ML — HIGH (ref 0–300)
OB PNL STL: POSITIVE
OVALOCYTES BLD QL SMEAR: SLIGHT — SIGNIFICANT CHANGE UP
PCO2 BLDV: 61 MMHG — HIGH (ref 39–42)
PH BLDV: 7.38 — SIGNIFICANT CHANGE UP (ref 7.32–7.43)
PLAT MORPH BLD: NORMAL — SIGNIFICANT CHANGE UP
PLATELET # BLD AUTO: 65 K/UL — LOW (ref 150–400)
PO2 BLDV: 32 MMHG — SIGNIFICANT CHANGE UP (ref 25–45)
POIKILOCYTOSIS BLD QL AUTO: SLIGHT — SIGNIFICANT CHANGE UP
POLYCHROMASIA BLD QL SMEAR: SLIGHT — SIGNIFICANT CHANGE UP
POTASSIUM BLDV-SCNC: 3.9 MMOL/L — SIGNIFICANT CHANGE UP (ref 3.5–5.1)
POTASSIUM SERPL-MCNC: 3.9 MMOL/L — SIGNIFICANT CHANGE UP (ref 3.5–5.3)
POTASSIUM SERPL-SCNC: 3.9 MMOL/L — SIGNIFICANT CHANGE UP (ref 3.5–5.3)
PROT SERPL-MCNC: 6 G/DL — SIGNIFICANT CHANGE UP (ref 6–8.3)
PROTHROM AB SERPL-ACNC: 25.8 SEC — HIGH (ref 9.5–13)
RBC # BLD: 2.01 M/UL — LOW (ref 3.8–5.2)
RBC # FLD: 18.3 % — HIGH (ref 10.3–14.5)
RBC BLD AUTO: ABNORMAL
RH IG SCN BLD-IMP: POSITIVE — SIGNIFICANT CHANGE UP
SAO2 % BLDV: 43.1 % — LOW (ref 67–88)
SODIUM SERPL-SCNC: 141 MMOL/L — SIGNIFICANT CHANGE UP (ref 135–145)
STOMATOCYTES BLD QL SMEAR: SLIGHT — SIGNIFICANT CHANGE UP
TROPONIN T, HIGH SENSITIVITY RESULT: 45 NG/L — SIGNIFICANT CHANGE UP (ref 0–51)
WBC # BLD: 9.19 K/UL — SIGNIFICANT CHANGE UP (ref 3.8–10.5)
WBC # FLD AUTO: 9.19 K/UL — SIGNIFICANT CHANGE UP (ref 3.8–10.5)

## 2024-06-27 PROCEDURE — 71045 X-RAY EXAM CHEST 1 VIEW: CPT | Mod: 26

## 2024-06-27 PROCEDURE — 99285 EMERGENCY DEPT VISIT HI MDM: CPT

## 2024-06-27 RX ORDER — DEXTROSE MONOHYDRATE AND SODIUM CHLORIDE 5; .3 G/100ML; G/100ML
500 INJECTION, SOLUTION INTRAVENOUS ONCE
Refills: 0 | Status: DISCONTINUED | OUTPATIENT
Start: 2024-06-27 | End: 2024-06-27

## 2024-06-27 RX ORDER — PANTOPRAZOLE SODIUM 40 MG/10ML
40 INJECTION, POWDER, FOR SOLUTION INTRAVENOUS ONCE
Refills: 0 | Status: COMPLETED | OUTPATIENT
Start: 2024-06-27 | End: 2024-06-27

## 2024-06-27 RX ORDER — POTASSIUM CHLORIDE 20 MEQ
1 PACKET (EA) ORAL
Qty: 0 | Refills: 0 | DISCHARGE

## 2024-06-27 RX ORDER — ROSUVASTATIN CALCIUM 5 MG/1
1 TABLET ORAL
Qty: 0 | Refills: 0 | DISCHARGE

## 2024-06-27 RX ORDER — ACETAMINOPHEN 325 MG
650 TABLET ORAL EVERY 6 HOURS
Refills: 0 | Status: DISCONTINUED | OUTPATIENT
Start: 2024-06-27 | End: 2024-06-28

## 2024-06-27 RX ORDER — DABIGATRAN ETEXILATE MESYLATE 150 MG/1
1 CAPSULE ORAL
Qty: 0 | Refills: 0 | DISCHARGE

## 2024-06-27 RX ADMIN — PANTOPRAZOLE SODIUM 40 MILLIGRAM(S): 40 INJECTION, POWDER, FOR SOLUTION INTRAVENOUS at 19:33

## 2024-06-28 DIAGNOSIS — E78.5 HYPERLIPIDEMIA, UNSPECIFIED: ICD-10-CM

## 2024-06-28 DIAGNOSIS — D64.9 ANEMIA, UNSPECIFIED: ICD-10-CM

## 2024-06-28 DIAGNOSIS — D50.0 IRON DEFICIENCY ANEMIA SECONDARY TO BLOOD LOSS (CHRONIC): ICD-10-CM

## 2024-06-28 DIAGNOSIS — Z29.9 ENCOUNTER FOR PROPHYLACTIC MEASURES, UNSPECIFIED: ICD-10-CM

## 2024-06-28 DIAGNOSIS — D69.6 THROMBOCYTOPENIA, UNSPECIFIED: ICD-10-CM

## 2024-06-28 DIAGNOSIS — R06.00 DYSPNEA, UNSPECIFIED: ICD-10-CM

## 2024-06-28 DIAGNOSIS — N17.9 ACUTE KIDNEY FAILURE, UNSPECIFIED: ICD-10-CM

## 2024-06-28 DIAGNOSIS — I50.32 CHRONIC DIASTOLIC (CONGESTIVE) HEART FAILURE: ICD-10-CM

## 2024-06-28 DIAGNOSIS — I48.20 CHRONIC ATRIAL FIBRILLATION, UNSPECIFIED: ICD-10-CM

## 2024-06-28 DIAGNOSIS — I10 ESSENTIAL (PRIMARY) HYPERTENSION: ICD-10-CM

## 2024-06-28 DIAGNOSIS — I50.9 HEART FAILURE, UNSPECIFIED: ICD-10-CM

## 2024-06-28 DIAGNOSIS — I48.91 UNSPECIFIED ATRIAL FIBRILLATION: ICD-10-CM

## 2024-06-28 DIAGNOSIS — I89.0 LYMPHEDEMA, NOT ELSEWHERE CLASSIFIED: ICD-10-CM

## 2024-06-28 LAB
ALBUMIN SERPL ELPH-MCNC: 2.9 G/DL — LOW (ref 3.3–5)
ALP SERPL-CCNC: 43 U/L — SIGNIFICANT CHANGE UP (ref 40–120)
ALT FLD-CCNC: 8 U/L — LOW (ref 10–45)
ANION GAP SERPL CALC-SCNC: 8 MMOL/L — SIGNIFICANT CHANGE UP (ref 5–17)
APPEARANCE UR: CLEAR — SIGNIFICANT CHANGE UP
APTT BLD: 70.5 SEC — HIGH (ref 24.5–35.6)
AST SERPL-CCNC: 14 U/L — SIGNIFICANT CHANGE UP (ref 10–40)
BACTERIA # UR AUTO: NEGATIVE /HPF — SIGNIFICANT CHANGE UP
BASE EXCESS BLDV CALC-SCNC: 10.5 MMOL/L — HIGH (ref -2–3)
BILIRUB SERPL-MCNC: 1.7 MG/DL — HIGH (ref 0.2–1.2)
BILIRUB UR-MCNC: NEGATIVE — SIGNIFICANT CHANGE UP
BUN SERPL-MCNC: 44 MG/DL — HIGH (ref 7–23)
CA-I SERPL-SCNC: 1.23 MMOL/L — SIGNIFICANT CHANGE UP (ref 1.15–1.33)
CALCIUM SERPL-MCNC: 9 MG/DL — SIGNIFICANT CHANGE UP (ref 8.4–10.5)
CAST: 0 /LPF — SIGNIFICANT CHANGE UP (ref 0–4)
CHLORIDE BLDV-SCNC: 102 MMOL/L — SIGNIFICANT CHANGE UP (ref 96–108)
CHLORIDE SERPL-SCNC: 101 MMOL/L — SIGNIFICANT CHANGE UP (ref 96–108)
CO2 BLDV-SCNC: 39 MMOL/L — HIGH (ref 22–26)
CO2 SERPL-SCNC: 34 MMOL/L — HIGH (ref 22–31)
COLOR SPEC: YELLOW — SIGNIFICANT CHANGE UP
CREAT SERPL-MCNC: 1.15 MG/DL — SIGNIFICANT CHANGE UP (ref 0.5–1.3)
DIFF PNL FLD: ABNORMAL
EGFR: 45 ML/MIN/1.73M2 — LOW
GAS PNL BLDV: 138 MMOL/L — SIGNIFICANT CHANGE UP (ref 136–145)
GAS PNL BLDV: SIGNIFICANT CHANGE UP
GAS PNL BLDV: SIGNIFICANT CHANGE UP
GLUCOSE BLDC GLUCOMTR-MCNC: 112 MG/DL — HIGH (ref 70–99)
GLUCOSE BLDV-MCNC: 103 MG/DL — HIGH (ref 70–99)
GLUCOSE SERPL-MCNC: 112 MG/DL — HIGH (ref 70–99)
GLUCOSE UR QL: NEGATIVE MG/DL — SIGNIFICANT CHANGE UP
HCO3 BLDV-SCNC: 37 MMOL/L — HIGH (ref 22–29)
HCT VFR BLD CALC: 21.6 % — LOW (ref 34.5–45)
HCT VFR BLD CALC: 24 % — LOW (ref 34.5–45)
HCT VFR BLDA CALC: 21 % — CRITICAL LOW (ref 34.5–46.5)
HGB BLD CALC-MCNC: 7 G/DL — CRITICAL LOW (ref 11.7–16.1)
HGB BLD-MCNC: 6.7 G/DL — CRITICAL LOW (ref 11.5–15.5)
HGB BLD-MCNC: 7.6 G/DL — LOW (ref 11.5–15.5)
INR BLD: 1.96 RATIO — HIGH (ref 0.85–1.18)
KETONES UR-MCNC: NEGATIVE MG/DL — SIGNIFICANT CHANGE UP
LACTATE BLDV-MCNC: 1.5 MMOL/L — SIGNIFICANT CHANGE UP (ref 0.5–2)
LEUKOCYTE ESTERASE UR-ACNC: ABNORMAL
MAGNESIUM SERPL-MCNC: 2.2 MG/DL — SIGNIFICANT CHANGE UP (ref 1.6–2.6)
MCHC RBC-ENTMCNC: 26.5 PG — LOW (ref 27–34)
MCHC RBC-ENTMCNC: 26.7 PG — LOW (ref 27–34)
MCHC RBC-ENTMCNC: 31 GM/DL — LOW (ref 32–36)
MCHC RBC-ENTMCNC: 31.7 GM/DL — LOW (ref 32–36)
MCV RBC AUTO: 84.2 FL — SIGNIFICANT CHANGE UP (ref 80–100)
MCV RBC AUTO: 85.4 FL — SIGNIFICANT CHANGE UP (ref 80–100)
NITRITE UR-MCNC: NEGATIVE — SIGNIFICANT CHANGE UP
NRBC # BLD: 0 /100 WBCS — SIGNIFICANT CHANGE UP (ref 0–0)
NRBC # BLD: 0 /100 WBCS — SIGNIFICANT CHANGE UP (ref 0–0)
PCO2 BLDV: 64 MMHG — HIGH (ref 39–42)
PH BLDV: 7.37 — SIGNIFICANT CHANGE UP (ref 7.32–7.43)
PH UR: 6 — SIGNIFICANT CHANGE UP (ref 5–8)
PHOSPHATE SERPL-MCNC: 3.8 MG/DL — SIGNIFICANT CHANGE UP (ref 2.5–4.5)
PLATELET # BLD AUTO: 67 K/UL — LOW (ref 150–400)
PLATELET # BLD AUTO: 67 K/UL — LOW (ref 150–400)
PO2 BLDV: 52 MMHG — HIGH (ref 25–45)
POTASSIUM BLDV-SCNC: 4.1 MMOL/L — SIGNIFICANT CHANGE UP (ref 3.5–5.1)
POTASSIUM SERPL-MCNC: 4.1 MMOL/L — SIGNIFICANT CHANGE UP (ref 3.5–5.3)
POTASSIUM SERPL-SCNC: 4.1 MMOL/L — SIGNIFICANT CHANGE UP (ref 3.5–5.3)
PROT SERPL-MCNC: 5.6 G/DL — LOW (ref 6–8.3)
PROT UR-MCNC: NEGATIVE MG/DL — SIGNIFICANT CHANGE UP
PROTHROM AB SERPL-ACNC: 21.1 SEC — HIGH (ref 9.5–13)
RBC # BLD: 2.53 M/UL — LOW (ref 3.8–5.2)
RBC # BLD: 2.85 M/UL — LOW (ref 3.8–5.2)
RBC # FLD: 16.6 % — HIGH (ref 10.3–14.5)
RBC # FLD: 17.2 % — HIGH (ref 10.3–14.5)
RBC CASTS # UR COMP ASSIST: 4 /HPF — SIGNIFICANT CHANGE UP (ref 0–4)
REVIEW: SIGNIFICANT CHANGE UP
SAO2 % BLDV: 83.5 % — SIGNIFICANT CHANGE UP (ref 67–88)
SODIUM SERPL-SCNC: 143 MMOL/L — SIGNIFICANT CHANGE UP (ref 135–145)
SP GR SPEC: 1.01 — SIGNIFICANT CHANGE UP (ref 1–1.03)
SQUAMOUS # UR AUTO: 0 /HPF — SIGNIFICANT CHANGE UP (ref 0–5)
UROBILINOGEN FLD QL: 1 MG/DL — SIGNIFICANT CHANGE UP (ref 0.2–1)
WBC # BLD: 9.41 K/UL — SIGNIFICANT CHANGE UP (ref 3.8–10.5)
WBC # BLD: 9.59 K/UL — SIGNIFICANT CHANGE UP (ref 3.8–10.5)
WBC # FLD AUTO: 9.41 K/UL — SIGNIFICANT CHANGE UP (ref 3.8–10.5)
WBC # FLD AUTO: 9.59 K/UL — SIGNIFICANT CHANGE UP (ref 3.8–10.5)
WBC UR QL: 0 /HPF — SIGNIFICANT CHANGE UP (ref 0–5)

## 2024-06-28 PROCEDURE — 99223 1ST HOSP IP/OBS HIGH 75: CPT | Mod: GC

## 2024-06-28 PROCEDURE — 93308 TTE F-UP OR LMTD: CPT | Mod: 26

## 2024-06-28 RX ORDER — METOPROLOL TARTRATE 50 MG
5 TABLET ORAL ONCE
Refills: 0 | Status: COMPLETED | OUTPATIENT
Start: 2024-06-28 | End: 2024-06-28

## 2024-06-28 RX ORDER — FUROSEMIDE 10 MG/ML
40 INJECTION, SOLUTION INTRAMUSCULAR; INTRAVENOUS ONCE
Refills: 0 | Status: COMPLETED | OUTPATIENT
Start: 2024-06-28 | End: 2024-06-28

## 2024-06-28 RX ORDER — ACETAMINOPHEN 325 MG
650 TABLET ORAL EVERY 6 HOURS
Refills: 0 | Status: DISCONTINUED | OUTPATIENT
Start: 2024-06-28 | End: 2024-07-04

## 2024-06-28 RX ORDER — PANTOPRAZOLE SODIUM 40 MG/10ML
40 INJECTION, POWDER, FOR SOLUTION INTRAVENOUS
Refills: 0 | Status: DISCONTINUED | OUTPATIENT
Start: 2024-06-28 | End: 2024-07-01

## 2024-06-28 RX ORDER — METOPROLOL TARTRATE 50 MG
25 TABLET ORAL EVERY 8 HOURS
Refills: 0 | Status: DISCONTINUED | OUTPATIENT
Start: 2024-06-28 | End: 2024-06-29

## 2024-06-28 RX ADMIN — FUROSEMIDE 40 MILLIGRAM(S): 10 INJECTION, SOLUTION INTRAMUSCULAR; INTRAVENOUS at 12:58

## 2024-06-28 RX ADMIN — Medication 25 MILLIGRAM(S): at 12:43

## 2024-06-28 RX ADMIN — Medication 25 MILLIGRAM(S): at 22:25

## 2024-06-28 RX ADMIN — PANTOPRAZOLE SODIUM 40 MILLIGRAM(S): 40 INJECTION, POWDER, FOR SOLUTION INTRAVENOUS at 05:22

## 2024-06-28 RX ADMIN — PANTOPRAZOLE SODIUM 40 MILLIGRAM(S): 40 INJECTION, POWDER, FOR SOLUTION INTRAVENOUS at 17:58

## 2024-06-28 RX ADMIN — Medication 5 MILLIGRAM(S): at 11:51

## 2024-06-29 LAB
ALBUMIN SERPL ELPH-MCNC: 2.9 G/DL — LOW (ref 3.3–5)
ALP SERPL-CCNC: 43 U/L — SIGNIFICANT CHANGE UP (ref 40–120)
ALT FLD-CCNC: 9 U/L — LOW (ref 10–45)
ANION GAP SERPL CALC-SCNC: 9 MMOL/L — SIGNIFICANT CHANGE UP (ref 5–17)
AST SERPL-CCNC: 14 U/L — SIGNIFICANT CHANGE UP (ref 10–40)
BASOPHILS # BLD AUTO: 0.03 K/UL — SIGNIFICANT CHANGE UP (ref 0–0.2)
BASOPHILS NFR BLD AUTO: 0.3 % — SIGNIFICANT CHANGE UP (ref 0–2)
BILIRUB SERPL-MCNC: 1.9 MG/DL — HIGH (ref 0.2–1.2)
BUN SERPL-MCNC: 43 MG/DL — HIGH (ref 7–23)
CALCIUM SERPL-MCNC: 9 MG/DL — SIGNIFICANT CHANGE UP (ref 8.4–10.5)
CHLORIDE SERPL-SCNC: 102 MMOL/L — SIGNIFICANT CHANGE UP (ref 96–108)
CO2 SERPL-SCNC: 34 MMOL/L — HIGH (ref 22–31)
CREAT SERPL-MCNC: 1.08 MG/DL — SIGNIFICANT CHANGE UP (ref 0.5–1.3)
EGFR: 48 ML/MIN/1.73M2 — LOW
EOSINOPHIL # BLD AUTO: 0.12 K/UL — SIGNIFICANT CHANGE UP (ref 0–0.5)
EOSINOPHIL NFR BLD AUTO: 1.4 % — SIGNIFICANT CHANGE UP (ref 0–6)
GLUCOSE SERPL-MCNC: 115 MG/DL — HIGH (ref 70–99)
HCT VFR BLD CALC: 24.5 % — LOW (ref 34.5–45)
HCT VFR BLD CALC: 27.7 % — LOW (ref 34.5–45)
HGB BLD-MCNC: 7.7 G/DL — LOW (ref 11.5–15.5)
HGB BLD-MCNC: 8.5 G/DL — LOW (ref 11.5–15.5)
IMM GRANULOCYTES NFR BLD AUTO: 0.9 % — SIGNIFICANT CHANGE UP (ref 0–0.9)
LYMPHOCYTES # BLD AUTO: 1.18 K/UL — SIGNIFICANT CHANGE UP (ref 1–3.3)
LYMPHOCYTES # BLD AUTO: 13.3 % — SIGNIFICANT CHANGE UP (ref 13–44)
MAGNESIUM SERPL-MCNC: 2.1 MG/DL — SIGNIFICANT CHANGE UP (ref 1.6–2.6)
MCHC RBC-ENTMCNC: 26.4 PG — LOW (ref 27–34)
MCHC RBC-ENTMCNC: 26.8 PG — LOW (ref 27–34)
MCHC RBC-ENTMCNC: 30.7 GM/DL — LOW (ref 32–36)
MCHC RBC-ENTMCNC: 31.4 GM/DL — LOW (ref 32–36)
MCV RBC AUTO: 85.4 FL — SIGNIFICANT CHANGE UP (ref 80–100)
MCV RBC AUTO: 86 FL — SIGNIFICANT CHANGE UP (ref 80–100)
MONOCYTES # BLD AUTO: 0.72 K/UL — SIGNIFICANT CHANGE UP (ref 0–0.9)
MONOCYTES NFR BLD AUTO: 8.1 % — SIGNIFICANT CHANGE UP (ref 2–14)
MRSA PCR RESULT.: SIGNIFICANT CHANGE UP
NEUTROPHILS # BLD AUTO: 6.73 K/UL — SIGNIFICANT CHANGE UP (ref 1.8–7.4)
NEUTROPHILS NFR BLD AUTO: 76 % — SIGNIFICANT CHANGE UP (ref 43–77)
NRBC # BLD: 0 /100 WBCS — SIGNIFICANT CHANGE UP (ref 0–0)
NRBC # BLD: 0 /100 WBCS — SIGNIFICANT CHANGE UP (ref 0–0)
PHOSPHATE SERPL-MCNC: 3.8 MG/DL — SIGNIFICANT CHANGE UP (ref 2.5–4.5)
PLATELET # BLD AUTO: 75 K/UL — LOW (ref 150–400)
PLATELET # BLD AUTO: 79 K/UL — LOW (ref 150–400)
POTASSIUM SERPL-MCNC: 4.3 MMOL/L — SIGNIFICANT CHANGE UP (ref 3.5–5.3)
POTASSIUM SERPL-SCNC: 4.3 MMOL/L — SIGNIFICANT CHANGE UP (ref 3.5–5.3)
PROT SERPL-MCNC: 5.5 G/DL — LOW (ref 6–8.3)
RBC # BLD: 2.87 M/UL — LOW (ref 3.8–5.2)
RBC # BLD: 3.22 M/UL — LOW (ref 3.8–5.2)
RBC # FLD: 17.2 % — HIGH (ref 10.3–14.5)
RBC # FLD: 17.3 % — HIGH (ref 10.3–14.5)
S AUREUS DNA NOSE QL NAA+PROBE: SIGNIFICANT CHANGE UP
SODIUM SERPL-SCNC: 145 MMOL/L — SIGNIFICANT CHANGE UP (ref 135–145)
WBC # BLD: 10.56 K/UL — HIGH (ref 3.8–10.5)
WBC # BLD: 8.86 K/UL — SIGNIFICANT CHANGE UP (ref 3.8–10.5)
WBC # FLD AUTO: 10.56 K/UL — HIGH (ref 3.8–10.5)
WBC # FLD AUTO: 8.86 K/UL — SIGNIFICANT CHANGE UP (ref 3.8–10.5)

## 2024-06-29 PROCEDURE — 99232 SBSQ HOSP IP/OBS MODERATE 35: CPT | Mod: GC

## 2024-06-29 RX ORDER — FUROSEMIDE 10 MG/ML
40 INJECTION, SOLUTION INTRAMUSCULAR; INTRAVENOUS ONCE
Refills: 0 | Status: COMPLETED | OUTPATIENT
Start: 2024-06-29 | End: 2024-06-29

## 2024-06-29 RX ORDER — METOPROLOL TARTRATE 50 MG
50 TABLET ORAL THREE TIMES A DAY
Refills: 0 | Status: DISCONTINUED | OUTPATIENT
Start: 2024-06-29 | End: 2024-06-30

## 2024-06-29 RX ADMIN — Medication 50 MILLIGRAM(S): at 14:10

## 2024-06-29 RX ADMIN — Medication 25 MILLIGRAM(S): at 05:38

## 2024-06-29 RX ADMIN — FUROSEMIDE 40 MILLIGRAM(S): 10 INJECTION, SOLUTION INTRAMUSCULAR; INTRAVENOUS at 11:07

## 2024-06-29 RX ADMIN — PANTOPRAZOLE SODIUM 40 MILLIGRAM(S): 40 INJECTION, POWDER, FOR SOLUTION INTRAVENOUS at 05:37

## 2024-06-29 RX ADMIN — PANTOPRAZOLE SODIUM 40 MILLIGRAM(S): 40 INJECTION, POWDER, FOR SOLUTION INTRAVENOUS at 17:50

## 2024-06-29 RX ADMIN — Medication 50 MILLIGRAM(S): at 21:11

## 2024-06-29 RX ADMIN — Medication 1 APPLICATION(S): at 14:09

## 2024-06-29 RX ADMIN — Medication 3 MILLIGRAM(S): at 21:10

## 2024-06-30 LAB
ALBUMIN SERPL ELPH-MCNC: 2.9 G/DL — LOW (ref 3.3–5)
ALP SERPL-CCNC: 45 U/L — SIGNIFICANT CHANGE UP (ref 40–120)
ALT FLD-CCNC: 8 U/L — LOW (ref 10–45)
ANION GAP SERPL CALC-SCNC: 8 MMOL/L — SIGNIFICANT CHANGE UP (ref 5–17)
AST SERPL-CCNC: 13 U/L — SIGNIFICANT CHANGE UP (ref 10–40)
BASOPHILS # BLD AUTO: 0.03 K/UL — SIGNIFICANT CHANGE UP (ref 0–0.2)
BASOPHILS NFR BLD AUTO: 0.4 % — SIGNIFICANT CHANGE UP (ref 0–2)
BILIRUB SERPL-MCNC: 1.4 MG/DL — HIGH (ref 0.2–1.2)
BUN SERPL-MCNC: 36 MG/DL — HIGH (ref 7–23)
CALCIUM SERPL-MCNC: 9 MG/DL — SIGNIFICANT CHANGE UP (ref 8.4–10.5)
CHLORIDE SERPL-SCNC: 103 MMOL/L — SIGNIFICANT CHANGE UP (ref 96–108)
CO2 SERPL-SCNC: 35 MMOL/L — HIGH (ref 22–31)
CREAT SERPL-MCNC: 1.11 MG/DL — SIGNIFICANT CHANGE UP (ref 0.5–1.3)
EGFR: 47 ML/MIN/1.73M2 — LOW
EOSINOPHIL # BLD AUTO: 0.25 K/UL — SIGNIFICANT CHANGE UP (ref 0–0.5)
EOSINOPHIL NFR BLD AUTO: 3.3 % — SIGNIFICANT CHANGE UP (ref 0–6)
GLUCOSE SERPL-MCNC: 100 MG/DL — HIGH (ref 70–99)
HCT VFR BLD CALC: 29.8 % — LOW (ref 34.5–45)
HGB BLD-MCNC: 8.5 G/DL — LOW (ref 11.5–15.5)
IMM GRANULOCYTES NFR BLD AUTO: 0.5 % — SIGNIFICANT CHANGE UP (ref 0–0.9)
LYMPHOCYTES # BLD AUTO: 1.34 K/UL — SIGNIFICANT CHANGE UP (ref 1–3.3)
LYMPHOCYTES # BLD AUTO: 17.9 % — SIGNIFICANT CHANGE UP (ref 13–44)
MAGNESIUM SERPL-MCNC: 2.1 MG/DL — SIGNIFICANT CHANGE UP (ref 1.6–2.6)
MCHC RBC-ENTMCNC: 25.7 PG — LOW (ref 27–34)
MCHC RBC-ENTMCNC: 28.5 GM/DL — LOW (ref 32–36)
MCV RBC AUTO: 90 FL — SIGNIFICANT CHANGE UP (ref 80–100)
MONOCYTES # BLD AUTO: 0.65 K/UL — SIGNIFICANT CHANGE UP (ref 0–0.9)
MONOCYTES NFR BLD AUTO: 8.7 % — SIGNIFICANT CHANGE UP (ref 2–14)
NEUTROPHILS # BLD AUTO: 5.17 K/UL — SIGNIFICANT CHANGE UP (ref 1.8–7.4)
NEUTROPHILS NFR BLD AUTO: 69.2 % — SIGNIFICANT CHANGE UP (ref 43–77)
NRBC # BLD: 0 /100 WBCS — SIGNIFICANT CHANGE UP (ref 0–0)
PHOSPHATE SERPL-MCNC: 3.8 MG/DL — SIGNIFICANT CHANGE UP (ref 2.5–4.5)
PLATELET # BLD AUTO: 76 K/UL — LOW (ref 150–400)
POTASSIUM SERPL-MCNC: 3.9 MMOL/L — SIGNIFICANT CHANGE UP (ref 3.5–5.3)
POTASSIUM SERPL-SCNC: 3.9 MMOL/L — SIGNIFICANT CHANGE UP (ref 3.5–5.3)
PROT SERPL-MCNC: 5.5 G/DL — LOW (ref 6–8.3)
RBC # BLD: 3.31 M/UL — LOW (ref 3.8–5.2)
RBC # FLD: 17.5 % — HIGH (ref 10.3–14.5)
SODIUM SERPL-SCNC: 146 MMOL/L — HIGH (ref 135–145)
WBC # BLD: 7.48 K/UL — SIGNIFICANT CHANGE UP (ref 3.8–10.5)
WBC # FLD AUTO: 7.48 K/UL — SIGNIFICANT CHANGE UP (ref 3.8–10.5)

## 2024-06-30 PROCEDURE — 99232 SBSQ HOSP IP/OBS MODERATE 35: CPT | Mod: GC

## 2024-06-30 PROCEDURE — 99223 1ST HOSP IP/OBS HIGH 75: CPT

## 2024-06-30 RX ORDER — METOPROLOL TARTRATE 50 MG
75 TABLET ORAL THREE TIMES A DAY
Refills: 0 | Status: DISCONTINUED | OUTPATIENT
Start: 2024-06-30 | End: 2024-07-04

## 2024-06-30 RX ORDER — FUROSEMIDE 10 MG/ML
40 INJECTION, SOLUTION INTRAMUSCULAR; INTRAVENOUS ONCE
Refills: 0 | Status: COMPLETED | OUTPATIENT
Start: 2024-06-30 | End: 2024-06-30

## 2024-06-30 RX ADMIN — Medication 50 MILLIGRAM(S): at 06:07

## 2024-06-30 RX ADMIN — Medication 75 MILLIGRAM(S): at 13:43

## 2024-06-30 RX ADMIN — FUROSEMIDE 40 MILLIGRAM(S): 10 INJECTION, SOLUTION INTRAMUSCULAR; INTRAVENOUS at 12:48

## 2024-06-30 RX ADMIN — PANTOPRAZOLE SODIUM 40 MILLIGRAM(S): 40 INJECTION, POWDER, FOR SOLUTION INTRAVENOUS at 18:04

## 2024-06-30 RX ADMIN — PANTOPRAZOLE SODIUM 40 MILLIGRAM(S): 40 INJECTION, POWDER, FOR SOLUTION INTRAVENOUS at 06:08

## 2024-06-30 RX ADMIN — Medication 3 MILLIGRAM(S): at 21:06

## 2024-06-30 RX ADMIN — Medication 75 MILLIGRAM(S): at 21:06

## 2024-07-01 ENCOUNTER — TRANSCRIPTION ENCOUNTER (OUTPATIENT)
Age: 89
End: 2024-07-01

## 2024-07-01 LAB
A1C WITH ESTIMATED AVERAGE GLUCOSE RESULT: 5.4 % — SIGNIFICANT CHANGE UP (ref 4–5.6)
ADD ON TEST-SPECIMEN IN LAB: SIGNIFICANT CHANGE UP
ANION GAP SERPL CALC-SCNC: 9 MMOL/L — SIGNIFICANT CHANGE UP (ref 5–17)
BLD GP AB SCN SERPL QL: NEGATIVE — SIGNIFICANT CHANGE UP
BUN SERPL-MCNC: 35 MG/DL — HIGH (ref 7–23)
CALCIUM SERPL-MCNC: 9.2 MG/DL — SIGNIFICANT CHANGE UP (ref 8.4–10.5)
CHLORIDE SERPL-SCNC: 102 MMOL/L — SIGNIFICANT CHANGE UP (ref 96–108)
CHOLEST SERPL-MCNC: 88 MG/DL — SIGNIFICANT CHANGE UP
CO2 SERPL-SCNC: 35 MMOL/L — HIGH (ref 22–31)
CREAT SERPL-MCNC: 1.1 MG/DL — SIGNIFICANT CHANGE UP (ref 0.5–1.3)
EGFR: 47 ML/MIN/1.73M2 — LOW
ESTIMATED AVERAGE GLUCOSE: 108 MG/DL — SIGNIFICANT CHANGE UP (ref 68–114)
GLUCOSE SERPL-MCNC: 98 MG/DL — SIGNIFICANT CHANGE UP (ref 70–99)
HCT VFR BLD CALC: 29.9 % — LOW (ref 34.5–45)
HCT VFR BLD CALC: 31.8 % — LOW (ref 34.5–45)
HDLC SERPL-MCNC: 22 MG/DL — LOW
HGB BLD-MCNC: 9 G/DL — LOW (ref 11.5–15.5)
HGB BLD-MCNC: 9.4 G/DL — LOW (ref 11.5–15.5)
LIPID PNL WITH DIRECT LDL SERPL: 47 MG/DL — SIGNIFICANT CHANGE UP
MCHC RBC-ENTMCNC: 26.5 PG — LOW (ref 27–34)
MCHC RBC-ENTMCNC: 26.9 PG — LOW (ref 27–34)
MCHC RBC-ENTMCNC: 29.6 GM/DL — LOW (ref 32–36)
MCHC RBC-ENTMCNC: 30.1 GM/DL — LOW (ref 32–36)
MCV RBC AUTO: 88.2 FL — SIGNIFICANT CHANGE UP (ref 80–100)
MCV RBC AUTO: 90.9 FL — SIGNIFICANT CHANGE UP (ref 80–100)
NON HDL CHOLESTEROL: 66 MG/DL — SIGNIFICANT CHANGE UP
NRBC # BLD: 0 /100 WBCS — SIGNIFICANT CHANGE UP (ref 0–0)
NRBC # BLD: 0 /100 WBCS — SIGNIFICANT CHANGE UP (ref 0–0)
PLATELET # BLD AUTO: 63 K/UL — LOW (ref 150–400)
PLATELET # BLD AUTO: 72 K/UL — LOW (ref 150–400)
POTASSIUM SERPL-MCNC: 3.8 MMOL/L — SIGNIFICANT CHANGE UP (ref 3.5–5.3)
POTASSIUM SERPL-SCNC: 3.8 MMOL/L — SIGNIFICANT CHANGE UP (ref 3.5–5.3)
RBC # BLD: 3.39 M/UL — LOW (ref 3.8–5.2)
RBC # BLD: 3.5 M/UL — LOW (ref 3.8–5.2)
RBC # FLD: 17.3 % — HIGH (ref 10.3–14.5)
RBC # FLD: 17.6 % — HIGH (ref 10.3–14.5)
RH IG SCN BLD-IMP: POSITIVE — SIGNIFICANT CHANGE UP
SODIUM SERPL-SCNC: 146 MMOL/L — HIGH (ref 135–145)
TRIGL SERPL-MCNC: 96 MG/DL — SIGNIFICANT CHANGE UP
TSH SERPL-MCNC: 5.56 UIU/ML — HIGH (ref 0.27–4.2)
WBC # BLD: 10.29 K/UL — SIGNIFICANT CHANGE UP (ref 3.8–10.5)
WBC # BLD: 7.44 K/UL — SIGNIFICANT CHANGE UP (ref 3.8–10.5)
WBC # FLD AUTO: 10.29 K/UL — SIGNIFICANT CHANGE UP (ref 3.8–10.5)
WBC # FLD AUTO: 7.44 K/UL — SIGNIFICANT CHANGE UP (ref 3.8–10.5)

## 2024-07-01 PROCEDURE — 99221 1ST HOSP IP/OBS SF/LOW 40: CPT

## 2024-07-01 PROCEDURE — 43235 EGD DIAGNOSTIC BRUSH WASH: CPT | Mod: GC

## 2024-07-01 PROCEDURE — 99232 SBSQ HOSP IP/OBS MODERATE 35: CPT

## 2024-07-01 PROCEDURE — 99233 SBSQ HOSP IP/OBS HIGH 50: CPT | Mod: GC

## 2024-07-01 RX ORDER — SODIUM CHLORIDE 0.9 % (FLUSH) 0.9 %
500 SYRINGE (ML) INJECTION
Refills: 0 | Status: COMPLETED | OUTPATIENT
Start: 2024-07-01 | End: 2024-07-01

## 2024-07-01 RX ORDER — PANTOPRAZOLE SODIUM 40 MG/10ML
40 INJECTION, POWDER, FOR SOLUTION INTRAVENOUS
Refills: 0 | Status: DISCONTINUED | OUTPATIENT
Start: 2024-07-01 | End: 2024-07-03

## 2024-07-01 RX ORDER — ATORVASTATIN CALCIUM 20 MG/1
20 TABLET, FILM COATED ORAL AT BEDTIME
Refills: 0 | Status: DISCONTINUED | OUTPATIENT
Start: 2024-07-01 | End: 2024-07-04

## 2024-07-01 RX ORDER — PETROLATUM 42 G/100G
1 OINTMENT TOPICAL DAILY
Refills: 0 | Status: DISCONTINUED | OUTPATIENT
Start: 2024-07-01 | End: 2024-07-04

## 2024-07-01 RX ORDER — PEG3350/SOD SULF,BICARB,CL/KCL 240-22.72G
4000 SOLUTION, RECONSTITUTED, ORAL ORAL ONCE
Refills: 0 | Status: COMPLETED | OUTPATIENT
Start: 2024-07-01 | End: 2024-07-01

## 2024-07-01 RX ADMIN — Medication 75 MILLIGRAM(S): at 13:39

## 2024-07-01 RX ADMIN — Medication 75 MILLIGRAM(S): at 05:42

## 2024-07-01 RX ADMIN — Medication 75 MILLIGRAM(S): at 21:55

## 2024-07-01 RX ADMIN — PETROLATUM 1 APPLICATION(S): 42 OINTMENT TOPICAL at 17:01

## 2024-07-01 RX ADMIN — Medication 30 MILLILITER(S): at 09:03

## 2024-07-01 RX ADMIN — Medication 4000 MILLILITER(S): at 17:01

## 2024-07-01 RX ADMIN — ATORVASTATIN CALCIUM 20 MILLIGRAM(S): 20 TABLET, FILM COATED ORAL at 21:55

## 2024-07-01 RX ADMIN — PANTOPRAZOLE SODIUM 40 MILLIGRAM(S): 40 INJECTION, POWDER, FOR SOLUTION INTRAVENOUS at 05:42

## 2024-07-01 RX ADMIN — Medication 1 APPLICATION(S): at 11:47

## 2024-07-02 LAB
-  AMPICILLIN: SIGNIFICANT CHANGE UP
-  CIPROFLOXACIN: SIGNIFICANT CHANGE UP
-  LEVOFLOXACIN: SIGNIFICANT CHANGE UP
-  NITROFURANTOIN: SIGNIFICANT CHANGE UP
-  TETRACYCLINE: SIGNIFICANT CHANGE UP
-  VANCOMYCIN: SIGNIFICANT CHANGE UP
ALBUMIN SERPL ELPH-MCNC: 3.1 G/DL — LOW (ref 3.3–5)
ALP SERPL-CCNC: 51 U/L — SIGNIFICANT CHANGE UP (ref 40–120)
ALT FLD-CCNC: 10 U/L — SIGNIFICANT CHANGE UP (ref 10–45)
ANION GAP SERPL CALC-SCNC: 10 MMOL/L — SIGNIFICANT CHANGE UP (ref 5–17)
APTT BLD: 33.4 SEC — SIGNIFICANT CHANGE UP (ref 24.5–35.6)
AST SERPL-CCNC: 17 U/L — SIGNIFICANT CHANGE UP (ref 10–40)
BASOPHILS # BLD AUTO: 0.04 K/UL — SIGNIFICANT CHANGE UP (ref 0–0.2)
BASOPHILS NFR BLD AUTO: 0.5 % — SIGNIFICANT CHANGE UP (ref 0–2)
BILIRUB SERPL-MCNC: 1.6 MG/DL — HIGH (ref 0.2–1.2)
BUN SERPL-MCNC: 31 MG/DL — HIGH (ref 7–23)
CALCIUM SERPL-MCNC: 9 MG/DL — SIGNIFICANT CHANGE UP (ref 8.4–10.5)
CHLORIDE SERPL-SCNC: 102 MMOL/L — SIGNIFICANT CHANGE UP (ref 96–108)
CO2 SERPL-SCNC: 34 MMOL/L — HIGH (ref 22–31)
CREAT SERPL-MCNC: 1.03 MG/DL — SIGNIFICANT CHANGE UP (ref 0.5–1.3)
CULTURE RESULTS: ABNORMAL
D DIMER BLD IA.RAPID-MCNC: 240 NG/ML DDU — HIGH
EGFR: 51 ML/MIN/1.73M2 — LOW
EOSINOPHIL # BLD AUTO: 0.21 K/UL — SIGNIFICANT CHANGE UP (ref 0–0.5)
EOSINOPHIL NFR BLD AUTO: 2.5 % — SIGNIFICANT CHANGE UP (ref 0–6)
FIBRINOGEN PPP-MCNC: 278 MG/DL — SIGNIFICANT CHANGE UP (ref 200–445)
FOLATE SERPL-MCNC: >20 NG/ML — SIGNIFICANT CHANGE UP
GLUCOSE SERPL-MCNC: 106 MG/DL — HIGH (ref 70–99)
HCT VFR BLD CALC: 30.6 % — LOW (ref 34.5–45)
HGB BLD-MCNC: 8.8 G/DL — LOW (ref 11.5–15.5)
HIV 1 & 2 AB SERPL IA.RAPID: SIGNIFICANT CHANGE UP
IMM GRANULOCYTES NFR BLD AUTO: 0.4 % — SIGNIFICANT CHANGE UP (ref 0–0.9)
INR BLD: 1.18 RATIO — SIGNIFICANT CHANGE UP (ref 0.85–1.18)
LDH SERPL L TO P-CCNC: 231 U/L — SIGNIFICANT CHANGE UP (ref 50–242)
LYMPHOCYTES # BLD AUTO: 1.37 K/UL — SIGNIFICANT CHANGE UP (ref 1–3.3)
LYMPHOCYTES # BLD AUTO: 16 % — SIGNIFICANT CHANGE UP (ref 13–44)
MAGNESIUM SERPL-MCNC: 2.1 MG/DL — SIGNIFICANT CHANGE UP (ref 1.6–2.6)
MCHC RBC-ENTMCNC: 26.6 PG — LOW (ref 27–34)
MCHC RBC-ENTMCNC: 28.8 GM/DL — LOW (ref 32–36)
MCV RBC AUTO: 92.4 FL — SIGNIFICANT CHANGE UP (ref 80–100)
METHOD TYPE: SIGNIFICANT CHANGE UP
MONOCYTES # BLD AUTO: 0.59 K/UL — SIGNIFICANT CHANGE UP (ref 0–0.9)
MONOCYTES NFR BLD AUTO: 6.9 % — SIGNIFICANT CHANGE UP (ref 2–14)
NEUTROPHILS # BLD AUTO: 6.31 K/UL — SIGNIFICANT CHANGE UP (ref 1.8–7.4)
NEUTROPHILS NFR BLD AUTO: 73.7 % — SIGNIFICANT CHANGE UP (ref 43–77)
NRBC # BLD: 0 /100 WBCS — SIGNIFICANT CHANGE UP (ref 0–0)
ORGANISM # SPEC MICROSCOPIC CNT: ABNORMAL
ORGANISM # SPEC MICROSCOPIC CNT: ABNORMAL
PHOSPHATE SERPL-MCNC: 3.5 MG/DL — SIGNIFICANT CHANGE UP (ref 2.5–4.5)
PLATELET # BLD AUTO: 57 K/UL — LOW (ref 150–400)
POTASSIUM SERPL-MCNC: 3.6 MMOL/L — SIGNIFICANT CHANGE UP (ref 3.5–5.3)
POTASSIUM SERPL-SCNC: 3.6 MMOL/L — SIGNIFICANT CHANGE UP (ref 3.5–5.3)
PROT SERPL-MCNC: 5.9 G/DL — LOW (ref 6–8.3)
PROTHROM AB SERPL-ACNC: 12.9 SEC — SIGNIFICANT CHANGE UP (ref 9.5–13)
RBC # BLD: 3.31 M/UL — LOW (ref 3.8–5.2)
RBC # FLD: 17.4 % — HIGH (ref 10.3–14.5)
SODIUM SERPL-SCNC: 146 MMOL/L — HIGH (ref 135–145)
SPECIMEN SOURCE: SIGNIFICANT CHANGE UP
VIT B12 SERPL-MCNC: 1660 PG/ML — HIGH (ref 232–1245)
WBC # BLD: 8.55 K/UL — SIGNIFICANT CHANGE UP (ref 3.8–10.5)
WBC # FLD AUTO: 8.55 K/UL — SIGNIFICANT CHANGE UP (ref 3.8–10.5)

## 2024-07-02 PROCEDURE — 99232 SBSQ HOSP IP/OBS MODERATE 35: CPT

## 2024-07-02 PROCEDURE — 45378 DIAGNOSTIC COLONOSCOPY: CPT | Mod: GC,52

## 2024-07-02 PROCEDURE — 99233 SBSQ HOSP IP/OBS HIGH 50: CPT | Mod: GC

## 2024-07-02 DEVICE — NET RETRV ROT ROTH 2.5MMX230CM: Type: IMPLANTABLE DEVICE | Status: FUNCTIONAL

## 2024-07-02 RX ORDER — SODIUM CHLORIDE 0.9 % (FLUSH) 0.9 %
500 SYRINGE (ML) INJECTION
Refills: 0 | Status: COMPLETED | OUTPATIENT
Start: 2024-07-02 | End: 2024-07-02

## 2024-07-02 RX ORDER — PEG3350/SOD SULF,BICARB,CL/KCL 240-22.72G
4000 SOLUTION, RECONSTITUTED, ORAL ORAL ONCE
Refills: 0 | Status: COMPLETED | OUTPATIENT
Start: 2024-07-02 | End: 2024-07-02

## 2024-07-02 RX ADMIN — PANTOPRAZOLE SODIUM 40 MILLIGRAM(S): 40 INJECTION, POWDER, FOR SOLUTION INTRAVENOUS at 05:24

## 2024-07-02 RX ADMIN — Medication 1 APPLICATION(S): at 11:11

## 2024-07-02 RX ADMIN — Medication 4000 MILLILITER(S): at 17:41

## 2024-07-02 RX ADMIN — ATORVASTATIN CALCIUM 20 MILLIGRAM(S): 20 TABLET, FILM COATED ORAL at 21:29

## 2024-07-02 RX ADMIN — Medication 30 MILLILITER(S): at 12:08

## 2024-07-02 RX ADMIN — PETROLATUM 1 APPLICATION(S): 42 OINTMENT TOPICAL at 11:11

## 2024-07-02 RX ADMIN — Medication 75 MILLIGRAM(S): at 21:29

## 2024-07-02 RX ADMIN — Medication 75 MILLIGRAM(S): at 13:57

## 2024-07-02 RX ADMIN — Medication 75 MILLIGRAM(S): at 05:24

## 2024-07-03 ENCOUNTER — TRANSCRIPTION ENCOUNTER (OUTPATIENT)
Age: 89
End: 2024-07-03

## 2024-07-03 LAB
ADD ON TEST-SPECIMEN IN LAB: SIGNIFICANT CHANGE UP
ADD ON TEST-SPECIMEN IN LAB: SIGNIFICANT CHANGE UP
ALBUMIN SERPL ELPH-MCNC: 3.1 G/DL — LOW (ref 3.3–5)
ALP SERPL-CCNC: 52 U/L — SIGNIFICANT CHANGE UP (ref 40–120)
ALT FLD-CCNC: 11 U/L — SIGNIFICANT CHANGE UP (ref 10–45)
ANION GAP SERPL CALC-SCNC: 12 MMOL/L — SIGNIFICANT CHANGE UP (ref 5–17)
APTT BLD: 25.1 SEC — SIGNIFICANT CHANGE UP (ref 24.5–35.6)
APTT BLD: 36 SEC — HIGH (ref 24.5–35.6)
AST SERPL-CCNC: 17 U/L — SIGNIFICANT CHANGE UP (ref 10–40)
BASOPHILS # BLD AUTO: 0.04 K/UL — SIGNIFICANT CHANGE UP (ref 0–0.2)
BASOPHILS NFR BLD AUTO: 0.5 % — SIGNIFICANT CHANGE UP (ref 0–2)
BILIRUB SERPL-MCNC: 1.5 MG/DL — HIGH (ref 0.2–1.2)
BUN SERPL-MCNC: 26 MG/DL — HIGH (ref 7–23)
CALCIUM SERPL-MCNC: 8.9 MG/DL — SIGNIFICANT CHANGE UP (ref 8.4–10.5)
CHLORIDE SERPL-SCNC: 101 MMOL/L — SIGNIFICANT CHANGE UP (ref 96–108)
CO2 SERPL-SCNC: 32 MMOL/L — HIGH (ref 22–31)
CREAT SERPL-MCNC: 1.06 MG/DL — SIGNIFICANT CHANGE UP (ref 0.5–1.3)
EGFR: 50 ML/MIN/1.73M2 — LOW
EOSINOPHIL # BLD AUTO: 0.16 K/UL — SIGNIFICANT CHANGE UP (ref 0–0.5)
EOSINOPHIL NFR BLD AUTO: 2.2 % — SIGNIFICANT CHANGE UP (ref 0–6)
FIBRINOGEN PPP-MCNC: 233 MG/DL — SIGNIFICANT CHANGE UP (ref 200–445)
GLUCOSE SERPL-MCNC: 101 MG/DL — HIGH (ref 70–99)
HCT VFR BLD CALC: 31.3 % — LOW (ref 34.5–45)
HEPARIN-PF4 AB RESULT: <0.6 U/ML — SIGNIFICANT CHANGE UP (ref 0–0.9)
HGB BLD-MCNC: 9.1 G/DL — LOW (ref 11.5–15.5)
IMM GRANULOCYTES NFR BLD AUTO: 0.4 % — SIGNIFICANT CHANGE UP (ref 0–0.9)
INR BLD: 1.41 RATIO — HIGH (ref 0.85–1.18)
INR BLD: 1.45 RATIO — HIGH (ref 0.85–1.18)
LYMPHOCYTES # BLD AUTO: 1.61 K/UL — SIGNIFICANT CHANGE UP (ref 1–3.3)
LYMPHOCYTES # BLD AUTO: 21.7 % — SIGNIFICANT CHANGE UP (ref 13–44)
MAGNESIUM SERPL-MCNC: 2 MG/DL — SIGNIFICANT CHANGE UP (ref 1.6–2.6)
MCHC RBC-ENTMCNC: 26.6 PG — LOW (ref 27–34)
MCHC RBC-ENTMCNC: 29.1 GM/DL — LOW (ref 32–36)
MCV RBC AUTO: 91.5 FL — SIGNIFICANT CHANGE UP (ref 80–100)
MONOCYTES # BLD AUTO: 0.6 K/UL — SIGNIFICANT CHANGE UP (ref 0–0.9)
MONOCYTES NFR BLD AUTO: 8.1 % — SIGNIFICANT CHANGE UP (ref 2–14)
NEUTROPHILS # BLD AUTO: 4.97 K/UL — SIGNIFICANT CHANGE UP (ref 1.8–7.4)
NEUTROPHILS NFR BLD AUTO: 67.1 % — SIGNIFICANT CHANGE UP (ref 43–77)
NRBC # BLD: 0 /100 WBCS — SIGNIFICANT CHANGE UP (ref 0–0)
PF4 HEPARIN CMPLX AB SER-ACNC: NEGATIVE — SIGNIFICANT CHANGE UP
PHOSPHATE SERPL-MCNC: 3.5 MG/DL — SIGNIFICANT CHANGE UP (ref 2.5–4.5)
PLATELET # BLD AUTO: 45 K/UL — LOW (ref 150–400)
POTASSIUM SERPL-MCNC: 3.5 MMOL/L — SIGNIFICANT CHANGE UP (ref 3.5–5.3)
POTASSIUM SERPL-SCNC: 3.5 MMOL/L — SIGNIFICANT CHANGE UP (ref 3.5–5.3)
PROT SERPL-MCNC: 5.9 G/DL — LOW (ref 6–8.3)
PROTHROM AB SERPL-ACNC: 15.1 SEC — HIGH (ref 9.5–13)
PROTHROM AB SERPL-ACNC: 15.4 SEC — HIGH (ref 9.5–13)
RBC # BLD: 3.42 M/UL — LOW (ref 3.8–5.2)
RBC # FLD: 17.5 % — HIGH (ref 10.3–14.5)
RETICS #: 200 K/UL — HIGH (ref 25–125)
RETICS/RBC NFR: 6 % — HIGH (ref 0.5–2.5)
SODIUM SERPL-SCNC: 145 MMOL/L — SIGNIFICANT CHANGE UP (ref 135–145)
WBC # BLD: 7.41 K/UL — SIGNIFICANT CHANGE UP (ref 3.8–10.5)
WBC # FLD AUTO: 7.41 K/UL — SIGNIFICANT CHANGE UP (ref 3.8–10.5)

## 2024-07-03 PROCEDURE — 99223 1ST HOSP IP/OBS HIGH 75: CPT

## 2024-07-03 PROCEDURE — 99233 SBSQ HOSP IP/OBS HIGH 50: CPT | Mod: GC

## 2024-07-03 PROCEDURE — 99232 SBSQ HOSP IP/OBS MODERATE 35: CPT | Mod: GC

## 2024-07-03 PROCEDURE — 99232 SBSQ HOSP IP/OBS MODERATE 35: CPT

## 2024-07-03 RX ORDER — PANTOPRAZOLE SODIUM 40 MG/10ML
40 INJECTION, POWDER, FOR SOLUTION INTRAVENOUS
Refills: 0 | Status: DISCONTINUED | OUTPATIENT
Start: 2024-07-03 | End: 2024-07-04

## 2024-07-03 RX ORDER — FUROSEMIDE 10 MG/ML
1 INJECTION, SOLUTION INTRAMUSCULAR; INTRAVENOUS
Refills: 0 | DISCHARGE

## 2024-07-03 RX ORDER — METOPROLOL TARTRATE 50 MG
1 TABLET ORAL
Refills: 0 | DISCHARGE

## 2024-07-03 RX ORDER — DEXTROSE MONOHYDRATE AND SODIUM CHLORIDE 5; .3 G/100ML; G/100ML
1000 INJECTION, SOLUTION INTRAVENOUS
Refills: 0 | Status: DISCONTINUED | OUTPATIENT
Start: 2024-07-03 | End: 2024-07-03

## 2024-07-03 RX ORDER — FUROSEMIDE 10 MG/ML
1 INJECTION, SOLUTION INTRAMUSCULAR; INTRAVENOUS
Qty: 30 | Refills: 2
Start: 2024-07-03 | End: 2024-09-30

## 2024-07-03 RX ORDER — METOPROLOL TARTRATE 50 MG
1 TABLET ORAL
Qty: 180 | Refills: 2
Start: 2024-07-03 | End: 2024-12-29

## 2024-07-03 RX ORDER — SPIRONOLACTONE 25 MG/1
0.5 TABLET ORAL
Refills: 0 | DISCHARGE

## 2024-07-03 RX ORDER — PHYTONADIONE 5 MG/1
5 TABLET ORAL DAILY
Refills: 0 | Status: DISCONTINUED | OUTPATIENT
Start: 2024-07-04 | End: 2024-07-04

## 2024-07-03 RX ORDER — FUROSEMIDE 10 MG/ML
2 INJECTION, SOLUTION INTRAMUSCULAR; INTRAVENOUS
Refills: 0 | DISCHARGE

## 2024-07-03 RX ORDER — DABIGATRAN ETEXILATE 75 MG/1
1 CAPSULE ORAL
Refills: 0 | DISCHARGE

## 2024-07-03 RX ADMIN — ATORVASTATIN CALCIUM 20 MILLIGRAM(S): 20 TABLET, FILM COATED ORAL at 21:23

## 2024-07-03 RX ADMIN — Medication 75 MILLIGRAM(S): at 21:24

## 2024-07-03 RX ADMIN — DEXTROSE MONOHYDRATE AND SODIUM CHLORIDE 50 MILLILITER(S): 5; .3 INJECTION, SOLUTION INTRAVENOUS at 11:51

## 2024-07-03 RX ADMIN — PANTOPRAZOLE SODIUM 40 MILLIGRAM(S): 40 INJECTION, POWDER, FOR SOLUTION INTRAVENOUS at 05:15

## 2024-07-03 RX ADMIN — PETROLATUM 1 APPLICATION(S): 42 OINTMENT TOPICAL at 11:53

## 2024-07-03 RX ADMIN — Medication 75 MILLIGRAM(S): at 14:34

## 2024-07-03 RX ADMIN — PANTOPRAZOLE SODIUM 40 MILLIGRAM(S): 40 INJECTION, POWDER, FOR SOLUTION INTRAVENOUS at 17:28

## 2024-07-03 RX ADMIN — Medication 75 MILLIGRAM(S): at 05:15

## 2024-07-03 RX ADMIN — Medication 1 APPLICATION(S): at 11:51

## 2024-07-03 RX ADMIN — Medication 3 MILLIGRAM(S): at 21:44

## 2024-07-04 VITALS
TEMPERATURE: 98 F | OXYGEN SATURATION: 98 % | HEART RATE: 94 BPM | SYSTOLIC BLOOD PRESSURE: 90 MMHG | DIASTOLIC BLOOD PRESSURE: 60 MMHG | RESPIRATION RATE: 18 BRPM

## 2024-07-04 LAB
CMV DNA CSF QL NAA+PROBE: ABNORMAL IU/ML
CMV DNA SPEC NAA+PROBE-LOG#: ABNORMAL LOG10IU/ML
EBV EA AB SER IA-ACNC: 8.76 U/ML — SIGNIFICANT CHANGE UP
EBV EA AB TITR SER IF: POSITIVE
EBV EA IGG SER-ACNC: NEGATIVE — SIGNIFICANT CHANGE UP
EBV NA IGG SER IA-ACNC: 25.5 U/ML — HIGH
EBV PATRN SPEC IB-IMP: SIGNIFICANT CHANGE UP
EBV VCA IGG AVIDITY SER QL IA: POSITIVE
EBV VCA IGM SER IA-ACNC: 598 U/ML — HIGH
EBV VCA IGM SER IA-ACNC: <10 U/ML — SIGNIFICANT CHANGE UP
EBV VCA IGM TITR FLD: NEGATIVE — SIGNIFICANT CHANGE UP
FERRITIN SERPL-MCNC: 39 NG/ML — SIGNIFICANT CHANGE UP (ref 13–330)
FOLATE SERPL-MCNC: 19.9 NG/ML — SIGNIFICANT CHANGE UP
HAPTOGLOB SERPL-MCNC: 131 MG/DL — SIGNIFICANT CHANGE UP (ref 34–200)
HAV IGM SER-ACNC: SIGNIFICANT CHANGE UP
HBV CORE IGM SER-ACNC: SIGNIFICANT CHANGE UP
HBV SURFACE AG SER-ACNC: SIGNIFICANT CHANGE UP
HCV AB S/CO SERPL IA: 0.16 S/CO — SIGNIFICANT CHANGE UP (ref 0–0.99)
HCV AB SERPL-IMP: SIGNIFICANT CHANGE UP

## 2024-07-04 PROCEDURE — 84443 ASSAY THYROID STIM HORMONE: CPT

## 2024-07-04 PROCEDURE — 86664 EPSTEIN-BARR NUCLEAR ANTIGEN: CPT

## 2024-07-04 PROCEDURE — 71045 X-RAY EXAM CHEST 1 VIEW: CPT

## 2024-07-04 PROCEDURE — 85379 FIBRIN DEGRADATION QUANT: CPT

## 2024-07-04 PROCEDURE — 36415 COLL VENOUS BLD VENIPUNCTURE: CPT

## 2024-07-04 PROCEDURE — 82607 VITAMIN B-12: CPT

## 2024-07-04 PROCEDURE — 85027 COMPLETE CBC AUTOMATED: CPT

## 2024-07-04 PROCEDURE — P9016: CPT

## 2024-07-04 PROCEDURE — 84484 ASSAY OF TROPONIN QUANT: CPT

## 2024-07-04 PROCEDURE — 85670 THROMBIN TIME PLASMA: CPT

## 2024-07-04 PROCEDURE — 83605 ASSAY OF LACTIC ACID: CPT

## 2024-07-04 PROCEDURE — 82728 ASSAY OF FERRITIN: CPT

## 2024-07-04 PROCEDURE — 86665 EPSTEIN-BARR CAPSID VCA: CPT

## 2024-07-04 PROCEDURE — 82435 ASSAY OF BLOOD CHLORIDE: CPT

## 2024-07-04 PROCEDURE — 87086 URINE CULTURE/COLONY COUNT: CPT

## 2024-07-04 PROCEDURE — 86022 PLATELET ANTIBODIES: CPT

## 2024-07-04 PROCEDURE — 36569 INSJ PICC 5 YR+ W/O IMAGING: CPT

## 2024-07-04 PROCEDURE — 86900 BLOOD TYPING SEROLOGIC ABO: CPT

## 2024-07-04 PROCEDURE — 85246 CLOT FACTOR VIII VW ANTIGEN: CPT

## 2024-07-04 PROCEDURE — 85610 PROTHROMBIN TIME: CPT

## 2024-07-04 PROCEDURE — 82272 OCCULT BLD FECES 1-3 TESTS: CPT

## 2024-07-04 PROCEDURE — 82803 BLOOD GASES ANY COMBINATION: CPT

## 2024-07-04 PROCEDURE — 81001 URINALYSIS AUTO W/SCOPE: CPT

## 2024-07-04 PROCEDURE — 87077 CULTURE AEROBIC IDENTIFY: CPT

## 2024-07-04 PROCEDURE — 87799 DETECT AGENT NOS DNA QUANT: CPT

## 2024-07-04 PROCEDURE — 82962 GLUCOSE BLOOD TEST: CPT

## 2024-07-04 PROCEDURE — 87186 SC STD MICRODIL/AGAR DIL: CPT

## 2024-07-04 PROCEDURE — 82947 ASSAY GLUCOSE BLOOD QUANT: CPT

## 2024-07-04 PROCEDURE — 80053 COMPREHEN METABOLIC PANEL: CPT

## 2024-07-04 PROCEDURE — 85730 THROMBOPLASTIN TIME PARTIAL: CPT

## 2024-07-04 PROCEDURE — 83036 HEMOGLOBIN GLYCOSYLATED A1C: CPT

## 2024-07-04 PROCEDURE — 80074 ACUTE HEPATITIS PANEL: CPT

## 2024-07-04 PROCEDURE — 84295 ASSAY OF SERUM SODIUM: CPT

## 2024-07-04 PROCEDURE — 85045 AUTOMATED RETICULOCYTE COUNT: CPT

## 2024-07-04 PROCEDURE — 86703 HIV-1/HIV-2 1 RESULT ANTBDY: CPT

## 2024-07-04 PROCEDURE — 86663 EPSTEIN-BARR ANTIBODY: CPT

## 2024-07-04 PROCEDURE — 83735 ASSAY OF MAGNESIUM: CPT

## 2024-07-04 PROCEDURE — C1894: CPT

## 2024-07-04 PROCEDURE — C1751: CPT

## 2024-07-04 PROCEDURE — 86923 COMPATIBILITY TEST ELECTRIC: CPT

## 2024-07-04 PROCEDURE — 82330 ASSAY OF CALCIUM: CPT

## 2024-07-04 PROCEDURE — 85014 HEMATOCRIT: CPT

## 2024-07-04 PROCEDURE — 87641 MR-STAPH DNA AMP PROBE: CPT

## 2024-07-04 PROCEDURE — 80048 BASIC METABOLIC PNL TOTAL CA: CPT

## 2024-07-04 PROCEDURE — 85732 THROMBOPLASTIN TIME PARTIAL: CPT

## 2024-07-04 PROCEDURE — 84132 ASSAY OF SERUM POTASSIUM: CPT

## 2024-07-04 PROCEDURE — 96374 THER/PROPH/DIAG INJ IV PUSH: CPT

## 2024-07-04 PROCEDURE — 85384 FIBRINOGEN ACTIVITY: CPT

## 2024-07-04 PROCEDURE — 97530 THERAPEUTIC ACTIVITIES: CPT

## 2024-07-04 PROCEDURE — 85025 COMPLETE CBC W/AUTO DIFF WBC: CPT

## 2024-07-04 PROCEDURE — 85018 HEMOGLOBIN: CPT

## 2024-07-04 PROCEDURE — 99285 EMERGENCY DEPT VISIT HI MDM: CPT

## 2024-07-04 PROCEDURE — 86850 RBC ANTIBODY SCREEN: CPT

## 2024-07-04 PROCEDURE — 86901 BLOOD TYPING SEROLOGIC RH(D): CPT

## 2024-07-04 PROCEDURE — 97162 PT EVAL MOD COMPLEX 30 MIN: CPT

## 2024-07-04 PROCEDURE — 85240 CLOT FACTOR VIII AHG 1 STAGE: CPT

## 2024-07-04 PROCEDURE — 85245 CLOT FACTOR VIII VW RISTOCTN: CPT

## 2024-07-04 PROCEDURE — 80061 LIPID PANEL: CPT

## 2024-07-04 PROCEDURE — 36430 TRANSFUSION BLD/BLD COMPNT: CPT

## 2024-07-04 PROCEDURE — 97110 THERAPEUTIC EXERCISES: CPT

## 2024-07-04 PROCEDURE — 87640 STAPH A DNA AMP PROBE: CPT

## 2024-07-04 PROCEDURE — 84100 ASSAY OF PHOSPHORUS: CPT

## 2024-07-04 PROCEDURE — 82746 ASSAY OF FOLIC ACID SERUM: CPT

## 2024-07-04 PROCEDURE — 83880 ASSAY OF NATRIURETIC PEPTIDE: CPT

## 2024-07-04 PROCEDURE — 83010 ASSAY OF HAPTOGLOBIN QUANT: CPT

## 2024-07-04 PROCEDURE — 93308 TTE F-UP OR LMTD: CPT

## 2024-07-04 PROCEDURE — 83615 LACTATE (LD) (LDH) ENZYME: CPT

## 2024-07-04 PROCEDURE — 99239 HOSP IP/OBS DSCHRG MGMT >30: CPT | Mod: GC

## 2024-07-04 RX ADMIN — PANTOPRAZOLE SODIUM 40 MILLIGRAM(S): 40 INJECTION, POWDER, FOR SOLUTION INTRAVENOUS at 05:59

## 2024-07-04 RX ADMIN — Medication 75 MILLIGRAM(S): at 05:59

## 2024-07-04 RX ADMIN — Medication 1 APPLICATION(S): at 11:03

## 2024-07-04 RX ADMIN — PETROLATUM 1 APPLICATION(S): 42 OINTMENT TOPICAL at 11:03

## 2024-07-04 RX ADMIN — PHYTONADIONE 5 MILLIGRAM(S): 5 TABLET ORAL at 11:04

## 2024-07-05 LAB
EBV DNA SERPL NAA+PROBE-ACNC: ABNORMAL IU/ML
EBVPCR LOG: ABNORMAL LOG10IU/ML

## 2024-07-08 LAB
UNFRACTIONATED HEPARIN INTERPRETATION: SIGNIFICANT CHANGE UP
UNFRACTIONATED HEPARIN RESULT: NEGATIVE — SIGNIFICANT CHANGE UP
UNFRACTIONATED HEPARIN-HIGH DOSE: 0 % — SIGNIFICANT CHANGE UP
UNFRACTIONATED HEPARIN-LOW DOSE: 0 % — SIGNIFICANT CHANGE UP

## 2024-07-17 PROBLEM — I63.9 CEREBRAL INFARCTION, UNSPECIFIED: Chronic | Status: ACTIVE | Noted: 2024-06-28

## 2024-07-17 PROBLEM — I50.30 UNSPECIFIED DIASTOLIC (CONGESTIVE) HEART FAILURE: Chronic | Status: ACTIVE | Noted: 2024-06-28

## 2024-07-17 PROBLEM — I48.20 CHRONIC ATRIAL FIBRILLATION, UNSPECIFIED: Chronic | Status: ACTIVE | Noted: 2024-06-28

## 2024-07-17 NOTE — CHART NOTE - NSCHARTNOTEFT_GEN_A_CORE
89 yo female with PMH of CHF, Afib (On pradaxa), HTN, chronic lymphedema and HLD presenting with symptomatic anemia (Hgb 5.0 on arrival) suspect 2/2 UGIB given melanotic stools. GI consulted, s/p EGD on 7/1 showing esophagitis and gastritis. S/p colonoscopy on 7/2 - limited by poor bowel prep, plan repeat colonoscopy on 7/3 if able to complete prep. Found during stay to have worsening thrombocytopenia, Heme consulted prior ut      #Thrombocytopenia  - Plts 65 on admission, uptrended to 79 on 6/29, then downtrended to 45 today 7/3  - Baseline plts 110-150s in 1/2024, though notably previously as low as 59 in 3/2023  - HIV neg  - coags normal   - Folate and B12  normal, PF4 normal.  -  EBV -ve, low level of CMV +, HIV, hepatitis panel -ve  - On 7/3 haptoglobin 131, retic 6% with Hct 31  - Given hx of easy bruising and gum bleeding, and GIB,  check von Willebrand panel  -  Will review peripheral smear again  - Full consult to follow 89 yo female with PMH of CHF, Afib (On pradaxa), HTN, chronic lymphedema and HLD presenting with symptomatic anemia (Hgb 5.0 on arrival) suspect 2/2 UGIB given melanotic stools. GI consulted, s/p EGD on 7/1 showing esophagitis and gastritis. S/p colonoscopy on 7/2 - limited by poor bowel prep, plan repeat colonoscopy on 7/3 if able to complete prep. Found during stay to have worsening thrombocytopenia, Heme consulted prior ut      #Thrombocytopenia  - Plts 65 on admission, uptrended to 79 on 6/29, then downtrended to 45 today 7/3  - Baseline plts 110-150s in 1/2024, though notably previously as low as 59 in 3/2023  - HIV neg  - coags normal   - Folate and B12  normal, PF4 normal.  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 GI consulted, s/p EGD on 7/1 showing esophagitis and gastritis. S/p colonoscopy on 7/2 - limited by poor bowel prep, plan repeat colonoscopy on 7/3 if able to complete prep. Found during stay to have worsening thrombocytopenia, Heme consulted prior ut      #Thrombocytopenia  - Plts 65 on admission, uptrended to 79 on 6/29, then downtrended to 45 today 7/3  - Baseline plts 110-150s in 1/2024, though notably previously as low as 59 in 3/2023  - HIV neg  - coags normal   - Folate and B12  normal, PF4 normal.  -  EBV -ve, low level of CMV PCR, HIV, hepatitis panel -ve  - On 7/3 haptoglobin 131, retic 6% with Hct 31  - Given hx of easy bruising and gum bleeding, and GIB,     - Will review peripheral smear -> hypochromic microcytic anemia with anisocytosis,  neutrophils with normal morphology, relative increased in monocytes, some reactive lymphocytes seen, very scant but giant platelets  - suspect ITP based on these findings; please start dexamethasone 40 mg IV qD   - Given the recent GIB, for faster response, would add IVIG 1g/kg daily x2  - please check iron panel with ferritin given hypochromia  - Case discussed with attending  - Full consult to follow tomorrow 89 yo female with PMH of CHF, Afib (On pradaxa), HTN, chronic lymphedema and HLD presenting with symptomatic anemia (Hgb 5.0 on arrival) suspect 2/2 UGIB given melanotic stools. GI consulted, s/p EGD on 7/1 showing esophagitis and gastritis. S/p colonoscopy on 7/2 - limited by poor bowel prep, plan repeat colonoscopy on 7/3 if able to complete prep. Found during stay to have worsening thrombocytopenia, Heme consulted prior ut      #Thrombocytopenia  - Plts 65 on admission, uptrended to 79 on 6/29, then downtrended to 45 today 7/3  - Baseline plts 110-150s in 1/2024, though notably previously as low as 59 in 3/2023  - HIV neg;   - EBV -ve, low level of CMV PCR, HIV, hepatitis panel -ve  - coags normal   - Folate and B12  normal, PF4 normal.  - On 7/3 haptoglobin 131, retic 6% with Hct 31  - Given hx of easy bruising and gum bleeding, and GIB,     - Will review peripheral smear -> hypochromic microcytic anemia with anisocytosis, no significant schistocytes;  neutrophils with normal morphology, relative increased in monocytes, some reactive lymphocytes seen; very scant but giant platelets  - suspect ITP based on these findings; please start dexamethasone 40 mg IV qD   - Given the recent GIB, for faster response, would add IVIG 1g/kg daily x2  - please check iron panel with ferritin given hypochromia  - Case discussed with attending

## 2024-07-17 NOTE — H&P ADULT - PROBLEM SELECTOR PLAN 5
Praxada was stopped during last admission for bleeding risk.  Continue stopping praxada. BNP of 1994 (below baseline) and long standing dyspnea.  Crackles at right lower lung and JVD.    - Continue Lasix as above  - Considering age, GDMT not best option

## 2024-07-17 NOTE — H&P ADULT - PROBLEM SELECTOR PLAN 6
Currently hypotensive with SBP<100 but baseline for patient. Pradaxa was stopped during last admission for bleeding risk.    Continue holding pradaxa  Continue metop tartrate 75mg TID

## 2024-07-17 NOTE — ED PROVIDER NOTE - ENMT, MLM
Airway patent, Nasal mucosa clear. Mouth with normal mucosa, no gingival bleeding. Throat has no vesicles, no oropharyngeal exudates and uvula is midline.

## 2024-07-17 NOTE — H&P ADULT - TIME BILLING
chart reviewing, history taking, physical exam, assessment and documentation, including speaking to specialist regarding the management.

## 2024-07-17 NOTE — ED PROVIDER NOTE - PROGRESS NOTE DETAILS
thrombocytopenic to 4, H/H stable compared to recent labs. will consult hem for suspected HIT or ITP. pt mentating well, stable vitals. endorsed to hospitalist. stable for admission - Stan Koroma PA-C

## 2024-07-17 NOTE — H&P ADULT - ATTENDING COMMENTS
89 y/o pt with a PMH of HFpEF on chronic 3-6L NC, a-fib, CVA and lymphedema with recent hospitalization for anemia s/p GI work-up which was unrevealing, presents to the ED with worsening thrombocytopenia <10k, c/f  likely ITP; also with possible cellulitis of RLE.   Some bruising on UEs, but better than on prior admission. b/l LE edema, worse from baseline per dtr and aid, also with worsening erythema and weeping in LLE. +slight warmth and tenderness in RLE.     - heme recs appreciated, suspect ITP - start decadron and IVIG, monitor counts daily  - possible cellulitis of RLE, but pt non septic on vitals, non toxic appearing - will treat with ancef for now, monitor LEs  - increase lasix to 40mg BID for now to help diurese   - c/w metoprolol home dose; no longer on AC   - neuro checks     #ITP  #RLE cellulitis  #Acute on chronic HFpEF   #chronic hypoxic respiratory failure  #lymphedema   #Chronic Afib   #CVA    d/w dtr at bedside  d/w hematology fellow

## 2024-07-17 NOTE — ED PROVIDER NOTE - OBJECTIVE STATEMENT
89 y/o female with PMH of CHF, Afib (On pradaxa), HTN, chronic lymphedema, on chronic O2 2L NC presents to the ED for outpatient labs showing thrombocytopenia of 6000.  Patient presents accompanied by daughter and aide for collateral reporting patient has endorsed generalized lethargy and shortness of breath for the last few days, typically has to walk about 2-3 steps to get to the commode but has been unable to.  They also note bruising to bilateral lower legs anteriorly where she has chronic lymphedema, more clear bruising unusual. Recent admit for anemia hgb around 5 s/p transfusion, had clean endoscopy, unable to get colonoscopy for inadequate prep. pt/aid/daughter denies sudden weight changes. Patient/aide/daughter deny noting any dark or bloody stools, hematemesis, mucosal bleeding, hematuria or bruising in other areas of the body.  Patient denies chest pain, palpitations, dizziness, LOC. Pt baseline nonambulatory able to step up 2-step commode with 1 person assistance

## 2024-07-17 NOTE — H&P ADULT - PROBLEM SELECTOR PLAN 3
Does not report new onset fatigue Close to baseline now. Some skin wounds that are draining mostly clear fluid.     Plan:  Increase to Lasix 40mg BID  Wound care consult  Observe discharge from left leg (currently pink serous fluid)

## 2024-07-17 NOTE — H&P ADULT - ASSESSMENT
89 y/o pt with a  89 y/o pt with a PMH of HFpEF on chronic 3-6L NC, a-fib, CVA and lymphedema presents to the ED with asymptomatic thrombocytopenia. No acute bleeds, skin discoloration and mentation is at baseline,  Unlikely infectious etiology, TTP, and giant platelets seen on smear making ITP most likely.

## 2024-07-17 NOTE — ED PROVIDER NOTE - CRITICAL CARE ATTENDING CONTRIBUTION TO CARE
I, Joseph Lara, was available to discuss  the management with the advanced care provider. I reviewed the  ACP's note and agree with the documented findings and plan of care.  I performed a non-face to face encounter with the patient and available for all supervision of the ACP.     89 y/o female with PMH of CHF, Afib (On pradaxa), HTN, chronic lymphedema and HLD who presents to the ED with 2-3 days of shortness of breath. Patient with Outpatient blood test drawn yesterday found to have a platelet counts at this 6 was in the 50s when she was discharged last week for GI bleed. Patient also had a drop in hemoglobin to 7.8 discharged with 9.1 denies any blood per rectum had a unclear complete prep for colonoscopy just had some gastritis on EGD no active bleeding not on AC at this time was on AC prior, no signs of petechia or active bleeding. recheck labs.

## 2024-07-17 NOTE — ED ADULT NURSE NOTE - NSICDXPASTMEDICALHX_GEN_ALL_CORE_FT
PAST MEDICAL HISTORY:  (HFpEF) heart failure with preserved ejection fraction     Chronic atrial fibrillation     CVA (cerebrovascular accident)     Dyslipidemia     Hypercalcemia     Hypertension     Lymphedema     Myocardial Infarction 15-20 years ago

## 2024-07-17 NOTE — ED PROVIDER NOTE - SKIN TEMP
bilateral lower ext with lymphedema w/overlying redness, no warmth; overlying anterior shin ecchymosis, serous oozing/warm

## 2024-07-17 NOTE — H&P ADULT - NSHPPHYSICALEXAM_GEN_ALL_CORE
PHYSICAL EXAM:  Constitutional: NAD, comfortable in bed.  HEENT: NC/AT, PERRLA, EOMI, no conjunctival pallor or scleral icterus, MMM  Neck: Supple, present JVD  Respiratory: CTA B/L. No wheezes or rhonchi but right lower lobe crackles audible.  Cardiovascular: RRR, normal S1 and S2, no m/r/g.   Gastrointestinal: +BS, soft NTND, no guarding or rebound tenderness, no palpable masses   Extremities: wwp; no cyanosis or clubbing. Bilateral 3+ pitting pedal edema with redness and serous pink discharge seen on the left leg.   Vascular: Pulses equal and strong throughout.   Neurological: AAOx3, no CN deficits  Skin: Bruising seen on forearms and dorsal hands of different ages. PHYSICAL EXAM:  Constitutional: NAD, comfortable in bed.  HEENT: NC/AT, PERRLA, EOMI, no conjunctival pallor or scleral icterus, MMM  Neck: Supple, present JVD  Respiratory: CTA B/L. No wheezes or rhonchi but right lower lobe crackles audible.  Cardiovascular: RRR, normal S1 and S2, no m/r/g.   Gastrointestinal: +BS, soft NTND, no guarding or rebound tenderness, no palpable masses   Extremities: wwp; no cyanosis or clubbing. Bilateral 3+ pitting pedal edema with redness and serous pink discharge seen on the left leg. RLE>LLE more red and tender to palpation  Vascular: Pulses equal and strong throughout.   Neurological: AAOx3, no CN deficits  Skin: Bruising seen on forearms and dorsal hands of different ages. Vital Signs Last 24 Hrs  T(C): 36.5 (17 Jul 2024 18:19), Max: 36.8 (17 Jul 2024 14:42)  T(F): 97.7 (17 Jul 2024 18:19), Max: 98.2 (17 Jul 2024 14:42)  HR: 105 (17 Jul 2024 18:19) (105 - 109)  BP: 94/54 (17 Jul 2024 18:19) (90/60 - 109/77)  BP(mean): --  RR: 18 (17 Jul 2024 18:19) (18 - 20)  SpO2: 99% (17 Jul 2024 18:19) (96% - 100%)    Parameters below as of 17 Jul 2024 18:19  Patient On (Oxygen Delivery Method): nasal cannula        PHYSICAL EXAM:  Constitutional: NAD, comfortable in bed.  HEENT: NC/AT, PERRLA, EOMI, no conjunctival pallor or scleral icterus, MMM  Neck: Supple, present JVD  Respiratory: CTA B/L. No wheezes or rhonchi but right lower lobe crackles audible.  Cardiovascular: RRR, normal S1 and S2, no m/r/g.   Gastrointestinal: +BS, soft NTND, no guarding or rebound tenderness, no palpable masses   Extremities: wwp; no cyanosis or clubbing. Bilateral 3+ pitting pedal edema with redness and serous pink discharge seen on the left leg. RLE>LLE more red and tender to palpation  Vascular: Pulses equal and strong throughout.   Neurological: AAOx3, no CN deficits  Skin: Bruising seen on forearms and dorsal hands of different ages.

## 2024-07-17 NOTE — H&P ADULT - PROBLEM SELECTOR PLAN 4
BNP of 1994 (below baseline) and long standing dyspnea.  Crackles at right lower lung and JVD. Does not report new onset fatigue. Past admission in early july 2024 got multiple pRBC transfusions and discharged with Hgb 9.1. Now 8.4.   - Likely multifactorial considering age and other comorbidities    Plan:  - Iron studies with ferritin  - CTM

## 2024-07-17 NOTE — H&P ADULT - HISTORY OF PRESENT ILLNESS
89 y/o pt with a PMH of HFpEF, a-fib, CVA and lymphedema presents to the ED with asymptomatic thrombocytopenia with baseline SOB and no jaimie blood from any bodily orifices. 3 weeks ago, patient got admitted for decreased HgB, for which EGD and colonoscopy was done. EGD was unremarkable while colonoscopy was non-determinative due to poor bowel prep. Patient chose to follow up outpatient and was discharged. Heparin was not administered during the hospital course. 2 days ago, the patient's home health aide found clear to pink exudate from bilateral leg lymphedema. Earlier today, the patient received a result showing 4000 platelets which prompted daughter to bring her to the ED. Patient does not have chest pain, fever, loss of consciousness, change in vision, nausea, vomiting, headache, recent trauma, blood in stool, urine, mouth, anus, eyes, or nose.    ED course; 89 y/o pt with a PMH of HFpEF, a-fib, CVA and lymphedema presents to the ED with asymptomatic thrombocytopenia with baseline SOB and no jaimie blood from any bodily orifices. 3 weeks ago, patient got admitted for decreased HgB, for which EGD and colonoscopy was done. EGD was unremarkable while colonoscopy was non-determinative due to poor bowel prep. Patient chose to follow up outpatient and was discharged. Heparin was not administered during the hospital course. 2 days ago, the patient's home health aide found clear to pink exudate from bilateral leg lymphedema. Earlier today, the patient received a result showing 4000 platelets which prompted daughter to bring her to the ED. Patient does not have chest pain, fever, loss of consciousness, change in vision, nausea, vomiting, headache, recent trauma, blood in stool, urine, mouth, anus, eyes, or nose.    ED course; gave 1 unit of pRBC. heme consult (only replete platelets if signs of bleeding emerges). 89 y/o pt with a PMH of HFpEF on chronic 3-6L NC, a-fib, CVA and lymphedema presents to the ED with asymptomatic thrombocytopenia with baseline SOB and no jaimie blood from any bodily orifices. 3 weeks ago, patient got admitted for decreased HgB, for which EGD and colonoscopy was done. EGD was unremarkable while colonoscopy was non-determinative due to poor bowel prep. Patient chose to follow up outpatient and was discharged. Heparin was not administered during the hospital course. 2 days ago, the patient's home health aide found clear to pink exudate from bilateral chronic leg lymphedema. Earlier today, the patient received a result showing 4000 platelets which prompted daughter to bring her to the ED. Patient does not have chest pain, fever, loss of consciousness, change in vision, nausea, vomiting, headache, recent trauma, blood in stool, urine, mouth, anus, eyes, or nose. She also denies any recent diarrhea or significant weight loss. Last two weeks, patient complained of fatigue and she has history of anemia since childhood per her daughter but had never required transfusions until the last admission early July.     At Ed, patient showed no signs of bleed and was mentating at baseline. Trops negatives, coag studies wnl, BNP 1994 (baseline) afebrile, RVP negative and xray showing likely left pleural effusion/atelectasis. Plts lowered to 4k but Hgb stable ~8, and bilirubin wnl. Platelet transfusion held due to no signs of bleeding and concern for ITP.
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
## 2024-07-17 NOTE — ED ADULT NURSE NOTE - OBJECTIVE STATEMENT
Patient BIBEMS from home c/o low platelets. Patient was here 2 weeks ago for low hgb and received 4 units of PRBC, patient went for repeat labs yesterday and was told platelets were low. Patient reports some mild sob, on 2L NC at baseline. Patient A&Ox4. Patient placed on cardiac monitor. No signs of acute distress at this time. Family and HHA at bedside. Plan of care in progress.

## 2024-07-17 NOTE — H&P ADULT - PROBLEM SELECTOR PLAN 2
Continue Lasix 40mg x1 per day  Observe discharge from left leg (currently pink serous fluid) RLE more erythematous and warm compared to LLE with some mild tenderness to palpation. Now starting steroid and IVIG making infection more likely    - Start Cefazolin 1000 q8h for 5 days  - CTM

## 2024-07-17 NOTE — H&P ADULT - PROBLEM SELECTOR PLAN 1
Asymptomatic thrombocytopenia (4000)  Currently no new petechiae and A&O x3  Infectious workup negative for EBV, HepB, HepC, resp viruses, HIV.    Plan:  - Skin and neuro checks q4  - CBC with diff and retic count  - Peripheral blood smear  - Marcos test  - VAHE, antiphospholipid panel  - f/u heme consult: check von Willebrand given hx of easy bruising. Asymptomatic (4k).  Currently no new petechiae and A&O x3  Infectious workup negative for EBV, HepB, HepC, resp viruses, HIV.  Unlikely hemolytic with normal bilirubin. Hx of easy bruising and anemia per daughter  Blood smear: hypochromic microcytic anemia with anisocytosis,  neutrophils with normal morphology, relative increased in monocytes, some reactive lymphocytes seen, very scant but giant platelets    Ddx: likely ITP vs vWF vs unlikely TTP, malignancy and HUS    Plan:  - Dexa 40mg qD x 5 days  - IVIG 1mg/kg x 2 doses (give tylenol and benadryl prior to administration)  - Skin and neuro checks q4  - CBC with diff and retic count  - f/u heme consult: check von Willebrand given hx of easy bruising.  - Continue holding AC

## 2024-07-17 NOTE — ED ADULT NURSE NOTE - NSFALLHARMRISKINTERV_ED_ALL_ED

## 2024-07-17 NOTE — H&P ADULT - PROBLEM SELECTOR PLAN 7
Continue statin 5mg x1 day Currently hypotensive with SBP<100 but baseline for patient.    - Metop tartrate 75mg TID

## 2024-07-17 NOTE — H&P ADULT - PROBLEM SELECTOR PLAN 8
No DVT ppx indicated at this time.  Diet: regular high fiber diet  Amb: non ambulatory at baseline   Dispo: Floors Continue statin 20 mg qD

## 2024-07-17 NOTE — H&P ADULT - NSHPLABSRESULTS_GEN_ALL_CORE
LABS:                        8.4    8.31  )-----------( 4        ( 17 Jul 2024 11:28 )             29.5     07-17    139  |  95<L>  |  23  ----------------------------<  114<H>  3.4<L>   |  31  |  0.95    Ca    8.9      17 Jul 2024 11:28  Mg     1.8     07-17    TPro  6.1  /  Alb  3.0<L>  /  TBili  0.9  /  DBili  x   /  AST  19  /  ALT  12  /  AlkPhos  66  07-17    PT/INR - ( 17 Jul 2024 11:28 )   PT: 11.7 sec;   INR: 1.07 ratio         PTT - ( 17 Jul 2024 11:28 )  PTT:35.2 sec  Urinalysis Basic - ( 17 Jul 2024 11:28 )    Color: x / Appearance: x / SG: x / pH: x  Gluc: 114 mg/dL / Ketone: x  / Bili: x / Urobili: x   Blood: x / Protein: x / Nitrite: x   Leuk Esterase: x / RBC: x / WBC x   Sq Epi: x / Non Sq Epi: x / Bacteria: x          RADIOLOGY & ADDITIONAL TESTS: Reviewed.    MEDICATIONS:  MEDICATIONS  (STANDING):  atorvastatin 20 milliGRAM(s) Oral at bedtime  furosemide    Tablet 40 milliGRAM(s) Oral daily  metoprolol tartrate 75 milliGRAM(s) Oral three times a day  potassium chloride    Tablet ER 40 milliEquivalent(s) Oral every 4 hours    MEDICATIONS  (PRN):

## 2024-07-18 NOTE — CONSULT NOTE ADULT - NS ATTEND AMEND GEN_ALL_CORE FT
Pt seen and examined with ACP.  Assessment and plan reviewed and discussed.  Agree with above.    Status of wounds and treatment recommendations d/w  pt and pt's son and daughter (on son's phone).  All questions answered.   Pt and family expressed understanding.    I spent  55 minutes face to face w/ this pt of which more than 50% of the time was spent counseling & coordinating care of this pt.

## 2024-07-18 NOTE — CONSULT NOTE ADULT - ASSESSMENT
A/P:89 y/o pt with a PMH of HFpEF on chronic 3-6L NC, a-fib, CVA and lymphedema presents to the ED with asymptomatic thrombocytopenia with baseline SOB and no jaimie blood from any bodily orifices. 3 weeks ago, patient got admitted for decreased HgB, for which EGD and colonoscopy was done. EGD was unremarkable while colonoscopy was non-determinative due to poor bowel prep. Patient chose to follow up outpatient and was discharged. Heparin was not administered during the hospital course. 2 days ago, the patient's home health aide found clear to pink exudate from bilateral chronic leg lymphedema.    Wound Consult requested to assist w/ management of  BLE weeping bullae    Recommendations:   Apply adaptic, 4x4, secure with carolann     Consider MINOO/PVR,in line with GOC      with negative MINOO/PVR apply BL ACE wraps    Moisturize intact skin w/ SWEEN cream BID  Nutrition Consult for optimization in pt w/ Increased nutritional needs            encourage high quality protein, shelly/ prosource, MVI & Vit C to promote wound healing    Continue turning and positioning w/ offloading assistive devices as per protocol  Buttocks/ Sacrum Aleida BID and prn soiling        Continue w/ attends under pads and Pericare w/  purewick care as per protocol  Waffle Cushion to chair when oob to chair  Continue w/ low air loss pressure redistribution bed surface   Care as per medicine, will remain available as requested  Upon discharge f/u as outpatient at Wound Center [address] [place] [address] [phone]  Seen w/ attng and D/w team & RN  Thank you for this consult,  Marilu Queen, KENDRICK-BC, [facility]  [phone]  Nights/ Weekends/ Holidays please call:  General Surgery Consult pager ([phone]) for emergencies  Wound PT for multilayer leg wrapping or VAC issues (x [phone])  A/P:89 y/o pt with a PMH of HFpEF on chronic 3-6L NC, a-fib, CVA and lymphedema presents to the ED with asymptomatic thrombocytopenia with baseline SOB and no jaimie blood from any bodily orifices. 3 weeks ago, patient got admitted for decreased HgB, for which EGD and colonoscopy was done. EGD was unremarkable while colonoscopy was non-determinative due to poor bowel prep. Patient chose to follow up outpatient and was discharged. Heparin was not administered during the hospital course. 2 days ago, the patient's home health aide found clear to pink exudate from bilateral chronic leg lymphedema.    Wound Consult requested to assist w/ management of  BLE weeping bullae    Recommendations:   Apply adaptic, 4x4, secure with carolann daily   Consider MINOO/PVR,in line with GOC      with negative MINOO/PVR apply BL ACE wraps    Moisturize intact skin w/ SWEEN cream BID  Nutrition Consult for optimization in pt w/ Increased nutritional needs            encourage high quality protein, shelly/ prosource, MVI & Vit C to promote wound healing    Continue turning and positioning w/ offloading assistive devices as per protocol  Buttocks/ Sacrum Aleida BID and prn soiling        Continue w/ attends under pads and Pericare w/  purewick care as per protocol  Waffle Cushion to chair when oob to chair  Continue w/ low air loss pressure redistribution bed surface   Care as per medicine, will remain available as requested  Upon discharge f/u as outpatient at Wound Center [address] [place] [address] [phone]  Seen w/ attng and D/w team & RN  Thank you for this consult,  Marilu Queen, KENDRICK-BC, [facility]  [phone]  Nights/ Weekends/ Holidays please call:  General Surgery Consult pager ([phone]) for emergencies  Wound PT for multilayer leg wrapping or VAC issues (x [phone])  A/P:89 y/o pt with a PMH of HFpEF on chronic 3-6L NC, a-fib, CVA and lymphedema presents to the ED with asymptomatic thrombocytopenia with baseline SOB and no jaimie blood from any bodily orifices. 3 weeks ago, patient got admitted for decreased HgB, for which EGD and colonoscopy was done. EGD was unremarkable while colonoscopy was non-determinative due to poor bowel prep. Patient chose to follow up outpatient and was discharged. Heparin was not administered during the hospital course. 2 days ago, the patient's home health aide found clear to pink exudate from bilateral chronic leg lymphedema.    Wound Consult requested to assist w/ management of  BLE wounds  lymphedema  MAD    Recommendations:   BLE -Apply adaptic, 4x4, secure with carolann daily   Consider MINOO/PVR,in line with GOC      with negative MINOO/PVR apply BL ACE wraps  leg elevation    Moisturize intact skin w/ SWEEN cream BID  Nutrition Consult for optimization in pt w/ Increased nutritional needs            encourage high quality protein, shelly/ prosource, MVI & Vit C to promote wound healing    Continue turning and positioning w/ offloading assistive devices as per protocol  Buttocks/ Sacrum Aleida BID and prn soiling        Continue w/ attends under pads and Pericare w/  purewick care as per protocol  Waffle Cushion to chair when oob to chair  Continue w/ low air loss pressure redistribution bed surface   Care as per medicine, will remain available as requested  Upon discharge f/u as outpatient at Wound Center [address] [place] [address] [phone]  Seen w/ attng and D/w team & RN  Thank you for this consult,  Marilu Queen, KENDRICK-BC, [facility]  [phone]  Nights/ Weekends/ Holidays please call:  General Surgery Consult pager ([phone]) for emergencies  Wound PT for multilayer leg wrapping or VAC issues (x [phone])

## 2024-07-18 NOTE — PATIENT PROFILE ADULT - FALL HARM RISK - HARM RISK INTERVENTIONS

## 2024-07-18 NOTE — PHYSICAL THERAPY INITIAL EVALUATION ADULT - GENERAL OBSERVATIONS, REHAB EVAL
Pt a/w thrombocytopenia, platelets now improved to 13, pt with h/o CVA, lymphedema in LE. Per Resident Diane, PT can work with pt with platelets at 13. Pt received semi-supine in bed in NAD, VSS, +2L O2 NC, +primafit, pt is A&Ox2-3, confused on date/time, following all commands, agreeable to PT. Noted redness & minimal weeping from bilateral lower legs.

## 2024-07-18 NOTE — PROGRESS NOTE ADULT - PROBLEM SELECTOR PLAN 6
Pradaxa was stopped during last admission for bleeding risk.    Continue holding pradaxa  Continue metop tartrate 75mg TID

## 2024-07-18 NOTE — PROGRESS NOTE ADULT - ATTENDING COMMENTS
89 y/o pt with a PMH of HFpEF on chronic 3-6L NC, a-fib, CVA and lymphedema with recent hospitalization for anemia s/p GI work-up which was unrevealing, presents to the ED with worsening thrombocytopenia <10k, c/f  likely ITP; also with possible cellulitis of RLE.   Some bruising on UEs, but better than on prior admission. b/l LE edema, worse from baseline per dtr and aid, also with worsening erythema and weeping in LLE. +slight warmth and tenderness in RLE.     - heme recs appreciated, suspect ITP - now on decadron and IVIG, monitor counts daily - plt slightly better today at 13   - possible cellulitis of RLE, but pt non septic on vitals, non toxic appearing - c/w ancef for now, monitor LEs  - c/w lasix to 40mg BID for now to help diurese   - c/w metoprolol home dose; no longer on AC   - neuro checks     #ITP  #RLE cellulitis  #Acute on chronic HFpEF   #chronic hypoxic respiratory failure  #lymphedema   #Chronic Afib   #CVA

## 2024-07-18 NOTE — PHYSICAL THERAPY INITIAL EVALUATION ADULT - ADDITIONAL COMMENTS
Pt's son reports pt ambulates short house hold distances with rollator with assistance of HHA, pt receives assistance for all ADLs. Pt has rollator, wheelchair, commode, shower chair. Pt sleeps in recliner chair. Pt has home O2

## 2024-07-18 NOTE — PROGRESS NOTE ADULT - PROBLEM SELECTOR PLAN 5
BNP of 1994 (below baseline) and long standing dyspnea.  Crackles at right lower lung and JVD.    - Continue Lasix as above  - Considering age, GDMT not best option

## 2024-07-18 NOTE — CONSULT NOTE ADULT - SUBJECTIVE AND OBJECTIVE BOX
HPI:  89 y/o pt with a PMH of HFpEF , a-fib, CVA and lymphedema presents to the ED with asymptomatic thrombocytopenia with baseline SOB and no jaimie bleeding.    Of note, 3 weeks ago, patient got admitted for decreased HgB, for which EGD and colonoscopy was done. EGD was unremarkable while colonoscopy was non-determinative due to poor bowel prep.  Heparin was not administered during the hospital course. Patient wasn't started on any new medication during that hospital course. Patient has had low platelet since previous hospitalization.  Patient had a follow up lab work after dc from previous hospitalization, found to have plt 4k,  which prompted daughter to bring her to the ED. Patient does not have  blood in stool, urine, mouth, anus, eyes, or nose. She also denies any recent diarrhea or significant weight loss.  Patient has had low platelet count with plt 50-70 previously.   Heme consulted for thrombocytopenia.          PAST MEDICAL & SURGICAL HISTORY:  Hypertension      Dyslipidemia      Myocardial Infarction  15-20 years ago      Hypercalcemia      Lymphedema      Chronic atrial fibrillation      (HFpEF) heart failure with preserved ejection fraction      CVA (cerebrovascular accident)      S/P Parathyroidectomy      S/P Hysterectomy      Bilateral Cataracts          Allergies    No Known Allergies    Intolerances        MEDICATIONS  (STANDING):  acetaminophen     Tablet .. 975 milliGRAM(s) Oral daily  atorvastatin 20 milliGRAM(s) Oral at bedtime  ceFAZolin   IVPB      ceFAZolin   IVPB 1000 milliGRAM(s) IV Intermittent every 8 hours  dexAMETHasone  IVPB 40 milliGRAM(s) IV Intermittent daily  diphenhydrAMINE 25 milliGRAM(s) Oral daily  furosemide    Tablet 40 milliGRAM(s) Oral two times a day  immune   globulin 10% (GAMMAGARD) IVPB 50 Gram(s) IV Intermittent every 24 hours  iron sucrose IVPB 200 milliGRAM(s) IV Intermittent <User Schedule>  metoprolol tartrate 75 milliGRAM(s) Oral three times a day    MEDICATIONS  (PRN):      FAMILY HISTORY:  No pertinent family history in first degree relatives        SOCIAL HISTORY: No EtOH, no tobacco    REVIEW OF SYSTEMS:  All other review of systems is negative unless indicated above.        T(F): 98.2 (07-18-24 @ 15:57), Max: 98.8 (07-17-24 @ 21:15)  HR: 98 (07-18-24 @ 15:57)  BP: 92/60 (07-18-24 @ 15:57)  RR: 19 (07-18-24 @ 15:57)  SpO2: 100% (07-18-24 @ 15:57)  Wt(kg): --    GENERAL: NAD,    HEAD:  Atraumatic, Normocephalic  EYES: EOMI, PERRLA  NECK: Supple, No JVD  CHEST/LUNG: Clear to auscultation bilaterally; No wheeze  HEART: Regular rate and rhythm; No murmurs, rubs, or gallops  ABDOMEN: Soft, Nontender, Nondistended; Bowel sounds present  EXTREMITIES:  2+ Peripheral Pulses, bilateral LE lymphedema  NEUROLOGY: non-focal  SKIN: No rashes or lesions, appears pale                          7.6    7.51  )-----------( 13       ( 18 Jul 2024 07:26 )             26.5       07-18    135  |  96  |  24<H>  ----------------------------<  159<H>  4.7   |  30  |  1.07    Ca    8.5      18 Jul 2024 07:26  Phos  3.6     07-18  Mg     1.8     07-18    TPro  6.1  /  Alb  3.0<L>  /  TBili  0.9  /  DBili  x   /  AST  19  /  ALT  12  /  AlkPhos  66  07-17      Magnesium: 1.8 mg/dL (07-18 @ 07:26)  Phosphorus: 3.6 mg/dL (07-18 @ 07:26)      PT/INR - ( 17 Jul 2024 11:28 )   PT: 11.7 sec;   INR: 1.07 ratio         PTT - ( 17 Jul 2024 11:28 )  PTT:35.2 sec    Clean Catch Clean Catch (Midstream)  06-28 @ 22:54   10,000 - 49,000 CFU/mL Enterococcus faecalis  --  Enterococcus faecalis

## 2024-07-18 NOTE — PROGRESS NOTE ADULT - PROBLEM SELECTOR PLAN 3
Close to baseline now. Some skin wounds that are draining mostly clear fluid.     Plan:  Increase to Lasix 40mg BID; reconsider increasing lasix dose because removal of intravascular fluid with lymphatic blockage could lead to intravascular volume depletion.  Wound care consult  Observe discharge from left leg (currently pink serous fluid) Close to baseline now. Some skin wounds that are draining mostly clear fluid.     Plan:  Increase to Lasix 40mg BID; reconsider increasing lasix dose because removal of intravascular fluid with lymphatic blockage could lead to intravascular volume depletion.  Wound care consult  Observe discharge from left leg (currently serous fluid)   Potential referral for complete decongestive therapy Close to baseline now. Some skin wounds that are draining mostly clear fluid.     Plan:  Increase to Lasix 40mg BID for worsening lymphedema + CHF.  Wound care consult  Observe discharge from left leg (currently serous fluid)   Potential referral for complete decongestive therapy Close to baseline now. Some skin wounds that are draining mostly clear fluid.     Plan:  Increase to Lasix 40mg BID for worsening lymphedema + CHF.  Wound care consult  Observe discharge from left leg (currently serous fluid) Close to baseline now. Some skin wounds that are draining mostly clear fluid.     Plan:  Increase to Lasix 40mg BID for worsening lymphedema + CHF.  Wound care consult: consider MINOO/PVR due to non palp DP in line with GOC, conservative management with f/u [facility] [address].  Observe discharge from left leg (currently serous fluid)

## 2024-07-18 NOTE — PROGRESS NOTE ADULT - SUBJECTIVE AND OBJECTIVE BOX
**********************  Desmond Yeoh  MS-3, Internal Medicine  **********************  Patient is a 89y old  Female who presents with a chief complaint of asymptomatic thrombocytopenia (17 Jul 2024 15:02)      OVERNIGHT EVENTS: No acute events.    SUBJECTIVE:  Patient seen and examined at bedside. Denies fever, chills, HA, cough, sob, chest pain, abdominal pain, dysuria, change in bowel movements.    OBJECTIVE:  Vital Signs Last 12 Hrs  T(F): 98.8 (07-18-24 @ 06:54), Max: 98.8 (07-17-24 @ 21:15)  HR: 98 (07-18-24 @ 06:54) (92 - 105)  BP: 100/70 (07-18-24 @ 06:54) (97/66 - 126/84)  BP(mean): --  RR: 18 (07-18-24 @ 06:54) (18 - 18)  SpO2: 98% (07-18-24 @ 06:54) (98% - 100%)  I&O's Summary      PHYSICAL EXAM:  Constitutional: NAD, comfortable in bed.  HEENT: NC/AT, PERRLA, EOMI, no conjunctival pallor or scleral icterus, MMM  Neck: Supple, no JVD  Respiratory: CTA B/L. No w/r/r.   Cardiovascular: RRR, normal S1 and S2, no m/r/g.   Gastrointestinal: +BS, soft NTND, no guarding or rebound tenderness, no palpable masses   Extremities: wwp; no cyanosis, clubbing or edema.   Vascular: Pulses equal and strong throughout.   Neurological: AAOx3, no CN deficits, strength and sensation intact throughout.   Skin: No gross skin abnormalities or rashes        LABS:                        8.4    8.31  )-----------( 4        ( 17 Jul 2024 11:28 )             29.5     07-17    139  |  95<L>  |  23  ----------------------------<  114<H>  3.4<L>   |  31  |  0.95    Ca    8.9      17 Jul 2024 11:28  Mg     1.8     07-17    TPro  6.1  /  Alb  3.0<L>  /  TBili  0.9  /  DBili  x   /  AST  19  /  ALT  12  /  AlkPhos  66  07-17    PT/INR - ( 17 Jul 2024 11:28 )   PT: 11.7 sec;   INR: 1.07 ratio         PTT - ( 17 Jul 2024 11:28 )  PTT:35.2 sec  Urinalysis Basic - ( 17 Jul 2024 11:28 )    Color: x / Appearance: x / SG: x / pH: x  Gluc: 114 mg/dL / Ketone: x  / Bili: x / Urobili: x   Blood: x / Protein: x / Nitrite: x   Leuk Esterase: x / RBC: x / WBC x   Sq Epi: x / Non Sq Epi: x / Bacteria: x          RADIOLOGY & ADDITIONAL TESTS: Reviewed.    MEDICATIONS:  MEDICATIONS  (STANDING):  acetaminophen     Tablet .. 975 milliGRAM(s) Oral daily  atorvastatin 20 milliGRAM(s) Oral at bedtime  ceFAZolin   IVPB 1000 milliGRAM(s) IV Intermittent every 8 hours  ceFAZolin   IVPB      dexAMETHasone  IVPB 40 milliGRAM(s) IV Intermittent daily  diphenhydrAMINE 25 milliGRAM(s) Oral daily  furosemide    Tablet 40 milliGRAM(s) Oral two times a day  immune   globulin 10% (GAMMAGARD) IVPB 50 Gram(s) IV Intermittent every 24 hours  metoprolol tartrate 75 milliGRAM(s) Oral three times a day    MEDICATIONS  (PRN):   **********************  Desmond Yeoh  MS-3, Internal Medicine  **********************  Patient is a 89y old  Female who presents with a chief complaint of asymptomatic thrombocytopenia (17 Jul 2024 15:02)      OVERNIGHT EVENTS: No acute events.    SUBJECTIVE:  Patient seen and examined at bedside. Denies fever, chills, HA, cough, sob, chest pain, abdominal pain, dysuria, change in bowel movements.    OBJECTIVE:  Vital Signs Last 12 Hrs  T(F): 98.8 (07-18-24 @ 06:54), Max: 98.8 (07-17-24 @ 21:15)  HR: 98 (07-18-24 @ 06:54) (92 - 105)  BP: 100/70 (07-18-24 @ 06:54) (97/66 - 126/84)  BP(mean): --  RR: 18 (07-18-24 @ 06:54) (18 - 18)  SpO2: 98% (07-18-24 @ 06:54) (98% - 100%)  I&O's Summary      PHYSICAL EXAM:  Constitutional: NAD, comfortable in bed.  HEENT: NC/AT, PERRLA, EOMI, no conjunctival pallor or scleral icterus, MMM, no blood visible in mouth  Neck: Supple, no JVD  Respiratory: CTA B/L. No w/r/r.   Cardiovascular: RRR, normal S1 and S2, no m/r/g.   Gastrointestinal: +BS, soft NTND, no guarding or rebound tenderness, no palpable masses   Extremities: wwp; no cyanosis, clubbing or edema.   Vascular: Pulses equal and strong throughout.   Neuro/Psych: AAOx3, no CN deficits, strength and sensation intact throughout, good fund of knowledge and working memory  Skin: No petechiae seen onthe body. Lower limb lymphedema bilaterally with erythematous leg and feet (edema 3+). 1 cm vesicle with clear fluid on anterior L shin, two macular purple bruises on R shin. Minimal clear fluid discharge on R leg.        LABS:                        8.4    8.31  )-----------( 4        ( 17 Jul 2024 11:28 )             29.5     07-17    139  |  95<L>  |  23  ----------------------------<  114<H>  3.4<L>   |  31  |  0.95    Ca    8.9      17 Jul 2024 11:28  Mg     1.8     07-17    TPro  6.1  /  Alb  3.0<L>  /  TBili  0.9  /  DBili  x   /  AST  19  /  ALT  12  /  AlkPhos  66  07-17    PT/INR - ( 17 Jul 2024 11:28 )   PT: 11.7 sec;   INR: 1.07 ratio         PTT - ( 17 Jul 2024 11:28 )  PTT:35.2 sec  Urinalysis Basic - ( 17 Jul 2024 11:28 )    Color: x / Appearance: x / SG: x / pH: x  Gluc: 114 mg/dL / Ketone: x  / Bili: x / Urobili: x   Blood: x / Protein: x / Nitrite: x   Leuk Esterase: x / RBC: x / WBC x   Sq Epi: x / Non Sq Epi: x / Bacteria: x          RADIOLOGY & ADDITIONAL TESTS: Reviewed.    MEDICATIONS:  MEDICATIONS  (STANDING):  acetaminophen     Tablet .. 975 milliGRAM(s) Oral daily  atorvastatin 20 milliGRAM(s) Oral at bedtime  ceFAZolin   IVPB 1000 milliGRAM(s) IV Intermittent every 8 hours  ceFAZolin   IVPB      dexAMETHasone  IVPB 40 milliGRAM(s) IV Intermittent daily  diphenhydrAMINE 25 milliGRAM(s) Oral daily  furosemide    Tablet 40 milliGRAM(s) Oral two times a day  immune   globulin 10% (GAMMAGARD) IVPB 50 Gram(s) IV Intermittent every 24 hours  metoprolol tartrate 75 milliGRAM(s) Oral three times a day    MEDICATIONS  (PRN):   **********************  Desmond Yeoh  MS-3, Internal Medicine  **********************  Patient is a 89y old  Female who presents with a chief complaint of asymptomatic thrombocytopenia (17 Jul 2024 15:02)      OVERNIGHT EVENTS: No acute events.    SUBJECTIVE:  Patient seen and examined at bedside. Denies fever, chills, HA, cough, sob, chest pain, abdominal pain, dysuria, change in bowel movements.    OBJECTIVE:  Vital Signs Last 12 Hrs  T(F): 98.8 (07-18-24 @ 06:54), Max: 98.8 (07-17-24 @ 21:15)  HR: 98 (07-18-24 @ 06:54) (92 - 105)  BP: 100/70 (07-18-24 @ 06:54) (97/66 - 126/84)  BP(mean): --  RR: 18 (07-18-24 @ 06:54) (18 - 18)  SpO2: 98% (07-18-24 @ 06:54) (98% - 100%)  I&O's Summary      PHYSICAL EXAM:  Constitutional: NAD, comfortable in bed.  HEENT: NC/AT, PERRLA, EOMI, no conjunctival pallor or scleral icterus, MMM, no blood visible in mouth  Neck: Supple, no JVD  Respiratory: CTA B/L. No w/r/r.   Cardiovascular: RRR, normal S1 and S2, no m/r/g.   Gastrointestinal: +BS, soft NTND, no guarding or rebound tenderness, no palpable masses   Extremities: wwp; no cyanosis, clubbing or edema.   Vascular: Pulses equal and strong throughout.   Neuro/Psych: AAOx3, no CN deficits, strength and sensation intact throughout, good fund of knowledge and working memory  Skin: No petechiae seen onthe body. Lower limb lymphedema bilaterally with erythematous leg and feet (edema 3+). 1 cm vesicle with clear fluid on anterior L shin, two macular purple bruises on R shin. Minimal clear fluid discharge on R leg.    LABS:                        7.6    7.51  )-----------( 13       ( 18 Jul 2024 07:26 )             26.5     07-18    135  |  96  |  24<H>  ----------------------------<  159<H>  4.7   |  30  |  1.07    Ca    8.5      18 Jul 2024 07:26  Phos  3.6     07-18  Mg     1.8     07-18    TPro  6.1  /  Alb  3.0<L>  /  TBili  0.9  /  DBili  x   /  AST  19  /  ALT  12  /  AlkPhos  66  07-17    PT/INR - ( 17 Jul 2024 11:28 )   PT: 11.7 sec;   INR: 1.07 ratio         PTT - ( 17 Jul 2024 11:28 )  PTT:35.2 sec        RADIOLOGY & ADDITIONAL TESTS: Reviewed.    MEDICATIONS:  MEDICATIONS  (STANDING):  acetaminophen     Tablet .. 975 milliGRAM(s) Oral daily  atorvastatin 20 milliGRAM(s) Oral at bedtime  ceFAZolin   IVPB 1000 milliGRAM(s) IV Intermittent every 8 hours  ceFAZolin   IVPB      dexAMETHasone  IVPB 40 milliGRAM(s) IV Intermittent daily  diphenhydrAMINE 25 milliGRAM(s) Oral daily  furosemide    Tablet 40 milliGRAM(s) Oral two times a day  immune   globulin 10% (GAMMAGARD) IVPB 50 Gram(s) IV Intermittent every 24 hours  metoprolol tartrate 75 milliGRAM(s) Oral three times a day    MEDICATIONS  (PRN):

## 2024-07-18 NOTE — PROGRESS NOTE ADULT - SUBJECTIVE AND OBJECTIVE BOX
***************************************************************  Darrel Contreras  (PGY1) Internal Medicine  On TEAMS  ***************************************************************    PROGRESS NOTE:     Patient is a 89y old  Female who presents with a chief complaint of asymptomatic thrombocytopenia (17 Jul 2024 15:02)      INTERVAL EVENTS:   SUBJECTIVE / OVERNIGHT EVENTS: No acute overnight events. Patient was seen and examined by the bedside this AM.   OXYGEN:   TELEMETRY:       MEDICATIONS  (STANDING):  acetaminophen     Tablet .. 975 milliGRAM(s) Oral daily  atorvastatin 20 milliGRAM(s) Oral at bedtime  ceFAZolin   IVPB      ceFAZolin   IVPB 1000 milliGRAM(s) IV Intermittent every 8 hours  dexAMETHasone  IVPB 40 milliGRAM(s) IV Intermittent daily  diphenhydrAMINE 25 milliGRAM(s) Oral daily  furosemide    Tablet 40 milliGRAM(s) Oral two times a day  immune   globulin 10% (GAMMAGARD) IVPB 50 Gram(s) IV Intermittent every 24 hours  metoprolol tartrate 75 milliGRAM(s) Oral three times a day    MEDICATIONS  (PRN):      CAPILLARY BLOOD GLUCOSE        I&O's Summary      PHYSICAL EXAM:  Vital Signs Last 24 Hrs  T(C): 37.1 (18 Jul 2024 06:54), Max: 37.1 (17 Jul 2024 20:40)  T(F): 98.8 (18 Jul 2024 06:54), Max: 98.8 (17 Jul 2024 21:15)  HR: 98 (18 Jul 2024 06:54) (92 - 109)  BP: 100/70 (18 Jul 2024 06:54) (90/60 - 126/84)  BP(mean): --  RR: 18 (18 Jul 2024 06:54) (18 - 20)  SpO2: 98% (18 Jul 2024 06:54) (96% - 100%)    Parameters below as of 18 Jul 2024 06:54  Patient On (Oxygen Delivery Method): nasal cannula  O2 Flow (L/min): 2      General : NAD  CV: RRR no R/M/G  Lungs: CTAB  Abd : Soft, non-tender, non-distended  Extremities : No LE Edema  Neuro : A&Ox3  Skin: Normal color and turgor    LABS:                        8.4    8.31  )-----------( 4        ( 17 Jul 2024 11:28 )             29.5     07-17    139  |  95<L>  |  23  ----------------------------<  114<H>  3.4<L>   |  31  |  0.95    Ca    8.9      17 Jul 2024 11:28  Mg     1.8     07-17    TPro  6.1  /  Alb  3.0<L>  /  TBili  0.9  /  DBili  x   /  AST  19  /  ALT  12  /  AlkPhos  66  07-17    PT/INR - ( 17 Jul 2024 11:28 )   PT: 11.7 sec;   INR: 1.07 ratio         PTT - ( 17 Jul 2024 11:28 )  PTT:35.2 sec      Urinalysis Basic - ( 17 Jul 2024 11:28 )    Color: x / Appearance: x / SG: x / pH: x  Gluc: 114 mg/dL / Ketone: x  / Bili: x / Urobili: x   Blood: x / Protein: x / Nitrite: x   Leuk Esterase: x / RBC: x / WBC x   Sq Epi: x / Non Sq Epi: x / Bacteria: x          COORDINATION OF CARE:  Care Discussed with Consultants/Other Providers [Y/N]:  Prior or Outpatient Records Reviewed [Y/N]:

## 2024-07-18 NOTE — PHYSICAL THERAPY INITIAL EVALUATION ADULT - MANUAL MUSCLE TESTING RESULTS, REHAB EVAL
UE right shoulder 2/5, elbow 3/5, grasp 3/5. L shoulder 3/5, elbow 3/5, grasp 3/5. bilateral LE at least 3/5/grossly assessed due to

## 2024-07-18 NOTE — PROGRESS NOTE ADULT - PROBLEM SELECTOR PLAN 1
Asymptomatic (4k).  Currently no new petechiae and A&O x3  Infectious workup negative for EBV, HepB, HepC, resp viruses, HIV.  Unlikely hemolytic with normal bilirubin. Hx of easy bruising and anemia per daughter  Blood smear: hypochromic microcytic anemia with anisocytosis,  neutrophils with normal morphology, relative increased in monocytes, some reactive lymphocytes seen, very scant but giant platelets    Ddx: likely ITP vs vWF vs unlikely TTP, malignancy and HUS    Plan:  - Dexa 40mg qD x 5 days  - IVIG 1mg/kg x 2 doses (give tylenol and benadryl prior to administration)  - Skin and neuro checks q4  - CBC with diff and retic count AM labs  - f/u heme consult: check von Willebrand given hx of easy bruising.  - Continue holding AC Asymptomatic (4k).  Currently no new petechiae and A&O x3  Infectious workup negative for EBV, HepB, HepC, resp viruses, HIV.  Unlikely hemolytic with normal bilirubin. Hx of easy bruising and anemia per daughter  Blood smear: hypochromic microcytic anemia with anisocytosis,  neutrophils with normal morphology, relative increased in monocytes, some reactive lymphocytes seen, very scant but giant platelets    Ddx: likely ITP vs vWF vs unlikely TTP, malignancy and HUS    Plan:  - Dexa 40mg qD x 5 days  - IVIG 1mg/kg x 2 doses (give tylenol and benadryl prior to administration)  - Skin and neuro checks q4  - Trend platelet count  - f/u heme consult: check von Willebrand given hx of easy bruising.  - Continue holding AC Asymptomatic (13k 07/18). Responding to treatment  Currently no new petechiae and A&O x3  Infectious workup negative for EBV, HepB, HepC, resp viruses, HIV.  Unlikely hemolytic with normal bilirubin. Hx of easy bruising and anemia per daughter  Blood smear: hypochromic microcytic anemia with anisocytosis,  neutrophils with normal morphology, relative increased in monocytes, some reactive lymphocytes seen, very scant but giant platelets    Ddx: likely ITP  vs vWF vs unlikely TTP, malignancy and HUS    Plan:  - Dexa 40mg qD x 5 days (end: 07/21)  - IVIG 1mg/kg x 2 doses (give tylenol and benadryl prior to administration)  - Skin and neuro checks q4  - Trend platelet count  - f/u heme consult  - F/u vWF workup  - Continue holding AC Asymptomatic (13k 07/18). Responding to treatment  Currently no new petechiae and A&O x3  Infectious workup negative for EBV, HepB, HepC, resp viruses, HIV.  Unlikely hemolytic with normal bilirubin. Hx of easy bruising and anemia per daughter  Blood smear: hypochromic microcytic anemia with anisocytosis,  neutrophils with normal morphology, relative increased in monocytes, some reactive lymphocytes seen, very scant but giant platelets    Ddx: likely ITP  vs vWF vs unlikely TTP, malignancy and HUS    Plan:  - Dexa 40mg qD x 5 days (end: 07/21)  - IVIG 1mg/kg x 2 doses (give tylenol and benadryl prior to administration)  - Skin and neuro checks q4  - Trend platelet count  - Heme following. Appreciate recs  - F/u vWF workup  - Continue holding AC Asymptomatic (13k 07/18). Responding to treatment  Currently no new petechiae and A&O x3  Infectious workup negative for EBV, HepB, HepC, resp viruses, HIV.  Unlikely hemolytic with normal bilirubin. Hx of easy bruising and anemia per daughter  Blood smear: hypochromic microcytic anemia with anisocytosis,  neutrophils with normal morphology, relative increased in monocytes, some reactive lymphocytes seen, very scant but giant platelets    Ddx: likely ITP vs vWF vs unlikely TTP, malignancy and HUS    Plan:  - Dexa 40mg qD x 5 days (end: 07/21)  - IVIG 1mg/kg x 2 doses (give tylenol and benadryl prior to administration)  - Skin and neuro checks q4  - Trend platelet count  - Heme following. Appreciate recs  - F/u vWF workup  - Continue holding AC

## 2024-07-18 NOTE — PROGRESS NOTE ADULT - PROBLEM SELECTOR PLAN 2
RLE more erythematous and warm compared to LLE with some mild tenderness to palpation. Now starting steroid and IVIG making infection more likely    - Start Cefazolin 1000 q8h for 5 days  - CTM RLE more erythematous and warm compared to LLE with some mild tenderness to palpation. Now starting steroid and IVIG making infection more likely    - Start Cefazolin 1000 q8h for 5 days  - CTM  - Observe for any purulent discharge concerning for MRSA. RLE more erythematous and warm compared to LLE with some mild tenderness to palpation. Now starting steroid and IVIG making infection more likely. Less concerned for MRSA at this point.    - Continue Cefazolin 1000 q8h for 5 days (end: 07/21)  - CTM

## 2024-07-18 NOTE — PROGRESS NOTE ADULT - ASSESSMENT
89 y/o pt with a PMH of HFpEF on chronic 3-6L NC, a-fib, CVA and lymphedema presents to the ED with asymptomatic thrombocytopenia. No acute bleeds, skin discoloration and mentation is at baseline,  Unlikely infectious etiology, TTP, and giant platelets seen on smear making ITP most likely.  89 y/o pt with a PMH of HFpEF on chronic 3-6L NC, a-fib, CVA and lymphedema presents to the ED with asymptomatic thrombocytopenia. No acute bleeds, skin discoloration and mentation is at baseline,  Unlikely infectious etiology, HIT, TTP, and giant platelets seen on smear making ITP most likely.

## 2024-07-18 NOTE — PROGRESS NOTE ADULT - PROBLEM SELECTOR PLAN 4
Does not report new onset fatigue. Past admission in early july 2024 got multiple pRBC transfusions and discharged with Hgb 9.1. Now 8.4.   - Likely multifactorial considering age and other comorbidities    Plan:  - Iron studies with ferritin  - CTM Does not report new onset fatigue. Past admission in early july 2024 got multiple pRBC transfusions and discharged with Hgb 9.1. Now 8.4.   - Likely multifactorial considering age and other comorbidities  - Iron 28, TIBC 311, Tsat 9%, Ferritin  - Likely iron deficiency dominant but overall multifactorial    Plan:  - Ferrous Sulfate 325mg qD with bowel regiment  - Iron studies with ferritin  - CTM Does not report new onset fatigue. Past admission in early july 2024 got multiple pRBC transfusions and discharged with Hgb 9.1. Now 8.4.   - Likely multifactorial considering age and other comorbidities  - Iron 28, TIBC 311, Tsat 9%, Ferritin  - Likely iron deficiency dominant but overall multifactorial    Plan:  - Ferrous Sulfate 325mg qD with bowel regiment  - Iron studies with ferritin: low iron, %sat 9 suggestive of iron deficiency, replete with ferrous sulfate.  - CTM Does not report new onset fatigue. Past admission in early july 2024 got multiple pRBC transfusions and discharged with Hgb 9.1. Now 8.4.   - Likely multifactorial considering age and other comorbidities  - Iron 28, TIBC 311, Tsat 9%, Ferritin  - Likely iron deficiency dominant but overall multifactorial    Plan:  - IV iron sucrose 200mg x 3 doses  - CTM

## 2024-07-18 NOTE — PHYSICAL THERAPY INITIAL EVALUATION ADULT - RANGE OF MOTION EXAMINATION, REHAB EVAL
except right shoulder flex limited/bilateral upper extremity ROM was WFL (within functional limits)/bilateral lower extremity ROM was WFL (within functional limits)

## 2024-07-18 NOTE — PATIENT PROFILE ADULT - FUNCTIONAL ASSESSMENT - BASIC MOBILITY 6.
2-calculated by average/Not able to assess (calculate score using [facility] averaging method)

## 2024-07-18 NOTE — CONSULT NOTE ADULT - SUBJECTIVE AND OBJECTIVE BOX
Wound SURGERY CONSULT NOTE    HPI:  89 y/o pt with a PMH of HFpEF on chronic 3-6L NC, a-fib, CVA and lymphedema presents to the ED with asymptomatic thrombocytopenia with baseline SOB and no jaimie blood from any bodily orifices. 3 weeks ago, patient got admitted for decreased HgB, for which EGD and colonoscopy was done. EGD was unremarkable while colonoscopy was non-determinative due to poor bowel prep. Patient chose to follow up outpatient and was discharged. Heparin was not administered during the hospital course. 2 days ago, the patient's home health aide found clear to pink exudate from bilateral chronic leg lymphedema. Earlier today, the patient received a result showing 4000 platelets which prompted daughter to bring her to the ED. Patient does not have chest pain, fever, loss of consciousness, change in vision, nausea, vomiting, headache, recent trauma, blood in stool, urine, mouth, anus, eyes, or nose. She also denies any recent diarrhea or significant weight loss. Last two weeks, patient complained of fatigue and she has history of anemia since childhood per her daughter but had never required transfusions until the last admission early July.     At Ed, patient showed no signs of bleed and was mentating at baseline. Trops negatives, coag studies wnl, BNP 1994 (baseline) afebrile, RVP negative and xray showing likely left pleural effusion/atelectasis. Plts lowered to 4k but Hgb stable ~8, and bilirubin wnl. Platelet transfusion held due to no signs of bleeding and concern for ITP.  (17 Jul 2024 15:02)        Wound consult requested by team to assist w/ management of skin injury.   Pt w/o c/o pain, odor, color change, Patient with c/o drainage and worsening swelling. Offloading and pericare initiated upon admission as pt Increasingly sedentary 2/2 to illness. Pt is Incontinent of urine & stool. (+) orlinck. Son at bedside daughter on telephone, lymphedema education provided and all questions asked and answered to family's expressed understanding and satisfaction.    Current Diet: Diet, Regular (07-17-24 @ 13:45)      PAST MEDICAL & SURGICAL HISTORY:  Hypertension      Dyslipidemia      Myocardial Infarction  15-20 years ago      Hypercalcemia      Lymphedema      Chronic atrial fibrillation      (HFpEF) heart failure with preserved ejection fraction      CVA (cerebrovascular accident)      S/P Parathyroidectomy      S/P Hysterectomy      Bilateral Cataracts          REVIEW OF SYSTEMS:  General/ Breast/ Skin/Vasc/ Neuro/ MSK: see HPI  All other systems negative    MEDICATIONS  (STANDING):  acetaminophen     Tablet .. 975 milliGRAM(s) Oral daily  atorvastatin 20 milliGRAM(s) Oral at bedtime  ceFAZolin   IVPB      ceFAZolin   IVPB 1000 milliGRAM(s) IV Intermittent every 8 hours  dexAMETHasone  IVPB 40 milliGRAM(s) IV Intermittent daily  diphenhydrAMINE 25 milliGRAM(s) Oral daily  furosemide    Tablet 40 milliGRAM(s) Oral two times a day  immune   globulin 10% (GAMMAGARD) IVPB 50 Gram(s) IV Intermittent every 24 hours  metoprolol tartrate 75 milliGRAM(s) Oral three times a day    MEDICATIONS  (PRN):      Allergies    No Known Allergies    Intolerances        SOCIAL HISTORY:      No hx of smoking, ETOH, drugs, Lives with 24/7 aide normally.   Right now lives with daughter for the summer      FAMILY HISTORY:    No pertinent family history in first degree relatives    PHYSICAL EXAM:  Vital Signs Last 24 Hrs  T(C): 37.1 (18 Jul 2024 06:54), Max: 37.1 (17 Jul 2024 20:40)  T(F): 98.8 (18 Jul 2024 06:54), Max: 98.8 (17 Jul 2024 21:15)  HR: 98 (18 Jul 2024 06:54) (92 - 109)  BP: 100/70 (18 Jul 2024 06:54) (90/60 - 126/84)  BP(mean): --  RR: 18 (18 Jul 2024 06:54) (18 - 18)  SpO2: 98% (18 Jul 2024 06:54) (98% - 100%)    Parameters below as of 18 Jul 2024 06:54  Patient On (Oxygen Delivery Method): nasal cannula  O2 Flow (L/min): 2      NAD/ Alert/ Obese, frail  Versa Care P500  HEENT:  sclera clear, mucosa moist, throat clear, trachea midline, neck supple  Respiratory: nonlabored w/ equal chest rise  Gastrointestinal: soft NT/ND   : (+)purewick  Neurology:  verbal,  follows commands  Psych: calm/ appropriate  Musculoskeletal: no deformities/ contractures  Vascular: BLE equally warm, no cyanosis, clubbing, nor acute ischemia           BLE DP pulses not palpable           BLE lymphedema with cobblestone appearance              LLE ecchymosis with weeping bullae            RLE superficial wound with mild drainage  Skin:  thin, dry, pale, frail  BL buttocks / sacral region with hyper and hypo pigmentation     There is no blistering, drainage       increased warmth, tenderness, induration, fluctuance, nor crepitus    LABS/ CULTURES/ RADIOLOGY:                        7.6    7.51  )-----------( 13 ( 18 Jul 2024 07:26 )             26.5       135  |  96  |  24  ----------------------------<  159      [07-18-24 @ 07:26]  4.7   |  30  |  1.07        Ca     8.5     [07-18-24 @ 07:26]      Mg     1.8     [07-18-24 @ 07:26]      Phos  3.6     [07-18-24 @ 07:26]    TPro  6.1  /  Alb  3.0  /  TBili  0.9  /  DBili  x   /  AST  19  /  ALT  12  /  AlkPhos  66  [07-17-24 @ 11:28]    PT/INR: PT 11.7 , INR 1.07       [07-17-24 @ 11:28]  PTT: 35.2       [07-17-24 @ 11:28]        A1C with Estimated Average Glucose Result: 5.4 % (07-01-24 @ 03:49)                             Wound SURGERY CONSULT NOTE    HPI:  89 y/o pt with a PMH of HFpEF on chronic 3-6L NC, a-fib, CVA and lymphedema presents to the ED with asymptomatic thrombocytopenia with baseline SOB and no jaimie blood from any bodily orifices. 3 weeks ago, patient got admitted for decreased HgB, for which EGD and colonoscopy was done. EGD was unremarkable while colonoscopy was non-determinative due to poor bowel prep. Patient chose to follow up outpatient and was discharged. Heparin was not administered during the hospital course. 2 days ago, the patient's home health aide found clear to pink exudate from bilateral chronic leg lymphedema. Earlier today, the patient received a result showing 4000 platelets which prompted daughter to bring her to the ED. Patient does not have chest pain, fever, loss of consciousness, change in vision, nausea, vomiting, headache, recent trauma, blood in stool, urine, mouth, anus, eyes, or nose. She also denies any recent diarrhea or significant weight loss. Last two weeks, patient complained of fatigue and she has history of anemia since childhood per her daughter but had never required transfusions until the last admission early July.     At Ed, patient showed no signs of bleed and was mentating at baseline. Trops negatives, coag studies wnl, BNP 1994 (baseline) afebrile, RVP negative and xray showing likely left pleural effusion/atelectasis. Plts lowered to 4k but Hgb stable ~8, and bilirubin wnl. Platelet transfusion held due to no signs of bleeding and concern for ITP.  (17 Jul 2024 15:02)        Wound consult requested by team to assist w/ management of skin injury.   Pt w/o c/o pain, odor, color change, Patient with c/o drainage and worsening swelling. Offloading and pericare initiated upon admission as pt Increasingly sedentary 2/2 to illness. Pt is Incontinent of urine & stool. (+) orlinck. Son at bedside daughter on telephone, lymphedema education provided and all questions asked and answered to family's expressed understanding and satisfaction.    Current Diet: Diet, Regular (07-17-24 @ 13:45)      PAST MEDICAL & SURGICAL HISTORY:  Hypertension      Dyslipidemia      Myocardial Infarction  15-20 years ago      Hypercalcemia      Lymphedema      Chronic atrial fibrillation      (HFpEF) heart failure with preserved ejection fraction      CVA (cerebrovascular accident)      S/P Parathyroidectomy      S/P Hysterectomy      Bilateral Cataracts          REVIEW OF SYSTEMS:  General/ Breast/ Skin/Vasc/ Neuro/ MSK: see HPI  All other systems negative    MEDICATIONS  (STANDING):  acetaminophen     Tablet .. 975 milliGRAM(s) Oral daily  atorvastatin 20 milliGRAM(s) Oral at bedtime  ceFAZolin   IVPB      ceFAZolin   IVPB 1000 milliGRAM(s) IV Intermittent every 8 hours  dexAMETHasone  IVPB 40 milliGRAM(s) IV Intermittent daily  diphenhydrAMINE 25 milliGRAM(s) Oral daily  furosemide    Tablet 40 milliGRAM(s) Oral two times a day  immune   globulin 10% (GAMMAGARD) IVPB 50 Gram(s) IV Intermittent every 24 hours  metoprolol tartrate 75 milliGRAM(s) Oral three times a day    MEDICATIONS  (PRN):      Allergies    No Known Allergies    Intolerances        SOCIAL HISTORY:      No hx of smoking, ETOH, drugs, Lives with 24/7 aide..   Lives with daughter for the summer      FAMILY HISTORY:    No pertinent family history in first degree relatives    PHYSICAL EXAM:  Vital Signs Last 24 Hrs  T(C): 37.1 (18 Jul 2024 06:54), Max: 37.1 (17 Jul 2024 20:40)  T(F): 98.8 (18 Jul 2024 06:54), Max: 98.8 (17 Jul 2024 21:15)  HR: 98 (18 Jul 2024 06:54) (92 - 109)  BP: 100/70 (18 Jul 2024 06:54) (90/60 - 126/84)  BP(mean): --  RR: 18 (18 Jul 2024 06:54) (18 - 18)  SpO2: 98% (18 Jul 2024 06:54) (98% - 100%)    Parameters below as of 18 Jul 2024 06:54  Patient On (Oxygen Delivery Method): nasal cannula  O2 Flow (L/min): 2      NAD/ Alert/ Obese, frail  Versa Care P500  HEENT:  sclera clear, mucosa moist, throat clear, trachea midline, neck supple  Respiratory: nonlabored w/ equal chest rise  Gastrointestinal: soft NT/ND   : (+)purewick  Neurology:  verbal,  follows commands  Psych: calm/ appropriate  Musculoskeletal: no deformities/ contractures  Vascular: BLE equally warm, no cyanosis, clubbing, nor acute ischemia           BLE DP pulses not palpable           BLE lymphedema with cobblestone appearance              LLE ecchymosis with weeping bullae            RLE superficial wound with mild drainage  Skin:  thin, dry, pale, frail  BL buttocks / sacral region with hyper and hypo pigmentation     There is no blistering, drainage       increased warmth, tenderness, induration, fluctuance, nor crepitus    LABS/ CULTURES/ RADIOLOGY:                        7.6    7.51  )-----------( 13 ( 18 Jul 2024 07:26 )             26.5       135  |  96  |  24  ----------------------------<  159      [07-18-24 @ 07:26]  4.7   |  30  |  1.07        Ca     8.5     [07-18-24 @ 07:26]      Mg     1.8     [07-18-24 @ 07:26]      Phos  3.6     [07-18-24 @ 07:26]    TPro  6.1  /  Alb  3.0  /  TBili  0.9  /  DBili  x   /  AST  19  /  ALT  12  /  AlkPhos  66  [07-17-24 @ 11:28]    PT/INR: PT 11.7 , INR 1.07       [07-17-24 @ 11:28]  PTT: 35.2       [07-17-24 @ 11:28]        A1C with Estimated Average Glucose Result: 5.4 % (07-01-24 @ 03:49)                             Wound SURGERY CONSULT NOTE    HPI:  89 y/o pt with a PMH of HFpEF on chronic 3-6L NC, a-fib, CVA and lymphedema presents to the ED with asymptomatic thrombocytopenia with baseline SOB and no jaimie blood from any bodily orifices. 3 weeks ago, patient got admitted for decreased HgB, for which EGD and colonoscopy was done. EGD was unremarkable while colonoscopy was non-determinative due to poor bowel prep. Patient chose to follow up outpatient and was discharged. Heparin was not administered during the hospital course. 2 days ago, the patient's home health aide found clear to pink exudate from bilateral chronic leg lymphedema. Earlier today, the patient received a result showing 4000 platelets which prompted daughter to bring her to the ED. Patient does not have chest pain, fever, loss of consciousness, change in vision, nausea, vomiting, headache, recent trauma, blood in stool, urine, mouth, anus, eyes, or nose. She also denies any recent diarrhea or significant weight loss. Last two weeks, patient complained of fatigue and she has history of anemia since childhood per her daughter but had never required transfusions until the last admission early July.     At Ed, patient showed no signs of bleed and was mentating at baseline. Trops negatives, coag studies wnl, BNP 1994 (baseline) afebrile, RVP negative and xray showing likely left pleural effusion/atelectasis. Plts lowered to 4k but Hgb stable ~8, and bilirubin wnl. Platelet transfusion held due to no signs of bleeding and concern for ITP.  (17 Jul 2024 15:02)        Wound consult requested by team to assist w/ management of BLE weeping wounds.   Pt w/o c/o pain, odor, color change, Patient with c/o drainage and worsening swelling. Offloading and pericare initiated upon admission as pt Increasingly sedentary 2/2 to illness. Pt is Incontinent of urine & stool. (+) raffy. Son at bedside daughter on telephone, lymphedema education provided and all questions asked and answered to family's expressed understanding and satisfaction.    Current Diet: Diet, Regular (07-17-24 @ 13:45)      PAST MEDICAL & SURGICAL HISTORY:  Hypertension      Dyslipidemia      Myocardial Infarction  15-20 years ago      Hypercalcemia      Lymphedema      Chronic atrial fibrillation      (HFpEF) heart failure with preserved ejection fraction      CVA (cerebrovascular accident)      S/P Parathyroidectomy      S/P Hysterectomy      Bilateral Cataracts          REVIEW OF SYSTEMS:  General/ Breast/ Skin/Vasc/ Neuro/ MSK: see HPI  All other systems negative    MEDICATIONS  (STANDING):  acetaminophen     Tablet .. 975 milliGRAM(s) Oral daily  atorvastatin 20 milliGRAM(s) Oral at bedtime  ceFAZolin   IVPB      ceFAZolin   IVPB 1000 milliGRAM(s) IV Intermittent every 8 hours  dexAMETHasone  IVPB 40 milliGRAM(s) IV Intermittent daily  diphenhydrAMINE 25 milliGRAM(s) Oral daily  furosemide    Tablet 40 milliGRAM(s) Oral two times a day  immune   globulin 10% (GAMMAGARD) IVPB 50 Gram(s) IV Intermittent every 24 hours  metoprolol tartrate 75 milliGRAM(s) Oral three times a day    MEDICATIONS  (PRN):      Allergies    No Known Allergies    Intolerances        SOCIAL HISTORY:      No hx of smoking, ETOH, drugs, Lives with 24/7 aide..   Lives with daughter for the summer      FAMILY HISTORY:    No pertinent family history in first degree relatives    PHYSICAL EXAM:  Vital Signs Last 24 Hrs  T(C): 37.1 (18 Jul 2024 06:54), Max: 37.1 (17 Jul 2024 20:40)  T(F): 98.8 (18 Jul 2024 06:54), Max: 98.8 (17 Jul 2024 21:15)  HR: 98 (18 Jul 2024 06:54) (92 - 109)  BP: 100/70 (18 Jul 2024 06:54) (90/60 - 126/84)  BP(mean): --  RR: 18 (18 Jul 2024 06:54) (18 - 18)  SpO2: 98% (18 Jul 2024 06:54) (98% - 100%)    Parameters below as of 18 Jul 2024 06:54  Patient On (Oxygen Delivery Method): nasal cannula  O2 Flow (L/min): 2      NAD/ Alert/ Obese, frail  Versa Care P500  HEENT:  sclera clear, mucosa moist, throat clear, trachea midline, neck supple  Respiratory: nonlabored w/ equal chest rise  Gastrointestinal: soft NT/ND   : (+)purewick  Neurology:  verbal,  follows commands  Psych: calm/ appropriate  Musculoskeletal: no deformities/ contractures  Vascular: BLE equally warm, no cyanosis, clubbing, nor acute ischemia           BLE DP pulses not palpable           BLE lymphedema with cobblestone appearance              LLE ecchymosis with weeping open blisters            RLE superficial wound with mild drainage  Skin:  thin, dry, pale, frail  BL buttocks / sacral region with hyper and hypo pigmentation c/w moisture associated dermatitis     There is no blistering, drainage       increased warmth, tenderness, induration, fluctuance, nor crepitus    LABS/ CULTURES/ RADIOLOGY:                        7.6    7.51  )-----------( 13 ( 18 Jul 2024 07:26 )             26.5       135  |  96  |  24  ----------------------------<  159      [07-18-24 @ 07:26]  4.7   |  30  |  1.07        Ca     8.5     [07-18-24 @ 07:26]      Mg     1.8     [07-18-24 @ 07:26]      Phos  3.6     [07-18-24 @ 07:26]    TPro  6.1  /  Alb  3.0  /  TBili  0.9  /  DBili  x   /  AST  19  /  ALT  12  /  AlkPhos  66  [07-17-24 @ 11:28]    PT/INR: PT 11.7 , INR 1.07       [07-17-24 @ 11:28]  PTT: 35.2       [07-17-24 @ 11:28]        A1C with Estimated Average Glucose Result: 5.4 % (07-01-24 @ 03:49)

## 2024-07-18 NOTE — CONSULT NOTE ADULT - ASSESSMENT
89 yo female with chronic thrombocytopenia, with previous admission for UGIB work up, found to have platelet count of 4k after discharge. Heme consulted for acute on chronic thrombocytopenia.      PMH: CHF, Afib (On pradaxa), HTN, chronic lymphedema and HLD     #Acute on chronic thrombocytopenia  - Plts 65 on previous admission, uptrended to 79 on 6/29, then downtrended to 45 today 7/3  - Baseline plts 110-150s in 1/2024, though notably previously as low as 59 in 3/2023  - HIV neg; EBV -ve, low level of CMV PCR, HIV -ve, hepatitis panel -ve  - coags normal   - Folate and B12 normal, PF4 normal.  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 Heme consulted for acute on chronic thrombocytopenia.      PMH: CHF, Afib (On pradaxa), HTN, chronic lymphedema and HLD     #Acute on chronic thrombocytopenia  - Plts 65 on previous admission, uptrended to 79 on 6/29, then downtrended to 45 today 7/3  - Baseline plts 110-150s in 1/2024, though notably previously as low as 59 in 3/2023  - HIV neg; EBV -ve, low level of CMV PCR, HIV -ve, hepatitis panel -ve  - coags normal   - Folate and B12 normal, PF4 normal.  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

## 2024-07-18 NOTE — PHYSICAL THERAPY INITIAL EVALUATION ADULT - PERTINENT HX OF CURRENT PROBLEM, REHAB EVAL
89 y/o pt with a PMH of HFpEF on chronic 3-6L NC, a-fib, CVA and lymphedema presents to the ED with asymptomatic thrombocytopenia with baseline SOB and no jaimie blood from any bodily orifices. 3 weeks ago, patient got admitted for decreased HgB, for which EGD and colonoscopy was done. EGD was unremarkable while colonoscopy was non-determinative due to poor bowel prep. Patient chose to follow up outpatient and was discharged. Heparin was not administered during the hospital course. 2 days ago, the patient's home health aide found clear to pink exudate from bilateral chronic leg lymphedema. Earlier today, the patient received a result showing 4000 platelets which prompted daughter to bring her to the ED. Patient does not have chest pain, fever, loss of consciousness, change in vision, nausea, vomiting, headache, recent trauma, blood in stool, urine, mouth, anus, eyes, or nose. She also denies any recent diarrhea or significant weight loss. Last two weeks, patient complained of fatigue and she has history of anemia since childhood per her daughter but had never required transfusions until the last admission early July.     At Ed, patient showed no signs of bleed and was mentating at baseline. Trops negatives, coag studies wnl, BNP 1994 (baseline) afebrile, RVP negative and xray showing likely left pleural effusion/atelectasis. Plts lowered to 4k but Hgb stable ~8, and bilirubin wnl. Platelet transfusion held due to no signs of bleeding and concern for ITP.

## 2024-07-18 NOTE — PROGRESS NOTE ADULT - PROBLEM SELECTOR PLAN 9
PPX: SCD   Diet: regular diet  Amb: non ambulatory at baseline   Dispo: Floors. PT ordered PPX: SCD   Diet: regular diet  Amb: non ambulatory at baseline. fall risk.   Dispo: Floors. PT ordered. PPX: SCD (difficult with lymphadema)  Diet: regular diet  Amb: non ambulatory at baseline. fall risk.   Dispo: Floors. PT ordered.

## 2024-07-19 NOTE — DIETITIAN INITIAL EVALUATION ADULT - ADD RECOMMEND
1. recommend addition of low sodium diet to diet order  2. encourage PO intake, protein source with each meal as tolerated  3. monitor PO intake, weight trend, electrolytes, blood glucose levels, labs, BMs

## 2024-07-19 NOTE — DIETITIAN INITIAL EVALUATION ADULT - REASON FOR ADMISSION
Thrombocytopenia    Per chart, patient is a 89 y/o female with PMH including HFpEF (on chronic 3-6L NC), AFib, h/o CVA, lymphedema. Patient presents to [facility] w/ asymptomatic thrombocytopenia w/ baseline SOB and no jaimie blood from any bodily orifices. Giant platelets seen on smear making ITP most likely per MD.

## 2024-07-19 NOTE — PROGRESS NOTE ADULT - ASSESSMENT
89 yo female with chronic thrombocytopenia, with previous admission for UGIB work up, found to have platelet count of 4k after discharge. Heme consulted for acute on chronic thrombocytopenia.      PMH: CHF, Afib (On pradaxa), HTN, chronic lymphedema and HLD     #Acute on chronic thrombocytopenia  - Plts 65 on previous admission, uptrended to 79 on 6/29, then downtrended to 45 today 7/3  - Baseline plts 110-150s in 1/2024, though notably previously as low as 59 in 3/2023  - HIV neg; EBV -ve, low level of CMV PCR, HIV -ve, hepatitis panel -ve  - coags normal   - Folate and B12 normal, PF4 normal.  - On 7/3 haptoglobin 131, retic 6% with Hct 31  - Given hx of easy bruising and gum bleeding, and GIB, started IVIG along with steriod for faster response  -  peripheral smear -> hypochromic microcytic anemia with anisocytosis, no significant schistocytes; neutrophils with normal morphology, relative increased in monocytes, some [place] cells seen, a few large granular lymphocytes seen; very scant but giant platelets  - suspect ITP based on these findings   - flow cytometry sent to r/o LGL/MDS.   - Complete dexamethasone 40 mg IV qD and IVIG 1g/kg daily x2  - patient is referred for follow up at [facility]      #SANCHO  - iron panel with ferritin consistent with iron deficiency anemia-> recommend IV iron 200mg x 5 qD    - patient had EGD which unremarkable while colonoscopy was non-determinative due to poor bowel prep  - suspect that downtrending Hb is related to SANCHO from occult blood loss: would considerVINNY hart    89 yo female with chronic thrombocytopenia, with previous admission for UGIB work up, found to have platelet count of 4k after discharge. Heme consulted for acute on chronic thrombocytopenia.      PMH: CHF, Afib (On pradaxa), HTN, chronic lymphedema and HLD     #Acute on chronic thrombocytopenia  - Plts 65 on previous admission, uptrended to 79 on 6/29, then downtrended to 45 today 7/3  - Baseline plts 110-150s in 1/2024, though notably previously as low as 59 in 3/2023  - HIV neg; EBV -ve, low level of CMV PCR, HIV -ve, hepatitis panel -ve  - coags normal   - Folate and B12 normal, PF4 normal.  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   - Complete dexamethasone 40 mg IV qD and IVIG 1g/kg daily x2  - patient is referred for follow up at [facility]      #SANCHO  - iron panel with ferritin consistent with iron deficiency anemia-> recommend IV iron 200mg x 5 qD    - patient had EGD which unremarkable while colonoscopy was non-determinative due to poor bowel prep  - suspect that downtrending Hb is related to SANCHO from occult blood loss: would consider GI eval     Hematology will sign off.      89 yo female with chronic thrombocytopenia, with previous admission for UGIB work up, found to have platelet count of 4k after discharge. Heme consulted for acute on chronic thrombocytopenia.      PMH: CHF, Afib (no longer on pradaxa), HTN, chronic lymphedema and HLD     #Acute on chronic thrombocytopenia  - Plts 65 on previous admission, uptrended to 79 on 6/29, then downtrended to 45 today 7/3  - Baseline plts 110-150s in 1/2024, though notably previously as low as 59 in 3/2023  - HIV neg; EBV -ve, low level of CMV PCR, HIV -ve, hepatitis panel -ve  - coags normal   - Folate and B12 normal, PF4 normal.  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   - Complete dexamethasone 40 mg IV qD and IVIG 1g/kg daily x 2  - patient is referred for follow up at [facility]      #SANCHO  - iron panel with ferritin consistent with iron deficiency anemia-> recommend IV iron 200mg x 5 qD    - patient had EGD which unremarkable while colonoscopy was non-determinative due to poor bowel prep  - suspect that downtrending Hb is related to SANCHO from occult blood loss: would consider GI eval     Hematology will sign off.

## 2024-07-19 NOTE — DIETITIAN INITIAL EVALUATION ADULT - PROBLEM SELECTOR PLAN 4
Does not report new onset fatigue. Past admission in early july 2024 got multiple pRBC transfusions and discharged with Hgb 9.1. Now 8.4.   - Likely multifactorial considering age and other comorbidities    Plan:  - Iron studies with ferritin  - CTM

## 2024-07-19 NOTE — DISCHARGE NOTE PROVIDER - NSDCCPCAREPLAN_GEN_ALL_CORE_FT
PRINCIPAL DISCHARGE DIAGNOSIS  Diagnosis: Thrombocytopenia  Assessment and Plan of Treatment: You were admitted for low platelet counts that improved with treatment (steroids and IVIG). You have follow up setup with hematology outpatient. If you start experiencing chest pain, palpitations, uncontrolled bleeding, blood in your stool or in your mouth, lightheadedness or uncontrolled nausea/vomiting, please come back to the hospital.      SECONDARY DISCHARGE DIAGNOSES  Diagnosis: Dyspnea on exertion  Assessment and Plan of Treatment:     Diagnosis: Anemia  Assessment and Plan of Treatment: You were found to be anemic in the hospital and was given 1 unit of blood for this whhich helped improved your counts. Please follow up with hematology regarding the anemia and for further workup. Additionally, you can also follow up with the gastroenterologists as there were concerns that your bleeding may be in the gastrointestinal tract. If you start experiencing chest pain, palpitations, uncontrolled bleeding, blood in your stool or in your mouth, lightheadedness or uncontrolled nausea/vomiting, please come back to the hospital.     PRINCIPAL DISCHARGE DIAGNOSIS  Diagnosis: Thrombocytopenia  Assessment and Plan of Treatment: You were admitted for low platelet counts that improved with treatment (steroids and IVIG). You have follow up setup with hematology outpatient. If you start experiencing chest pain, palpitations, uncontrolled bleeding, blood in your stool or in your mouth, lightheadedness or uncontrolled nausea/vomiting, please come back to the hospital.      SECONDARY DISCHARGE DIAGNOSES  Diagnosis: Dyspnea on exertion  Assessment and Plan of Treatment: You were found to have worsening shortness of breath during your hospital stay and we believe that this is due to your heart failure. We performed an ultrasound which showed that your heart function has declined and your lungs have fluid around them, making it harder for you to breathe. We gave you diuretics to help you urinate out excess fluid in your body. Please continue the oral diuretics (water pills) that you take at home and 2L/min of oxygen and follow up with home visit cardiology. If you feel faint, have chest pain, or develop worsening shortness of breath please return to the hospital.    Diagnosis: Anemia  Assessment and Plan of Treatment: You were found to be anemic in the hospital and was given 1 unit of blood for this whhich helped improved your counts. Please follow up with hematology regarding the anemia and for further workup. Additionally, you can also follow up with the gastroenterologists as there were concerns that your bleeding may be in the gastrointestinal tract. If you start experiencing chest pain, palpitations, uncontrolled bleeding, blood in your stool or in your mouth, lightheadedness or uncontrolled nausea/vomiting, please come back to the hospital.    Diagnosis: Chronic atrial fibrillation  Assessment and Plan of Treatment: While you were in the hospital, your atrial fibrillation was not well controlled with your heart beating very fast which contributed to your heart failure. Please continue stopping the [place] and set up home cardiology visits. If you feel faint, have chest pain, or develop worsening shortness of breath please return to the hospital.    Diagnosis: DVT, lower extremity  Assessment and Plan of Treatment: You were found to have a blood clot in your right lower calf when we performed ultrasound. Please follow up with your primary care physician regarding at-home ultrasound of the right lower calf to monitor for possible changes in the clot. Anticoagulants will not be prescribed because you are at high risk of bleeding. We also performed a CT angiogram to see if there were any clots in the blood vessels of your lungs and there was none.

## 2024-07-19 NOTE — PROGRESS NOTE ADULT - PROBLEM SELECTOR PLAN 2
RLE more erythematous and warm compared to LLE with some mild tenderness to palpation. Now starting steroid and IVIG making infection more likely. Less concerned for MRSA at this point.    - Continue Cefazolin 1000 q8h for 5 days (end: 07/21)  - CTM

## 2024-07-19 NOTE — DISCHARGE NOTE PROVIDER - CARE PROVIDER_API CALL
Johnnie Black [place]  Critical Care Medicine  [address], [address]  [place] [address]  Phone: [phone]  Fax: [phone]  Established Patient  Follow Up Time: 2 weeks

## 2024-07-19 NOTE — PROGRESS NOTE ADULT - PROBLEM SELECTOR PLAN 3
Close to baseline now. Some skin wounds that are draining mostly clear fluid.     Plan:  Increase to Lasix 40mg BID for worsening lymphedema + CHF.  -> Wound care consult: consider MINOO/PVR due to non palp DP in line with GOC, conservative management with [facility] [address].  Observe discharge from left leg (currently serous fluid) Close to baseline now. Some skin wounds that are draining mostly clear fluid.     Plan:  Increase to Lasix 40mg BID for worsening lymphedema + CHF.  -> Wound care consult: conservative management with f/u outpatient at [facility] [address]  Observe discharge from left leg (currently no discharge) Does not report new onset fatigue. Past admission in early july 2024 got multiple pRBC transfusions and discharged with Hgb 9.1.  - Likely multifactorial considering age and other comorbidities  - Iron 28, TIBC 311, Tsat 9%, Ferritin  - Likely iron deficiency dominant but overall multifactorial  - HgB fell to 6.7 (07/19) s/p 1u pRBC    DDx: occult bleeding GI vs IDS vs MDS/LGL vs less likely hemolysis    Plan:  - Trend RBC count (post transfusion and am)  - Continue IV sucrose x4 doses (end: 07/22)  - CTM  - Add on retic count, LDH, haptoglobin, Tbili. heme notified.  - Plan for outpatient heme followup

## 2024-07-19 NOTE — PROGRESS NOTE ADULT - PROBLEM SELECTOR PLAN 9
PPX: SCD (difficult with lymphadema)  Diet: regular diet  Amb: non ambulatory at baseline. fall risk.   Dispo: Floors. PT ordered. PPX: SCD (difficult with lymphedema)  Diet: regular diet  Amb: non ambulatory at baseline. fall risk.   Dispo: Floors.   PT: Home PT

## 2024-07-19 NOTE — DIETITIAN INITIAL EVALUATION ADULT - PROBLEM SELECTOR PLAN 2
RLE more erythematous and warm compared to LLE with some mild tenderness to palpation. Now starting steroid and IVIG making infection more likely    - Start Cefazolin 1000 q8h for 5 days  - CTM

## 2024-07-19 NOTE — DISCHARGE NOTE PROVIDER - CARE PROVIDERS DIRECT ADDRESSES
Adult
[email or web address]

## 2024-07-19 NOTE — DIETITIAN INITIAL EVALUATION ADULT - PERTINENT LABORATORY DATA
07-19    138  |  96  |  32<H>  ----------------------------<  138<H>  4.1   |  30  |  1.26    Ca    8.3<L>      19 Jul 2024 07:33  Phos  3.2     07-19  Mg     1.9     07-19    TPro  6.8  /  Alb  2.5<L>  /  TBili  0.4  /  DBili  <0.1  /  AST  21  /  ALT  6<L>  /  AlkPhos  47  07-19  A1C with Estimated Average Glucose Result: 5.4 % (07-01-24 @ 03:49)

## 2024-07-19 NOTE — DIETITIAN INITIAL EVALUATION ADULT - OTHER INFO
NKFA per patient. Patient's daughter report her most recent UBW was 180lbs, which changes depending on degree of lymphedema, takes diuretics as prescribed at home as ordered by her doctor. Recent weight history per [facility] growth chart as follows: 201lbs (7/18/2024), 199lbs (1/10/2024), 199lbs (3/8/2023). Current weight of 110lbs, likely in error, patient appears much closer to most recent UBW reported PTA of 180lbs. Will monitor weight trend.    - Prior hypokalemia 7/17, WNL as of today.  - Remains on antibiotic regimen noted.  - Lasix noted.

## 2024-07-19 NOTE — DIETITIAN INITIAL EVALUATION ADULT - PERTINENT MEDS FT
MEDICATIONS  (STANDING):  atorvastatin 20 milliGRAM(s) Oral at bedtime  ceFAZolin   IVPB 1000 milliGRAM(s) IV Intermittent every 8 hours  ceFAZolin   IVPB      dexAMETHasone  IVPB 40 milliGRAM(s) IV Intermittent daily  furosemide    Tablet 40 milliGRAM(s) Oral two times a day  iron sucrose IVPB 200 milliGRAM(s) IV Intermittent <User Schedule>  metoprolol tartrate 75 milliGRAM(s) Oral three times a day    MEDICATIONS  (PRN):

## 2024-07-19 NOTE — DIETITIAN INITIAL EVALUATION ADULT - NSFNSGIIOFT_GEN_A_CORE
07-18-24 @ 07:01  -  07-19-24 @ 07:00  --------------------------------------------------------  OUT:    Stool (mL): 1 mL  Total OUT: 1 mL    Total NET: -1 mL      07-19-24 @ 07:01  -  07-19-24 @ 15:32  --------------------------------------------------------  OUT:    Stool (mL): 2 mL  Total OUT: 2 mL    Total NET: -2 mL

## 2024-07-19 NOTE — DIETITIAN INITIAL EVALUATION ADULT - REASON INDICATOR FOR ASSESSMENT
Consult for "MST score 2 or >"  Source: chart, patient, patient's daughter at bedside  Chart reviewed, events noted

## 2024-07-19 NOTE — DIETITIAN INITIAL EVALUATION ADULT - ORAL INTAKE PTA/DIET HISTORY
Patient and patient's daughter report patient normally eats well PTA w/ no recent issues. Denied chewing/swallowing impairment (h/o CVA in the past noted) or nausea/vomiting. Patient reports she normally eats well at home and tries to adhere to a low sodium diet in setting of reported chronic lymphedema. Reports she had been advised in the past to weigh herself daily at home, but per patient's daughter she has been unable to do this due to difficulty with proper ambulation to stand on body weight scale.

## 2024-07-19 NOTE — DIETITIAN INITIAL EVALUATION ADULT - PROBLEM SELECTOR PLAN 3
Close to baseline now. Some skin wounds that are draining mostly clear fluid.     Plan:  Increase to Lasix 40mg BID  Wound care consult  Observe discharge from left leg (currently pink serous fluid)

## 2024-07-19 NOTE — DIETITIAN INITIAL EVALUATION ADULT - CALCULATED TO (CAL/KG)
[address] Plasty Text: The lesion was extirpated to the level of the fat with a #15 scalpel blade.  Given the location of the defect, shape of the defect and the proximity to free margins a W-plasty was deemed most appropriate for repair.  Using a sterile surgical marker, the appropriate transposition arms of the W-plasty were drawn incorporating the defect and placing the expected incisions within the relaxed skin tension lines where possible.    The area thus outlined was incised deep to adipose tissue with a #15 scalpel blade.  The skin margins were undermined to an appropriate distance in all directions utilizing iris scissors.  The opposing transposition arms were then transposed into place in opposite direction and anchored with interrupted buried subcutaneous sutures.

## 2024-07-19 NOTE — PROGRESS NOTE ADULT - ATTENDING COMMENTS
Patient seen and examined with Dr. Martinez.      Data: peripheral smear: hypochromic microcytic anemia with anisocytosis, no significant schistocytes; neutrophils with normal morphology, relative increased in monocytes, some [place] cells seen, a few large granular lymphocytes seen; very scant but giant platelets    Assessment: 89 year old day +3 dex + IVIG for recurrent thrombocytopenia (7/3/24: treated with pulse Dex).  Course concurrently complicated by iron def and cellulitis.    PMH: CHF, Afib (On pradaxa), HTN, chronic lymphedema and HLD     Recommend:  dexamethasone 40 mg for a total of four days and IVIG 1g/kg two infusions completed 7/18/24    Pending: peripheral blood flow for LGL.  Likely will require a bone marrow (regardless of LGL analysis) if after this infection her thrombocytopenia persists.     iron deficiency anemia: IV iron 200mg x 5 qD    Over 35 minutes were spent in direct patient care, non-resident teaching, care and care coordination.  Will arrange for hematology outpatient follow-up.  Please call for future questions.

## 2024-07-19 NOTE — DISCHARGE NOTE PROVIDER - NSDCFUADDAPPT_GEN_ALL_CORE_FT
APPTS ARE READY TO BE MADE: [ ] YES    Best Family or Patient Contact (if needed):    Additional Information about above appointments (if needed):    1: Hematology for anemia ()  2:   3:     Other comments or requests:    APPTS ARE READY TO BE MADE: [ ] YES    Best Family or Patient Contact (if needed):    Additional Information about above appointments (if needed):    1: Hematology for anemia and thrombocytopenia ()  2: Cardiology for atrial fibrillation and heart failure [phone]  3: GI for anemia and possible internal GI bleeding [phone] - if desired    Other comments or requests:

## 2024-07-19 NOTE — DIETITIAN INITIAL EVALUATION ADULT - PROBLEM SELECTOR PLAN 1
Asymptomatic (4k).  Currently no new petechiae and A&O x3  Infectious workup negative for EBV, HepB, HepC, resp viruses, HIV.  Unlikely hemolytic with normal bilirubin. Hx of easy bruising and anemia per daughter  Blood smear: hypochromic microcytic anemia with anisocytosis,  neutrophils with normal morphology, relative increased in monocytes, some reactive lymphocytes seen, very scant but giant platelets    Ddx: likely ITP vs vWF vs unlikely TTP, malignancy and HUS    Plan:  - Dexa 40mg qD x 5 days  - IVIG 1mg/kg x 2 doses (give tylenol and benadryl prior to administration)  - Skin and neuro checks q4  - CBC with diff and retic count  - f/u heme consult: check von Willebrand given hx of easy bruising.  - Continue holding AC

## 2024-07-19 NOTE — PROGRESS NOTE ADULT - PROBLEM SELECTOR PLAN 1
Asymptomatic (13k 07/18). Responding to treatment  Currently no new petechiae and A&O x3  Infectious workup negative for EBV, HepB, HepC, resp viruses, HIV.  Unlikely hemolytic with normal bilirubin. Hx of easy bruising and anemia per daughter  Blood smear: hypochromic microcytic anemia with anisocytosis,  neutrophils with normal morphology, relative increased in monocytes, some reactive lymphocytes seen, very scant but giant platelets    Ddx: likely ITP vs vWF vs unlikely TTP, malignancy and HUS    Plan:  - Dexa 40mg qD x 5 days (end: 07/21)  - IVIG 1mg/kg x 2 doses (give tylenol and benadryl prior to administration)  - Skin and neuro checks q4  - Trend platelet count  - Heme following. Appreciate recs: sent for flow cytometry to rule out MDS/LGL. Consider bone marrow biopsy if thrombocytopenia persists after infection.   - F/u vWF workup  - Continue holding AC Asymptomatic (13k 07/18). Responding to treatment  Currently no new petechiae and A&O x3  Infectious workup negative for EBV, HepB, HepC, resp viruses, HIV.  Unlikely hemolytic with normal bilirubin. Hx of easy bruising and anemia per daughter  Blood smear: hypochromic microcytic anemia with anisocytosis,  neutrophils with normal morphology, relative increased in monocytes, some reactive lymphocytes seen, very scant but giant platelets    Ddx: likely ITP vs vWF vs unlikely TTP, malignancy and HUS    Plan:  - Dexa 40mg qD x 4 days (end: 07/20)  - IVIG 1mg/kg x 2 doses (give tylenol and benadryl prior to administration)  - Skin and neuro checks q4  - Trend platelet count  - Heme following. Appreciate recs: sent for flow cytometry to rule out MDS/LGL. Consider bone marrow biopsy if thrombocytopenia persists after infection.   - F/u vWF workup  - Continue holding AC Asymptomatic (13k 07/18, 60k 07/19). Responding to treatment  Currently no new petechiae and A&O x3  Infectious workup negative for EBV, HepB, HepC, resp viruses, HIV.  Unlikely hemolytic with normal bilirubin. Hx of easy bruising and anemia per daughter  Blood smear: hypochromic microcytic anemia with anisocytosis,  neutrophils with normal morphology, relative increased in monocytes, some reactive lymphocytes seen, very scant but giant platelets    Ddx: likely ITP vs vWF vs unlikely TTP, malignancy and HUS    Plan: (Improving)  - Dexa 40mg qD x 4 days (end: 07/20)  - IVIG 1mg/kg x 2 doses (give tylenol and benadryl prior to administration)  - Skin and neuro checks q4  - Trend platelet count  - Heme following. Appreciate recs: sent for flow cytometry to rule out MDS/LGL. Consider bone marrow biopsy if thrombocytopenia persists after infection.   - F/u vWF workup  - Continue holding AC

## 2024-07-19 NOTE — PROGRESS NOTE ADULT - SUBJECTIVE AND OBJECTIVE BOX
INTERVAL HPI/OVERNIGHT EVENTS:  Patient S&E at bedside. No o/n events,     VITAL SIGNS:  T(F): 97.8 (07-19-24 @ 12:12)  HR: 87 (07-19-24 @ 12:12)  BP: 112/73 (07-19-24 @ 12:12)  RR: 18 (07-19-24 @ 12:12)  SpO2: 100% (07-19-24 @ 12:12)  Wt(kg): --    PHYSICAL EXAM:    Constitutional: NAD, appears pale  Eyes: EOMI, sclera non-icteric  Neck: supple  Respiratory: CTAB, no wheezes or crackles   Cardiovascular: RRR  Gastrointestinal: soft, NTND, + BS  Extremities: no cyanosis, bilateral lymphedema  Neurological: awake and alert      MEDICATIONS  (STANDING):  acetaminophen     Tablet .. 975 milliGRAM(s) Oral daily  atorvastatin 20 milliGRAM(s) Oral at bedtime  ceFAZolin   IVPB 1000 milliGRAM(s) IV Intermittent every 8 hours  ceFAZolin   IVPB      dexAMETHasone  IVPB 40 milliGRAM(s) IV Intermittent daily  diphenhydrAMINE 25 milliGRAM(s) Oral daily  furosemide    Tablet 40 milliGRAM(s) Oral two times a day  iron sucrose IVPB 200 milliGRAM(s) IV Intermittent <User Schedule>  metoprolol tartrate 75 milliGRAM(s) Oral three times a day    MEDICATIONS  (PRN):      Allergies    No Known Allergies    Intolerances        LABS:                        6.7    8.59  )-----------( 60       ( 19 Jul 2024 07:33 )             24.1     07-19    138  |  96  |  32<H>  ----------------------------<  138<H>  4.1   |  30  |  1.26    Ca    8.3<L>      19 Jul 2024 07:33  Phos  3.2     07-19  Mg     1.9     07-19        Urinalysis Basic - ( 19 Jul 2024 07:33 )    Color: x / Appearance: x / SG: x / pH: x  Gluc: 138 mg/dL / Ketone: x  / Bili: x / Urobili: x   Blood: x / Protein: x / Nitrite: x   Leuk Esterase: x / RBC: x / WBC x   Sq Epi: x / Non Sq Epi: x / Bacteria: x        RADIOLOGY & ADDITIONAL TESTS:  Studies reviewed.   INTERVAL HPI/OVERNIGHT EVENTS:  Patient S&E at bedside. No o/n events. Patient's Hb dropped to 6.7 today, requiring transfusion    VITAL SIGNS:  T(F): 97.8 (07-19-24 @ 12:12)  HR: 87 (07-19-24 @ 12:12)  BP: 112/73 (07-19-24 @ 12:12)  RR: 18 (07-19-24 @ 12:12)  SpO2: 100% (07-19-24 @ 12:12)  Wt(kg): --    PHYSICAL EXAM:    Constitutional: NAD, appears pale  Eyes: EOMI, sclera non-icteric  Neck: supple  Respiratory: CTAB, no wheezes or crackles   Cardiovascular: RRR  Gastrointestinal: soft, NTND, + BS  Extremities: no cyanosis, bilateral lymphedema  Neurological: awake and alert      MEDICATIONS  (STANDING):  acetaminophen     Tablet .. 975 milliGRAM(s) Oral daily  atorvastatin 20 milliGRAM(s) Oral at bedtime  ceFAZolin   IVPB 1000 milliGRAM(s) IV Intermittent every 8 hours  ceFAZolin   IVPB      dexAMETHasone  IVPB 40 milliGRAM(s) IV Intermittent daily  diphenhydrAMINE 25 milliGRAM(s) Oral daily  furosemide    Tablet 40 milliGRAM(s) Oral two times a day  iron sucrose IVPB 200 milliGRAM(s) IV Intermittent <User Schedule>  metoprolol tartrate 75 milliGRAM(s) Oral three times a day    MEDICATIONS  (PRN):      Allergies    No Known Allergies    Intolerances        LABS:                        6.7    8.59  )-----------( 60       ( 19 Jul 2024 07:33 )             24.1     07-19    138  |  96  |  32<H>  ----------------------------<  138<H>  4.1   |  30  |  1.26    Ca    8.3<L>      19 Jul 2024 07:33  Phos  3.2     07-19  Mg     1.9     07-19        Urinalysis Basic - ( 19 Jul 2024 07:33 )    Color: x / Appearance: x / SG: x / pH: x  Gluc: 138 mg/dL / Ketone: x  / Bili: x / Urobili: x   Blood: x / Protein: x / Nitrite: x   Leuk Esterase: x / RBC: x / WBC x   Sq Epi: x / Non Sq Epi: x / Bacteria: x        RADIOLOGY & ADDITIONAL TESTS:  Studies reviewed.

## 2024-07-19 NOTE — PROGRESS NOTE ADULT - ATTENDING COMMENTS
89 y/o pt with a PMH of HFpEF on chronic 3-6L NC, a-fib, CVA and lymphedema with recent hospitalization for anemia s/p GI work-up which was unrevealing, presents to the ED with worsening thrombocytopenia <10k, c/f  likely ITP; also with possible cellulitis of RLE. Now with worsening anemia, suspect likely SANCHO, no s/s of GI bleed at this time.   Last BM yest, no melena or BRBPR; feeling more SOB this AM. RLE erythema and warmth improved back to baseline.       - ITP - s/p IVIG x 3 doses, plan for decadron x 4 doses (last dose 7/20), monitor counts daily - plt improved to 60 now   - worsening anemia in past 2 days, no e/o active bleeding noted - d/w hematology team, suspect SANCHO, possible GI bleed, low suspicion for hematologic process - no role of BM bx. Will monitor CBC closely post transfusion, if continues to downtrend, will consult GI as pt was unable to colonoscopy on prior admission 2/2 poor prep   - possible cellulitis of RLE, but pt non septic on vitals, non toxic appearing - c/w ancef x 5d, monitor LEs  - c/w lasix to 40mg BID for now to help diurese   - c/w metoprolol home dose; no longer on AC   - neuro checks     #ITP  #RLE cellulitis  #Acute on chronic HFpEF   #chronic hypoxic respiratory failure  #lymphedema   #Chronic Afib   #CVA    multiple visit to bedside, d/w son Akin at length   d/w hematology team, and Dr Min

## 2024-07-19 NOTE — DISCHARGE NOTE PROVIDER - HOSPITAL COURSE
HPI:  89 y/o pt with a PMH of HFpEF on chronic 3-6L NC, a-fib, CVA and lymphedema presents to the ED with asymptomatic thrombocytopenia with baseline SOB and no jaimie blood from any bodily orifices. 3 weeks ago, patient got admitted for decreased HgB, for which EGD and colonoscopy was done. EGD was unremarkable while colonoscopy was non-determinative due to poor bowel prep. Patient chose to follow up outpatient and was discharged. Heparin was not administered during the hospital course. 2 days ago, the patient's home health aide found clear to pink exudate from bilateral chronic leg lymphedema. Earlier today, the patient received a result showing 4000 platelets which prompted daughter to bring her to the ED. Patient does not have chest pain, fever, loss of consciousness, change in vision, nausea, vomiting, headache, recent trauma, blood in stool, urine, mouth, anus, eyes, or nose. She also denies any recent diarrhea or significant weight loss. Last two weeks, patient complained of fatigue and she has history of anemia since childhood per her daughter but had never required transfusions until the last admission early July.     At Ed, patient showed no signs of bleed and was mentating at baseline. Trops negatives, coag studies wnl, BNP 1994 (baseline) afebrile, RVP negative and xray showing likely left pleural effusion/atelectasis. Plts lowered to 4k but Hgb stable ~8, and bilirubin wnl. Platelet transfusion held due to no signs of bleeding and concern for ITP.  (17 Jul 2024 15:02)    Hospital Course:      Patient admitted for severe thrombocytopenia of 4k but received no transfusion as no signs of bleeding, good mentation, and there was concern for ITP. Heme consulted and started patient on Dexamethasone 40mg x 4 doses with IVIG x 2 doses that helped improve platelets to 60k on second day of admission making ITP likely. Concurrently started IV Cefazolin 5 day course for mild cellulitis of right foot. Found to be anemic with Hgb 6.7 (07/19) and received 1u pRBC improving Hgb to ________.     Important Medication Changes and Reason:    Active or Pending Issues Requiring Follow-up:    Advanced Directives:   [ X] Full code  [ ] DNR  [ ] Hospice    Discharge Diagnoses:  - Thrombocytopenia  - Anemia         HPI:  89 y/o pt with a PMH of HFpEF on chronic 3-6L NC, a-fib, CVA and lymphedema presents to the ED with asymptomatic thrombocytopenia with baseline SOB and no jaimie blood from any bodily orifices. 3 weeks ago, patient got admitted for decreased HgB, for which EGD and colonoscopy was done. EGD was unremarkable while colonoscopy was non-determinative due to poor bowel prep. Patient chose to follow up outpatient and was discharged. Heparin was not administered during the hospital course. 2 days ago, the patient's home health aide found clear to pink exudate from bilateral chronic leg lymphedema. Earlier today, the patient received a result showing 4000 platelets which prompted daughter to bring her to the ED. Patient does not have chest pain, fever, loss of consciousness, change in vision, nausea, vomiting, headache, recent trauma, blood in stool, urine, mouth, anus, eyes, or nose. She also denies any recent diarrhea or significant weight loss. Last two weeks, patient complained of fatigue and she has history of anemia since childhood per her daughter but had never required transfusions until the last admission early July.     At Ed, patient showed no signs of bleed and was mentating at baseline. Trops negatives, coag studies wnl, BNP 1994 (baseline) afebrile, RVP negative and xray showing likely left pleural effusion/atelectasis. Plts lowered to 4k but Hgb stable ~8, and bilirubin wnl. Platelet transfusion held due to no signs of bleeding and concern for ITP.  (17 Jul 2024 15:02)    Hospital Course:      Patient admitted for severe thrombocytopenia of 4k but received no transfusion as no signs of bleeding, good mentation, and there was concern for ITP. Heme consulted and started patient on Dexamethasone 40mg x 4 doses with IVIG x 2 doses that helped improve platelets to 60k on second day of admission making ITP likely. Concurrently started IV Cefazolin 5 day course for mild cellulitis of right foot. Found to be anemic with Hgb 6.7 (07/19) and received 1u pRBC improving Hgb to around 8. Thrombocytopenia and her Hg remains stable around 8-9. Patient started becoming more SOB likely secondary to increased IV fluids from pRBC and IVIG requiring increased diruresis with prompt improvement. Repeat TTE showed_____     Important Medication Changes and Reason:  - Lasix ?      Active or Pending Issues Requiring Follow-up:  - Follow up with hematology for thrombocytopenia and anemia  - Follow up with cardiologist for heart failure    Advanced Directives:   [ X] Full code  [ ] DNR  [ ] Hospice    Discharge Diagnoses:  - Thrombocytopenia  - Anemia         HPI:  89 y/o pt with a PMH of HFpEF on chronic 3-6L NC, a-fib, CVA and lymphedema presents to the ED with asymptomatic thrombocytopenia with baseline SOB and no jaimie blood from any bodily orifices. 3 weeks ago, patient got admitted for decreased HgB, for which EGD and colonoscopy was done. EGD was unremarkable while colonoscopy was non-determinative due to poor bowel prep. Patient chose to follow up outpatient and was discharged. Heparin was not administered during the hospital course. 2 days ago, the patient's home health aide found clear to pink exudate from bilateral chronic leg lymphedema. Earlier today, the patient received a result showing 4000 platelets which prompted daughter to bring her to the ED. Patient does not have chest pain, fever, loss of consciousness, change in vision, nausea, vomiting, headache, recent trauma, blood in stool, urine, mouth, anus, eyes, or nose. She also denies any recent diarrhea or significant weight loss. Last two weeks, patient complained of fatigue and she has history of anemia since childhood per her daughter but had never required transfusions until the last admission early July.     At Ed, patient showed no signs of bleed and was mentating at baseline. Trops negatives, coag studies wnl, BNP 1994 (baseline) afebrile, RVP negative and xray showing likely left pleural effusion/atelectasis. Plts lowered to 4k but Hgb stable ~8, and bilirubin wnl. Platelet transfusion held due to no signs of bleeding and concern for ITP.  (17 Jul 2024 15:02)    Hospital Course:      Patient admitted for severe thrombocytopenia of 4k but received no transfusion as there was no signs of bleeding, good mentation, and there was concern for ITP. Heme consulted and started patient on Dexamethasone 40mg x 4 doses with IVIG x 2 doses that helped improve platelets to 60k on second day of admission making ITP likely. Concurrently started IV Cefazolin 5 day course for mild cellulitis of right foot with chronic lymphedema. Found to be anemic with Hgb 6.7 (07/19) and received 1u pRBC improving Hgb to around 8. Thrombocytopenia and her Hg remains stable around 9-10. Patient started becoming more SOB likely secondary to increased IV fluids from pRBC and IVIG requiring increased diuresis with prompt improvement. Patient also had an episode of decreased blood pressure and tachycardia likely secondary to atrial fibrillation that resolved with magnesium supplementation. Repeat TTE showed decreased ejection fraction at 45-50%, evidence of increased right cavity pressures and left pleural effusions. Duplex ultrasound of the lower extremities and CTA showed right posterior tibial DVT with no thromboembolic PE. Therapeutic anticoagulation was not administered due to past history of bleeds. Patient also developed acute kidney injury post CT angiography.    Important Medication Changes and Reason:  - Lasix PO 40mg qD -> Lasix IV 40mg qD -> Bumex : increasing pulmonary edema and SOB  - Metoprolol 75mg PO PID -> metoprolol 100mg PID: poorly controlled a-fib  - Pantoprazole 40mg PO qD (before breakfast): suspected GI bleed    Active or Pending Issues Requiring Follow-up:  - Follow up with hematology for thrombocytopenia and anemia  - Follow up with cardiologist for heart failure and atrial fibrillation  - Follow up primary care physician after serial duplex ultrasound of right lower extremity for DVT     Advanced Directives:   [ X] Full code  [ ] DNR  [ ] Hospice    Discharge Diagnoses:  - Heart failure with preserved ejection fraction  - Chronic atrial fibrillation  - DVT of right lower extremity  - Pulmonary hypertension  - Thrombocytopenia  - Anemia         HPI:  89 y/o pt with a PMH of HFpEF on chronic 3-6L NC, a-fib, CVA and lymphedema presents to the ED with asymptomatic thrombocytopenia with baseline SOB and no jaimie blood from any bodily orifices. 3 weeks ago, patient got admitted for decreased HgB, for which EGD and colonoscopy was done. EGD was unremarkable while colonoscopy was non-determinative due to poor bowel prep. Patient chose to follow up outpatient and was discharged. Heparin was not administered during the hospital course. 2 days ago, the patient's home health aide found clear to pink exudate from bilateral chronic leg lymphedema. Earlier today, the patient received a result showing 4000 platelets which prompted daughter to bring her to the ED. Patient does not have chest pain, fever, loss of consciousness, change in vision, nausea, vomiting, headache, recent trauma, blood in stool, urine, mouth, anus, eyes, or nose. She also denies any recent diarrhea or significant weight loss. Last two weeks, patient complained of fatigue and she has history of anemia since childhood per her daughter but had never required transfusions until the last admission early July.     At Ed, patient showed no signs of bleed and was mentating at baseline. Trops negatives, coag studies wnl, BNP 1994 (baseline) afebrile, RVP negative and xray showing likely left pleural effusion/atelectasis. Plts lowered to 4k but Hgb stable ~8, and bilirubin wnl. Platelet transfusion held due to no signs of bleeding and concern for ITP.  (17 Jul 2024 15:02)    Hospital Course:      Patient admitted for severe thrombocytopenia of 4k but received no transfusion as there was no signs of bleeding, good mentation, and there was concern for ITP. Heme consulted and started patient on Dexamethasone 40mg x 4 doses with IVIG x 2 doses that helped improve platelets to 60k on second day of admission making ITP likely. Concurrently started IV Cefazolin 5 day course for mild cellulitis of right foot with chronic lymphedema. Found to be anemic with Hgb 6.7 (07/19) and received 1u pRBC improving Hgb to around 8. Thrombocytopenia and her Hg remains stable around 9-10. Patient started becoming more SOB likely secondary to increased IV fluids from pRBC and IVIG requiring increased diuresis with prompt improvement. Patient also had an episode of decreased blood pressure and tachycardia likely secondary to atrial fibrillation that resolved with magnesium supplementation. Repeat TTE showed decreased ejection fraction at 45-50%, evidence of increased right cavity pressures and left pleural effusions. Duplex ultrasound of the lower extremities and CTA showed right posterior tibial DVT with no thromboembolic PE. Therapeutic anticoagulation was not administered due to past history of bleeds. Patient also developed acute kidney injury post CT angiography mostly likely secondary to heart failure.    Important Medication Changes and Reason:  - Lasix PO 40mg qD -> Lasix IV 40mg qD -> Bumex : increasing pulmonary edema and SOB  - Metoprolol 75mg PO PID -> metoprolol 100mg PID: poorly controlled a-fib  - Pantoprazole 40mg PO qD (before breakfast): suspected GI bleed    Active or Pending Issues Requiring Follow-up:  - Follow up with hematology for thrombocytopenia and anemia  - Follow up with cardiologist for heart failure and atrial fibrillation  - Follow up primary care physician after serial duplex ultrasound of right lower extremity for DVT     Advanced Directives:   [ X] Full code  [ ] DNR  [ ] Hospice    Discharge Diagnoses:  - Heart failure with preserved ejection fraction  - Chronic atrial fibrillation  - DVT of right lower extremity  - Pulmonary hypertension  - Thrombocytopenia  - Anemia

## 2024-07-19 NOTE — DIETITIAN INITIAL EVALUATION ADULT - REASON
Patient eating well PTA w/ only weight fluctuations coming from fluid shifts, eating well during admission, will monitor parameters

## 2024-07-19 NOTE — DIETITIAN INITIAL EVALUATION ADULT - NS FNS REASON FOR WEIGHT CHANG
intermittent fluctuations w/ chronic lymphedema, amount of weight and time frames unknown as patient not able to routinely ambulate to weigh herself on a standing body weight scale/fluid loss/fluid retention

## 2024-07-19 NOTE — PROGRESS NOTE ADULT - ASSESSMENT
89 y/o pt with a PMH of HFpEF on chronic 3-6L NC, a-fib, CVA and lymphedema presents to the ED with asymptomatic thrombocytopenia. No acute bleeds, skin discoloration and mentation is at baseline,  Unlikely infectious etiology, HIT, TTP, and giant platelets seen on smear making ITP most likely.  89 y/o pt with a PMH of HFpEF on chronic 3-6L NC, a-fib, CVA and lymphedema presents to the ED with asymptomatic thrombocytopenia. No acute bleeds, skin discoloration and mentation is at baseline,  Unlikely infectious etiology, HIT, TTP, and giant platelets seen on smear making ITP most likely. Now with anemia (hgb 6.7) s/p 1u pRBC.

## 2024-07-19 NOTE — DISCHARGE NOTE PROVIDER - NSFOLLOWUPCLINICS_GEN_ALL_ED_FT
[facility]  Hematology/Oncology  [address] [id number]  Phone: [phone]  Fax:   Follow Up Time: 2 weeks     [facility]  Hematology/Oncology  [address] [place] [address]  Phone: [phone]  Fax:   Follow Up Time: 2 weeks    [facility]  Gastroenterology  [address] [place] [id number]  Phone: [phone]  Fax:

## 2024-07-19 NOTE — DISCHARGE NOTE PROVIDER - NSDCMRMEDTOKEN_GEN_ALL_CORE_FT
Klor-Con M20 oral tablet, extended release: 1 tab(s) orally once a day  Lasix 40 mg oral tablet: 1 tab(s) orally once a day  metoprolol tartrate 75 mg oral tablet: 1 tab(s) orally 3 times a day  rosuvastatin 5 mg oral tablet: 1 tab(s) orally once a day (at bedtime)   Klor-Con M20 oral tablet, extended release: 1 tab(s) orally once a day  Lasix 40 mg oral tablet: 1 tab(s) orally once a day  metoprolol tartrate 75 mg oral tablet: 1 tab(s) orally 3 times a day  pantoprazole 40 mg oral delayed release tablet: 1 tab(s) orally once a day (before a meal)  rosuvastatin 5 mg oral tablet: 1 tab(s) orally once a day (at bedtime)

## 2024-07-19 NOTE — PROGRESS NOTE ADULT - SUBJECTIVE AND OBJECTIVE BOX
Patient is a 89y old  Female who presents with a chief complaint of asymptomatic thrombocytopenia (18 Jul 2024 17:23)      OVERNIGHT EVENTS: No acute events.    SUBJECTIVE:  Patient seen and examined at bedside. Denies fever, chills, HA, cough, sob, chest pain, abdominal pain, dysuria, change in bowel movements.    OBJECTIVE:  Vital Signs Last 12 Hrs  T(F): 98 (07-19-24 @ 04:41), Max: 98.4 (07-18-24 @ 19:23)  HR: 97 (07-19-24 @ 04:41) (77 - 99)  BP: 102/70 (07-19-24 @ 04:41) (93/58 - 105/72)  BP(mean): --  RR: 18 (07-19-24 @ 04:41) (18 - 19)  SpO2: 99% (07-19-24 @ 04:41) (96% - 100%)  I&O's Summary    18 Jul 2024 07:01  -  19 Jul 2024 07:00  --------------------------------------------------------  IN: 250 mL / OUT: 601 mL / NET: -351 mL        PHYSICAL EXAM:  Constitutional: NAD, comfortable in bed.  HEENT: NC/AT, PERRLA, EOMI, no conjunctival pallor or scleral icterus, MMM  Neck: Supple, no JVD  Respiratory: CTA B/L. No w/r/r.   Cardiovascular: RRR, normal S1 and S2, no m/r/g.   Gastrointestinal: +BS, soft NTND, no guarding or rebound tenderness, no palpable masses   Extremities: wwp; no cyanosis, clubbing or edema.   Vascular: Pulses equal and strong throughout.   Neurological: AAOx3, no CN deficits, strength and sensation intact throughout.   Skin: No gross skin abnormalities or rashes        LABS:                        7.6    7.51  )-----------( 13       ( 18 Jul 2024 07:26 )             26.5     07-18    135  |  96  |  24<H>  ----------------------------<  159<H>  4.7   |  30  |  1.07    Ca    8.5      18 Jul 2024 07:26  Phos  3.6     07-18  Mg     1.8     07-18    TPro  6.1  /  Alb  3.0<L>  /  TBili  0.9  /  DBili  x   /  AST  19  /  ALT  12  /  AlkPhos  66  07-17    PT/INR - ( 17 Jul 2024 11:28 )   PT: 11.7 sec;   INR: 1.07 ratio         PTT - ( 17 Jul 2024 11:28 )  PTT:35.2 sec          RADIOLOGY & ADDITIONAL TESTS: Reviewed.    MEDICATIONS:  MEDICATIONS  (STANDING):  acetaminophen     Tablet .. 975 milliGRAM(s) Oral daily  atorvastatin 20 milliGRAM(s) Oral at bedtime  ceFAZolin   IVPB 1000 milliGRAM(s) IV Intermittent every 8 hours  ceFAZolin   IVPB      dexAMETHasone  IVPB 40 milliGRAM(s) IV Intermittent daily  diphenhydrAMINE 25 milliGRAM(s) Oral daily  furosemide    Tablet 40 milliGRAM(s) Oral two times a day  iron sucrose IVPB 200 milliGRAM(s) IV Intermittent <User Schedule>  metoprolol tartrate 75 milliGRAM(s) Oral three times a day    MEDICATIONS  (PRN):   Patient is a 89y old  Female who presents with a chief complaint of asymptomatic thrombocytopenia (18 Jul 2024 17:23)      OVERNIGHT EVENTS: No acute events.    SUBJECTIVE:  Patient seen and examined at bedside. The patient have SOB worse than baseline, feeling short of breath when sitting up to eat food. She does not notice any new bleeding or skin changes anywhere on her body. Denies fever, chills, HA, cough, dizziness, palpitations, chest pain, abdominal pain, dysuria, change in bowel movements.    OBJECTIVE:  Vital Signs Last 12 Hrs  T(F): 98.1 (07-19-24 @ 08:36), Max: 98.1 (07-19-24 @ 00:17)  HR: 83 (07-19-24 @ 08:36) (77 - 99)  BP: 125/81 (07-19-24 @ 08:36) (93/58 - 125/81)  BP(mean): --  RR: 18 (07-19-24 @ 08:36) (18 - 18)  SpO2: 97% (07-19-24 @ 08:36) (96% - 99%)  I&O's Summary    18 Jul 2024 07:01  -  19 Jul 2024 07:00  --------------------------------------------------------  IN: 250 mL / OUT: 601 mL / NET: -351 mL        PHYSICAL EXAM:  Constitutional: NAD, comfortable in bed.  HEENT: NC/AT, PERRLA, EOMI, no conjunctival pallor, injections or scleral icterus, MMM  Neck: Supple  Respiratory: CTA B/L. No w/r/r.   Cardiovascular: RRR, normal S1 and S2, no m/r/g.   Gastrointestinal: +BS, soft NTND, no guarding or rebound tenderness, no palpable masses   Extremities: wwp; no cyanosis, clubbing. Edema +3 bilaterally with lymphedema on both legs.  Vascular: Pulses equal and strong throughout.   Neurological: AAOx3, no CN deficits. good fund of knowledge and working memory.  Skin: No petechiae. Lower limb lymphedema bilaterally with erythematous leg and feet. No vesicles, bullae, exudative fluid visible on legs. Bruises on body remain constant and unchanged.      LABS:                        6.7    8.59  )-----------( 60       ( 19 Jul 2024 07:33 )             24.1     07-19    138  |  96  |  32<H>  ----------------------------<  138<H>  4.1   |  30  |  1.26    Ca    8.3<L>      19 Jul 2024 07:33  Phos  3.2     07-19  Mg     1.9     07-19    TPro  6.1  /  Alb  3.0<L>  /  TBili  0.9  /  DBili  x   /  AST  19  /  ALT  12  /  AlkPhos  66  07-17    PT/INR - ( 17 Jul 2024 11:28 )   PT: 11.7 sec;   INR: 1.07 ratio         PTT - ( 17 Jul 2024 11:28 )  PTT:35.2 sec        RADIOLOGY & ADDITIONAL TESTS: Reviewed.    MEDICATIONS:  MEDICATIONS  (STANDING):  acetaminophen     Tablet .. 975 milliGRAM(s) Oral daily  atorvastatin 20 milliGRAM(s) Oral at bedtime  ceFAZolin   IVPB 1000 milliGRAM(s) IV Intermittent every 8 hours  ceFAZolin   IVPB      dexAMETHasone  IVPB 40 milliGRAM(s) IV Intermittent daily  diphenhydrAMINE 25 milliGRAM(s) Oral daily  furosemide    Tablet 40 milliGRAM(s) Oral two times a day  metoprolol tartrate 75 milliGRAM(s) Oral three times a day    MEDICATIONS  (PRN):

## 2024-07-19 NOTE — PROGRESS NOTE ADULT - PROBLEM SELECTOR PLAN 4
Does not report new onset fatigue. Past admission in early july 2024 got multiple pRBC transfusions and discharged with Hgb 9.1. Now 8.4.   - Likely multifactorial considering age and other comorbidities  - Iron 28, TIBC 311, Tsat 9%, Ferritin  - Likely iron deficiency dominant but overall multifactorial    Plan:  - IV iron sucrose 200mg x 3 doses  - CTM Does not report new onset fatigue. Past admission in early july 2024 got multiple pRBC transfusions and discharged with Hgb 9.1. Now 8.4.   - Likely multifactorial considering age and other comorbidities  - Iron 28, TIBC 311, Tsat 9%, Ferritin  - Likely iron deficiency dominant but overall multifactorial  - HgB fell to 6.7 from 7.6.    Plan:  - IV iron sucrose 200mg x 3 doses  - 1 unit of PRBC over 3 hours  - CTM Does not report new onset fatigue. Past admission in early july 2024 got multiple pRBC transfusions and discharged with Hgb 9.1. Now 8.4.   - Likely multifactorial considering age and other comorbidities  - Iron 28, TIBC 311, Tsat 9%, Ferritin  - Likely iron deficiency dominant but overall multifactorial  - HgB fell to 6.7 from 7.6.    Plan:  - IV iron sucrose 200mg x 3 doses  - 1 unit of PRBC over 3 hours  - CTM  - Add on retic count for normocytic anemia workup Does not report new onset fatigue. Past admission in early july 2024 got multiple pRBC transfusions and discharged with Hgb 9.1. Now 8.4.   - Likely multifactorial considering age and other comorbidities  - Iron 28, TIBC 311, Tsat 9%, Ferritin  - Likely iron deficiency dominant but overall multifactorial  - HgB fell to 6.7 from 7.6.    Plan:  - Trend RBC count  - IV iron sucrose 200mg x 3 doses  - 1 unit of PRBC over 3 hours  - CTM  - Add on retic count, LDH, haptoglobin, Tbili for normocytic anemia workup. heme notified. Does not report new onset fatigue. Past admission in early july 2024 got multiple pRBC transfusions and discharged with Hgb 9.1. Now 8.4.   - Likely multifactorial considering age and other comorbidities  - Iron 28, TIBC 311, Tsat 9%, Ferritin  - Likely iron deficiency dominant but overall multifactorial  - HgB fell to 6.7 from 7.6.    Plan:  - Trend RBC count  - IV iron sucrose held today as receiving transfusion. Plan to restart tomorrow.   - 1 unit of PRBC over 3 hours  - CTM  - Add on retic count, LDH, haptoglobin, Tbili for normocytic anemia workup. heme notified. Close to baseline now. Some skin wounds that are draining mostly clear fluid.     Plan:  Continue Lasix 40mg BID for worsening lymphedema + CHF.  -> Wound care consult: conservative management with [facility] [address]  Observe discharge from left leg (currently no discharge)

## 2024-07-19 NOTE — DIETITIAN INITIAL EVALUATION ADULT - NUTRITION DIAGNOSIS
If provider orders tests at today's visit, patient would like to be contacted via [facility].  If to contact patient by phone, patient's preferred phone # is [phone] (Cell) and it is OK to leave message on voice mail or with family member.  If medications are ordered at today's visit, the pharmacy name/location patient would like them to be sent to is   [id number] - [place], [place] - [address]  [address] [place] [id number]  Phone: [phone] Fax: [phone]     yes...

## 2024-07-20 NOTE — PROGRESS NOTE ADULT - PROBLEM SELECTOR PLAN 4
Close to baseline now. Some skin wounds that are draining mostly clear fluid.     Plan:  Continue Lasix 40mg BID for worsening lymphedema + CHF.  -> Wound care consult: conservative management with f/[facility] outpatient [facility] [address]  Observe discharge from left leg (currently no discharge)

## 2024-07-20 NOTE — PROGRESS NOTE ADULT - ATTENDING COMMENTS
89 y/o pt with a PMH of HFpEF on chronic 3-6L NC, a-fib, CVA and lymphedema with recent hospitalization for anemia s/p GI work-up which was unrevealing, presents to the ED with worsening thrombocytopenia <10k, c/f  likely ITP; also with possible cellulitis of RLE. Now with worsening anemia, suspect likely SANCHO, no s/s of GI bleed at this time.   s/p 1u PRBC yest, Hb isabel appropriately; had BM yesterday evening with no melena or blood reported      - ITP - s/p IVIG x 2 doses, s/p decadron x 4 doses (last dose 7/20) - plts with significant improvement, c/t monior   - worsening anemia, no e/o active bleeding noted - d/w hematology team, suspect SANCHO, possible GI bleed, low suspicion for hematologic process - no role for BM bx.   - CBC responded appropriately to 1u PRBC, stable this AM - c/t monitor, if noted with drop in Hb, would d/w family again about GI consult for colonoscopy   - possible cellulitis of RLE, but pt non septic on vitals, non toxic appearing - on ancef x 5d, LEs with improved erythema, warmth and swelling   - c/w lasix to 40mg BID for now to help diurese   - c/w metoprolol home dose; no longer on AC   - neuro checks     #ITP  #RLE cellulitis  #Acute on chronic HFpEF   #chronic hypoxic respiratory failure  #lymphedema   #Chronic Afib   #CVA

## 2024-07-20 NOTE — PROGRESS NOTE ADULT - PROBLEM SELECTOR PLAN 1
Asymptomatic (13k 07/18, 60k 07/19). Responding to treatment  Currently no new petechiae and A&O x3  Infectious workup negative for EBV, HepB, HepC, resp viruses, HIV.  Unlikely hemolytic with normal bilirubin. Hx of easy bruising and anemia per daughter  Blood smear: hypochromic microcytic anemia with anisocytosis,  neutrophils with normal morphology, relative increased in monocytes, some reactive lymphocytes seen, very scant but giant platelets    Ddx: likely ITP vs vWF vs unlikely TTP, malignancy and HUS    Plan: (Improving)  - Dexa 40mg qD x 4 days (end: 07/20)  - IVIG 1mg/kg x 2 doses (give tylenol and benadryl prior to administration)  - Skin and neuro checks q4  - Trend platelet count  - Heme following. Appreciate recs: sent for flow cytometry to rule out MDS/LGL. Consider bone marrow biopsy if thrombocytopenia persists after infection.   - F/u vWF workup  - Continue holding AC

## 2024-07-20 NOTE — PROGRESS NOTE ADULT - ASSESSMENT
89F with a PMH of HFpEF on chronic 3-6L NC, a-fib, CVA and lymphedema presents to the ED with asymptomatic thrombocytopenia. No acute bleeds, skin discoloration and mentation is at baseline,  Unlikely infectious etiology, HIT, TTP, and giant platelets seen on smear making ITP most likely. Now with anemia (hgb 6.7) s/p 1u pRBC.

## 2024-07-20 NOTE — PROGRESS NOTE ADULT - SUBJECTIVE AND OBJECTIVE BOX
Patient is a 89y old  Female who presents with a chief complaint of Thrombocytopenia    Per chart, patient is a 89 y/o female with PMH including HFpEF (on chronic 3-6L NC), AFib, h/o CVA, lymphedema. Patient presents to [facility] w/ asymptomatic thrombocytopenia w/ baseline SOB and no jaimie blood from any bodily orifices. Giant platelets seen on smear making ITP most likely per MD. (19 Jul 2024 15:32)      SUBJECTIVE / OVERNIGHT EVENTS:  Patient seen and evaluated at bedside.  No events overnight.     Vital Signs Last 24 Hrs  T(C): 36.4 (20 Jul 2024 04:43), Max: 36.7 (19 Jul 2024 08:36)  T(F): 97.6 (20 Jul 2024 04:43), Max: 98.1 (19 Jul 2024 08:36)  HR: 93 (20 Jul 2024 04:43) (83 - 98)  BP: 111/70 (20 Jul 2024 04:43) (106/70 - 127/86)  BP(mean): --  RR: 18 (20 Jul 2024 04:43) (18 - 20)  SpO2: 99% (20 Jul 2024 04:43) (96% - 100%)    Parameters below as of 20 Jul 2024 04:43  Patient On (Oxygen Delivery Method): nasal cannula  O2 Flow (L/min): 2      PHYSICAL EXAM:  GENERAL: NAD, well-developed  CHEST/LUNG: Clear to auscultation bilaterally; No wheeze  HEART: Regular rate and rhythm; Normal S1 S2, No murmurs, rubs, or gallops  ABDOMEN: Soft, Nontender, Nondistended; Bowel sounds present  EXTREMITIES:  2+ Peripheral Pulses, No clubbing, cyanosis, or edema  PSYCH: AAOx3    LABS:                        8.2    10.91 )-----------( 81       ( 19 Jul 2024 16:24 )             28.4     Hgb Trend: 8.2<--, 6.7<--, 7.6<--, 8.4<--  07-19    138  |  96  |  32<H>  ----------------------------<  138<H>  4.1   |  30  |  1.26    Ca    8.3<L>      19 Jul 2024 07:33  Phos  3.2     07-19  Mg     1.9     07-19    TPro  6.8  /  Alb  2.5<L>  /  TBili  0.4  /  DBili  <0.1  /  AST  21  /  ALT  6<L>  /  AlkPhos  47  07-19    Creatinine Trend: 1.26<--, 1.07<--, 0.95<--, 1.06<--, 1.03<--, 1.10<--  LIVER FUNCTIONS - ( 19 Jul 2024 07:33 )  Alb: 2.5 g/dL / Pro: 6.8 g/dL / ALK PHOS: 47 U/L / ALT: 6 U/L / AST: 21 U/L / GGT: x                 Urinalysis Basic - ( 19 Jul 2024 07:33 )    Color: x / Appearance: x / SG: x / pH: x  Gluc: 138 mg/dL / Ketone: x  / Bili: x / Urobili: x   Blood: x / Protein: x / Nitrite: x   Leuk Esterase: x / RBC: x / WBC x   Sq Epi: x / Non Sq Epi: x / Bacteria: x     Patient is a 89y old  Female who presents with a chief complaint of Thrombocytopenia    Per chart, patient is a 89 y/o female with PMH including HFpEF (on chronic 3-6L NC), AFib, h/o CVA, lymphedema. Patient presents to [facility] w/ asymptomatic thrombocytopenia w/ baseline SOB and no jaimie blood from any bodily orifices. Giant platelets seen on smear making ITP most likely per MD. (19 Jul 2024 15:32)      SUBJECTIVE / OVERNIGHT EVENTS:  Patient seen and evaluated at bedside.  No events overnight. Patient is laying in bed comfortably with son at bedside.  Said she feels a little tired but unchanged. Is sleeping well at night. Has not had any episodes of blood with BM. Feels that pain in her L leg is improved,     Vital Signs Last 24 Hrs  T(C): 36.4 (20 Jul 2024 04:43), Max: 36.7 (19 Jul 2024 08:36)  T(F): 97.6 (20 Jul 2024 04:43), Max: 98.1 (19 Jul 2024 08:36)  HR: 93 (20 Jul 2024 04:43) (83 - 98)  BP: 111/70 (20 Jul 2024 04:43) (106/70 - 127/86)  BP(mean): --  RR: 18 (20 Jul 2024 04:43) (18 - 20)  SpO2: 99% (20 Jul 2024 04:43) (96% - 100%)    Parameters below as of 20 Jul 2024 04:43  Patient On (Oxygen Delivery Method): nasal cannula  O2 Flow (L/min): 2      PHYSICAL EXAM:  GENERAL: NAD, well-developed  CHEST/LUNG: Clear to auscultation bilaterally; No wheeze. on baseline 2L NC   HEART: Regular rate and rhythm; Normal S1 S2, No murmurs, rubs, or gallops  ABDOMEN: Soft, Nontender, Nondistended; Bowel sounds present  EXTREMITIES:  2+ Peripheral Pulses, lymphedema in b/l lower extremities. R leg no pain to palpation. L leg slight pain but improved from baseline.   PSYCH: AAOx3    LABS:                        8.2    10.91 )-----------( 81       ( 19 Jul 2024 16:24 )             28.4     Hgb Trend: 8.2<--, 6.7<--, 7.6<--, 8.4<--  07-19    138  |  96  |  32<H>  ----------------------------<  138<H>  4.1   |  30  |  1.26    Ca    8.3<L>      19 Jul 2024 07:33  Phos  3.2     07-19  Mg     1.9     07-19    TPro  6.8  /  Alb  2.5<L>  /  TBili  0.4  /  DBili  <0.1  /  AST  21  /  ALT  6<L>  /  AlkPhos  47  07-19    Creatinine Trend: 1.26<--, 1.07<--, 0.95<--, 1.06<--, 1.03<--, 1.10<--  LIVER FUNCTIONS - ( 19 Jul 2024 07:33 )  Alb: 2.5 g/dL / Pro: 6.8 g/dL / ALK PHOS: 47 U/L / ALT: 6 U/L / AST: 21 U/L / GGT: x                 Urinalysis Basic - ( 19 Jul 2024 07:33 )    Color: x / Appearance: x / SG: x / pH: x  Gluc: 138 mg/dL / Ketone: x  / Bili: x / Urobili: x   Blood: x / Protein: x / Nitrite: x   Leuk Esterase: x / RBC: x / WBC x   Sq Epi: x / Non Sq Epi: x / Bacteria: x

## 2024-07-20 NOTE — PROGRESS NOTE ADULT - PROBLEM SELECTOR PLAN 3
Does not report new onset fatigue. Past admission in early july 2024 got multiple pRBC transfusions and discharged with Hgb 9.1.  - Likely multifactorial considering age and other comorbidities  - Iron 28, TIBC 311, Tsat 9%, Ferritin  - Likely iron deficiency dominant but overall multifactorial  - HgB fell to 6.7 (07/19) s/p 1u pRBC    DDx: occult bleeding GI vs IDS vs MDS/LGL vs less likely hemolysis    Plan:  - Trend RBC count (post transfusion and am)  - Continue IV sucrose x4 doses (end: 07/22)  - CTM  - Add on retic count, LDH, haptoglobin, Tbili. heme notified.  - Plan for outpatient heme followup

## 2024-07-20 NOTE — PROGRESS NOTE ADULT - PROBLEM SELECTOR PLAN 9
PPX: SCD (difficult with lymphedema)  Diet: regular diet  Amb: non ambulatory at baseline. fall risk.   Dispo: Floors.   PT: Home PT

## 2024-07-21 NOTE — PROGRESS NOTE ADULT - PROBLEM SELECTOR PLAN 5
BNP of 1994 (below baseline) and long standing dyspnea.  Crackles at right lower lung and JVD.    - Continue Lasix as above  - Considering age, GDMT not best option BNP of 1994 (below baseline) and long standing dyspnea.  Crackles at right lower lung and JVD. Some increased SOB 07/21    - Continue Lasix as above  - CTM oxygen requirements  - Considering age, GDMT not best option Close to baseline now. No drainage today    Plan:  Continue Lasix 40mg BID   -> Wound care consult: conservative management with f/[facility] [address]  Observe discharge from left leg (currently no discharge)

## 2024-07-21 NOTE — PROGRESS NOTE ADULT - SUBJECTIVE AND OBJECTIVE BOX
***************************************************************  Darrel Contreras  (PGY1) Internal Medicine  On TEAMS  ***************************************************************    PROGRESS NOTE:     Patient is a 89y old  Female who presents with a chief complaint of asymptomatic thrombocytopenia (20 Jul 2024 07:22)      INTERVAL EVENTS:   SUBJECTIVE / OVERNIGHT EVENTS: No acute overnight events. Patient was seen and examined by the bedside this AM.   OXYGEN:   TELEMETRY:       MEDICATIONS  (STANDING):  atorvastatin 20 milliGRAM(s) Oral at bedtime  ceFAZolin   IVPB 1000 milliGRAM(s) IV Intermittent every 8 hours  ceFAZolin   IVPB      furosemide    Tablet 40 milliGRAM(s) Oral two times a day  iron sucrose IVPB 200 milliGRAM(s) IV Intermittent <User Schedule>  metoprolol tartrate 75 milliGRAM(s) Oral three times a day    MEDICATIONS  (PRN):      CAPILLARY BLOOD GLUCOSE        I&O's Summary    20 Jul 2024 07:01  -  21 Jul 2024 07:00  --------------------------------------------------------  IN: 770 mL / OUT: 900 mL / NET: -130 mL        PHYSICAL EXAM:  Vital Signs Last 24 Hrs  T(C): 36.4 (21 Jul 2024 04:26), Max: 36.7 (20 Jul 2024 19:17)  T(F): 97.5 (21 Jul 2024 04:26), Max: 98.1 (20 Jul 2024 19:17)  HR: 76 (21 Jul 2024 04:26) (76 - 87)  BP: 113/77 (21 Jul 2024 04:26) (113/77 - 115/77)  BP(mean): --  RR: 18 (21 Jul 2024 04:26) (18 - 18)  SpO2: 99% (21 Jul 2024 04:26) (94% - 99%)    Parameters below as of 21 Jul 2024 04:26  Patient On (Oxygen Delivery Method): nasal cannula  O2 Flow (L/min): 3      General : NAD  CV: RRR no R/M/G  Lungs: CTAB  Abd : Soft, non-tender, non-distended  Extremities : No LE Edema  Neuro : A&Ox3  Skin: Normal color and turgor    LABS:                        8.1    10.54 )-----------( 114      ( 20 Jul 2024 07:04 )             28.8     07-20    141  |  99  |  34<H>  ----------------------------<  128<H>  4.1   |  33<H>  |  1.08    Ca    8.4      20 Jul 2024 07:05  Phos  2.6     07-20  Mg     1.9     07-20    TPro  6.8  /  Alb  2.5<L>  /  TBili  0.4  /  DBili  <0.1  /  AST  21  /  ALT  6<L>  /  AlkPhos  47  07-19          Urinalysis Basic - ( 20 Jul 2024 07:05 )    Color: x / Appearance: x / SG: x / pH: x  Gluc: 128 mg/dL / Ketone: x  / Bili: x / Urobili: x   Blood: x / Protein: x / Nitrite: x   Leuk Esterase: x / RBC: x / WBC x   Sq Epi: x / Non Sq Epi: x / Bacteria: x          COORDINATION OF CARE:  Care Discussed with Consultants/Other Providers [Y/N]:  Prior or Outpatient Records Reviewed [Y/N]: ***************************************************************  Darrel Contreras  (PGY1) Internal Medicine  On TEAMS  ***************************************************************    PROGRESS NOTE:     Patient is a 89y old  Female who presents with a chief complaint of asymptomatic thrombocytopenia (20 Jul 2024 07:22)      INTERVAL EVENTS:   SUBJECTIVE / OVERNIGHT EVENTS: No acute overnight events. Patient was seen and examined by the bedside this AM. Increased SOB this am. Son mentioned she is more anxious as well.   OXYGEN:   TELEMETRY:       MEDICATIONS  (STANDING):  atorvastatin 20 milliGRAM(s) Oral at bedtime  ceFAZolin   IVPB 1000 milliGRAM(s) IV Intermittent every 8 hours  ceFAZolin   IVPB      furosemide    Tablet 40 milliGRAM(s) Oral two times a day  iron sucrose IVPB 200 milliGRAM(s) IV Intermittent <User Schedule>  metoprolol tartrate 75 milliGRAM(s) Oral three times a day    MEDICATIONS  (PRN):      CAPILLARY BLOOD GLUCOSE        I&O's Summary    20 Jul 2024 07:01  -  21 Jul 2024 07:00  --------------------------------------------------------  IN: 770 mL / OUT: 900 mL / NET: -130 mL        PHYSICAL EXAM:  Vital Signs Last 24 Hrs  T(C): 36.4 (21 Jul 2024 04:26), Max: 36.7 (20 Jul 2024 19:17)  T(F): 97.5 (21 Jul 2024 04:26), Max: 98.1 (20 Jul 2024 19:17)  HR: 76 (21 Jul 2024 04:26) (76 - 87)  BP: 113/77 (21 Jul 2024 04:26) (113/77 - 115/77)  BP(mean): --  RR: 18 (21 Jul 2024 04:26) (18 - 18)  SpO2: 99% (21 Jul 2024 04:26) (94% - 99%)    Parameters below as of 21 Jul 2024 04:26  Patient On (Oxygen Delivery Method): nasal cannula  O2 Flow (L/min): 3      General : NAD  CV: RRR no R/M/G  Lungs: Difficult to evaluate lower lobes as patient unable to lean forward more. Upper lobes CTAB  Abd : Soft, non-tender, non-distended  Extremities : LE lymphedema improved. Erythema decreased and no drainage today.   Neuro : A&Ox3  Skin: Normal color and turgor    LABS:                        8.1    10.54 )-----------( 114      ( 20 Jul 2024 07:04 )             28.8     07-20    141  |  99  |  34<H>  ----------------------------<  128<H>  4.1   |  33<H>  |  1.08    Ca    8.4      20 Jul 2024 07:05  Phos  2.6     07-20  Mg     1.9     07-20    TPro  6.8  /  Alb  2.5<L>  /  TBili  0.4  /  DBili  <0.1  /  AST  21  /  ALT  6<L>  /  AlkPhos  47  07-19          Urinalysis Basic - ( 20 Jul 2024 07:05 )    Color: x / Appearance: x / SG: x / pH: x  Gluc: 128 mg/dL / Ketone: x  / Bili: x / Urobili: x   Blood: x / Protein: x / Nitrite: x   Leuk Esterase: x / RBC: x / WBC x   Sq Epi: x / Non Sq Epi: x / Bacteria: x          COORDINATION OF CARE:  Care Discussed with Consultants/Other Providers [Y/N]:  Prior or Outpatient Records Reviewed [Y/N]:

## 2024-07-21 NOTE — PROGRESS NOTE ADULT - PROBLEM SELECTOR PLAN 1
Asymptomatic at 4k on admission (13k 07/18, 60k 07/19, 114k 07/20). Responding to treatment  Currently no new petechiae and A&O x3  Infectious workup negative for EBV, HepB, HepC, resp viruses, HIV.  Unlikely hemolytic with normal bilirubin. Hx of easy bruising and anemia per daughter  Blood smear: hypochromic microcytic anemia with anisocytosis,  neutrophils with normal morphology, relative increased in monocytes, some reactive lymphocytes seen, very scant but giant platelets  - s/p dexa 40mg x 4 days (end 07/20) and IVIG x 2 days    Ddx: likely ITP considering response to treatment vs possible malignancy    Plan: (Improving)  - Trend platelet count  - neuro checks  - Heme following. Appreciate recs: sent for flow cytometry to rule out MDS/LGL. Consider bone marrow biopsy if thrombocytopenia persists after infection.   - Continue holding AC

## 2024-07-21 NOTE — PROGRESS NOTE ADULT - PROBLEM SELECTOR PLAN 3
Does not report new onset fatigue. Past admission in early july 2024 got multiple pRBC transfusions and discharged with Hgb 9.1.  - Likely multifactorial considering age and other comorbidities  - Iron 28, TIBC 311, Tsat 9%, Ferritin  - Likely iron deficiency dominant but overall multifactorial  - HgB fell to 6.7 (07/19) s/p 1u pRBC, post transfusion 8.2, 07/20 8.1    DDx: occult GI bleeding vs IDS vs MDS/LGL vs less likely hemolysis    Plan:  - Consider GI consult, inpatient vs outpatient colonoscopy  - Continue IV sucrose x4 doses (end: 07/22)  - CTM  - Add on retic count, LDH, haptoglobin, Tbili. heme notified.  - Plan for outpatient heme followup Does not report new onset fatigue. Past admission in early july 2024 got multiple pRBC transfusions and discharged with Hgb 9.1.  - Likely multifactorial considering age and other comorbidities  - Iron 28, TIBC 311, Tsat 9%, Ferritin  - Likely iron deficiency dominant but overall multifactorial  - HgB fell to 6.7 (07/19) s/p 1u pRBC, post transfusion 8.2, 07/21 8.2    DDx: occult GI bleeding vs IDS vs MDS/LGL vs less likely hemolysis    Plan:  - Consider GI consult, inpatient vs outpatient colonoscopy  - Continue IV sucrose x4 doses (end: 07/22)  - CTM  - Plan for outpatient heme followup BNP of 1994 (below baseline) and long standing dyspnea.  Crackles at right lower lung and JVD. Some increased SOB 07/21  - Consider PE if SOB not improving    - CXR, BNP am  - incentive spirometry  - Continue Lasix as above  - CTM oxygen requirements  - Considering age, GDMT not best option

## 2024-07-21 NOTE — PROGRESS NOTE ADULT - PROBLEM SELECTOR PLAN 4
Close to baseline now. Some skin wounds that are draining mostly clear fluid.     Plan:  Continue Lasix 40mg BID for worsening lymphedema + CHF.  -> Wound care consult: conservative management with [facility] [address]  Observe discharge from left leg (currently no discharge) Close to baseline now. No drainage today    Plan:  Continue Lasix 40mg BID   -> Wound care consult: conservative management with f/u outpatient [facility] [address]  Observe discharge from left leg (currently no discharge) Does not report new onset fatigue. Past admission in early july 2024 got multiple pRBC transfusions and discharged with Hgb 9.1.  - Likely multifactorial considering age and other comorbidities  - Iron 28, TIBC 311, Tsat 9%, Ferritin  - Likely iron deficiency dominant but overall multifactorial  - HgB fell to 6.7 (07/19) s/p 1u pRBC, post transfusion 8.2, 07/21 8.2    DDx: occult GI bleeding vs IDS vs MDS/LGL vs less likely hemolysis    Plan:  - Consider GI consult, inpatient vs outpatient colonoscopy  - Continue IV sucrose x4 doses (end: 07/22)  - CTM  - Plan for outpatient heme followup

## 2024-07-21 NOTE — PROGRESS NOTE ADULT - PROBLEM SELECTOR PLAN 2
RLE more erythematous and warm compared to LLE with some mild tenderness to palpation. Now starting steroid and IVIG making infection more likely. Less concerned for MRSA at this point.    - Continue Cefazolin 1000 q8h for 5 days (end: 07/21)  - CTM Improving. RLE more erythematous and warm compared to LLE with some mild tenderness to palpation. Now starting steroid and IVIG making infection more likely. Less concerned for MRSA at this point.    - Continue Cefazolin 1000 q8h for 5 days (end: 07/21)  - CTM

## 2024-07-21 NOTE — PROGRESS NOTE ADULT - ASSESSMENT
89F with a PMH of HFpEF on chronic 3-6L NC, a-fib, CVA and lymphedema presents to the ED with asymptomatic thrombocytopenia. 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 Now with anemia (hgb 6.7) s/p 1u pRBC.  89F with a PMH of HFpEF on chronic 3-6L NC, a-fib, CVA and lymphedema presents to the ED with asymptomatic thrombocytopenia. 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  Anemia (hgb 6.7) s/p 1u pRBC improved and thrombocytopenia improved as well.

## 2024-07-21 NOTE — PROVIDER CONTACT NOTE (MEDICATION) - ACTION/TREATMENT ORDERED:
Provider notified. IV Phosphate ordered. Provider assessing further for potential need to medicate for anxiety.

## 2024-07-21 NOTE — PROGRESS NOTE ADULT - ATTENDING COMMENTS
-D/w patient and daughter at bedside.   -SOB later today better. On 3L NC. Has slight crackles in lung bases. ?atelectasis vs from fluid in setting of recent transfusion and IVIG and h/o CHF.   -F/u CXR. -Incentive spirometer, OOB to chair. -BNP.   -Completing ancef for LE cellulitis.   -Melatonin qhs for sleep.   -CTM hgb. -D/w patient and daughter at bedside.   -SOB later today better. On 3L NC. Has slight crackles in lung bases. ?atelectasis vs from fluid in setting of recent transfusion and IVIG and h/o CHF.   -F/u CXR. -Incentive spirometer, OOB to chair. -BNP.   -Completing ancef for LE cellulitis.   -Melatonin qhs for sleep.   -CTM hgb.  -D/w house staff team. -D/w patient and daughter at bedside.   -SOB later today better. On 3L NC. Has slight crackles in lung bases. ?atelectasis vs from fluid in setting of recent transfusion and IVIG and h/o CHF.   -F/u CXR. -Incentive spirometer, OOB to chair. -BNP.   -Completing ancef for LE cellulitis.   -Melatonin qhs for sleep.   -CTM hgb. -Plts have improved. -F/u heme recs.   -D/w house staff team.

## 2024-07-22 NOTE — PROGRESS NOTE ADULT - PROBLEM SELECTOR PLAN 4
Close to baseline now. No drainage today    Plan:  - IV treatment per above  -> Wound care consult: conservative management with [facility] [address]  Observe discharge from left leg (currently no discharge)

## 2024-07-22 NOTE — PROGRESS NOTE ADULT - PROBLEM SELECTOR PLAN 3
Does not report new onset fatigue. Past admission in early july 2024 got multiple pRBC transfusions and discharged with Hgb 9.1.  - Likely multifactorial considering age and other comorbidities  - Iron 28, TIBC 311, Tsat 9%, Ferritin  - Likely iron deficiency dominant but overall multifactorial  - HgB fell to 6.7 (07/19) s/p 1u pRBC, post transfusion 8.2, 07/21 8.2    DDx: occult GI bleeding vs IDS vs MDS/LGL vs less likely hemolysis    Plan:  - Consider GI consult, inpatient vs outpatient colonoscopy  - Continue IV sucrose x4 doses (end: 07/22)  - CTM  - Plan for outpatient heme followup

## 2024-07-22 NOTE — PROGRESS NOTE ADULT - PROBLEM SELECTOR PROBLEM 9
[facility] Call Center    Phone Message    May a detailed message be left on voicemail: no     Reason for Call: Call received from pt's son Sundeep to cancel her future appts, reports that pt recently passed away. Routed to care team as an FYI.     Action Taken: Message routed to:  Other: RHEUMATOLOGY SUPPORT POOL    Travel Screening: Not Applicable   Prophylactic measure

## 2024-07-22 NOTE — PROGRESS NOTE ADULT - ASSESSMENT
89F with a PMH of HFpEF on chronic 3-6L NC, a-fib, CVA and lymphedema presents to the ED with asymptomatic thrombocytopenia. 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  Anemia (hgb 6.7) s/p 1u pRBC improved and thrombocytopenia improved as well. Now treating for increased SOB likely 2/2 worsening HF

## 2024-07-22 NOTE — PROGRESS NOTE ADULT - PROBLEM SELECTOR PLAN 9
PPX: Heparin  Diet: regular diet  Amb: non ambulatory at baseline. fall risk.   Dispo: Floors.   PT: Home PT

## 2024-07-22 NOTE — PROGRESS NOTE ADULT - PROBLEM SELECTOR PLAN 1
BNP of 1994 (below baseline) and long standing dyspnea. Home Lasix PO 40qD  Crackles at right lower lung and JVD. increased SOB 07/21 and 07/22  - Consider PE if SOB not improving  - CXR (07/21) showed new rigth sided pleural effusion  - BNP increased to 7124 (07/22)  DDx: HF exacerbation vs less likely PE     Plan:  - Lasix 40mg IV  - TTE  - Restart Lasix PO 40mg BID tomorrow  - incentive spirometry  - CTM oxygen requirements  - Considering age, GDMT not best option BNP of 1994 (below baseline) and long standing dyspnea. Home Lasix PO 40qD  Crackles at right lower lung and JVD. increased SOB 07/21 and 07/22  - Consider PE if SOB not improving  - CXR (07/21) showed new rigth sided pleural effusion  - BNP increased to 7124 (07/22)  DDx: HF exacerbation vs less likely PE     Plan:  - Lasix 40mg IV once (received 40mg PO once already today)  - TTE  - Restart Lasix PO 40mg BID tomorrow  - incentive spirometry  - CTM oxygen requirements  - Considering age, GDMT not best option BNP of 1994 (below baseline) and long standing dyspnea. Home Lasix PO 40qD  Crackles at right lower lung and JVD. increased SOB 07/21 and 07/22  - Consider PE if SOB not improving  - CXR (07/21) showed new rigth sided pleural effusion  - BNP increased to 7124 (07/22)  DDx: HF exacerbation vs less likely PE     Plan:  - Lasix 40mg IV today and tomorrow (received 40mg PO once already today)  - TTE  - incentive spirometry  - CTM oxygen requirements  - Considering age, GDMT not best option

## 2024-07-22 NOTE — PROGRESS NOTE ADULT - PROBLEM SELECTOR PLAN 2
Resolved (>200K 07/22). Asymptomatic at 4k on admission s/p dexa 40mg x 4 days (end 07/20) and IVIG x 2days  Currently no new petechiae and A&O x3  Blood smear: hypochromic microcytic anemia with anisocytosis,  neutrophils with normal morphology, relative increased in monocytes, some reactive lymphocytes seen, very scant but giant platelets    Ddx: likely ITP considering response to treatment vs possible malignancy per heme    Plan: Resolved  - Trend platelet count  - neuro checks  - Heme following. Appreciate recs following flow cytometry result

## 2024-07-22 NOTE — PROGRESS NOTE ADULT - PROBLEM SELECTOR PLAN 5
Resolved. RLE more erythematous and warm compared to LLE with some mild tenderness to palpation. Received steroid and IVIG so started IV antibx. Less concerned for MRSA at this point.    - received Cefazolin 1000 q8h for 5 days (end: 07/21)  - Improved on exam. CTM

## 2024-07-22 NOTE — PROGRESS NOTE ADULT - ATTENDING COMMENTS
-SOB worse this morning with movement. -BNP worsening, likely in setting of steroids and volume including blood product patient got this admission. CXR with small new right pleural effusion. -Suspect SOB related to acute on chronic diastolic CHF.   -Will give IV lasix 40mg x 1 today (already got 40mg PO this am). Monitor clinical response/UO to lasix iv. -TTE to see if any changes/worsening compared to last echo over a year ago.   -May be some component of anxiety, however would avoid anti-anxiety meds in elderly patient at this time.   -Hgb stable/improved and Plts improved. Can start heparin sq bid for DVT PPx. -SOB worse this morning with movement. -BNP worsening, likely in setting of steroids and volume including blood product patient got this admission. CXR with small new right pleural effusion. -Suspect SOB related to acute on chronic diastolic CHF.   -Will give IV lasix 40mg x 1 today (already got 40mg PO this am). Monitor clinical response/UO to lasix iv. -TTE to see if any changes/worsening compared to last echo over a year ago.   -May be some component of anxiety, however would avoid anti-anxiety meds in elderly patient at this time.   -Hgb stable/improved and Plts improved. Can start heparin sq bid for DVT PPx.  -D/w son at bedside.   -D/w house staff.

## 2024-07-22 NOTE — PROGRESS NOTE ADULT - SUBJECTIVE AND OBJECTIVE BOX
***************************************************************  Darrel Contreras  (PGY1) Internal Medicine  On TEAMS  ***************************************************************    PROGRESS NOTE:     Patient is a 89y old  Female who presents with a chief complaint of asymptomatic thrombocytopenia (21 Jul 2024 07:10)      INTERVAL EVENTS:   SUBJECTIVE / OVERNIGHT EVENTS: No acute overnight events. Patient was seen and examined by the bedside this AM.   OXYGEN:   TELEMETRY:       MEDICATIONS  (STANDING):  atorvastatin 20 milliGRAM(s) Oral at bedtime  furosemide    Tablet 40 milliGRAM(s) Oral two times a day  iron sucrose IVPB 200 milliGRAM(s) IV Intermittent <User Schedule>  melatonin 3 milliGRAM(s) Oral at bedtime  metoprolol tartrate 75 milliGRAM(s) Oral three times a day    MEDICATIONS  (PRN):      CAPILLARY BLOOD GLUCOSE        I&O's Summary    21 Jul 2024 07:01  -  22 Jul 2024 07:00  --------------------------------------------------------  IN: 1270 mL / OUT: 1000 mL / NET: 270 mL        PHYSICAL EXAM:  Vital Signs Last 24 Hrs  T(C): 36.3 (22 Jul 2024 05:10), Max: 36.9 (21 Jul 2024 08:50)  T(F): 97.3 (22 Jul 2024 05:10), Max: 98.5 (21 Jul 2024 08:50)  HR: 96 (22 Jul 2024 05:10) (86 - 96)  BP: 102/66 (22 Jul 2024 05:10) (102/66 - 111/76)  BP(mean): --  RR: 19 (22 Jul 2024 05:10) (18 - 19)  SpO2: 96% (22 Jul 2024 05:10) (96% - 100%)    Parameters below as of 22 Jul 2024 05:10  Patient On (Oxygen Delivery Method): nasal cannula  O2 Flow (L/min): 3      General : NAD  CV: RRR no R/M/G  Lungs: CTAB  Abd : Soft, non-tender, non-distended  Extremities : No LE Edema  Neuro : A&Ox3  Skin: Normal color and turgor    LABS:                        8.2    13.05 )-----------( 149      ( 21 Jul 2024 08:18 )             28.5     07-21    145  |  100  |  36<H>  ----------------------------<  108<H>  3.8   |  36<H>  |  1.01    Ca    8.5      21 Jul 2024 08:18  Phos  1.7     07-21  Mg     1.9     07-21            Urinalysis Basic - ( 21 Jul 2024 08:18 )    Color: x / Appearance: x / SG: x / pH: x  Gluc: 108 mg/dL / Ketone: x  / Bili: x / Urobili: x   Blood: x / Protein: x / Nitrite: x   Leuk Esterase: x / RBC: x / WBC x   Sq Epi: x / Non Sq Epi: x / Bacteria: x          COORDINATION OF CARE:  Care Discussed with Consultants/Other Providers [Y/N]:  Prior or Outpatient Records Reviewed [Y/N]: ***************************************************************  Darrel Contreras  (PGY1) Internal Medicine  On TEAMS  ***************************************************************    PROGRESS NOTE:     Patient is a 89y old  Female who presents with a chief complaint of asymptomatic thrombocytopenia (21 Jul 2024 07:10)      INTERVAL EVENTS: increased SOB overnight, likely anxiety per nurse. No decrease in O2 sat but briefly on 4L NC  SUBJECTIVE / OVERNIGHT EVENTS: No acute overnight events. Patient was seen and examined by the bedside this AM. This am still SOB and worse with movement. Still on 3L NC. Endorsed anxiety regarding her labs.   OXYGEN:   TELEMETRY:       MEDICATIONS  (STANDING):  atorvastatin 20 milliGRAM(s) Oral at bedtime  furosemide    Tablet 40 milliGRAM(s) Oral two times a day  iron sucrose IVPB 200 milliGRAM(s) IV Intermittent <User Schedule>  melatonin 3 milliGRAM(s) Oral at bedtime  metoprolol tartrate 75 milliGRAM(s) Oral three times a day    MEDICATIONS  (PRN):      CAPILLARY BLOOD GLUCOSE        I&O's Summary    21 Jul 2024 07:01  -  22 Jul 2024 07:00  --------------------------------------------------------  IN: 1270 mL / OUT: 1000 mL / NET: 270 mL        PHYSICAL EXAM:  Vital Signs Last 24 Hrs  T(C): 36.3 (22 Jul 2024 05:10), Max: 36.9 (21 Jul 2024 08:50)  T(F): 97.3 (22 Jul 2024 05:10), Max: 98.5 (21 Jul 2024 08:50)  HR: 96 (22 Jul 2024 05:10) (86 - 96)  BP: 102/66 (22 Jul 2024 05:10) (102/66 - 111/76)  BP(mean): --  RR: 19 (22 Jul 2024 05:10) (18 - 19)  SpO2: 96% (22 Jul 2024 05:10) (96% - 100%)    Parameters below as of 22 Jul 2024 05:10  Patient On (Oxygen Delivery Method): nasal cannula  O2 Flow (L/min): 3      General : NAD  CV: RRR no R/M/G  Lungs: CTAB upper lobes. Lower lobes hard to evaluate due to shallow breath.   Abd : Soft, non-tender, non-distended  Extremities : B/L LE edema decreased from before. Legs less erythematous than previous exam.   Neuro : A&Ox3  Skin: Normal color and turgor    LABS:                        8.2    13.05 )-----------( 149      ( 21 Jul 2024 08:18 )             28.5     07-21    145  |  100  |  36<H>  ----------------------------<  108<H>  3.8   |  36<H>  |  1.01    Ca    8.5      21 Jul 2024 08:18  Phos  1.7     07-21  Mg     1.9     07-21            Urinalysis Basic - ( 21 Jul 2024 08:18 )    Color: x / Appearance: x / SG: x / pH: x  Gluc: 108 mg/dL / Ketone: x  / Bili: x / Urobili: x   Blood: x / Protein: x / Nitrite: x   Leuk Esterase: x / RBC: x / WBC x   Sq Epi: x / Non Sq Epi: x / Bacteria: x          COORDINATION OF CARE:  Care Discussed with Consultants/Other Providers [Y/N]:  Prior or Outpatient Records Reviewed [Y/N]:

## 2024-07-23 ENCOUNTER — RESULT REVIEW (OUTPATIENT)
Age: 89
End: 2024-07-23

## 2024-07-23 NOTE — PROGRESS NOTE ADULT - PROBLEM SELECTOR PLAN 2
Resolved (>200K 07/22). Asymptomatic at 4k on admission s/p dexa 40mg x 4 days (end 07/20) and IVIG x 2days  Currently no new petechiae and A&O x3  Blood smear: hypochromic microcytic anemia with anisocytosis,  neutrophils with normal morphology, relative increased in monocytes, some reactive lymphocytes seen, very scant but giant platelets  Flow cytometry: Polytypic B cells, decreased CD4: CD8 ratio with no aberrancy, NK cells show no diagnostic abnormalities, myeloid findings are normal    Ddx: likely ITP considering response to treatment vs possible malignancy per heme    Plan: Resolved  - Trend platelet count  - neuro checks  - Heme following. Appreciate recs following flow cytometry result Resolved (>200K 07/22). Asymptomatic at 4k on admission s/p dexa 40mg x 4 days (end 07/20) and IVIG x 2days  Currently no new petechiae and A&O x3  Blood smear: hypochromic microcytic anemia with anisocytosis,  neutrophils with normal morphology, relative increased in monocytes, some reactive lymphocytes seen, very scant but giant platelets  Flow cytometry: Polytypic B cells, decreased CD4: CD8 ratio with no aberrancy, NK cells show no diagnostic abnormalities, myeloid findings are normal  - Flow cytometry not concerning for malignancy per heme    Ddx: likely ITP considering response to treatment vs possible malignancy per heme    Plan: Resolved  - Trend platelet count  - neuro checks  - F/u outpatient with heme  - Heme following. Appreciate recs following flow cytometry result

## 2024-07-23 NOTE — PROGRESS NOTE ADULT - PROBLEM SELECTOR PLAN 5
Resolved. RLE more erythematous and warm compared to LLE with some mild tenderness to palpation. Received steroid and IVIG so started IV antibx. Less concerned for MRSA at this point.    - received Cefazolin 1000 q8h for 5 days (end: 07/21)  - Improved on exam. CTM Resolved. RLE more erythematous and warm compared to LLE with some mild tenderness to palpation. Received steroid and IVIG so started IV antibx. Not concerned for MRSA.    - received Cefazolin 1000 q8h for 5 days (end: 07/21)  - Improved on exam. CTM

## 2024-07-23 NOTE — PROGRESS NOTE ADULT - PROBLEM SELECTOR PLAN 6
Pradaxa was stopped during last admission for bleeding risk.    Continue holding Pradaxa  Continue metop tartrate 75mg TID Pradaxa was stopped during last admission for bleeding risk.      Continue holding Pradaxa  Continue metop tartrate 75mg TID Pradaxa was stopped during last admission for bleeding risk.      Continue holding Pradaxa  Continue metop tartrate 75mg TID  Consult home cardiology for recommendations (recent rapid called for decreased blood pressure with normal saturation. EKG showed A-fib and RBBB). Consider amiodarone for rhythm control. Pradaxa was stopped during last admission for bleeding risk.      Continue holding Pradaxa  Continue metop tartrate 75mg TID  Consult cardiology for recommendations (recent rapid called for decreased blood pressure with normal saturation. EKG showed A-fib and RBBB). Pradaxa was stopped during last admission for bleeding risk.      Continue holding Pradaxa  Continue metop tartrate 75mg TID  Consult cardiology for recommendations (recent rapid called for decreased blood pressure with normal saturation. EKG showed A-fib and RBBB).  - May consider increasing meds if patient consistently tachycardic or in AFib Pradaxa was stopped during last admission for bleeding risk. CHADSVASC 5, HASBLED 3      Continue holding Pradaxa  Continue metop tartrate 75mg TID  Consult cardiology for recommendations (recent rapid called for decreased blood pressure with normal saturation. EKG showed A-fib and RBBB).  - May consider increasing meds if patient consistently tachycardic or in AFib

## 2024-07-23 NOTE — CONSULT NOTE ADULT - SUBJECTIVE AND OBJECTIVE BOX
CC: TCP    HPI: 89F w HFpEF on NC, CAD s/p remote MI, AF, CVA here w asx thrombocytopenia. Denies bleeding, CP, syncope. + anemia improved w 1UPRBC. + ADHF and now with hypotension for which cardiology consulted.     Allergies  No Known Allergies  Intolerances    PAST MEDICAL & SURGICAL HISTORY:  Hypertension  Dyslipidemia  Myocardial Infarction 15-20 years ago  Hypercalcemia  Lymphedema  Chronic atrial fibrillation  (HFpEF) heart failure with preserved ejection fraction  CVA (cerebrovascular accident)  S/P Parathyroidectomy  S/P Hysterectomy  Bilateral Cataracts    FAMILY HISTORY:  No pertinent family history in first degree relatives    Social History:  Lives with 24/7 aide normally. Right now lives with daughter for the summer  No recent travels (17 Jul 2024 15:02)    MEDS:  Home Medications:  Klor-Con M20 oral tablet, extended release: 1 tab(s) orally once a day (17 Jul 2024 14:26)  metoprolol tartrate 75 mg oral tablet: 1 tab(s) orally 3 times a day (22 Jul 2024 14:17)  rosuvastatin 5 mg oral tablet: 1 tab(s) orally once a day (at bedtime) (17 Jul 2024 14:26)    MEDICATIONS  (STANDING):  atorvastatin 20 milliGRAM(s) Oral at bedtime  heparin   Injectable 5000 Unit(s) SubCutaneous every 12 hours  melatonin 3 milliGRAM(s) Oral at bedtime  metoprolol tartrate 75 milliGRAM(s) Oral three times a day  pantoprazole    Tablet 40 milliGRAM(s) Oral before breakfast    MEDICATIONS  (PRN):      REVIEW OF SYSTEMS:  CONSTITUTIONAL: No fever, weight loss, or fatigue  EYES: No eye pain, visual disturbances, or discharge  ENMT:  No difficulty hearing, tinnitus, vertigo; No sinus or throat pain  NECK: No pain or stiffness  RESPIRATORY: No cough, wheezing, chills or hemoptysis; No Shortness of Breath  CARDIOVASCULAR: No chest pain, palpitations, passing out, dizziness, or leg swelling  GASTROINTESTINAL: No abdominal or epigastric pain. No nausea, vomiting, or hematemesis; No diarrhea or constipation. No melena or hematochezia.  GENITOURINARY: No dysuria, frequency, hematuria, or incontinence  NEUROLOGICAL: No headaches, memory loss, loss of strength, numbness, or tremors  SKIN: No itching, burning, rashes, or lesions   LYMPH Nodes: No enlarged glands  ENDOCRINE: No heat or cold intolerance; No hair loss  MUSCULOSKELETAL: No joint pain or swelling; No muscle, back, or extremity pain  PSYCHIATRIC: No depression, anxiety, mood swings, or difficulty sleeping  HEME/LYMPH: No easy bruising, or bleeding gums  ALLERY AND IMMUNOLOGIC: No hives or eczema	    [x] All others negative	    PHYSICAL EXAM:  Vital Signs Last 24 Hrs  T(C): 36.4 (23 Jul 2024 10:11), Max: 36.8 (22 Jul 2024 16:26)  T(F): 97.6 (23 Jul 2024 10:11), Max: 98.2 (22 Jul 2024 16:26)  HR: 89 (23 Jul 2024 14:45) (60 - 151)  BP: 91/62 (23 Jul 2024 14:45) (74/45 - 106/85)  BP(mean): --  RR: 20 (23 Jul 2024 14:45) (18 - 26)  SpO2: 97% (23 Jul 2024 14:45) (89% - 99%)    Parameters below as of 23 Jul 2024 14:45  Patient On (Oxygen Delivery Method): nasal cannula  O2 Flow (L/min): 3      Daily     Daily     Appearance: Normal	  HEENT:   Normal oral mucosa, PERRL, EOMI	  Lymphatic: No lymphadenopathy  Cardiovascular: Normal S1 S2, No JVD, II/VI SADAF, No edema  Respiratory: Lungs clear to auscultation	  Psychiatry: A & O x 3, Mood & affect appropriate  Gastrointestinal:  Soft, Non-tender, + BS	  Skin: No rashes, No ecchymoses, No cyanosis	  Neurologic: Non-focal  Extremities: Normal range of motion, No clubbing, cyanosis or edema  Vascular: Peripheral pulses palpable 2+ bilaterally    LABS:	 	  I&O's Summary    22 Jul 2024 07:01  -  23 Jul 2024 07:00  --------------------------------------------------------  IN: 770 mL / OUT: 1250 mL / NET: -480 mL                          9.5    10.42 )-----------( 249      ( 23 Jul 2024 08:13 )             33.1     07-23    144  |  100  |  44<H>  ----------------------------<  94  4.6   |  37<H>  |  1.06    Ca    9.1      23 Jul 2024 08:13  Phos  3.7     07-23  Mg     1.8     07-23        Creatinine Trend: 1.06<--, 0.97<--, 1.01<--, 1.08<--, 1.26<--, 1.07<--    TTE   1. Left ventricular cavity is small. Left ventricular wall thickness is normal. Left ventricular systolic function is mildly decreased with an ejection fraction visually estimated at 45 to 50 %. There are no regional wall motion abnormalities seen.   2. Analysis of left ventricular diastolic function and filling pressure is made challenging by the presence of atrial fibrillation.   3. Enlarged right ventricular cavity size, with normal wall thickness, and mildly reduced right ventricular systolic function.   4. The left atrium is severely dilated.   5. The right atrium is severely dilated.   6. No pericardial effusion seen.   7. Severe tricuspid regurgitation.   8. Estimated pulmonary artery systolic pressure is53 mmHg.   9. Large left pleural effusion noted.  10. Trileaflet aortic valve with reduced systolic excursion. Mild aortic stenosis.  11. The inferior vena cava is dilated measuring 2.10 cm in diameter, (dilated >2.1cm) with abnormal inspiratory collapse (abnormal <50%) consistent with elevated right atrial pressure (~15, range 10-20mmHg).  12. No prior echocardiogram is available for comparison.    	  ASSESSMENT/PLAN: 89F w HFpEF on NC, CAD s/p remote MI, AF, CVA here w asx thrombocytopenia. Denies bleeding, CP, syncope. + anemia improved w 1UPRBC. + ADHF and now with hypotension for which cardiology consulted. Now stabilized with ongoing rate control   -tele AF RVR  -TTE EF 45-50, severe LAE, BRIANNA, severe TR, large pleural effusion  -hold lasix for hypotension but resume tmw if pressures stable  -cont metoprolol for rate control   -TCP mgmt ongoing    ***    Daniel Montenegro MD, MPhil, [facility]  Cardiologist, [facility]  ; [employer] at [facility]  email: [email or web address] Card[facility] and Cardiovascular Surgery on-service contact/call information, go to [email or web address] and use "[email or web address]" to login.  Outpatient Cardiology appointments, call  [phone] to arrange with a colleague; I do not have outpatient Cardiology clinic.  CC: TCP    HPI: 89F w HFpEF on NC, CAD s/p remote MI, AF, CVA here w asx thrombocytopenia. Denies bleeding, CP, syncope. + anemia improved w 1UPRBC. + ADHF and now with hypotension for which cardiology consulted.     Allergies  No Known Allergies  Intolerances    PAST MEDICAL & SURGICAL HISTORY:  Hypertension  Dyslipidemia  Myocardial Infarction 15-20 years ago  Hypercalcemia  Lymphedema  Chronic atrial fibrillation  (HFpEF) heart failure with preserved ejection fraction  CVA (cerebrovascular accident)  S/P Parathyroidectomy  S/P Hysterectomy  Bilateral Cataracts    FAMILY HISTORY:  No pertinent family history in first degree relatives    Social History:  Lives with 24/7 aide normally. Right now lives with daughter for the summer  No recent travels (17 Jul 2024 15:02)    MEDS:  Home Medications:  Klor-Con M20 oral tablet, extended release: 1 tab(s) orally once a day (17 Jul 2024 14:26)  metoprolol tartrate 75 mg oral tablet: 1 tab(s) orally 3 times a day (22 Jul 2024 14:17)  rosuvastatin 5 mg oral tablet: 1 tab(s) orally once a day (at bedtime) (17 Jul 2024 14:26)    MEDICATIONS  (STANDING):  atorvastatin 20 milliGRAM(s) Oral at bedtime  heparin   Injectable 5000 Unit(s) SubCutaneous every 12 hours  melatonin 3 milliGRAM(s) Oral at bedtime  metoprolol tartrate 75 milliGRAM(s) Oral three times a day  pantoprazole    Tablet 40 milliGRAM(s) Oral before breakfast    MEDICATIONS  (PRN):      REVIEW OF SYSTEMS:  CONSTITUTIONAL: No fever, weight loss, or fatigue  EYES: No eye pain, visual disturbances, or discharge  ENMT:  No difficulty hearing, tinnitus, vertigo; No sinus or throat pain  NECK: No pain or stiffness  RESPIRATORY: No cough, wheezing, chills or hemoptysis; No Shortness of Breath  CARDIOVASCULAR: No chest pain, palpitations, passing out, dizziness, or leg swelling  GASTROINTESTINAL: No abdominal or epigastric pain. No nausea, vomiting, or hematemesis; No diarrhea or constipation. No melena or hematochezia.  GENITOURINARY: No dysuria, frequency, hematuria, or incontinence  NEUROLOGICAL: No headaches, memory loss, loss of strength, numbness, or tremors  SKIN: No itching, burning, rashes, or lesions   LYMPH Nodes: No enlarged glands  ENDOCRINE: No heat or cold intolerance; No hair loss  MUSCULOSKELETAL: No joint pain or swelling; No muscle, back, or extremity pain  PSYCHIATRIC: No depression, anxiety, mood swings, or difficulty sleeping  HEME/LYMPH: No easy bruising, or bleeding gums  ALLERY AND IMMUNOLOGIC: No hives or eczema	    [x] All others negative	    PHYSICAL EXAM:  Vital Signs Last 24 Hrs  T(C): 36.4 (23 Jul 2024 10:11), Max: 36.8 (22 Jul 2024 16:26)  T(F): 97.6 (23 Jul 2024 10:11), Max: 98.2 (22 Jul 2024 16:26)  HR: 89 (23 Jul 2024 14:45) (60 - 151)  BP: 91/62 (23 Jul 2024 14:45) (74/45 - 106/85)  BP(mean): --  RR: 20 (23 Jul 2024 14:45) (18 - 26)  SpO2: 97% (23 Jul 2024 14:45) (89% - 99%)    Parameters below as of 23 Jul 2024 14:45  Patient On (Oxygen Delivery Method): nasal cannula  O2 Flow (L/min): 3      Daily     Daily     Appearance: Normal	  HEENT:   Normal oral mucosa, PERRL, EOMI	  Lymphatic: No lymphadenopathy  Cardiovascular: Normal S1 S2, No JVD, II/VI SADAF, No edema  Respiratory: Lungs clear to auscultation	  Psychiatry: A & O x 3, Mood & affect appropriate  Gastrointestinal:  Soft, Non-tender, + BS	  Skin: No rashes, No ecchymoses, No cyanosis	  Neurologic: Non-focal  Extremities: Normal range of motion, No clubbing, cyanosis or edema  Vascular: Peripheral pulses palpable 2+ bilaterally    LABS:	 	  I&O's Summary    22 Jul 2024 07:01  -  23 Jul 2024 07:00  --------------------------------------------------------  IN: 770 mL / OUT: 1250 mL / NET: -480 mL                          9.5    10.42 )-----------( 249      ( 23 Jul 2024 08:13 )             33.1     07-23    144  |  100  |  44<H>  ----------------------------<  94  4.6   |  37<H>  |  1.06    Ca    9.1      23 Jul 2024 08:13  Phos  3.7     07-23  Mg     1.8     07-23        Creatinine Trend: 1.06<--, 0.97<--, 1.01<--, 1.08<--, 1.26<--, 1.07<--    TTE   1. Left ventricular cavity is small. Left ventricular wall thickness is normal. Left ventricular systolic function is mildly decreased with an ejection fraction visually estimated at 45 to 50 %. There are no regional wall motion abnormalities seen.   2. Analysis of left ventricular diastolic function and filling pressure is made challenging by the presence of atrial fibrillation.   3. Enlarged right ventricular cavity size, with normal wall thickness, and mildly reduced right ventricular systolic function.   4. The left atrium is severely dilated.   5. The right atrium is severely dilated.   6. No pericardial effusion seen.   7. Severe tricuspid regurgitation.   8. Estimated pulmonary artery systolic pressure is53 mmHg.   9. Large left pleural effusion noted.  10. Trileaflet aortic valve with reduced systolic excursion. Mild aortic stenosis.  11. The inferior vena cava is dilated measuring 2.10 cm in diameter, (dilated >2.1cm) with abnormal inspiratory collapse (abnormal <50%) consistent with elevated right atrial pressure (~15, range 10-20mmHg).  12. No prior echocardiogram is available for comparison.    	  ASSESSMENT/PLAN: 89F w HFpEF on NC, CAD s/p remote MI, AF, CVA here w asx thrombocytopenia. Denies bleeding, CP, syncope. + anemia improved w 1UPRBC. + ADHF and now with hypotension for which cardiology consulted. Now stabilized with ongoing rate control. + pleural effusion trigger, ongoing diuresis restart tmw. Caution heparin given TCP.   -tele AF RVR  -TTE EF 45-50, severe LAE, BRIANNA, severe TR, large pleural effusion  -hold lasix for hypotension but resume tmw if pressures stable  -cont metoprolol for rate control   -TCP mgmt ongoing    ***    Daniel Montenegro MD, MPhil, [facility]  Cardiologist, [facility]  ; [employer] at [facility]  email: [email or web address] Cardiology and Cardiovascular Surgery on-service contact/call information, go to [email or web address] and use "[email or web address]" to login.  Outpatient Cardiology appointments, call  [phone] to arrange with a colleague; I do not have outpatient Cardiology clinic.

## 2024-07-23 NOTE — PROGRESS NOTE ADULT - PROBLEM SELECTOR PLAN 3
Does not report new onset fatigue. Past admission in early july 2024 got multiple pRBC transfusions and discharged with Hgb 9.1.  - Likely multifactorial considering age and other comorbidities  - Iron 28, TIBC 311, Tsat 9%, Ferritin  - Likely iron deficiency dominant but overall multifactorial  - HgB fell to 6.7 (07/19) s/p 1u pRBC, post transfusion 8.2, 07/21 8.2, 07/22 9.5    DDx: occult GI bleeding vs IDS vs MDS/LGL vs less likely hemolysis    Plan:  - Consider GI consult, inpatient vs outpatient colonoscopy  - IV iron sucrose x4 doses (end: 07/22)  - CTM  - Plan for outpatient heme followup Does not report new onset fatigue. Past admission in early july 2024 got multiple pRBC transfusions and discharged with Hgb 9.1.  - Likely multifactorial considering age and other comorbidities  - Iron 28, TIBC 311, Tsat 9%, Ferritin  - Likely iron deficiency dominant but overall multifactorial  - HgB fell to 6.7 (07/19) s/p 1u pRBC, post transfusion 8.2, 07/21 8.2, 07/22 9.5, 07/23 9.5. Significantly improved after IV iron sucrose x4 doses (end: 07/22)    DDx: occult GI bleeding vs IDS vs MDS/LGL vs less likely hemolysis    Plan:  - Consider GI outpatient colonoscopy.  - CTM  - Plan for outpatient heme followup

## 2024-07-23 NOTE — PROGRESS NOTE ADULT - PROBLEM SELECTOR PLAN 1
BNP of 1994 (below baseline) and long standing dyspnea. Home Lasix PO 40qD  Crackles at right lower lung and JVD. increased SOB 07/21 and 07/22  - Consider PE if SOB not improving  - CXR (07/21) showed new R sided pleural effusion likely secondary to increased hydrostatic pressure  - BNP increased to 7124 (07/22)  DDx: HF exacerbation vs less likely PE     Plan:  - Lasix 40mg IV today and tomorrow (received 40mg PO once already today)  - Wells' Criteria of 1.5 shows PE unlikely -> draw D-dimer to rule out PE.  - TTE  - incentive spirometry  - CTM oxygen requirements  - Considering age, GDMT not best option BNP of 1994 (below baseline) and long standing dyspnea. Home Lasix PO 40qD  Crackles at right lower lung and JVD. increased SOB 07/21 and 07/22  - SOB is improving (consider PE if deteriorating)   - CXR (07/21) showed new R sided pleural effusion likely secondary to increased hydrostatic pressure  - BNP increased to 7124 (07/22)  DDx: HF exacerbation vs less likely PE     Plan:  - Lasix 40mg IV today and tomorrow (received 40mg PO once already today)  - Wells' Criteria of 1.5 shows PE unlikely -> draw D-dimer to rule out PE.  - TTE  - incentive spirometry  - CTM oxygen requirements  - Considering age, GDMT not best option BNP of 1994 (below baseline) and long standing dyspnea. Home Lasix PO 40qD  Crackles at right lower lung and JVD. increased SOB 07/21 and 07/22  - SOB is improving (consider PE if deteriorating)   - CXR (07/21) showed new R sided pleural effusion likely secondary to increased hydrostatic pressure  - BNP increased to 7124 (07/22)  DDx: HF exacerbation vs less likely PE     Plan:  - Lasix 40mg IV today and tomorrow (received 40mg PO once already today)  - Wells' Criteria of 1.5 shows PE unlikely  - TTE and bilateral duplex ultrasound  - incentive spirometry  - CTM oxygen requirements  - Considering age, GDMT not best option BNP of 1994 (below baseline) and long standing dyspnea. Home Lasix PO 40qD  Crackles at right lower lung and JVD. increased SOB 07/21 and 07/22  - SOB is improving (consider PE if deteriorating)   - CXR (07/21) showed new R sided pleural effusion likely secondary to increased hydrostatic pressure  - BNP increased to 7124 (07/22)  DDx: HF exacerbation vs less likely PE     Plan:  - Lasix 40mg IV today and tomorrow (received 40mg PO once already today)  - Wells' Criteria of 1.5 shows PE unlikely  - TTE and bilateral duplex ultrasound  - Consider consult cards for recs on worsened HF post TTE  - incentive spirometry  - CTM oxygen requirements  - Considering age, GDMT not best option BNP of 1994 (below baseline) and long standing dyspnea. Home Lasix PO 40qD  Crackles at right lower lung and JVD. increased SOB 07/21 and 07/22  - SOB is improving (consider PE if deteriorating)   - CXR (07/21) showed new R sided pleural effusion likely secondary to increased hydrostatic pressure  - BNP increased to 7124 (07/22)  DDx: HF exacerbation vs less likely PE     Plan:  - Lasix 40mg IV today and tomorrow (received 40mg PO once already today)  - Wells' Criteria of 1.5 = PE unlikely  - TTE and bilateral duplex ultrasound  - Consider consult cards for recs on worsened HF post TTE  - incentive spirometry  - CTM oxygen requirements  - Considering age, GDMT not best option [id number] BNP of 1994 (below baseline) and long standing dyspnea. Home Lasix PO 40qD  Crackles at right lower lung and JVD. increased SOB 07/21 and 07/22  - SOB is improving (consider PE if deteriorating)   - CXR (07/21) showed new R sided pleural effusion likely secondary to increased hydrostatic pressure  - BNP increased to 7124 (07/22)  DDx: HF exacerbation vs less likely PE     Plan:  - Lasix 40mg IV today and tomorrow (received 40mg PO once already today)  - Wells' Criteria of 1.5 = PE unlikely  - TTE and bilateral duplex ultrasound  - Consider consult cards for recs on worsened HF post TTE  - incentive spirometry  - CTM oxygen requirements  - Considering age, GDMT not best option BNP of 1994 (below baseline) and long standing dyspnea. Home Lasix PO 40qD  Crackles at right lower lung and JVD. increased SOB 07/21 and 07/22  - SOB is improving (consider PE if deteriorating)   - CXR (07/21) showed new R sided pleural effusion likely secondary to increased hydrostatic pressure  - BNP increased to 7124 (07/22)  DDx: HF exacerbation vs less likely PE     Plan:  - Lasix 40mg IV today and tomorrow (received 40mg PO once already today)  - Wells' Criteria of 1.5 = PE unlikely  -> TTE: decreased EF (45-50%), dilated   -> f/u bilateral duplex ultrasound  -> f/u cards consult on worsened HF post TTE  - incentive spirometry  - CTM oxygen requirements  - Considering age, GDMT not best option BNP of 1994 (below baseline) and long standing dyspnea. Home Lasix PO 40qD  Crackles at right lower lung and JVD. increased SOB 07/21 and 07/22  - SOB is improving (consider PE if deteriorating)   - CXR (07/21) showed new R sided pleural effusion likely secondary to increased hydrostatic pressure  - BNP increased to 7124 (07/22)  DDx: HF exacerbation vs less likely PE     Plan:  - Lasix 40mg IV today and tomorrow (received 40mg PO once already today)  - Wells' Criteria of 1.5 = PE unlikely  -> TTE: decreased EF (45-50%), dilated L and R atrium and R ventricle, tricuspid regurgitation, mPAP 53.  -> f/u bilateral duplex ultrasound  -> f/u cards consult on worsened HF post TTE  - incentive spirometry  - CTM oxygen requirements  - Considering age, GDMT not best option

## 2024-07-23 NOTE — PROGRESS NOTE ADULT - SUBJECTIVE AND OBJECTIVE BOX
**********************  Desmond Yeoh  MS-3, Internal Medicine  **********************  Patient is a 89y old  Female who presents with a chief complaint of asymptomatic thrombocytopenia (22 Jul 2024 07:13)      OVERNIGHT EVENTS: No acute events.    SUBJECTIVE:  Patient seen and examined at bedside. Denies fever, chills, HA, cough, sob, chest pain, abdominal pain, dysuria, change in bowel movements.    INTERVAL EVENTS: increased SOB overnight, likely anxiety per nurse. No decrease in O2 sat but briefly on 4L NC  SUBJECTIVE / OVERNIGHT EVENTS: No acute overnight events. Patient was seen and examined by the bedside this AM. This am still SOB and worse with movement. Still on 3L NC. Endorsed anxiety regarding her labs.     OBJECTIVE:  Vital Signs Last 12 Hrs  T(F): 97.5 (07-23-24 @ 05:09), Max: 98 (07-22-24 @ 19:43)  HR: 91 (07-23-24 @ 05:09) (60 - 98)  BP: 103/69 (07-23-24 @ 05:09) (99/62 - 103/69)  BP(mean): --  RR: 18 (07-23-24 @ 05:09) (18 - 18)  SpO2: 96% (07-23-24 @ 05:09) (96% - 98%)  I&O's Summary    22 Jul 2024 07:01  -  23 Jul 2024 07:00  --------------------------------------------------------  IN: 770 mL / OUT: 1250 mL / NET: -480 mL        PHYSICAL EXAM:  Constitutional: NAD, comfortable in bed.  HEENT: NC/AT, PERRLA, EOMI, no conjunctival pallor or scleral icterus, MMM  Neck: Supple, no JVD  Respiratory: CTA B/L. No w/r/r.   Cardiovascular: RRR, normal S1 and S2, no m/r/g.   Gastrointestinal: +BS, soft NTND, no guarding or rebound tenderness, no palpable masses   Extremities: wwp; no cyanosis, clubbing or edema.   Vascular: Pulses equal and strong throughout.   Neurological: AAOx3, no CN deficits, strength and sensation intact throughout.   Skin: No gross skin abnormalities or rashes        LABS:                        9.5    11.46 )-----------( 203      ( 22 Jul 2024 07:54 )             33.6     07-22    145  |  100  |  40<H>  ----------------------------<  90  4.7   |  33<H>  |  0.97    Ca    8.4      22 Jul 2024 07:55  Phos  5.0     07-22  Mg     1.8     07-22        Urinalysis Basic - ( 22 Jul 2024 07:55 )    Color: x / Appearance: x / SG: x / pH: x  Gluc: 90 mg/dL / Ketone: x  / Bili: x / Urobili: x   Blood: x / Protein: x / Nitrite: x   Leuk Esterase: x / RBC: x / WBC x   Sq Epi: x / Non Sq Epi: x / Bacteria: x          RADIOLOGY & ADDITIONAL TESTS: Reviewed.    MEDICATIONS:  MEDICATIONS  (STANDING):  atorvastatin 20 milliGRAM(s) Oral at bedtime  heparin   Injectable 5000 Unit(s) SubCutaneous every 12 hours  melatonin 3 milliGRAM(s) Oral at bedtime  metoprolol tartrate 75 milliGRAM(s) Oral three times a day    MEDICATIONS  (PRN):             **********************  Desmond Yeoh  MS-3, Internal Medicine  **********************  Patient is a 89y old  Female who presents with a chief complaint of asymptomatic thrombocytopenia (22 Jul 2024 07:13)      OVERNIGHT EVENTS: Rapid response was called for hypotension and hypoxia. Patient is asymptomatic with A&Ox3. When BP was redone, it showed 106/54 with 100% saturation on 2-3L NC. Heart rate irregular, variable rates, EKG showing Afib with RBBB. Consulted outpatient cardiologist that patient sees.    SUBJECTIVE:  Patient seen and examined at bedside. Denies fever, chills, HA, cough, chest pain, abdominal pain, dysuria, change in bowel movements. SOB has improved since yesterday. 3L NC.    OBJECTIVE:  Vital Signs Last 12 Hrs  T(F): 97.5 (07-23-24 @ 05:09), Max: 98 (07-22-24 @ 19:43)  HR: 91 (07-23-24 @ 05:09) (60 - 98)  BP: 103/69 (07-23-24 @ 05:09) (99/62 - 103/69)  BP(mean): --  RR: 18 (07-23-24 @ 05:09) (18 - 18)  SpO2: 96% (07-23-24 @ 05:09) (96% - 98%)  I&O's Summary    22 Jul 2024 07:01  -  23 Jul 2024 07:00  --------------------------------------------------------  IN: 770 mL / OUT: 1250 mL / NET: -480 mL        PHYSICAL EXAM:  Constitutional: NAD, appears tired and resting in bed.  HEENT: NC/AT, PERRLA, EOMI, no conjunctival pallor or scleral icterus, MMM  Neck: Supple  Respiratory: CTA B/L. No w/r/r.   Cardiovascular: RRR, normal S1 and S2, no m/r/g.   Gastrointestinal: +BS, soft NTND, no guarding or rebound tenderness, no palpable masses   Extremities: wwp; no cyanosis, clubbing or edema.   Vascular: Pulses equal and strong throughout.   Neurological: AAOx3  Skin: No gross skin abnormalities or rashes        LABS:                        9.5    11.46 )-----------( 203      ( 22 Jul 2024 07:54 )             33.6     07-22    145  |  100  |  40<H>  ----------------------------<  90  4.7   |  33<H>  |  0.97    Ca    8.4      22 Jul 2024 07:55  Phos  5.0     07-22  Mg     1.8     07-22        Urinalysis Basic - ( 22 Jul 2024 07:55 )    Color: x / Appearance: x / SG: x / pH: x  Gluc: 90 mg/dL / Ketone: x  / Bili: x / Urobili: x   Blood: x / Protein: x / Nitrite: x   Leuk Esterase: x / RBC: x / WBC x   Sq Epi: x / Non Sq Epi: x / Bacteria: x          RADIOLOGY & ADDITIONAL TESTS: Reviewed.    MEDICATIONS:  MEDICATIONS  (STANDING):  atorvastatin 20 milliGRAM(s) Oral at bedtime  heparin   Injectable 5000 Unit(s) SubCutaneous every 12 hours  melatonin 3 milliGRAM(s) Oral at bedtime  metoprolol tartrate 75 milliGRAM(s) Oral three times a day    MEDICATIONS  (PRN):             **********************  Desmond Yeoh  MS-3, Internal Medicine  **********************  Patient is a 89y old  Female who presents with a chief complaint of asymptomatic thrombocytopenia (22 Jul 2024 07:13)      OVERNIGHT EVENTS: Rapid response was called for hypotension and hypoxia. Patient is asymptomatic with A&Ox3. When BP was redone, it showed 106/54 with 100% saturation on 2-3L NC. Heart rate irregular, variable rates, EKG showing Afib with RBBB. Consulted outpatient cardiologist that patient sees.    SUBJECTIVE:  Patient seen and examined at bedside. Denies fever, chills, HA, cough, chest pain, abdominal pain, dysuria, change in bowel movements. SOB has improved since yesterday. 3L NC.    OBJECTIVE:  Vital Signs Last 12 Hrs  T(F): 97.5 (07-23-24 @ 05:09), Max: 98 (07-22-24 @ 19:43)  HR: 91 (07-23-24 @ 05:09) (60 - 98)  BP: 103/69 (07-23-24 @ 05:09) (99/62 - 103/69)  BP(mean): --  RR: 18 (07-23-24 @ 05:09) (18 - 18)  SpO2: 96% (07-23-24 @ 05:09) (96% - 98%)  I&O's Summary    22 Jul 2024 07:01  -  23 Jul 2024 07:00  --------------------------------------------------------  IN: 770 mL / OUT: 1250 mL / NET: -480 mL        PHYSICAL EXAM:  Constitutional: NAD, appears tired and resting in bed.  HEENT: NC/AT, PERRLA, EOMI, no conjunctival pallor or scleral icterus, MMM  Neck: Supple  Respiratory: right sided crackles, decreased breath sounds on right lung. No w/r/r.   Cardiovascular: RRR, normal S1 and S2.  Gastrointestinal: +BS, soft NTND, no guarding or rebound tenderness, no palpable masses   Extremities: wwp; no cyanosis or clubbing. Edema 2+ bilaterally.   Vascular: Pulses equal and strong throughout.   Neurological: AAOx3  Skin: Legs with lymphedema wrapped up in dressing with decreased size. No new petechiae or bleeds visible.         LABS:                        9.5    11.46 )-----------( 203      ( 22 Jul 2024 07:54 )             33.6     07-22    145  |  100  |  40<H>  ----------------------------<  90  4.7   |  33<H>  |  0.97    Ca    8.4      22 Jul 2024 07:55  Phos  5.0     07-22  Mg     1.8     07-22        Urinalysis Basic - ( 22 Jul 2024 07:55 )    Color: x / Appearance: x / SG: x / pH: x  Gluc: 90 mg/dL / Ketone: x  / Bili: x / Urobili: x   Blood: x / Protein: x / Nitrite: x   Leuk Esterase: x / RBC: x / WBC x   Sq Epi: x / Non Sq Epi: x / Bacteria: x          RADIOLOGY & ADDITIONAL TESTS: Reviewed.    MEDICATIONS:  MEDICATIONS  (STANDING):  atorvastatin 20 milliGRAM(s) Oral at bedtime  heparin   Injectable 5000 Unit(s) SubCutaneous every 12 hours  melatonin 3 milliGRAM(s) Oral at bedtime  metoprolol tartrate 75 milliGRAM(s) Oral three times a day    MEDICATIONS  (PRN):             **********************  Desmond Yeoh  MS-3, Internal Medicine  **********************  Patient is a 89y old  Female who presents with a chief complaint of asymptomatic thrombocytopenia (22 Jul 2024 07:13)      OVERNIGHT EVENTS: Rapid response was called for hypotension and hypoxia. Patient is asymptomatic with A&Ox3. When BP was redone, it showed 106/54 with 100% saturation on 2-3L NC. Heart rate irregular, variable rates, EKG showing Afib with RBBB. Consulted outpatient cardiologist that patient sees.    SUBJECTIVE:  Patient seen and examined at bedside. Denies fever, chills, HA, cough, chest pain, abdominal pain, dysuria, change in bowel movements. SOB has improved since yesterday. 3L NC.    OBJECTIVE:  Vital Signs Last 12 Hrs  T(F): 97.5 (07-23-24 @ 05:09), Max: 98 (07-22-24 @ 19:43)  HR: 91 (07-23-24 @ 05:09) (60 - 98)  BP: 103/69 (07-23-24 @ 05:09) (99/62 - 103/69)  BP(mean): --  RR: 18 (07-23-24 @ 05:09) (18 - 18)  SpO2: 96% (07-23-24 @ 05:09) (96% - 98%)  I&O's Summary    22 Jul 2024 07:01  -  23 Jul 2024 07:00  --------------------------------------------------------  IN: 770 mL / OUT: 1250 mL / NET: -480 mL        PHYSICAL EXAM:  Constitutional: NAD, appears tired and resting in bed.  HEENT: NC/AT, PERRLA, EOMI, no conjunctival pallor or scleral icterus, MMM  Neck: Supple  Respiratory: right sided crackles, decreased breath sounds on right lung. No w/r/r.   Cardiovascular: RRR, normal S1 and S2.  Gastrointestinal: +BS, soft NTND, no guarding or rebound tenderness, no palpable masses   Extremities: wwp; no cyanosis or clubbing. Edema 2+ bilaterally.   Vascular: Pulses equal and strong throughout.   Neurological: AAOx3  Skin: Legs with lymphedema wrapped up in dressing with decreased size. No new petechiae or bleeds visible.         LABS:                        9.5    11.46 )-----------( 203      ( 22 Jul 2024 07:54 )             33.6     07-22    145  |  100  |  40<H>  ----------------------------<  90  4.7   |  33<H>  |  0.97    Ca    8.4      22 Jul 2024 07:55  Phos  5.0     07-22  Mg     1.8     07-22        Urinalysis Basic - ( 22 Jul 2024 07:55 )    Color: x / Appearance: x / SG: x / pH: x  Gluc: 90 mg/dL / Ketone: x  / Bili: x / Urobili: x   Blood: x / Protein: x / Nitrite: x   Leuk Esterase: x / RBC: x / WBC x   Sq Epi: x / Non Sq Epi: x / Bacteria: x          RADIOLOGY & ADDITIONAL TESTS: Reviewed.  < from: TTE W or WO Ultrasound Enhancing Agent (07.23.24 @ 13:05) >   1. Left ventricular cavity is small. Left ventricular wall thickness is normal. Left ventricular systolic function is mildly decreased with an ejection fraction visually estimated at 45 to 50 %. There are no regional wall motion abnormalities seen.   2. Analysis of left ventricular diastolic function and filling pressure is made challenging by the presence of atrial fibrillation.   3. Enlarged right ventricular cavity size, with normal wall thickness, and mildly reduced right ventricular systolic function.   4. The left atrium is severely dilated.   5. The right atrium is severely dilated.   6. No pericardial effusion seen.   7. Severe tricuspid regurgitation.   8. Estimated pulmonary artery systolic pressure is53 mmHg.   9. Large left pleural effusion noted.  10. Trileaflet aortic valve with reduced systolic excursion. Mild aortic stenosis.  11. The inferior vena cava is dilated measuring 2.10 cm in diameter, (dilated >2.1cm) with abnormal inspiratory collapse (abnormal <50%) consistent with elevated right atrial pressure (~15, range 10-20mmHg).  12. No prior echocardiogram is available for comparison.    < end of copied text >      MEDICATIONS:  MEDICATIONS  (STANDING):  atorvastatin 20 milliGRAM(s) Oral at bedtime  heparin   Injectable 5000 Unit(s) SubCutaneous every 12 hours  melatonin 3 milliGRAM(s) Oral at bedtime  metoprolol tartrate 75 milliGRAM(s) Oral three times a day    MEDICATIONS  (PRN):

## 2024-07-23 NOTE — PROGRESS NOTE ADULT - ATTENDING COMMENTS
-RRT this morning for hypotension and hypoxia, though when assessed by team BP was improved without intervention and O2 was near prior. Transferred to telemetry. Vitals q4h.   -Patient seen/examined with house staff. -Discussed plan with son at bedside and RN.   -Patient still gets SOB with movement and has mild JVD, some now trace LE edema, and reduced BS in RLB >LLB with some crackles in LLB. C/w CXR findings of b/l pleural effusions. -Felt better after IV lasix 40mg yesterday. -Will give additional 40mg IV lasix today. Strict I's/O's. and daily weights. -F/u Echo results. C/w telemetry. -Followed with Dr. Wiley outpatient, house cardiology team requested to help with diuretic mgmt and CHF mgmt.   -Had rapid Afib up to 140's but not sustained, so transferred to telemetry. C/w metoprolol 75mg TID for now. If rates increase again, may need to increase BB and/or add additional agent. Has been off of full AC since last admission over c/f GIB with anemia.  -Will get LE duplex to assess for clot.   -Encouraged Incentive spirometer and OOB to chair.   -We discussed anxiety component to SOB which patient does have to some degree but often denies. Would not add any benzos at this time.   -PPI for h/o gastritis and esophagitis seen on recent EGD.   -Plts have improved and Hgb stable.   -D/w house staff.

## 2024-07-23 NOTE — PROGRESS NOTE ADULT - ASSESSMENT
89F with a PMH of HFpEF on chronic 3-6L NC, a-fib, CVA and lymphedema presents to the ED with asymptomatic thrombocytopenia. 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  Anemia (hgb 6.7) s/p 1u pRBC improved and thrombocytopenia normalized. Now treating for increased SOB likely 2/2 worsening HF.

## 2024-07-24 NOTE — PROGRESS NOTE ADULT - PROBLEM SELECTOR PLAN 1
BNP of 1994 (below baseline) and long standing dyspnea. Home Lasix PO 40qD  Crackles at right lower lung and JVD. increased SOB 07/21 and 07/22  - SOB is improving (consider PE if deteriorating)   - CXR (07/21) showed new R sided pleural effusion likely secondary to increased hydrostatic pressure  - TTE (07/23) showed reduced ejection fraction of 45-50, severe LAE, BRIANNA, severe TR and large pleural effusion  - BNP increased to 7124 (07/22)  DDx: HF exacerbation vs less likely PE     Plan:  - Lasix 40mg IV today and tomorrow (received 40mg PO once already today)  - Wells' Criteria of 1.5 = PE unlikely  -> TTE: decreased EF (45-50%), dilated L and R atrium and R ventricle, tricuspid regurgitation, mPAP 53.  -> f/u bilateral duplex ultrasound  -> f/u cards consult on worsened HF post TTE: Restart diuresis today if pressures are stable. Caution use of heparin.  - incentive spirometry  - CTM oxygen requirements  - Considering age, GDMT not best option BNP of 1994 (below baseline) and long standing dyspnea. Home Lasix PO 40qD  Crackles at right lower lung and JVD. increased SOB 07/21 and 07/22  - SOB is improving (consider PE if deteriorating)   - CXR (07/21) showed new R sided pleural effusion likely secondary to increased hydrostatic pressure  - TTE (07/23) showed reduced ejection fraction of 45-50, severe LAE, BRIANNA, severe TR and large pleural effusion  - BNP increased to 7124 (07/22)  DDx: HF exacerbation likely 2/2 AFib vs less likely PE     Plan:  - Lasix 40mg IV today and PO Lasix 40mg BID tomorrow  - Wells' Criteria of 1.5 = PE unlikely  -> TTE: decreased EF (45-50%), dilated L and R atrium and R ventricle, tricuspid regurgitation, mPAP 53.  -> f/u bilateral duplex ultrasound  -> f/u cards consult on worsened HF post TTE: Restart diuresis today if pressures are stable. Caution use of heparin.  - incentive spirometry  - CTM oxygen requirements  - Considering age, GDMT not best option BNP of 1994 (below baseline) and long standing dyspnea. Home Lasix PO 40qD  Crackles at right lower lung and JVD. increased SOB 07/21 and 07/22  - SOB is improving (consider PE if deteriorating)   - CXR (07/21) showed new R sided pleural effusion likely secondary to increased hydrostatic pressure  - TTE (07/23) showed reduced ejection fraction of 45-50, severe LAE, BRIANNA, severe TR and large pleural effusion  - BNP increased to 7124 (07/22)  DDx: HF exacerbation likely 2/2 AFib vs less likely PE     Plan:  - Lasix 40mg IV today and PO Lasix 40mg BID tomorrow  -> TTE: decreased EF (45-50%), dilated L and R atrium and R ventricle, tricuspid regurgitation, mPAP 53.  -> f/u cards consult on worsened HF post TTE: Restart diuresis today if pressures are stable.  - incentive spirometry  - CTM oxygen requirements  - Considering age, GDMT not best option

## 2024-07-24 NOTE — PROGRESS NOTE ADULT - PROBLEM SELECTOR PLAN 2
Resolved (>200K 07/22). Asymptomatic at 4k on admission s/p dexa 40mg x 4 days (end 07/20) and IVIG x 2days  Blood smear: hypochromic microcytic anemia with anisocytosis,  neutrophils with normal morphology, relative increased in monocytes, some reactive lymphocytes seen, very scant but giant platelets  Flow cytometry: Polytypic B cells, decreased CD4: CD8 ratio with no aberrancy, NK cells show no diagnostic abnormalities, myeloid findings are normal  - Heme: not concerning for malignancy    Ddx: likely ITP considering response to treatment vs possible malignancy per heme    Plan: Resolved (7/24 - 323)  - Trend platelet count  - neuro checks  - F/u outpatient with heme  - Heme following. Appreciate recs following flow cytometry result - Pradaxa was stopped during last admission for bleeding risk (early july 2024). Known melena during that admission. - CHADSVASC 5, HASBLED 3.  - In continuous AFib during admission, with HR ranging from 60s to 130s    - Continue holding Pradaxa due to recent presumed GI bleed  - Increase metop tartrate to 100 mg TID  - Hold rhythm control as chemical cardioversion increases risk for stroke (off AC for few weeks now)  - appreciate cards rec

## 2024-07-24 NOTE — PROGRESS NOTE ADULT - SUBJECTIVE AND OBJECTIVE BOX
**********************  Desmond Yeoh  MS-3, Internal Medicine  **********************  Patient is a 89y old  Female who presents with a chief complaint of asymptomatic thrombocytopenia (22 Jul 2024 07:13)      OVERNIGHT EVENTS: None.    SUBJECTIVE:  Patient seen and examined at bedside. Denies fever, chills, HA, cough, chest pain, palpitations?, abdominal pain, dysuria, change in bowel movements. SOB has improved since yesterday. 3L NC.    OBJECTIVE:  Vital Signs Last 12 Hrs  T(F): 96.8 (07-24-24 @ 05:42), Max: 97.5 (07-23-24 @ 20:23)  HR: 96 (07-24-24 @ 05:42) (92 - 99)  BP: 116/82 (07-24-24 @ 05:42) (101/65 - 116/82)  BP(mean): --  RR: 20 (07-24-24 @ 05:42) (20 - 20)  SpO2: 100% (07-24-24 @ 05:42) (100% - 100%)  I&O's Summary    22 Jul 2024 07:01  -  23 Jul 2024 07:00  --------------------------------------------------------  IN: 770 mL / OUT: 1250 mL / NET: -480 mL    23 Jul 2024 07:01  -  24 Jul 2024 06:42  --------------------------------------------------------  IN: 0 mL / OUT: 450 mL / NET: -450 mL        PHYSICAL EXAM:  Constitutional: NAD, appears tired and resting in bed.  HEENT: NC/AT, PERRLA, EOMI, no conjunctival pallor or scleral icterus, MMM  Neck: Supple  Respiratory: right sided crackles, decreased breath sounds on right lung. No w/r/r.   Cardiovascular: RRR, normal S1 and S2.  Gastrointestinal: +BS, soft NTND, no guarding or rebound tenderness, no palpable masses   Extremities: wwp; no cyanosis or clubbing. Edema 2+ bilaterally.   Vascular: Pulses equal and strong throughout.   Neurological: AAOx3  Skin: Legs with lymphedema wrapped up in dressing with decreased size. No new petechiae or bleeds visible.       LABS:                        9.8    11.23 )-----------( 323      ( 24 Jul 2024 06:06 )             35.3     07-24    143  |  99  |  49<H>  ----------------------------<  121<H>  4.4   |  35<H>  |  1.22    Ca    9.2      24 Jul 2024 06:06  Phos  3.6     07-24  Mg     2.1     07-24          RADIOLOGY & ADDITIONAL TESTS: Reviewed.  < from: TTE W or WO Ultrasound Enhancing Agent (07.23.24 @ 13:05) >   1. Left ventricular cavity is small. Left ventricular wall thickness is normal. Left ventricular systolic function is mildly decreased with an ejection fraction visually estimated at 45 to 50 %. There are no regional wall motion abnormalities seen.   2. Analysis of left ventricular diastolic function and filling pressure is made challenging by the presence of atrial fibrillation.   3. Enlarged right ventricular cavity size, with normal wall thickness, and mildly reduced right ventricular systolic function.   4. The left atrium is severely dilated.   5. The right atrium is severely dilated.   6. No pericardial effusion seen.   7. Severe tricuspid regurgitation.   8. Estimated pulmonary artery systolic pressure is53 mmHg.   9. Large left pleural effusion noted.  10. Trileaflet aortic valve with reduced systolic excursion. Mild aortic stenosis.  11. The inferior vena cava is dilated measuring 2.10 cm in diameter, (dilated >2.1cm) with abnormal inspiratory collapse (abnormal <50%) consistent with elevated right atrial pressure (~15, range 10-20mmHg).  12. No prior echocardiogram is available for comparison.      MEDICATIONS:  MEDICATIONS  (STANDING):  atorvastatin 20 milliGRAM(s) Oral at bedtime  heparin   Injectable 5000 Unit(s) SubCutaneous every 12 hours  melatonin 3 milliGRAM(s) Oral at bedtime  metoprolol tartrate 75 milliGRAM(s) Oral three times a day  pantoprazole    Tablet 40 milliGRAM(s) Oral before breakfast  senna 2 Tablet(s) Oral at bedtime    MEDICATIONS  (PRN):  polyethylene glycol 3350 17 Gram(s) Oral two times a day PRN Constipation           **********************  Desmond Yeoh  MS-3, Internal Medicine  **********************  Patient is a 89y old  Female who presents with a chief complaint of asymptomatic thrombocytopenia (22 Jul 2024 07:13)      OVERNIGHT EVENTS: None.    SUBJECTIVE:  Patient seen and examined at bedside. Denies fever, chills, HA, cough, chest pain, palpitations, abdominal pain, dysuria. She has had trouble defecating and will be receiving Miralax today (senna was given yesterday). SOB has been stable since yesterday. 3L NC.    OBJECTIVE:  Vital Signs Last 12 Hrs  T(F): 96.8 (07-24-24 @ 05:42), Max: 97.5 (07-23-24 @ 20:23)  HR: 96 (07-24-24 @ 05:42) (92 - 99)  BP: 116/82 (07-24-24 @ 05:42) (101/65 - 116/82)  BP(mean): --  RR: 20 (07-24-24 @ 05:42) (20 - 20)  SpO2: 100% (07-24-24 @ 05:42) (100% - 100%)  I&O's Summary    22 Jul 2024 07:01  -  23 Jul 2024 07:00  --------------------------------------------------------  IN: 770 mL / OUT: 1250 mL / NET: -480 mL    23 Jul 2024 07:01  -  24 Jul 2024 06:42  --------------------------------------------------------  IN: 0 mL / OUT: 450 mL / NET: -450 mL        PHYSICAL EXAM:  Constitutional: NAD, appears tired and resting in bed.  HEENT: NC/AT, PERRLA, EOMI, no conjunctival pallor or scleral icterus, MMM  Neck: Supple  Respiratory: right sided crackles, decreased breath sounds on right lung. No w/r/r. (will reevaluate with patient in better positioning)  Cardiovascular: RRR, normal S1 and S2.  Gastrointestinal: +BS, soft NTND, no guarding or rebound tenderness, no palpable masses   Extremities: wwp; no cyanosis or clubbing. Unable to properly assess edema because of bandages.  Vascular: Pedal pulses equal and strong throughout.   Neurological: AAOx3  Skin: Legs with lymphedema wrapped up in dressing with stable size. No new petechiae or bleeds visible.       LABS:                        9.8    11.23 )-----------( 323      ( 24 Jul 2024 06:06 )             35.3     07-24    143  |  99  |  49<H>  ----------------------------<  121<H>  4.4   |  35<H>  |  1.22    Ca    9.2      24 Jul 2024 06:06  Phos  3.6     07-24  Mg     2.1     07-24          RADIOLOGY & ADDITIONAL TESTS: Reviewed.  < from: TTE W or WO Ultrasound Enhancing Agent (07.23.24 @ 13:05) >   1. Left ventricular cavity is small. Left ventricular wall thickness is normal. Left ventricular systolic function is mildly decreased with an ejection fraction visually estimated at 45 to 50 %. There are no regional wall motion abnormalities seen.   2. Analysis of left ventricular diastolic function and filling pressure is made challenging by the presence of atrial fibrillation.   3. Enlarged right ventricular cavity size, with normal wall thickness, and mildly reduced right ventricular systolic function.   4. The left atrium is severely dilated.   5. The right atrium is severely dilated.   6. No pericardial effusion seen.   7. Severe tricuspid regurgitation.   8. Estimated pulmonary artery systolic pressure is53 mmHg.   9. Large left pleural effusion noted.  10. Trileaflet aortic valve with reduced systolic excursion. Mild aortic stenosis.  11. The inferior vena cava is dilated measuring 2.10 cm in diameter, (dilated >2.1cm) with abnormal inspiratory collapse (abnormal <50%) consistent with elevated right atrial pressure (~15, range 10-20mmHg).  12. No prior echocardiogram is available for comparison.      MEDICATIONS:  MEDICATIONS  (STANDING):  atorvastatin 20 milliGRAM(s) Oral at bedtime  heparin   Injectable 5000 Unit(s) SubCutaneous every 12 hours  melatonin 3 milliGRAM(s) Oral at bedtime  metoprolol tartrate 75 milliGRAM(s) Oral three times a day  pantoprazole    Tablet 40 milliGRAM(s) Oral before breakfast  senna 2 Tablet(s) Oral at bedtime    MEDICATIONS  (PRN):  polyethylene glycol 3350 17 Gram(s) Oral two times a day PRN Constipation

## 2024-07-24 NOTE — CHART NOTE - NSCHARTNOTEFT_GEN_A_CORE
CTA chest obtained today to evaluate for PE given LE duplex noting RLE DVT. CTA chest negative for PE but notable for moderate R pleural effusion and small L pleural effusion. Patient has been intermittently short of breath requiring IV diuresis. Discussed findings of above w/ patient's son and daughter at bedside. Discussed that we should continue diuresis to optimize her volume and respiratory status. Original plan was to hold diuresis for today given IV contrast load and to resume PO Lasix 40mg BID starting tomorrow however will plan to give IV Lasix 40mg x1 tomorrow morning and re-assess.     I also discussed considering a pulmonology evaluation for diagnostic and therapeutic thoracentesis. We discussed risks and benefits of the procedure including bleeding, infection, and pneumothorax. At this time, however, the family would like to defer pulmonology evaluation, stating that the patient appears too weak/frail to withstand invasive procedures. They would like to continue diuresis for now and re-assess.     *******************************  Jorge Rodriguez MD (PGY-3)  Internal Medicine  Contact via [employer]  *******************************

## 2024-07-24 NOTE — PROGRESS NOTE ADULT - PROBLEM SELECTOR PLAN 10
PPX: Heparin  Diet: regular diet  Amb: non ambulatory at baseline. fall risk.   Dispo: Floors.   PT: Home PT Continue statin 20 mg qD

## 2024-07-24 NOTE — PROGRESS NOTE ADULT - PROBLEM SELECTOR PLAN 8
Continue statin 20 mg qD Currently hypotensive with SBP<100 but baseline for patient.    - Metop tartrate 75mg TID Resolved. RLE more erythematous and warm compared to LLE with some mild tenderness to palpation. Received steroid and IVIG so started IV antibx. Not concerned for MRSA.    - received Cefazolin 1000 q8h for 5 days (end: 07/21)  - Improved on exam. CTM

## 2024-07-24 NOTE — PROGRESS NOTE ADULT - PROBLEM SELECTOR PLAN 3
Does not report new onset fatigue. Past admission in early july 2024 got multiple pRBC transfusions and discharged with Hgb 9.1.  - Likely multifactorial considering age and other comorbidities  - Iron 28, TIBC 311, Tsat 9%, Ferritin  - Likely iron deficiency dominant but overall multifactorial  - HgB fell to 6.7 (07/19) s/p 1u pRBC, post transfusion 8.2, 07/21 8.2, 07/22 9.5, 07/23 9.5, 07/24 9.8.   - Significantly improved after IV iron sucrose x4 doses (end: 07/22)    DDx: occult GI bleeding vs IDS vs MDS/LGL vs less likely hemolysis    Plan:  - Consider GI outpatient colonoscopy.  - CTM  - Plan for outpatient heme followup Duplex ultrasound visualized DVT in R posterior tibial vein.  Will perform diagnostic study for potential thromboembolism.  Wells Score = 4.5    Consider CTA Chest vs V/Q scan.  Will discuss with patient + family about risks and benefits for both.

## 2024-07-24 NOTE — PROGRESS NOTE ADULT - SUBJECTIVE AND OBJECTIVE BOX
INTERVAL EVENTS/SUBJ:  No events     Home Medications:  Klor-Con M20 oral tablet, extended release: 1 tab(s) orally once a day (17 Jul 2024 14:26)  metoprolol tartrate 75 mg oral tablet: 1 tab(s) orally 3 times a day (22 Jul 2024 14:17)  rosuvastatin 5 mg oral tablet: 1 tab(s) orally once a day (at bedtime) (17 Jul 2024 14:26)      MEDICATIONS  (STANDING):  atorvastatin 20 milliGRAM(s) Oral at bedtime  heparin   Injectable 5000 Unit(s) SubCutaneous every 12 hours  melatonin 3 milliGRAM(s) Oral at bedtime  metoprolol tartrate 75 milliGRAM(s) Oral three times a day  pantoprazole    Tablet 40 milliGRAM(s) Oral before breakfast  senna 2 Tablet(s) Oral at bedtime    MEDICATIONS  (PRN):  polyethylene glycol 3350 17 Gram(s) Oral two times a day PRN Constipation      Vital Signs Last 24 Hrs  T(C): 36 (24 Jul 2024 05:42), Max: 36.4 (23 Jul 2024 08:25)  T(F): 96.8 (24 Jul 2024 05:42), Max: 97.6 (23 Jul 2024 10:11)  HR: 96 (24 Jul 2024 05:42) (89 - 151)  BP: 116/82 (24 Jul 2024 05:42) (74/45 - 116/82)  BP(mean): --  RR: 20 (24 Jul 2024 05:42) (20 - 26)  SpO2: 100% (24 Jul 2024 05:42) (89% - 100%)    Parameters below as of 24 Jul 2024 05:42  Patient On (Oxygen Delivery Method): nasal cannula  O2 Flow (L/min): 3      REVIEW OF SYSTEMS:  As per HPI, otherwise unremarkable.     PHYSICAL EXAM:  Constitutional/Appearance: Normal, Well-developed  HEENT:   Normal oral mucosa, no drainage or redness, supple neck  Lymphatic: No lymphadenopathy  Cardiovascular: Normal S1 S2, No edema, II/VI SADAF  Respiratory: Lungs clear to auscultation, respirations non-labored  Psychiatry: A & O x 3, appropriate affect.   Gastrointestinal:  Soft, Non-tender, no distention  Skin: No rashes, No ecchymoses, No cyanosis	  Neurologic: Non-focal, Alert and oriented x 3  Extremities: Normal range of motion  Vascular: Peripheral pulses palpable 2+ bilaterally (radial)    LABS:  CBC Full  -  ( 24 Jul 2024 06:06 )  WBC Count : 11.23 K/uL  RBC Count : 3.94 M/uL  Hemoglobin : 9.8 g/dL  Hematocrit : 35.3 %  Platelet Count - Automated : 323 K/uL  Mean Cell Volume : 89.6 fl  Mean Cell Hemoglobin : 24.9 pg  Mean Cell Hemoglobin Concentration : 27.8 gm/dL  Auto Neutrophil # : x  Auto Lymphocyte # : x  Auto Monocyte # : x  Auto Eosinophil # : x  Auto Basophil # : x  Auto Neutrophil % : x  Auto Lymphocyte % : x  Auto Monocyte % : x  Auto Eosinophil % : x  Auto Basophil % : x      07-24    143  |  99  |  49<H>  ----------------------------<  121<H>  4.4   |  35<H>  |  1.22    Ca    9.2      24 Jul 2024 06:06  Phos  3.6     07-24  Mg     2.1     07-24            IMPRESSION AND PLAN: 89F w HFpEF on NC, CAD s/p remote MI, AF, CVA here w asx thrombocytopenia. Denies bleeding, CP, syncope. + anemia improved w 1UPRBC. + ADHF and now with hypotension for which cardiology consulted. Now stabilized with ongoing rate control. + pleural effusion trigger, ongoing diuresis restart tmw. Caution heparin given TCP.   -tele AF RVR  -TTE EF 45-50, severe LAE, BRIANNA, severe TR, large pleural effusion  -cont lasix, close monitor BP  -cont metoprolol for rate control   -TCP mgmt ongoing      ***    Daniel Montenegro MD, MPhil, [facility]  Cardiologist, [facility]  ; [employer] at [facility]  email: [email or web address] on-service contact/call information, go to [email or web address] and use "[email or web address] to login.  Outpatient Cardiology appointments, call  [phone] to arrange with a colleague; I do not have outpatient Cardiology clinic.

## 2024-07-24 NOTE — PROGRESS NOTE ADULT - PROBLEM SELECTOR PLAN 9
PPX: Heparin  Diet: regular diet  Amb: non ambulatory at baseline. fall risk.   Dispo: Floors.   PT: Home PT Continue statin 20 mg qD Currently hypotensive with SBP<100 but baseline for patient.    - Metop tartrate 75mg TID

## 2024-07-24 NOTE — PROGRESS NOTE ADULT - PROBLEM SELECTOR PLAN 4
Close to baseline now. No drainage today    Plan:  - IV treatment per above  -> Wound care consult: conservative management with [facility] [address]  Observe discharge from left leg (currently no discharge) Resolved (>200K 07/22). Asymptomatic at 4k on admission s/p dexa 40mg x 4 days (end 07/20) and IVIG x 2days  Blood smear: hypochromic microcytic anemia with anisocytosis,  neutrophils with normal morphology, relative increased in monocytes, some reactive lymphocytes seen, very scant but giant platelets  Flow cytometry: Polytypic B cells, decreased CD4: CD8 ratio with no aberrancy, NK cells show no diagnostic abnormalities, myeloid findings are normal  - Heme: not concerning for malignancy    Ddx: likely ITP considering response to treatment vs possible malignancy per heme    Plan: Resolved (7/24 - 323)  - Trend platelet count  - neuro checks  - F/u outpatient with heme  - Heme following. Appreciate recs following flow cytometry result TTE showed BRIANNA, severe TR with mPAP of 53. TTE showed BRIANNA, severe TR with mPAP of 53.  Probably secondary to heart failure vs PE.

## 2024-07-24 NOTE — PROGRESS NOTE ADULT - NSPROGADDITIONALINFOA_GEN_ALL_CORE
< from: TTE W or WO Ultrasound Enhancing Agent (07.23.24 @ 13:05) >    CONCLUSIONS:      1. Left ventricular cavity is small. Left ventricular wall thickness is normal. Left ventricular systolic function is mildly decreased with an ejection fraction visually estimated at 45 to 50 %. There are no regional wall motion abnormalities seen.   2. Analysis of left ventricular diastolic function and filling pressure is made challenging by the presence of atrial fibrillation.   3. Enlarged right ventricular cavity size, with normal wall thickness, and mildly reduced right ventricular systolic function.   4. The left atrium is severely dilated.   5. The right atrium is severely dilated.   6. No pericardial effusion seen.   7. Severe tricuspid regurgitation.   8. Estimated pulmonary artery systolic pressure is53 mmHg.   9. Large left pleural effusion noted.  10. Trileaflet aortic valve with reduced systolic excursion. Mild aortic stenosis.  11. The inferior vena cava is dilated measuring 2.10 cm in diameter, (dilated >2.1cm) with abnormal inspiratory collapse (abnormal <50%) consistent with elevated right atrial pressure (~15, range 10-20mmHg).  12. No prior echocardiogram is available for comparison.    < end of copied text >

## 2024-07-24 NOTE — PROGRESS NOTE ADULT - ASSESSMENT
89F with a PMH of HFpEF on chronic 3-6L NC, a-fib, CVA and lymphedema presents to the ED with asymptomatic thrombocytopenia. 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  Anemia (hgb 6.7) s/p 1u pRBC improved and thrombocytopenia normalized. Now treating for increased SOB likely 2/2 worsening HF. 89F with a PMH of HFpEF on chronic 2L NC, a-fib, CVA and lymphedema presents to the ED with asymptomatic thrombocytopenia. 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  Anemia (hgb 6.7) s/p 1u pRBC improved and thrombocytopenia normalized. Now treating for increased SOB likely 2/2 worsening HF.

## 2024-07-24 NOTE — PROGRESS NOTE ADULT - PROBLEM SELECTOR PLAN 6
Pradaxa was stopped during last admission for bleeding risk. CHADSVASC 5, HASBLED 3      Continue holding Pradaxa  Continue metop tartrate 75mg TID  Consult cardiology for recommendations (recent rapid called for decreased blood pressure with normal saturation. EKG showed A-fib and RBBB).  - May consider increasing meds if patient consistently tachycardic or in AFib Close to baseline now. No drainage today    Plan:  - IV treatment per above  -> Wound care consult: conservative management with f/[facility] outpatient [facility] [address]  Observe discharge from left leg (currently no discharge) Does not report new onset fatigue. Past admission in early july 2024 got multiple pRBC transfusions and discharged with Hgb 9.1.  - Likely multifactorial considering age and other comorbidities  - Iron 28, TIBC 311, Tsat 9%, Ferritin  - Likely iron deficiency dominant but overall multifactorial  - HgB fell to 6.7 (07/19) s/p 1u pRBC, post transfusion 8.2, 07/21 8.2, 07/22 9.5, 07/23 9.5, 07/24 9.8.   - Significantly improved after IV iron sucrose x4 doses (end: 07/22)    DDx: occult GI bleeding vs IDS vs MDS/LGL vs less likely hemolysis    Plan:  - Consider GI outpatient colonoscopy.  - CTM  - Plan for outpatient heme followup

## 2024-07-24 NOTE — PROGRESS NOTE ADULT - ATTENDING COMMENTS
-SOB today stable. Not worse. But worse when she is moved/moves. No CP. Right calf tender on exam. -Posterior tibial DVT in RLE. C/f possible PE. Will get CTA chest to eval for PE. Will also further eval pleural effusions and lung parenchyma. -Discussed with patient's son and daughter.   -Will hold IV lasix for today to avoid further potential nephrotoxicity pending IV contrast. However, was improving to some extent with IV lasix.   -Cards appreciated.   -Afib rates on tele ranging 100-140's. Will increase metoprolol to 100mg TID.   -TTE now compared to March 2023 does show some worsening of EF now 45-50% and now with mild RV systolic dysfunction.   -Plts continue to improve s/p recent IVIG and dexamethasone. Hgb stable.   -C/w PPI indefinitely per GI recs given recent EGD with esophagitis and gastritis.   -D/w house staff and medical student. D/w patient, son, and daughter.

## 2024-07-24 NOTE — PROGRESS NOTE ADULT - PROBLEM SELECTOR PLAN 5
Resolved. RLE more erythematous and warm compared to LLE with some mild tenderness to palpation. Received steroid and IVIG so started IV antibx. Not concerned for MRSA.    - received Cefazolin 1000 q8h for 5 days (end: 07/21)  - Improved on exam. CTM Does not report new onset fatigue. Past admission in early july 2024 got multiple pRBC transfusions and discharged with Hgb 9.1.  - Likely multifactorial considering age and other comorbidities  - Iron 28, TIBC 311, Tsat 9%, Ferritin  - Likely iron deficiency dominant but overall multifactorial  - HgB fell to 6.7 (07/19) s/p 1u pRBC, post transfusion 8.2, 07/21 8.2, 07/22 9.5, 07/23 9.5, 07/24 9.8.   - Significantly improved after IV iron sucrose x4 doses (end: 07/22)    DDx: occult GI bleeding vs IDS vs MDS/LGL vs less likely hemolysis    Plan:  - Consider GI outpatient colonoscopy.  - CTM  - Plan for outpatient heme followup Resolved (>200K 07/22). Asymptomatic at 4k on admission s/p dexa 40mg x 4 days (end 07/20) and IVIG x 2days  Blood smear: hypochromic microcytic anemia with anisocytosis,  neutrophils with normal morphology, relative increased in monocytes, some reactive lymphocytes seen, very scant but giant platelets  Flow cytometry: Polytypic B cells, decreased CD4: CD8 ratio with no aberrancy, NK cells show no diagnostic abnormalities, myeloid findings are normal  - Heme: not concerning for malignancy    Ddx: likely ITP considering response to treatment vs possible malignancy per heme    Plan: Resolved (7/24 - 323)  - Trend platelet count  - neuro checks  - F/u outpatient with heme  - Heme following. Appreciate recs following flow cytometry result

## 2024-07-25 NOTE — PROGRESS NOTE ADULT - ATTENDING COMMENTS
-Cr and Na up today. Patient wasn't eating/drinking much. Encouraged PO. Giving albumin 50mL x 1 (was low prior). Holding lasix for now. May change to bumex once diuretics resumed. Monitor UO closely. -F/u urine studies and bladder scans. -Was constipated but had a large BM.   -No PE on CTA chest. Will do serial LE duplex outpatient (home visit MD can do this). Monitor off AC for now.   -Moderate Right Plef and smaller left pleural effusion. Will do med mgmt for now.   -C/w metoprolol 100mg tid for Afib. C/w tele.   -D/w house staff and medical student.   -D/w son at bedside.

## 2024-07-25 NOTE — PROGRESS NOTE ADULT - SUBJECTIVE AND OBJECTIVE BOX
**********************  Desmond Yeoh  MS-3, Internal Medicine  **********************  Patient is a 89y old  Female who presents with a chief complaint of asymptomatic thrombocytopenia (22 Jul 2024 07:13)      OVERNIGHT EVENTS: None.    SUBJECTIVE:  Patient seen and examined at bedside. Denies fever, chills, HA, cough, chest pain, palpitations, abdominal pain, dysuria. She has had trouble defecating and will be receiving Miralax today (senna was given yesterday). SOB has been stable since yesterday. 3L NC.    OBJECTIVE:  Vital Signs Last 12 Hrs  T(F): 96.8 (07-25-24 @ 04:58), Max: 97.9 (07-24-24 @ 21:49)  HR: 55 (07-25-24 @ 04:58) (55 - 79)  BP: 97/65 (07-25-24 @ 04:58) (97/65 - 108/65)  BP(mean): --  RR: 20 (07-25-24 @ 04:58) (20 - 20)  SpO2: 99% (07-25-24 @ 04:58) (94% - 100%)  I&O's Summary    23 Jul 2024 07:01  -  24 Jul 2024 07:00  --------------------------------------------------------  IN: 0 mL / OUT: 450 mL / NET: -450 mL    24 Jul 2024 07:01  -  25 Jul 2024 06:57  --------------------------------------------------------  IN: 200 mL / OUT: 0 mL / NET: 200 mL    -> 1 unsaved void one day ago (afternoon)   -> didn't get furosemide yesterday because received IV bolus of contrast ytd  -> will get furosemide IV 40mg today and reassess before considering similar dose in the evening.    PHYSICAL EXAM:  Constitutional: NAD, appears tired and resting in bed.  HEENT: NC/AT, PERRLA, EOMI, no conjunctival pallor or scleral icterus, MMM  Neck: Supple  Respiratory: right sided crackles, decreased breath sounds on right lung. No w/r/r. (will reevaluate with patient in better positioning)  Cardiovascular: RRR, normal S1 and S2.  Gastrointestinal: +BS, soft NTND, no guarding or rebound tenderness, no palpable masses   Extremities: wwp; no cyanosis or clubbing. Unable to properly assess edema because of bandages.  Vascular: Pedal pulses equal and strong throughout.   Neurological: AAOx3  Skin: Legs with lymphedema wrapped up in dressing with stable size. No new petechiae or bleeds visible.         LABS:                        9.8    11.23 )-----------( 323      ( 24 Jul 2024 06:06 )             35.3     07-24    143  |  99  |  49<H>  ----------------------------<  121<H>  4.4   |  35<H>  |  1.22    Ca    9.2      24 Jul 2024 06:06  Phos  3.6     07-24  Mg     2.1     07-24    RADIOLOGY & ADDITIONAL TESTS: Reviewed.  1. < from: [facility] Duplex Lower Ext Vein Scan, Bilat (07.24.24 @ 12:28) >  There is acute, below the knee DVT affecting the the right posterior   tibial veins.    There is  an avascular cystic collection measuring 2.1 cm in its long   diameter in the superficial soft tissues of the right thigh.  < from: CT Angio Chest PE Protocol w/ IV Cont (07.24.24 @ 14:47) >  No pulmonary embolus to the level of the segmental vessels.    Moderate right and small left pleural effusions.    2. < from: CT Angio Chest PE Protocol w/ IV Cont (07.24.24 @ 14:47) >    No pulmonary embolus to the level of the segmental vessels.    Moderate right and small left pleural effusions.          MEDICATIONS:  MEDICATIONS  (STANDING):  atorvastatin 20 milliGRAM(s) Oral at bedtime  hemorrhoidal Ointment 1 Application(s) Rectal two times a day  heparin   Injectable 5000 Unit(s) SubCutaneous every 12 hours  melatonin 3 milliGRAM(s) Oral at bedtime  metoprolol tartrate 100 milliGRAM(s) Oral three times a day  pantoprazole    Tablet 40 milliGRAM(s) Oral before breakfast  senna 2 Tablet(s) Oral at bedtime    MEDICATIONS  (PRN):  polyethylene glycol 3350 17 Gram(s) Oral two times a day PRN Constipation        Events (the day before):  - Patient and family informed about DVT studies and PE. Does not want further pulmonary workup (thoracentesis) at this time.    Events (today):  -  **********************  Desmond Yeoh  MS-3, Internal Medicine  **********************  Patient is a 89y old  Female who presents with a chief complaint of asymptomatic thrombocytopenia (22 Jul 2024 07:13)      OVERNIGHT EVENTS: None.    SUBJECTIVE:  Patient seen and examined at bedside. Denies fever, chills, HA, cough, chest pain, palpitations, abdominal pain, dysuria. She has had trouble defecating and will be receiving Miralax today (senna was given yesterday). SOB has been stable since yesterday. 3L NC.    OBJECTIVE:  Vital Signs Last 12 Hrs  T(F): 96.8 (07-25-24 @ 04:58), Max: 97.9 (07-24-24 @ 21:49)  HR: 55 (07-25-24 @ 04:58) (55 - 79)  BP: 97/65 (07-25-24 @ 04:58) (97/65 - 108/65)  BP(mean): --  RR: 20 (07-25-24 @ 04:58) (20 - 20)  SpO2: 99% (07-25-24 @ 04:58) (94% - 100%)  I&O's Summary    23 Jul 2024 07:01  -  24 Jul 2024 07:00  --------------------------------------------------------  IN: 0 mL / OUT: 450 mL / NET: -450 mL    24 Jul 2024 07:01  -  25 Jul 2024 06:57  --------------------------------------------------------  IN: 200 mL / OUT: 0 mL / NET: 200 mL    -> 1 unsaved void one day ago (afternoon)   -> didn't get furosemide yesterday because received IV bolus of contrast ytd  -> will get furosemide IV 40mg today and reassess before considering similar dose in the evening.    PHYSICAL EXAM:  Constitutional: NAD, appears tired and resting in bed.  HEENT: NC/AT, PERRLA, EOMI, no conjunctival pallor or scleral icterus, MMM  Neck: Supple  Respiratory: right sided crackles, decreased breath sounds on right lung. No w/r/r. (will reevaluate with patient in better positioning)  Cardiovascular: RRR, normal S1 and S2.  Gastrointestinal: +BS, soft NTND, no guarding or rebound tenderness, no palpable masses   Extremities: wwp; no cyanosis or clubbing. Unable to properly assess edema because of bandages.  Vascular: Pedal pulses equal and strong throughout.   Neurological: AAOx3  Skin: Legs with lymphedema wrapped up in dressing with stable size. No new petechiae or bleeds visible.         LABS:                        9.8    11.23 )-----------( 323      ( 24 Jul 2024 06:06 )             35.3     07-24    143  |  99  |  49<H>  ----------------------------<  121<H>  4.4   |  35<H>  |  1.22    Ca    9.2      24 Jul 2024 06:06  Phos  3.6     07-24  Mg     2.1     07-24    RADIOLOGY & ADDITIONAL TESTS: Reviewed.  1. < from: VA Duplex Lower Ext Vein Scan, Bilat (07.24.24 @ 12:28) >  There is acute, below the knee DVT affecting the the right posterior   tibial veins.    There is  an avascular cystic collection measuring 2.1 cm in its long   diameter in the superficial soft tissues of the right thigh.  < from: CT Angio Chest PE Protocol w/ IV Cont (07.24.24 @ 14:47) >  No pulmonary embolus to the level of the segmental vessels.    Moderate right and small left pleural effusions.    2. < from: CT Angio Chest PE Protocol w/ IV Cont (07.24.24 @ 14:47) >    No pulmonary embolus to the level of the segmental vessels.    Moderate right and small left pleural effusions.          MEDICATIONS:  MEDICATIONS  (STANDING):  atorvastatin 20 milliGRAM(s) Oral at bedtime  hemorrhoidal Ointment 1 Application(s) Rectal two times a day  heparin   Injectable 5000 Unit(s) SubCutaneous every 12 hours  melatonin 3 milliGRAM(s) Oral at bedtime  metoprolol tartrate 100 milliGRAM(s) Oral three times a day  pantoprazole    Tablet 40 milliGRAM(s) Oral before breakfast  senna 2 Tablet(s) Oral at bedtime    MEDICATIONS  (PRN):  polyethylene glycol 3350 17 Gram(s) Oral two times a day PRN Constipation        Events (the day before):  - Patient and family informed about DVT studies and PE. Does not want further pulmonary workup (thoracentesis) at this time.    Events (today):  - Reached out to patient's PCP to enquire about home serial LE duplex + establishing regular outpatient cardiology (remote) for patient **********************  Desmond Yeoh  MS-3, Internal Medicine  **********************  Patient is a 89y old  Female who presents with a chief complaint of asymptomatic thrombocytopenia (22 Jul 2024 07:13)      OVERNIGHT EVENTS: None.    SUBJECTIVE:  Patient seen and examined at bedside. Denies fever, chills, HA, cough, chest pain, palpitations, abdominal pain, dysuria. She has had trouble defecating and will be receiving Miralax today (senna was given yesterday). SOB has been stable since yesterday. 3L NC.    OBJECTIVE:  Vital Signs Last 12 Hrs  T(F): 96.8 (07-25-24 @ 04:58), Max: 97.9 (07-24-24 @ 21:49)  HR: 55 (07-25-24 @ 04:58) (55 - 79)  BP: 97/65 (07-25-24 @ 04:58) (97/65 - 108/65)  BP(mean): --  RR: 20 (07-25-24 @ 04:58) (20 - 20)  SpO2: 99% (07-25-24 @ 04:58) (94% - 100%)  I&O's Summary    23 Jul 2024 07:01  -  24 Jul 2024 07:00  --------------------------------------------------------  IN: 0 mL / OUT: 450 mL / NET: -450 mL    24 Jul 2024 07:01  -  25 Jul 2024 06:57  --------------------------------------------------------  IN: 200 mL / OUT: 0 mL / NET: 200 mL    -> 1 unsaved void one day ago (afternoon)   -> didn't get furosemide yesterday because received IV bolus of contrast ytd  -> will get furosemide IV 40mg today and reassess before considering similar dose in the evening.    PHYSICAL EXAM:  Constitutional: NAD, appears tired and resting in bed.  HEENT: NC/AT, PERRLA, EOMI, no conjunctival pallor or scleral icterus, MMM  Neck: Supple  Respiratory: right sided crackles, decreased breath sounds on right lung. No w/r/r. (will reevaluate with patient in better positioning)  Cardiovascular: RRR, normal S1 and S2.  Gastrointestinal: +BS, soft NTND, no guarding or rebound tenderness, no palpable masses   Extremities: wwp; no cyanosis or clubbing. Unable to properly assess edema because of bandages.  Vascular: Pedal pulses equal and strong throughout.   Neurological: AAOx3  Skin: Legs with lymphedema wrapped up in dressing with stable size. No new petechiae or bleeds visible.         LABS:                        9.8    11.23 )-----------( 323      ( 24 Jul 2024 06:06 )             35.3     07-24    143  |  99  |  49<H>  ----------------------------<  121<H>  4.4   |  35<H>  |  1.22    Ca    9.2      24 Jul 2024 06:06  Phos  3.6     07-24  Mg     2.1     07-24    RADIOLOGY & ADDITIONAL TESTS: Reviewed.  1. < from: VA Duplex Lower Ext Vein Scan, Bilat (07.24.24 @ 12:28) >  There is acute, below the knee DVT affecting the the right posterior   tibial veins.    There is  an avascular cystic collection measuring 2.1 cm in its long   diameter in the superficial soft tissues of the right thigh.  < from: CT Angio Chest PE Protocol w/ IV Cont (07.24.24 @ 14:47) >  No pulmonary embolus to the level of the segmental vessels.    Moderate right and small left pleural effusions.    2. < from: CT Angio Chest PE Protocol w/ IV Cont (07.24.24 @ 14:47) >    No pulmonary embolus to the level of the segmental vessels.    Moderate right and small left pleural effusions.          MEDICATIONS:  MEDICATIONS  (STANDING):  atorvastatin 20 milliGRAM(s) Oral at bedtime  hemorrhoidal Ointment 1 Application(s) Rectal two times a day  heparin   Injectable 5000 Unit(s) SubCutaneous every 12 hours  melatonin 3 milliGRAM(s) Oral at bedtime  metoprolol tartrate 100 milliGRAM(s) Oral three times a day  pantoprazole    Tablet 40 milliGRAM(s) Oral before breakfast  senna 2 Tablet(s) Oral at bedtime    MEDICATIONS  (PRN):  polyethylene glycol 3350 17 Gram(s) Oral two times a day PRN Constipation        Events (the day before):  - Patient and family informed about DVT studies and PE. Does not want further pulmonary workup (thoracentesis) at this time.    Events (today):  - Reached out to patient's PCP to enquire about home serial LE duplex + establishing regular outpatient cardiology (remote) for patient. Left a message and will f/u. **********************  Desmond Yeoh  MS-3, Internal Medicine  **********************  Patient is a 89y old  Female who presents with a chief complaint of asymptomatic thrombocytopenia (22 Jul 2024 07:13)      OVERNIGHT EVENTS: None.    SUBJECTIVE:  Patient seen and examined at bedside. Denies fever, chills, HA, cough, chest pain, palpitations, abdominal pain, dysuria. She has had trouble defecating and has received an enema (Miralax and senna was given days prior). SOB has been improved since yesterday. Patient has not been eating or drinking much in the past few days as per son. KEMAR PUGH.    OBJECTIVE:  Vital Signs Last 12 Hrs  T(F): 96.8 (07-25-24 @ 04:58), Max: 97.9 (07-24-24 @ 21:49)  HR: 55 (07-25-24 @ 04:58) (55 - 79)  BP: 97/65 (07-25-24 @ 04:58) (97/65 - 108/65)  BP(mean): --  RR: 20 (07-25-24 @ 04:58) (20 - 20)  SpO2: 99% (07-25-24 @ 04:58) (94% - 100%)  I&O's Summary    23 Jul 2024 07:01  -  24 Jul 2024 07:00  --------------------------------------------------------  IN: 0 mL / OUT: 450 mL / NET: -450 mL    24 Jul 2024 07:01  -  25 Jul 2024 06:57  --------------------------------------------------------  IN: 200 mL / OUT: 0 mL / NET: 200 mL    -> 1 unsaved void one day ago (afternoon)   -> didn't get furosemide yesterday because received IV bolus of contrast ytd  -> will get furosemide IV 40mg today and reassess before considering similar dose in the evening.    PHYSICAL EXAM:  Constitutional: NAD, appears tired and resting in bed.  HEENT: NC/AT, PERRLA, EOMI, no conjunctival pallor or scleral icterus, MMM  Neck: Supple, JVD  Respiratory: decreased breath sounds on both lower lung bases. No w/r/r.   Cardiovascular: RRR, normal S1 and S2.  Gastrointestinal: +BS, soft NTND, no guarding or rebound tenderness, no palpable masses   Extremities: wwp; no cyanosis or clubbing. Edema 2+  Vascular: Pedal pulses equal and strong throughout.   Neurological: AAOx3  : no suprapubic pain  Skin: Legs with lymphedema wrapped up in dressing with stable size. No new petechiae or bleeds visible.         LABS:                        10.2   12.86 )-----------( 351      ( 25 Jul 2024 07:22 )             35.9     07-25    146<H>  |  99  |  58<H>  ----------------------------<  90  4.9   |  32<H>  |  1.58<H>    Ca    9.5      25 Jul 2024 07:21  Phos  4.6     07-25  Mg     2.2     07-25        RADIOLOGY & ADDITIONAL TESTS: Reviewed.  1. < from: VA Duplex Lower Ext Vein Scan, Bilat (07.24.24 @ 12:28) >  There is acute, below the knee DVT affecting the the right posterior   tibial veins.    There is  an avascular cystic collection measuring 2.1 cm in its long   diameter in the superficial soft tissues of the right thigh.  < from: CT Angio Chest PE Protocol w/ IV Cont (07.24.24 @ 14:47) >  No pulmonary embolus to the level of the segmental vessels.    Moderate right and small left pleural effusions.    2. < from: CT Angio Chest PE Protocol w/ IV Cont (07.24.24 @ 14:47) >    No pulmonary embolus to the level of the segmental vessels.    Moderate right and small left pleural effusions.      3. Bladder scan = 205mL of urine in bladder    MEDICATIONS:  MEDICATIONS  (STANDING):  atorvastatin 20 milliGRAM(s) Oral at bedtime  hemorrhoidal Ointment 1 Application(s) Rectal two times a day  heparin   Injectable 5000 Unit(s) SubCutaneous every 12 hours  melatonin 3 milliGRAM(s) Oral at bedtime  metoprolol tartrate 100 milliGRAM(s) Oral three times a day  pantoprazole    Tablet 40 milliGRAM(s) Oral before breakfast  senna 2 Tablet(s) Oral at bedtime    MEDICATIONS  (PRN):  polyethylene glycol 3350 17 Gram(s) Oral two times a day PRN Constipation        Events (the day before):  - Patient and family informed about DVT studies and PE. Does not want further pulmonary workup (thoracentesis) at this time.    Events (today):  - Reached out to patient's PCP to enquire about home serial LE duplex + establishing regular outpatient cardiology (remote) for patient. Left a message and will f/u. **********************  Desmond Yeoh  MS-3, Internal Medicine  **********************  Patient is a 89y old  Female who presents with a chief complaint of asymptomatic thrombocytopenia (22 Jul 2024 07:13)      OVERNIGHT EVENTS: None.    SUBJECTIVE:  Patient seen and examined at bedside. Denies fever, chills, HA, cough, chest pain, palpitations, abdominal pain, dysuria. She has had trouble defecating and has received an enema (Miralax and senna was given days prior). SOB has been improved since yesterday. Patient has not been eating or drinking much in the past few days as per son. KEMAR PUGH.    OBJECTIVE:  Vital Signs Last 12 Hrs  T(F): 96.8 (07-25-24 @ 04:58), Max: 97.9 (07-24-24 @ 21:49)  HR: 55 (07-25-24 @ 04:58) (55 - 79)  BP: 97/65 (07-25-24 @ 04:58) (97/65 - 108/65)  BP(mean): --  RR: 20 (07-25-24 @ 04:58) (20 - 20)  SpO2: 99% (07-25-24 @ 04:58) (94% - 100%)  I&O's Summary    23 Jul 2024 07:01  -  24 Jul 2024 07:00  --------------------------------------------------------  IN: 0 mL / OUT: 450 mL / NET: -450 mL    24 Jul 2024 07:01  -  25 Jul 2024 06:57  --------------------------------------------------------  IN: 200 mL / OUT: 0 mL / NET: 200 mL    -> 1 unsaved void one day ago (afternoon)   -> didn't get furosemide yesterday because received IV bolus of contrast ytd  -> will get furosemide IV 40mg today and reassess before considering similar dose in the evening.    PHYSICAL EXAM:  Constitutional: NAD, appears tired and resting in bed.  HEENT: NC/AT, PERRLA, EOMI, no conjunctival pallor or scleral icterus, MMM  Neck: Supple, JVD  Respiratory: decreased breath sounds on both lower lung bases. No w/r/r.   Cardiovascular: RRR, normal S1 and S2.  Gastrointestinal: +BS, soft NTND, no guarding or rebound tenderness, no palpable masses   Extremities: wwp; no cyanosis or clubbing. Edema 2+  Vascular: Pedal pulses equal and strong throughout.   Neurological: AAOx3  : no suprapubic pain  Skin: Legs with lymphedema wrapped up in dressing with stable size. No new petechiae or bleeds visible.         LABS:                        10.2   12.86 )-----------( 351      ( 25 Jul 2024 07:22 )             35.9     07-25    146<H>  |  99  |  58<H>  ----------------------------<  90  4.9   |  32<H>  |  1.58<H>    Ca    9.5      25 Jul 2024 07:21  Phos  4.6     07-25  Mg     2.2     07-25        RADIOLOGY & ADDITIONAL TESTS: Reviewed.  1. < from: VA Duplex Lower Ext Vein Scan, Bilat (07.24.24 @ 12:28) >  There is acute, below the knee DVT affecting the the right posterior   tibial veins.    There is  an avascular cystic collection measuring 2.1 cm in its long   diameter in the superficial soft tissues of the right thigh.  < from: CT Angio Chest PE Protocol w/ IV Cont (07.24.24 @ 14:47) >  No pulmonary embolus to the level of the segmental vessels.    Moderate right and small left pleural effusions.    2. < from: CT Angio Chest PE Protocol w/ IV Cont (07.24.24 @ 14:47) >    No pulmonary embolus to the level of the segmental vessels.    Moderate right and small left pleural effusions.      3. Bladder scan = 205mL of urine in bladder    MEDICATIONS:  MEDICATIONS  (STANDING):  atorvastatin 20 milliGRAM(s) Oral at bedtime  hemorrhoidal Ointment 1 Application(s) Rectal two times a day  heparin   Injectable 5000 Unit(s) SubCutaneous every 12 hours  melatonin 3 milliGRAM(s) Oral at bedtime  metoprolol tartrate 100 milliGRAM(s) Oral three times a day  pantoprazole    Tablet 40 milliGRAM(s) Oral before breakfast  senna 2 Tablet(s) Oral at bedtime    MEDICATIONS  (PRN):  polyethylene glycol 3350 17 Gram(s) Oral two times a day PRN Constipation        Events (the day before):  - Patient and family informed about DVT studies and PE. Does not want further pulmonary workup (thoracentesis) at this time.    Events (today):  - Reached out to patient's PCP to enquire about home serial LE duplex + establishing regular outpatient cardiology (remote) for patient. Left a message and will f/u.  - Spoke to patient's son and daughter and they are aware that we will call home care to see if they could do the serial LE duplex. Told them that full anticoagulation for treatment was concerning for us given the patient's history of potential bleed. The family agrees with the plan. **********************  Desmond Yeoh  MS-3, Internal Medicine  **********************  Patient is a 89y old  Female who presents with a chief complaint of asymptomatic thrombocytopenia (22 Jul 2024 07:13)      OVERNIGHT EVENTS: None.    SUBJECTIVE:  Patient seen and examined at bedside. Denies fever, chills, HA, cough, chest pain, palpitations, abdominal pain, dysuria. She has had trouble defecating and has received an enema (Miralax and senna was given days prior). SOB has been improved since yesterday. Patient has not been eating or drinking much in the past few days as per son. Patient has urinated on the bed yesterday (unable to collect volume). 3L NC.    OBJECTIVE:  Vital Signs Last 12 Hrs  T(F): 96.8 (07-25-24 @ 04:58), Max: 97.9 (07-24-24 @ 21:49)  HR: 55 (07-25-24 @ 04:58) (55 - 79)  BP: 97/65 (07-25-24 @ 04:58) (97/65 - 108/65)  BP(mean): --  RR: 20 (07-25-24 @ 04:58) (20 - 20)  SpO2: 99% (07-25-24 @ 04:58) (94% - 100%)  I&O's Summary    23 Jul 2024 07:01  -  24 Jul 2024 07:00  --------------------------------------------------------  IN: 0 mL / OUT: 450 mL / NET: -450 mL    24 Jul 2024 07:01  -  25 Jul 2024 06:57  --------------------------------------------------------  IN: 200 mL / OUT: 0 mL / NET: 200 mL    -> 1 unsaved void one day ago (afternoon)   -> didn't get furosemide yesterday because received IV bolus of contrast ytd  -> will get furosemide IV 40mg today and reassess before considering similar dose in the evening.    PHYSICAL EXAM:  Constitutional: NAD, appears tired and resting in bed.  HEENT: NC/AT, PERRLA, EOMI, no conjunctival pallor or scleral icterus, MMM  Neck: Supple, JVD  Respiratory: decreased breath sounds on both lower lung bases. No w/r/r.   Cardiovascular: RRR, normal S1 and S2.  Gastrointestinal: +BS, soft NTND, no guarding or rebound tenderness, no palpable masses   Extremities: wwp; no cyanosis or clubbing. Edema 2+  Vascular: Pedal pulses equal and strong throughout.   Neurological: AAOx3  : no suprapubic pain  Skin: Legs with lymphedema wrapped up in dressing with stable size. No new petechiae or bleeds visible.         LABS:                        10.2   12.86 )-----------( 351      ( 25 Jul 2024 07:22 )             35.9     07-25    146<H>  |  99  |  58<H>  ----------------------------<  90  4.9   |  32<H>  |  1.58<H>    Ca    9.5      25 Jul 2024 07:21  Phos  4.6     07-25  Mg     2.2     07-25        RADIOLOGY & ADDITIONAL TESTS: Reviewed.  1. < from: VA Duplex Lower Ext Vein Scan, Bilat (07.24.24 @ 12:28) >  There is acute, below the knee DVT affecting the the right posterior   tibial veins.    There is  an avascular cystic collection measuring 2.1 cm in its long   diameter in the superficial soft tissues of the right thigh.  < from: CT Angio Chest PE Protocol w/ IV Cont (07.24.24 @ 14:47) >  No pulmonary embolus to the level of the segmental vessels.    Moderate right and small left pleural effusions.    2. < from: CT Angio Chest PE Protocol w/ IV Cont (07.24.24 @ 14:47) >    No pulmonary embolus to the level of the segmental vessels.    Moderate right and small left pleural effusions.      3. Bladder scan = 205mL of urine in bladder    MEDICATIONS:  MEDICATIONS  (STANDING):  atorvastatin 20 milliGRAM(s) Oral at bedtime  hemorrhoidal Ointment 1 Application(s) Rectal two times a day  heparin   Injectable 5000 Unit(s) SubCutaneous every 12 hours  melatonin 3 milliGRAM(s) Oral at bedtime  metoprolol tartrate 100 milliGRAM(s) Oral three times a day  pantoprazole    Tablet 40 milliGRAM(s) Oral before breakfast  senna 2 Tablet(s) Oral at bedtime    MEDICATIONS  (PRN):  polyethylene glycol 3350 17 Gram(s) Oral two times a day PRN Constipation        Events (the day before):  - Patient and family informed about DVT studies and PE. Does not want further pulmonary workup (thoracentesis) at this time.    Events (today):  - Reached out to patient's PCP to enquire about home serial LE duplex + establishing regular outpatient cardiology (remote) for patient. Left a message and will f/u.  - Spoke to patient's son and daughter and they are aware that we will call home care to see if they could do the serial LE duplex. Told them that full anticoagulation for treatment was concerning for us given the patient's history of potential bleed. The family agrees with the plan. **********************  Desmond Yeoh  MS-3, Internal Medicine  **********************  Patient is a 89y old  Female who presents with a chief complaint of asymptomatic thrombocytopenia (22 Jul 2024 07:13)      OVERNIGHT EVENTS: None.    SUBJECTIVE:  Patient seen and examined at bedside. Denies fever, chills, HA, cough, chest pain, palpitations, abdominal pain, dysuria. She has had trouble defecating and has received an enema (Miralax and senna was given days prior). SOB has been improved since yesterday. Patient has not been eating or drinking much in the past few days as per son. Patient has urinated on the bed yesterday (unable to collect volume). 3L NC.    OBJECTIVE:  Vital Signs Last 12 Hrs  T(F): 96.8 (07-25-24 @ 04:58), Max: 97.9 (07-24-24 @ 21:49)  HR: 55 (07-25-24 @ 04:58) (55 - 79)  BP: 97/65 (07-25-24 @ 04:58) (97/65 - 108/65)  BP(mean): --  RR: 20 (07-25-24 @ 04:58) (20 - 20)  SpO2: 99% (07-25-24 @ 04:58) (94% - 100%)  I&O's Summary    23 Jul 2024 07:01  -  24 Jul 2024 07:00  --------------------------------------------------------  IN: 0 mL / OUT: 450 mL / NET: -450 mL    24 Jul 2024 07:01  -  25 Jul 2024 06:57  --------------------------------------------------------  IN: 200 mL / OUT: 0 mL / NET: 200 mL    -> 1 unsaved void one day ago (afternoon)   -> didn't get furosemide yesterday because received IV bolus of contrast ytd  -> will get furosemide IV 40mg today and reassess before considering similar dose in the evening.    PHYSICAL EXAM:  Constitutional: NAD, appears tired and resting in bed.  HEENT: NC/AT, PERRLA, EOMI, no conjunctival pallor or scleral icterus, MMM  Neck: Supple, JVD  Respiratory: decreased breath sounds on both lower lung bases. No w/r/r.   Cardiovascular: RRR, normal S1 and S2.  Gastrointestinal: +BS, soft NTND, no guarding or rebound tenderness, no palpable masses   Extremities: wwp; no cyanosis or clubbing. Edema 2+  Vascular: Pedal pulses equal and strong throughout.   Neurological: AAOx3  : no suprapubic pain  Skin: Legs with lymphedema wrapped up in dressing with stable size. No new petechiae or bleeds visible.         LABS:                        10.2   12.86 )-----------( 351      ( 25 Jul 2024 07:22 )             35.9     07-25    146<H>  |  99  |  58<H>  ----------------------------<  90  4.9   |  32<H>  |  1.58<H>    Ca    9.5      25 Jul 2024 07:21  Phos  4.6     07-25  Mg     2.2     07-25        RADIOLOGY & ADDITIONAL TESTS: Reviewed.  1. < from: [facility] Duplex Lower Ext Vein Scan, Bilat (07.24.24 @ 12:28) >  There is acute, below the knee DVT affecting the the right posterior   tibial veins.    There is  an avascular cystic collection measuring 2.1 cm in its long   diameter in the superficial soft tissues of the right thigh.  < from: CT Angio Chest PE Protocol w/ IV Cont (07.24.24 @ 14:47) >  No pulmonary embolus to the level of the segmental vessels.    Moderate right and small left pleural effusions.    2. < from: CT Angio Chest PE Protocol w/ IV Cont (07.24.24 @ 14:47) >    No pulmonary embolus to the level of the segmental vessels.    Moderate right and small left pleural effusions.      3. Bladder scan = 205mL of urine in bladder    MEDICATIONS:  MEDICATIONS  (STANDING):  atorvastatin 20 milliGRAM(s) Oral at bedtime  hemorrhoidal Ointment 1 Application(s) Rectal two times a day  heparin   Injectable 5000 Unit(s) SubCutaneous every 12 hours  melatonin 3 milliGRAM(s) Oral at bedtime  metoprolol tartrate 100 milliGRAM(s) Oral three times a day  pantoprazole    Tablet 40 milliGRAM(s) Oral before breakfast  senna 2 Tablet(s) Oral at bedtime    MEDICATIONS  (PRN):  polyethylene glycol 3350 17 Gram(s) Oral two times a day PRN Constipation        Events (the day before):  - Patient and family informed about DVT studies and PE. Does not want further pulmonary workup (thoracentesis) at this time.    Events (today):  - Reached out to patient's PCP to enquire about home serial LE duplex + establishing regular outpatient cardiology (remote) for patient.   -> Patient's PCP does do duplex ultrasound at patient's home (I ordered a right extremity duplex ultrasound at Week 1 and Week 2 post-DVT diagnosis). PCP office would need to ask the provider first to confirm.  -> Told PCP office about cardiology needs. They will check to see if a cards PA affiliated with their office is willing to do home visits.  - Spoke to patient's son and daughter and they are aware that we will call home care to see if they could do the serial LE duplex. Told them that full anticoagulation for treatment was concerning for us given the patient's history of potential bleed. The family agrees with the plan. **********************  Desmond Yeoh  MS-3, Internal Medicine  **********************  Patient is a 89y old  Female who presents with a chief complaint of asymptomatic thrombocytopenia (22 Jul 2024 07:13)      OVERNIGHT EVENTS: None.    SUBJECTIVE:  Patient seen and examined at bedside. Denies fever, chills, HA, cough, chest pain, palpitations, abdominal pain, dysuria. She has had trouble defecating and has received an enema (Miralax and senna was given days prior). SOB has been improved since yesterday. Patient has not been eating or drinking much in the past few days as per son. Patient has urinated on the bed yesterday (unable to collect volume). 3L NC. Good BM after fleet enema    OBJECTIVE:  Vital Signs Last 12 Hrs  T(F): 96.8 (07-25-24 @ 04:58), Max: 97.9 (07-24-24 @ 21:49)  HR: 55 (07-25-24 @ 04:58) (55 - 79)  BP: 97/65 (07-25-24 @ 04:58) (97/65 - 108/65)  BP(mean): --  RR: 20 (07-25-24 @ 04:58) (20 - 20)  SpO2: 99% (07-25-24 @ 04:58) (94% - 100%)  I&O's Summary    23 Jul 2024 07:01  -  24 Jul 2024 07:00  --------------------------------------------------------  IN: 0 mL / OUT: 450 mL / NET: -450 mL    24 Jul 2024 07:01  -  25 Jul 2024 06:57  --------------------------------------------------------  IN: 200 mL / OUT: 0 mL / NET: 200 mL    -> 1 unsaved void one day ago (afternoon)   -> didn't get furosemide yesterday because received IV bolus of contrast ytd  -> will get furosemide IV 40mg today and reassess before considering similar dose in the evening.    PHYSICAL EXAM:  Constitutional: NAD, appears tired and resting in bed.  HEENT: NC/AT, PERRLA, EOMI, no conjunctival pallor or scleral icterus, MMM  Neck: Supple, JVD  Respiratory: decreased breath sounds on both lower lung bases. No w/r/r.   Cardiovascular: RRR, normal S1 and S2.  Gastrointestinal: +BS, soft NT, mild distension, no guarding or rebound tenderness, no palpable masses   Extremities: wwp; no cyanosis or clubbing. Edema 2+  Vascular: Pedal pulses equal and strong throughout.   Neurological: AAOx3  : no suprapubic pain  Skin: Legs with lymphedema wrapped up in dressing with stable size. No new petechiae or bleeds visible.         LABS:                        10.2   12.86 )-----------( 351      ( 25 Jul 2024 07:22 )             35.9     07-25    146<H>  |  99  |  58<H>  ----------------------------<  90  4.9   |  32<H>  |  1.58<H>    Ca    9.5      25 Jul 2024 07:21  Phos  4.6     07-25  Mg     2.2     07-25        RADIOLOGY & ADDITIONAL TESTS: Reviewed.  1. < from: VA Duplex Lower Ext Vein Scan, Bilat (07.24.24 @ 12:28) >  There is acute, below the knee DVT affecting the the right posterior   tibial veins.    There is  an avascular cystic collection measuring 2.1 cm in its long   diameter in the superficial soft tissues of the right thigh.  < from: CT Angio Chest PE Protocol w/ IV Cont (07.24.24 @ 14:47) >  No pulmonary embolus to the level of the segmental vessels.    Moderate right and small left pleural effusions.    2. < from: CT Angio Chest PE Protocol w/ IV Cont (07.24.24 @ 14:47) >    No pulmonary embolus to the level of the segmental vessels.    Moderate right and small left pleural effusions.      3. Bladder scan = 205mL of urine in bladder    MEDICATIONS:  MEDICATIONS  (STANDING):  atorvastatin 20 milliGRAM(s) Oral at bedtime  hemorrhoidal Ointment 1 Application(s) Rectal two times a day  heparin   Injectable 5000 Unit(s) SubCutaneous every 12 hours  melatonin 3 milliGRAM(s) Oral at bedtime  metoprolol tartrate 100 milliGRAM(s) Oral three times a day  pantoprazole    Tablet 40 milliGRAM(s) Oral before breakfast  senna 2 Tablet(s) Oral at bedtime    MEDICATIONS  (PRN):  polyethylene glycol 3350 17 Gram(s) Oral two times a day PRN Constipation        Events (the day before):  - Patient and family informed about DVT studies and PE. Does not want further pulmonary workup (thoracentesis) at this time.    Events (today):  - Reached out to patient's PCP to enquire about home serial LE duplex + establishing regular outpatient cardiology (remote) for patient.   -> Patient's PCP does do duplex ultrasound at patient's home (I ordered a right extremity duplex ultrasound at Week 1 and Week 2 post-DVT diagnosis). PCP office would need to ask the provider first to confirm.  -> Told PCP office about cardiology needs. They will check to see if a cards PA affiliated with their office is willing to do home visits.  - Spoke to patient's son and daughter and they are aware that we will call home care to see if they could do the serial LE duplex. Told them that full anticoagulation for treatment was concerning for us given the patient's history of potential bleed. The family agrees with the plan. **********************  Desmond Yeoh  MS-3, Internal Medicine  **********************  Patient is a 89y old  Female who presents with a chief complaint of asymptomatic thrombocytopenia (22 Jul 2024 07:13)      OVERNIGHT EVENTS: None.    SUBJECTIVE:  Patient seen and examined at bedside. Denies fever, chills, HA, cough, chest pain, palpitations, abdominal pain, dysuria. She has had trouble defecating and has received an enema (Miralax and senna was given days prior). Subsequent bowel movement was recorded. SOB has been improved since yesterday. Patient has not been eating or drinking much in the past few days as per son. Patient has urinated today and will perform urinalysis with sample. 3L NC.    OBJECTIVE:  Vital Signs Last 12 Hrs  T(F): 96.8 (07-25-24 @ 04:58), Max: 97.9 (07-24-24 @ 21:49)  HR: 55 (07-25-24 @ 04:58) (55 - 79)  BP: 97/65 (07-25-24 @ 04:58) (97/65 - 108/65)  BP(mean): --  RR: 20 (07-25-24 @ 04:58) (20 - 20)  SpO2: 99% (07-25-24 @ 04:58) (94% - 100%)  I&O's Summary    23 Jul 2024 07:01  -  24 Jul 2024 07:00  --------------------------------------------------------  IN: 0 mL / OUT: 450 mL / NET: -450 mL    24 Jul 2024 07:01  -  25 Jul 2024 06:57  --------------------------------------------------------  IN: 200 mL / OUT: 0 mL / NET: 200 mL    -> 1 unsaved void one day ago (afternoon)   -> didn't get furosemide yesterday because received IV bolus of contrast ytd  -> will get furosemide IV 40mg today and will not give additional doses today because of PHAM.    PHYSICAL EXAM:  Constitutional: NAD, appears tired and resting in bed.  HEENT: NC/AT, PERRLA, EOMI, no conjunctival pallor or scleral icterus, MMM  Neck: Supple, JVD  Respiratory: decreased breath sounds on both lower lung bases. No w/r/r.   Cardiovascular: RRR, normal S1 and S2.  Gastrointestinal: +BS, soft NT, mild distension, no guarding or rebound tenderness, no palpable masses   Extremities: wwp; no cyanosis or clubbing. Edema 2+  Vascular: Pedal pulses equal and strong throughout.   Neurological: AAOx3  : no suprapubic pain  Skin: Legs with lymphedema wrapped up in dressing with stable size. No new petechiae or bleeds visible.         LABS:                        10.2   12.86 )-----------( 351      ( 25 Jul 2024 07:22 )             35.9     07-25    146<H>  |  99  |  58<H>  ----------------------------<  90  4.9   |  32<H>  |  1.58<H>    Ca    9.5      25 Jul 2024 07:21  Phos  4.6     07-25  Mg     2.2     07-25        RADIOLOGY & ADDITIONAL TESTS: Reviewed.  1. < from: VA Duplex Lower Ext Vein Scan, Bilat (07.24.24 @ 12:28) >  There is acute, below the knee DVT affecting the the right posterior   tibial veins.    There is  an avascular cystic collection measuring 2.1 cm in its long   diameter in the superficial soft tissues of the right thigh.  < from: CT Angio Chest PE Protocol w/ IV Cont (07.24.24 @ 14:47) >  No pulmonary embolus to the level of the segmental vessels.    Moderate right and small left pleural effusions.    2. < from: CT Angio Chest PE Protocol w/ IV Cont (07.24.24 @ 14:47) >    No pulmonary embolus to the level of the segmental vessels.    Moderate right and small left pleural effusions.      3. Bladder scan = 205mL of urine in bladder    MEDICATIONS:  MEDICATIONS  (STANDING):  atorvastatin 20 milliGRAM(s) Oral at bedtime  hemorrhoidal Ointment 1 Application(s) Rectal two times a day  heparin   Injectable 5000 Unit(s) SubCutaneous every 12 hours  melatonin 3 milliGRAM(s) Oral at bedtime  metoprolol tartrate 100 milliGRAM(s) Oral three times a day  pantoprazole    Tablet 40 milliGRAM(s) Oral before breakfast  senna 2 Tablet(s) Oral at bedtime    MEDICATIONS  (PRN):  polyethylene glycol 3350 17 Gram(s) Oral two times a day PRN Constipation        Events (the day before):  - Patient and family informed about DVT studies and PE. Does not want further pulmonary workup (thoracentesis) at this time.    Events (today):  - Reached out to patient's PCP to enquire about home serial LE duplex + establishing regular outpatient cardiology (remote) for patient.   -> Patient's PCP does do duplex ultrasound at patient's home (I ordered a right extremity duplex ultrasound at Week 1 and Week 2 post-DVT diagnosis). PCP office would need to ask the provider first to confirm.  -> Told PCP office about cardiology needs. They will check to see if a cards PA affiliated with their office is willing to do home visits.  - Spoke to patient's son and daughter and they are aware that we will call home care to see if they could do the serial LE duplex. Told them that full anticoagulation for treatment was concerning for us given the patient's history of potential bleed. The family agrees with the plan.

## 2024-07-25 NOTE — PROGRESS NOTE ADULT - PROBLEM SELECTOR PLAN 8
Resolved. RLE more erythematous and warm compared to LLE with some mild tenderness to palpation. Received steroid and IVIG so started IV antibx. Not concerned for MRSA.    - received Cefazolin 1000 q8h for 5 days (end: 07/21)  - Improved on exam. CTM Close to baseline now. No drainage today    Plan:  - IV treatment per above  -> Wound care consult: conservative management with [facility] [address]  Observe discharge from left leg (currently no discharge)

## 2024-07-25 NOTE — PROGRESS NOTE ADULT - SUBJECTIVE AND OBJECTIVE BOX
INTERVAL EVENTS/SUBJ:  remains AF RVR    Home Medications:  Klor-Con M20 oral tablet, extended release: 1 tab(s) orally once a day (17 Jul 2024 14:26)  metoprolol tartrate 75 mg oral tablet: 1 tab(s) orally 3 times a day (22 Jul 2024 14:17)  rosuvastatin 5 mg oral tablet: 1 tab(s) orally once a day (at bedtime) (17 Jul 2024 14:26)      MEDICATIONS  (STANDING):  atorvastatin 20 milliGRAM(s) Oral at bedtime  hemorrhoidal Ointment 1 Application(s) Rectal two times a day  heparin   Injectable 5000 Unit(s) SubCutaneous every 12 hours  melatonin 3 milliGRAM(s) Oral at bedtime  metoprolol tartrate 100 milliGRAM(s) Oral three times a day  pantoprazole    Tablet 40 milliGRAM(s) Oral before breakfast  senna 2 Tablet(s) Oral at bedtime    MEDICATIONS  (PRN):  polyethylene glycol 3350 17 Gram(s) Oral two times a day PRN Constipation      Vital Signs Last 24 Hrs  T(C): 36 (25 Jul 2024 04:58), Max: 36.6 (24 Jul 2024 21:49)  T(F): 96.8 (25 Jul 2024 04:58), Max: 97.9 (24 Jul 2024 21:49)  HR: 55 (25 Jul 2024 04:58) (55 - 79)  BP: 97/65 (25 Jul 2024 04:58) (97/65 - 108/65)  BP(mean): --  RR: 20 (25 Jul 2024 04:58) (19 - 20)  SpO2: 99% (25 Jul 2024 04:58) (94% - 100%)    Parameters below as of 25 Jul 2024 04:58  Patient On (Oxygen Delivery Method): nasal cannula  O2 Flow (L/min): 3      REVIEW OF SYSTEMS:  As per HPI, otherwise unremarkable.     PHYSICAL EXAM:  Constitutional/Appearance: Normal, Well-developed  HEENT:   Normal oral mucosa, no drainage or redness, supple neck  Lymphatic: No lymphadenopathy  Cardiovascular: Normal S1 S2, No edema, II/VI SADAF  Respiratory: Lungs clear to auscultation, respirations non-labored  Psychiatry: A & O x 3, appropriate affect.   Gastrointestinal:  Soft, Non-tender, no distention  Skin: No rashes, No ecchymoses, No cyanosis	  Neurologic: Non-focal, Alert and oriented x 3  Extremities: Normal range of motion  Vascular: Peripheral pulses palpable 2+ bilaterally (radial)    LABS:  CBC Full  -  ( 25 Jul 2024 07:22 )  WBC Count : 12.86 K/uL  RBC Count : 3.91 M/uL  Hemoglobin : 10.2 g/dL  Hematocrit : 35.9 %  Platelet Count - Automated : 351 K/uL  Mean Cell Volume : 91.8 fl  Mean Cell Hemoglobin : 26.1 pg  Mean Cell Hemoglobin Concentration : 28.4 gm/dL  Auto Neutrophil # : x  Auto Lymphocyte # : x  Auto Monocyte # : x  Auto Eosinophil # : x  Auto Basophil # : x  Auto Neutrophil % : x  Auto Lymphocyte % : x  Auto Monocyte % : x  Auto Eosinophil % : x  Auto Basophil % : x      07-24    143  |  99  |  49<H>  ----------------------------<  121<H>  4.4   |  35<H>  |  1.22    Ca    9.2      24 Jul 2024 06:06  Phos  3.6     07-24  Mg     2.1     07-24      IMPRESSION AND PLAN: 89F w HFpEF on NC, CAD s/p remote MI, AF, CVA here w asx thrombocytopenia. Denies bleeding, CP, syncope. + anemia improved w 1UPRBC. + ADHF and now with hypotension for which cardiology consulted. Now stabilized with ongoing rate control. + pleural effusion trigger, ongoing diuresis restart tmw. Caution heparin given TCP/ITP.   -tele AF RVR  -TTE EF 45-50, severe LAE, BRIANNA, severe TR, large pleural effusion  -cont lasix, close monitor BP  -cont metoprolol for rate control   -TCP, pleural effusion mgmt ongoing  -+DVT, however holding ac given bleeding risk      ***    Daniel Montenegro MD, MPhil, [facility]  Cardiologist, [facility]  ; [employer] at [facility]  email: [email or web address] on-service contact/call information, go to [email or web address] and use "[email or web address]" to login.  Outpatient Cardiology appointments, call  [phone] to arrange with a colleague; I do not have outpatient Cardiology clinic.

## 2024-07-25 NOTE — PROGRESS NOTE ADULT - PROBLEM SELECTOR PLAN 7
Close to baseline now. No drainage today    Plan:  - IV treatment per above  -> Wound care consult: conservative management with f/[facility] [address]  Observe discharge from left leg (currently no discharge) Does not report new onset fatigue. Past admission in early july 2024 got multiple pRBC transfusions and discharged with Hgb 9.1.  - Likely multifactorial considering age and other comorbidities  - Iron 28, TIBC 311, Tsat 9%, Ferritin  - Likely iron deficiency dominant but overall multifactorial  - HgB fell to 6.7 (07/19) s/p 1u pRBC, post transfusion 8.2 -> 07/25 10.2.   - Significantly improved after IV iron sucrose x4 doses (end: 07/22)    DDx: occult GI bleeding vs IDS vs MDS/LGL vs less likely hemolysis    Plan:  - Consider GI outpatient colonoscopy.  - CTM  - Plan for outpatient heme followup Does not report new onset fatigue. Past admission in early july 2024 got multiple pRBC transfusions and discharged with Hgb 9.1.  - Likely multifactorial considering age and other comorbidities  - Iron 28, TIBC 311, Tsat 9%, Ferritin  - Likely iron deficiency dominant but overall multifactorial  - HgB fell to 6.7 (07/19) s/p 1u pRBC -> post transfusion 8.2 -> 07/25 10.2.   - Significantly improved after IV iron sucrose x4 doses (end: 07/22)    DDx: occult GI bleeding vs IDS vs MDS/LGL vs less likely hemolysis    Plan:  - Consider GI outpatient colonoscopy.  - CTM  - f/u outpatient heme

## 2024-07-25 NOTE — PROGRESS NOTE ADULT - PROBLEM SELECTOR PLAN 3
Duplex ultrasound visualized DVT in R posterior tibial vein.  Will perform diagnostic study for potential thromboembolism.  Wells Score = 4.5    CTA did not show evidence of PE  Will continue with conservative management (diuresis and anticoagulation) Duplex ultrasound visualized DVT in R posterior tibial vein.  Will perform diagnostic study for potential thromboembolism.  Wells Score = 4.5    CTA did not show evidence of PE + bilateral pleural effusions  Will continue with conservative management (diuresis and anticoagulation) Duplex ultrasound visualized DVT in R posterior tibial vein. (Wells Score = 4.5)  CTA did not show evidence of PE + bilateral pleural effusions  Will continue with conservative management (diuresis) Duplex ultrasound visualized DVT in R posterior tibial vein. (Wells Score = 4.5)  CTA did not show evidence of PE + bilateral pleural effusions  Will continue with conservative management (diuresis - held for now) - Pradaxa was stopped during last admission for bleeding risk (early july 2024). Known melena during that admission. - CHADSVASC 5, HASBLED 3.  - In continuous AFib during admission, with HR ranging from 60s to 130s  - Held Pradaxa due to recent presumed GI bleed  - Increase metop tartrate to 100 mg TID  - Cards rec: Hold rhythm control as chemical cardioversion increases risk for stroke (off AC for few weeks now)    -> Although distal DVT + inadequate control of afib + immobility + symptomatic pain is usually a sign for anticoagulation (Eliquis 10mg BID x7 days -> 5mg BID). However, patient is at high risk for bleeding with suspected GI bleed, anemia, past thrombocytopenia, age>75 and reduced functional capacity. Recommend U/S. - Pradaxa was stopped during last admission for bleeding risk (early july 2024). Known melena during that admission. - CHADSVASC 5, HASBLED 3.  - In continuous AFib during admission, with HR ranging from 60s to 130s  - Held Pradaxa due to recent presumed GI bleed  - Increase metop tartrate to 100 mg TID  - Cards rec: Hold rhythm control as chemical cardioversion increases risk for stroke (off AC for few weeks now)    - Although distal DVT + inadequate control of afib + immobility + symptomatic pain is usually a sign for anticoagulation. However, patient is at high risk for bleeding with suspected GI bleed, anemia, past thrombocytopenia, age>75 and reduced functional capacity. Recommend U/S.  -> U/S has been ordered at outpatient clinic. Will f/u to make sure patient has been informed about home appointment by outpatient clinic.

## 2024-07-25 NOTE — PROGRESS NOTE ADULT - PROBLEM SELECTOR PLAN 6
Does not report new onset fatigue. Past admission in early july 2024 got multiple pRBC transfusions and discharged with Hgb 9.1.  - Likely multifactorial considering age and other comorbidities  - Iron 28, TIBC 311, Tsat 9%, Ferritin  - Likely iron deficiency dominant but overall multifactorial  - HgB fell to 6.7 (07/19) s/p 1u pRBC, post transfusion 8.2 -> 07/24 9.8.   - Significantly improved after IV iron sucrose x4 doses (end: 07/22)    DDx: occult GI bleeding vs IDS vs MDS/LGL vs less likely hemolysis    Plan:  - Consider GI outpatient colonoscopy.  - CTM  - Plan for outpatient heme followup Does not report new onset fatigue. Past admission in early july 2024 got multiple pRBC transfusions and discharged with Hgb 9.1.  - Likely multifactorial considering age and other comorbidities  - Iron 28, TIBC 311, Tsat 9%, Ferritin  - Likely iron deficiency dominant but overall multifactorial  - HgB fell to 6.7 (07/19) s/p 1u pRBC, post transfusion 8.2 -> 07/25 10.2.   - Significantly improved after IV iron sucrose x4 doses (end: 07/22)    DDx: occult GI bleeding vs IDS vs MDS/LGL vs less likely hemolysis    Plan:  - Consider GI outpatient colonoscopy.  - CTM  - Plan for outpatient heme followup Resolved (>200K 07/22). Asymptomatic at 4k on admission s/p dexa 40mg x 4 days (end 07/20) and IVIG x 2days  Blood smear: hypochromic microcytic anemia with anisocytosis,  neutrophils with normal morphology, relative increased in monocytes, some reactive lymphocytes seen, very scant but giant platelets  Flow cytometry: Polytypic B cells, decreased CD4: CD8 ratio with no aberrancy, NK cells show no diagnostic abnormalities, myeloid findings are normal  - Heme: not concerning for malignancy    Ddx: likely ITP considering response to treatment vs possible malignancy per heme    Plan: Resolved (7/25 - 351)  - F/u outpatient with heme

## 2024-07-25 NOTE — PROGRESS NOTE ADULT - PROBLEM SELECTOR PLAN 2
- Pradaxa was stopped during last admission for bleeding risk (early july 2024). Known melena during that admission. - CHADSVASC 5, HASBLED 3.  - In continuous AFib during admission, with HR ranging from 60s to 130s    - Continue holding Pradaxa due to recent presumed GI bleed  - Increase metop tartrate to 100 mg TID  - Hold rhythm control as chemical cardioversion increases risk for stroke (off AC for few weeks now)  - appreciate cards rec - Pradaxa was stopped during last admission for bleeding risk (early july 2024). Known melena during that admission. - CHADSVASC 5, HASBLED 3.  - In continuous AFib during admission, with HR ranging from 60s to 130s    - Continue holding Pradaxa due to recent presumed GI bleed  - Increase metop tartrate to 100 mg TID  - Hold rhythm control as chemical cardioversion increases risk for stroke (off AC for few weeks now)  - appreciate cards rec    -> Given presence of DVT + inadequate control of afib = will resume full AC - Pradaxa was stopped during last admission for bleeding risk (early july 2024). Known melena during that admission. - CHADSVASC 5, HASBLED 3.  - In continuous AFib during admission, with HR ranging from 60s to 130s  - Held Pradaxa due to recent presumed GI bleed  - Increase metop tartrate to 100 mg TID  - Cards rec: Hold rhythm control as chemical cardioversion increases risk for stroke (off AC for few weeks now)    -> Given presence of DVT + inadequate control of afib outweighing risk of GI bleed = will resume full AC (Eliquis 10mg BID x7 days -> 5mg BID) - Pradaxa was stopped during last admission for bleeding risk (early july 2024). Known melena during that admission. - CHADSVASC 5, HASBLED 3.  - In continuous AFib during admission, with HR ranging from 60s to 130s  - Held Pradaxa due to recent presumed GI bleed  - Increase metop tartrate to 100 mg TID  - Cards rec: Hold rhythm control as chemical cardioversion increases risk for stroke (off AC for few weeks now)    -> Although distal DVT + inadequate control of afib + immobility +symptomatic pain is usually a sign for anticoagulation (Eliquis 10mg BID x7 days -> 5mg BID). However, patient is at high risk for bleeding with suspected GI bleed, anemia, past thrombocytopenia, age>75 and reduced functional capacity. Recommend U/S if possible. - Pradaxa was stopped during last admission for bleeding risk (early july 2024). Known melena during that admission. - CHADSVASC 5, HASBLED 3.  - In continuous AFib during admission, with HR ranging from 60s to 130s  - Held Pradaxa due to recent presumed GI bleed  - Increase metop tartrate to 100 mg TID  - Cards rec: Hold rhythm control as chemical cardioversion increases risk for stroke (off AC for few weeks now)    -> Although distal DVT + inadequate control of afib + immobility + symptomatic pain is usually a sign for anticoagulation (Eliquis 10mg BID x7 days -> 5mg BID). However, patient is at high risk for bleeding with suspected GI bleed, anemia, past thrombocytopenia, age>75 and reduced functional capacity. Recommend U/S if possible. - Pradaxa was stopped during last admission for bleeding risk (early july 2024). Known melena during that admission. - CHADSVASC 5, HASBLED 3.  - In continuous AFib during admission, with HR ranging from 60s to 130s  - Held Pradaxa due to recent presumed GI bleed  - Increase metop tartrate to 100 mg TID  - Cards rec: Hold rhythm control as chemical cardioversion increases risk for stroke (off AC for few weeks now)    -> Although distal DVT + inadequate control of afib + immobility + symptomatic pain is usually a sign for anticoagulation (Eliquis 10mg BID x7 days -> 5mg BID). However, patient is at high risk for bleeding with suspected GI bleed, anemia, past thrombocytopenia, age>75 and reduced functional capacity. Recommend U/S. Patient's baseline is BUN=23, Cr=0.95  BUN 58 and Cr 1.58. Ratio=36.7:1  Possible etiologies include: contrast induced nephropathy, diuretic use, decreased oncotic pressure in intravascular space, third spacing leading to hypoperfusion and prerenal azotemia    Plan:  - Halt diuresis and reassess kidney function tests tomorrow  - Change from Lasix to Bumex due to low levels of albumin  - Replete albumin with IV 50mg.  - Encourage water intake from patient.

## 2024-07-25 NOTE — PROGRESS NOTE ADULT - PROBLEM SELECTOR PLAN 10
Continue statin 20 mg qD Currently hypotensive with SBP<100 but baseline for patient.    - Metop tartrate 75mg TID

## 2024-07-25 NOTE — PROGRESS NOTE ADULT - PROBLEM SELECTOR PLAN 4
TTE showed BRIANNA, severe TR with mPAP of 53.  Probably secondary to afib rather than PE. Duplex ultrasound visualized DVT in R posterior tibial vein. (Wells Score = 4.5)  CTA did not show evidence of PE + bilateral pleural effusions  Will continue with conservative management (diuresis - held for now)    - Will received continued LE  outpatient per home health provider Duplex ultrasound visualized DVT in R posterior tibial vein. (Wells Score = 4.5)  CTA did not show evidence of PE + bilateral pleural effusions  Will continue with conservative management (diuresis - held for now)    - Will received LE US (7 days and 14 days post diagnosis) outpatient per home health provider

## 2024-07-25 NOTE — PROGRESS NOTE ADULT - PROBLEM SELECTOR PLAN 9
Currently hypotensive with SBP<100 but baseline for patient.    - Metop tartrate 75mg TID Resolved. RLE more erythematous and warm compared to LLE with some mild tenderness to palpation. Received steroid and IVIG so started IV antibx. Not concerned for MRSA.    - received Cefazolin 1000 q8h for 5 days (end: 07/21)  - Improved on exam. CTM

## 2024-07-25 NOTE — PROGRESS NOTE ADULT - PROBLEM SELECTOR PLAN 1
BNP of 1994 (below baseline) and long standing dyspnea. Home Lasix PO 40qD  Crackles at right lower lung and JVD. increased SOB 07/21 and 07/22  - SOB is improving (consider PE if deteriorating)   - CXR (07/21) showed new R sided pleural effusion likely secondary to increased hydrostatic pressure  - TTE (07/23) showed decreased EF (45-50%), dilated L and R atrium and R ventricle, tricuspid regurgitation, mPAP 53.  - BNP increased to 7124 (07/22)  -> Duplex ultrasound (7/23): Right posterior tibial DVT seen.  -> CT angio (7/23): No PEs were visualized. Moderate R and small L pleural effusion.    DDx: HF exacerbation likely 2/2 AFib with CT Angio ruling out PE    Plan:  - Lasix 40mg IV administered today (reassess before giving similar dose in the evening). PO Lasix 40mg BID tomorrow  - incentive spirometry  - CTM oxygen requirements  - Considering age, GDMT not best option BNP of 1994 (below baseline) and long standing dyspnea. Home Lasix PO 40qD  Crackles at right lower lung and JVD. increased SOB 07/21 and 07/22  - SOB is improving (consider PE if deteriorating)   - CXR (07/21) showed new R sided pleural effusion likely secondary to increased hydrostatic pressure  - TTE (07/23) showed decreased EF (45-50%), dilated L and R atrium and R ventricle, tricuspid regurgitation, mPAP 53.  - BNP increased to 7124 (07/22)  -> Duplex ultrasound (7/23): Right posterior tibial DVT seen.  -> CT angio (7/23): No PEs were visualized. Moderate R and small L pleural effusion.    DDx: HF exacerbation likely 2/2 AFib with CT Angio ruling out PE    Plan:  - Lasix 40mg IV administered today (reassess before giving similar dose in the evening). PO Lasix 40mg BID tomorrow.  -> Reassess for urinary retention (bladder scan at 9am = 205mL)  - incentive spirometry  - CTM oxygen requirements  - Considering age, GDMT not best option BNP of 1994 (below baseline) and long standing dyspnea. Home Lasix PO 40qD  Crackles at right lower lung and JVD. increased SOB 07/21 and 07/22  - SOB is improving (consider PE if deteriorating)   - CXR (07/21) showed new R sided pleural effusion likely secondary to increased hydrostatic pressure  - TTE (07/23) showed decreased EF (45-50%), dilated L and R atrium and R ventricle, tricuspid regurgitation, mPAP 53.  - BNP increased to 7124 (07/22)  -> Duplex ultrasound (7/23): Right posterior tibial DVT seen.  -> CT angio (7/23): No PEs were visualized. Moderate R and small L pleural effusion.    DDx: HF exacerbation likely 2/2 AFib with CT Angio ruling out PE    Plan:  - Lasix 40mg IV administered today. Hold Lasix for now given PHAM and prerenal azotemia.  - Reassess for urinary retention (bladder scan at 9am = 205mL)  - incentive spirometry  - CTM oxygen requirements  - Considering age, GDMT not best option BNP of 1994 (below baseline) and long standing dyspnea. Home Lasix PO 40qD  Crackles at right lower lung and JVD. increased SOB 07/21 and 07/22  - SOB is improving (consider PE if deteriorating)   - CXR (07/21) showed new R sided pleural effusion likely secondary to increased hydrostatic pressure  - TTE (07/23) showed decreased EF (45-50%), dilated L and R atrium and R ventricle, tricuspid regurgitation, mPAP 53.  - BNP increased to 7124 (07/22)  -> Duplex ultrasound (7/23): Right posterior tibial DVT seen.  -> CT angio (7/23): No PEs were visualized. Moderate R and small L pleural effusion.    DDx: HF exacerbation likely 2/2 AFib with CT Angio ruling out PE    Plan:  - Lasix 40mg IV administered today. Hold Lasix for now given PHAM and prerenal azotemia.  - IV Albumin  - Reassess for urinary retention (bladder scan at 9am = 205mL)  - incentive spirometry  - CTM oxygen requirements  - Considering age, GDMT not best option BNP of 1994 (below baseline) and long standing dyspnea. Home Lasix PO 40qD  Crackles at right lower lung and JVD. increased SOB 07/21 and 07/22  - SOB is improving (consider PE if deteriorating)   - CXR (07/21) showed new R sided pleural effusion likely secondary to increased hydrostatic pressure  - TTE (07/23) showed decreased EF (45-50%), dilated L and R atrium and R ventricle, tricuspid regurgitation, mPAP 53.  - BNP increased to 7124 (07/22)  -> Duplex ultrasound (7/23): Right posterior tibial DVT seen.  -> CT angio (7/23): No PEs were visualized. Moderate R and small L pleural effusion.    DDx: HF exacerbation likely 2/2 AFib with CT Angio ruling out PE    Plan:  - Lasix 40mg IV administered today. Hold Lasix for now given PHAM and prerenal azotemia.  - IV Albumin 50mL  - Reassess if there's urinary retention (bladder scan at 9am = 205mL)  - incentive spirometry  - CTM oxygen requirements  - Considering age, GDMT not best option

## 2024-07-25 NOTE — PROGRESS NOTE ADULT - PROBLEM SELECTOR PLAN 5
Resolved (>200K 07/22). Asymptomatic at 4k on admission s/p dexa 40mg x 4 days (end 07/20) and IVIG x 2days  Blood smear: hypochromic microcytic anemia with anisocytosis,  neutrophils with normal morphology, relative increased in monocytes, some reactive lymphocytes seen, very scant but giant platelets  Flow cytometry: Polytypic B cells, decreased CD4: CD8 ratio with no aberrancy, NK cells show no diagnostic abnormalities, myeloid findings are normal  - Heme: not concerning for malignancy    Ddx: likely ITP considering response to treatment vs possible malignancy per heme    Plan: Resolved (7/24 - 323)  - F/u outpatient with heme Resolved (>200K 07/22). Asymptomatic at 4k on admission s/p dexa 40mg x 4 days (end 07/20) and IVIG x 2days  Blood smear: hypochromic microcytic anemia with anisocytosis,  neutrophils with normal morphology, relative increased in monocytes, some reactive lymphocytes seen, very scant but giant platelets  Flow cytometry: Polytypic B cells, decreased CD4: CD8 ratio with no aberrancy, NK cells show no diagnostic abnormalities, myeloid findings are normal  - Heme: not concerning for malignancy    Ddx: likely ITP considering response to treatment vs possible malignancy per heme    Plan: Resolved (7/25 - 351)  - F/u outpatient with heme TTE showed BRIANNA, severe TR with mPAP of 53.  Probably secondary to afib.     - CTM, follow outpatient with cardiologisty TTE showed BRIANNA, severe TR with mPAP of 53.  Probably secondary to afib.     - CTM, follow outpatient with cardiologist (home visits)

## 2024-07-25 NOTE — PROGRESS NOTE ADULT - ASSESSMENT
89F with a PMH of HFpEF on chronic 2L NC, a-fib, CVA and lymphedema presents to the ED with asymptomatic thrombocytopenia. 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  Anemia (hgb 6.7) s/p 1u pRBC improved and thrombocytopenia normalized. Now treating for increased SOB likely 2/2 worsening HF.

## 2024-07-26 NOTE — CONSULT NOTE ADULT - PROBLEM SELECTOR RECOMMENDATION 9
Pt with PHAM in the setting of possible medication induced along with recent contrast exposure. Rec to get renal US to rule out obstruction. Rec Garzon catheter. Echo from 7/23 show LA and RA severely dilated, severe TR with PAP of 53 and large left pleural effusion. Would rec to get [place] cath for patient. Pr/Cr elevated at 0.7. May consider renal serology workup.  Will need to consider HD if renal failure continues to worsen. Monitor labs and urine output. Avoid NSAIDs, ACEI/ARBS, RCA and nephrotoxins. Dose medications as per eGFR. Pt with PHAM in the setting of possible medication induced along with recent contrast exposure. Rec to get renal US to rule out obstruction. Rec Garzon catheter. Echo from 7/23 show LA and RA severely dilated, severe TR with PAP of 53 and large left pleural effusion. Would rec to get RHC.   Pr/Cr elevated at 0.7. May consider renal serology workup.     Not a great HD candidate. Monitor labs and urine output. Avoid NSAIDs, ACEI/ARBS, RCA and nephrotoxins. Dose medications as per eGFR.

## 2024-07-26 NOTE — GOALS OF CARE CONVERSATION - ADVANCED CARE PLANNING - CONVERSATION DETAILS
Patient and daughter were present during the conversation. Patient was AOx4. Both parties agreed that they would like everything done for the patient in the even of a cardiac or respiratory arrest. They understood the prognosis of her condition and what resuscitation involves. The patient wishes for everything to be done to keep her alive. The daughter stated the patient's son is her healthcare proxy but in the event the son is not available, the daughter (Angela Barber) is the proxy. The patient agrees with the above.
Attempted to discuss GOC with patient, but she was lethargic, and AOx1. Continued conversation with patient's children Mani Olvia (healthcare proxy) and Angela Oliva with senior resident Dr. Jorge Rodriguez present.  Discussed patient's current diagnosis, treatment and prognosis. Patient's children understand the risks associated with her age and comorbidities. Considering patient's current increased work of breathing and lethargy likely secondary to a new infection (probably aspiration pneumonia), the patient's children were amenable to continue the GOC conversation. The patient's healthcare proxy is her son, Mani Oliva. He stated that his mother would not want to be resuscitated in the event of a cardiac arrest through CPR or shocks. He also stated his mother would not want to be intubated and mechanically ventilated, but would like to trial non-invasive ventilation. He also stated she would not want to be dialyzed or receive artifical nutrition through a feeding tube, but she would be okay with IV fluids and antibiotics. Also, the patient has been consistently hypotensive recently and the patient's son states she would be okay with IV pressors to help bring her pressures up.    The family is not certain regarding the patient's choice on returning to the hospital in the event she is discharged and would like more time to discuss this. Will follow up regarding this in the future.

## 2024-07-26 NOTE — CONSULT NOTE ADULT - ASSESSMENT
89 yo with hx of HFpEF on 3L NC, Afib, HLD, CVA and lymphedema presents with initially with SOB and received blood transfusion. Over course, she received lasix, IVF and contrast and noted to have PHAM and nephrology consulted.

## 2024-07-26 NOTE — PROGRESS NOTE ADULT - PROBLEM SELECTOR PLAN 4
Duplex ultrasound visualized DVT in R posterior tibial vein. (Wells Score = 4.5)  CTA did not show evidence of PE + bilateral pleural effusions  Will continue with conservative management (diuresis - held for now)    - Will received LE US (7 days and 14 days post diagnosis) outpatient per home health provider - Pradaxa was stopped during last admission for bleeding risk (early july 2024). Known melena during that admission. - CHADSVASC 5, HASBLED 3.  - In continuous AFib during admission, with HR ranging from 60s to 130s  - Held Pradaxa due to recent presumed GI bleed  - Increase metop tartrate to 100 mg TID  - Cards rec: Hold rhythm control as chemical cardioversion increases risk for stroke (off AC for few weeks now)    - Patient is at high risk for bleeding with suspected GI bleed, anemia, past thrombocytopenia, age>75 and reduced functional capacity. Recommend U/S instead of full anticoagulation.  - U/S has been ordered for 7/31 and 8/7 at patient's home. Patient's daughter informed by outpatient clinic. - Pradaxa was stopped during last admission for bleeding risk (early july 2024). Known melena during that admission. - CHADSVASC 5, HASBLED 3.  - In continuous AFib during admission, with HR ranging from 60s to 130s  - Held Pradaxa due to recent presumed GI bleed  - Continue metop tartrate to 100 mg TID  - Cards rec: Hold rhythm control as chemical cardioversion increases risk for stroke (off AC for few weeks now)

## 2024-07-26 NOTE — PROGRESS NOTE ADULT - ASSESSMENT
89F with a PMH of HFpEF on chronic 2L NC, a-fib, CVA and lymphedema presents to the ED with asymptomatic thrombocytopenia. 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  Anemia (hgb 6.7) s/p 1u pRBC improved and thrombocytopenia normalized. Now treating for increased SOB likely 2/2 worsening HF with no PE seen on imaging. 89F with a PMH of HFpEF on chronic 2L NC, a-fib, CVA and lymphedema presents to the ED with asymptomatic thrombocytopenia. 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  Anemia (hgb 6.7) s/p 1u pRBC improved and thrombocytopenia normalized. Now treating for increased SOB likely 2/2 worsening HF with no PE seen on imaging. Infectious workup at present.

## 2024-07-26 NOTE — PROGRESS NOTE ADULT - PROBLEM SELECTOR PROBLEM 1
Heart failure with preserved ejection fraction PHAM (acute kidney injury) Acute on chronic hypoxic respiratory failure

## 2024-07-26 NOTE — PROGRESS NOTE ADULT - ASSESSMENT
89F with a PMH of HFpEF on chronic 2L NC, a-fib, CVA and lymphedema presents to the ED with asymptomatic thrombocytopenia. 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  Anemia (hgb 6.7) s/p 1u pRBC improved and thrombocytopenia normalized. Now treating for increased SOB likely 2/2 worsening HF with no PE seen on imaging. Infectious workup at present.      Wound Consult requested to assist w/ management of  BLE wounds  lymphedema  MAD  RLE DVT      BLE -Apply adaptic, 4x4, secure with carolann daily   Duplex- (+)  below the knee DVT affecting the the right posterior tibial veins.      will defer multtlayer compression at this time  Consider MINOO/PVR,in line with GOC and pt w/ nonpalpable pulses 2/2 edema      with negative MINOO/PVR & DVT tx -prior to apply BLE multilayer compression  BLE  elevation  Moisturize intact skin w/ SWEEN cream BID  Nutrition optimization in pt w/ Increased nutritional needs        encourage high quality protein, shelly/ prosource, MVI & Vit C to promote wound healing  Continue turning and positioning w/ offloading assistive devices as per protocol  Buttocks/ Sacrum Aleida BID and prn soiling        Continue w/ attends under pads and Pericare w/  purewick care as per protocol  Waffle Cushion to chair when oob to chair  Continue w/ low air loss pressure redistribution bed surface   Care as per medicine, will remain available as requested  Upon discharge [facility] as [facility] [address] [place] [address] [phone]  D/w team & RN & attng  Thank you for this consult,  Nights/ Weekends/ Holidays please call:  General Surgery Consult pager ([phone]) for emergencies  Wound PT for multilayer leg wrapping or VAC issues (x [phone])   I spent  35minutes face to face w/ this pt of which more than 50% of the time was spent counseling & coordinating care of this pt.

## 2024-07-26 NOTE — PROGRESS NOTE ADULT - PROBLEM SELECTOR PLAN 7
Does not report new onset fatigue. Past admission in early july 2024 got multiple pRBC transfusions and discharged with Hgb 9.1.  - Likely multifactorial considering age and other comorbidities  - Iron 28, TIBC 311, Tsat 9%, Ferritin  - Likely iron deficiency dominant but overall multifactorial  - HgB fell to 6.7 (07/19) s/p 1u pRBC -> post transfusion 8.2 -> 07/25 10.2.   - Significantly improved after IV iron sucrose x4 doses (end: 07/22)    DDx: occult GI bleeding vs IDS vs MDS/LGL vs less likely hemolysis    Plan:  - Consider GI outpatient colonoscopy.  - CTM  - f/u outpatient heme Does not report new onset fatigue. Past admission in early july 2024 got multiple pRBC transfusions and discharged with Hgb 9.1.  - Likely multifactorial considering age and other comorbidities  - Iron 28, TIBC 311, Tsat 9%, Ferritin  - Likely iron deficiency dominant but overall multifactorial  - HgB fell to 6.7 (07/19) s/p 1u pRBC -> post transfusion 8.2 -> 07/26 9.9.   - Significantly improved after IV iron sucrose x4 doses (end: 07/22)    DDx: occult GI bleeding vs IDS vs MDS/LGL vs less likely hemolysis    Plan:  - Consider GI outpatient colonoscopy.  - CTM  - f/u outpatient heme Resolved (>200K 07/22). Asymptomatic at 4k on admission s/p dexa 40mg x 4 days (end 07/20) and IVIG x 2days  Blood smear: hypochromic microcytic anemia with anisocytosis,  neutrophils with normal morphology, relative increased in monocytes, some reactive lymphocytes seen, very scant but giant platelets  Flow cytometry: Polytypic B cells, decreased CD4: CD8 ratio with no aberrancy, NK cells show no diagnostic abnormalities, myeloid findings are normal  - Heme: not concerning for malignancy    Ddx: likely ITP considering response to treatment vs possible malignancy per heme    Plan: Resolved (7/26 - 337)  - F/u outpatient with heme

## 2024-07-26 NOTE — CONSULT NOTE ADULT - ATTENDING COMMENTS
Patient seen and examined with Dr. Martinez.    Consulted for recurrent thrombocytopenia    Vital Signs Last 24 Hrs  T(C): 36.9 (18 Jul 2024 19:23), Max: 37.1 (17 Jul 2024 20:40)  T(F): 98.4 (18 Jul 2024 19:23), Max: 98.8 (17 Jul 2024 21:15)  HR: 97 (18 Jul 2024 19:23) (92 - 105)  BP: 105/72 (18 Jul 2024 19:23) (92/60 - 126/84)  BP(mean): --  RR: 19 (18 Jul 2024 19:23) (18 - 19)  SpO2: 100% (18 Jul 2024 19:23) (98% - 100%)    Parameters below as of 18 Jul 2024 19:23  Patient On (Oxygen Delivery Method): nasal cannula  O2 Flow (L/min): 2    MEDICATIONS  (STANDING):  acetaminophen     Tablet .. 975 milliGRAM(s) Oral daily  atorvastatin 20 milliGRAM(s) Oral at bedtime  ceFAZolin   IVPB      ceFAZolin   IVPB 1000 milliGRAM(s) IV Intermittent every 8 hours  dexAMETHasone  IVPB 40 milliGRAM(s) IV Intermittent daily  diphenhydrAMINE 25 milliGRAM(s) Oral daily  furosemide    Tablet 40 milliGRAM(s) Oral two times a day  immune   globulin 10% (GAMMAGARD) IVPB 50 Gram(s) IV Intermittent every 24 hours  iron sucrose IVPB 200 milliGRAM(s) IV Intermittent <User Schedule>  metoprolol tartrate 75 milliGRAM(s) Oral three times a day      Data: peripheral smear: hypochromic microcytic anemia with anisocytosis, no significant schistocytes; neutrophils with normal morphology, relative increased in monocytes, some [place] cells seen, a few large granular lymphocytes seen; very scant but giant platelets    Assessment: 89 year old day +2 dex + IVIG for recurrent thrombocytopenia (7/3/24: treated with pulse Dex).  Course concurrently complicated by iron def and cellulitis.    PMH: CHF, Afib (On pradaxa), HTN, chronic lymphedema and HLD     Recommend:  dexamethasone 40 mg for a total of four days and IVIG 1g/kg daily x2  Rule out LGL with peripheral blood flow.  Likely will require a bone marrow (regardless of LGL analysis) if after this infection her thrombocytopenia persists.     iron deficiency anemia: IV iron 200mg x 5 qD    Over 60 minutes were spent in direct patient care, non-resident teaching, care and care coordination.
89F w HFpEF on NC, CAD s/p remote MI, AF, CVA here w asx thrombocytopenia. Pt with low EF and high recent pulm pressures. Only gotten 3 doses of lasix and now on fluids. I think she maybe either wet to euvolemic.   Can cont fluids but not more than 6 hours and then reassess  May need more aggressive IV diuretics and maybe bumex 2mg IV q8 and ? pleural tap  Not a great HD candidate given age  RHC may help if after attempt of aggressive diuresis fails    For weekend coverage, call  Dr. Lamin Bailey( fellow) and Dr. Julianne Burroughs( attending)      Larisa Caicedo MD  Office   Contact me directly via [employer] Teams     (After 5 pm or on weekends please page the on-call fellow/attending, can check [email or web address] for schedule. Login is pratima fam, schedule under [facility] medicine, psych, derm)

## 2024-07-26 NOTE — PROGRESS NOTE ADULT - PROBLEM SELECTOR PLAN 5
TTE showed BRIANNA, severe TR with mPAP of 53.  Probably secondary to afib.     - CTM, follow outpatient with cardiologist (home visits) Duplex ultrasound visualized DVT in R posterior tibial vein. (Wells Score = 4.5)  CTA did not show evidence of PE + bilateral pleural effusions  Will continue with conservative management (diuresis - held for now)    - Will received LE US (7 days and 14 days post diagnosis) outpatient per home health provider Duplex ultrasound visualized DVT in R posterior tibial vein. (Wells Score = 4.5)  CTA did not show evidence of PE + bilateral pleural effusions  - Patient is at high risk for bleeding with suspected GI bleed, anemia, past thrombocytopenia, age>75 and reduced functional capacity. Recommend U/S instead of full anticoagulation.    - U/S has been ordered for 7/31 and 8/7 at patient's home per home health. Patient's daughter informed by outpatient clinic.

## 2024-07-26 NOTE — PROGRESS NOTE ADULT - SUBJECTIVE AND OBJECTIVE BOX
**********************  Desmond Yeoh  MS-3, Internal Medicine  **********************  Patient is a 89y old  Female who presents with a chief complaint of asymptomatic thrombocytopenia (22 Jul 2024 07:13)      OVERNIGHT EVENTS: Two episodes of hypotension yesterday (during the afternoon and at night).    SUBJECTIVE:  Patient seen and examined at bedside. Denies fever, chills, HA, cough, chest pain, palpitations, abdominal pain, dysuria. She has had trouble defecating and has received an enema (Miralax and senna was given days prior). Subsequent bowel movement was recorded. SOB has been improved since yesterday. Patient has not been eating or drinking much in the past few days as per son. Patient has urinated today and will perform urinalysis with sample. 3L NC.    OBJECTIVE:  Vital Signs Last 12 Hrs  T(F): 97.1 (07-26-24 @ 04:25), Max: 97.9 (07-26-24 @ 00:49)  HR: 81 (07-26-24 @ 04:25) (81 - 125)  BP: 97/55 (07-26-24 @ 04:25) (77/49 - 101/66)  BP(mean): --  RR: 20 (07-26-24 @ 04:25) (20 - 20)  SpO2: 95% (07-26-24 @ 04:25) (95% - 98%)    I&O's Summary    24 Jul 2024 07:01  -  25 Jul 2024 07:00  --------------------------------------------------------  IN: 200 mL / OUT: 0 mL / NET: 200 mL    25 Jul 2024 07:01  -  26 Jul 2024 06:34  --------------------------------------------------------  IN: 120 mL / OUT: 377 mL / NET: -257 mL    - 2 stools yesterday night + 1 yesterday morning after giving fleet enema  - Evening furosemide dose held due to PHAM.    PHYSICAL EXAM:  Constitutional: NAD, appears tired and resting in bed.  HEENT: NC/AT, PERRLA, EOMI, no conjunctival pallor or scleral icterus, MMM  Neck: Supple, JVD  Respiratory: decreased breath sounds on both lower lung bases. No w/r/r.   Cardiovascular: RRR, normal S1 and S2.  Gastrointestinal: +BS, soft NT, mild distension, no guarding or rebound tenderness, no palpable masses   Extremities: wwp; no cyanosis or clubbing. Edema 2+  Vascular: Pedal pulses equal and strong throughout.   Neurological: AAOx3  : no suprapubic pain  Skin: Legs with lymphedema wrapped up in dressing with stable size. No new petechiae or bleeds visible.           LABS:                        10.2   12.86 )-----------( 351      ( 25 Jul 2024 07:22 )             35.9     07-25    146<H>  |  99  |  58<H>  ----------------------------<  90  4.9   |  32<H>  |  1.58<H>    Ca    9.5      25 Jul 2024 07:21  Phos  4.6     07-25  Mg     2.2     07-25                RADIOLOGY & ADDITIONAL TESTS: Reviewed.    MEDICATIONS:  MEDICATIONS  (STANDING):  atorvastatin 20 milliGRAM(s) Oral at bedtime  hemorrhoidal Ointment 1 Application(s) Rectal two times a day  heparin   Injectable 5000 Unit(s) SubCutaneous every 12 hours  melatonin 3 milliGRAM(s) Oral at bedtime  metoprolol tartrate 100 milliGRAM(s) Oral three times a day  pantoprazole    Tablet 40 milliGRAM(s) Oral before breakfast  senna 2 Tablet(s) Oral at bedtime      Events:  - Patient's PCP does do duplex ultrasound at patient's home (U/S RLE).  - Told PCP office about cardiology needs. They will attempt to look for a provider that does house calls.  -> PCP office called back to say that U/S has been scheduled for patient on 7/31 and 8/7. Patient's daughter was contacted and consented by the PCP office. **********************  Desmond Yeoh  MS-3, Internal Medicine  **********************  Patient is a 89y old  Female who presents with a chief complaint of asymptomatic thrombocytopenia (22 Jul 2024 07:13)      OVERNIGHT EVENTS: Two episodes of hypotension yesterday (during the afternoon and at night).    SUBJECTIVE:  Patient seen and examined at bedside. Denies fever, chills, HA, cough, chest pain, palpitations, abdominal pain, dysuria. She has had trouble defecating and has received an enema (Miralax and senna was given days prior). Subsequent bowel movement was recorded. SOB has been improved since yesterday. Patient has not been eating or drinking much in the past few days as per son. Patient has urinated today and will perform urinalysis with sample. 3L NC.    OBJECTIVE:  Vital Signs Last 12 Hrs  T(F): 97.1 (07-26-24 @ 04:25), Max: 97.9 (07-26-24 @ 00:49)  HR: 81 (07-26-24 @ 04:25) (81 - 125)  BP: 97/55 (07-26-24 @ 04:25) (77/49 - 101/66)  BP(mean): --  RR: 20 (07-26-24 @ 04:25) (20 - 20)  SpO2: 95% (07-26-24 @ 04:25) (95% - 98%)    I&O's Summary    24 Jul 2024 07:01  -  25 Jul 2024 07:00  --------------------------------------------------------  IN: 200 mL / OUT: 0 mL / NET: 200 mL    25 Jul 2024 07:01  -  26 Jul 2024 06:34  --------------------------------------------------------  IN: 120 mL / OUT: 377 mL / NET: -257 mL    - 2 stools yesterday night + 1 yesterday morning after giving fleet enema  - Evening furosemide dose held due to PHAM.    PHYSICAL EXAM:  Constitutional: NAD, appears tired and resting in bed.  HEENT: NC/AT, PERRLA, EOMI, no conjunctival pallor or scleral icterus, MMM  Neck: Supple, JVD  Respiratory: decreased breath sounds on both lower lung bases. No w/r/r.   Cardiovascular: RRR, normal S1 and S2.  Gastrointestinal: +BS, soft NT, mild distension, no guarding or rebound tenderness, no palpable masses   Extremities: wwp; no cyanosis or clubbing. Edema 2+  Vascular: Pedal pulses equal and strong throughout.   Neurological: AAOx3  : no suprapubic pain  Skin: Legs with lymphedema wrapped up in dressing with stable size. No new petechiae or bleeds visible.         LABS:                        9.9    17.89 )-----------( 337      ( 26 Jul 2024 06:32 )             34.7     07-26    145  |  97  |  69<H>  ----------------------------<  101<H>  5.4<H>   |  32<H>  |  2.35<H>    Ca    9.6      26 Jul 2024 06:32  Phos  5.4     07-26  Mg     2.2     07-26                  RADIOLOGY & ADDITIONAL TESTS: Reviewed.    MEDICATIONS:  MEDICATIONS  (STANDING):  atorvastatin 20 milliGRAM(s) Oral at bedtime  hemorrhoidal Ointment 1 Application(s) Rectal two times a day  heparin   Injectable 5000 Unit(s) SubCutaneous every 12 hours  melatonin 3 milliGRAM(s) Oral at bedtime  metoprolol tartrate 100 milliGRAM(s) Oral three times a day  pantoprazole    Tablet 40 milliGRAM(s) Oral before breakfast  senna 2 Tablet(s) Oral at bedtime      Events:  - Patient's PCP does do duplex ultrasound at patient's home (U/S RLE).  - Told PCP office about cardiology needs. They will attempt to look for a provider that does house calls.  -> PCP office called back to say that U/S has been scheduled for patient on 7/31 and 8/7. Patient's daughter was contacted and consented by the PCP office. **********************  Desmond Yeoh  MS-3, Internal Medicine  **********************  Patient is a 89y old  Female who presents with a chief complaint of asymptomatic thrombocytopenia (22 Jul 2024 07:13)      OVERNIGHT EVENTS: Two episodes of hypotension yesterday (during the afternoon and at night).    SUBJECTIVE:  Patient seen and examined at bedside. Denies fever, chills, HA, cough, chest pain, palpitations, abdominal pain, dysuria. She had two episodes of BM yesterday night. Did not produce any urine overnight. SOB has increased since yesterday. Patient has not been eating or drinking much in the past few days as per son. Patient appears more tired and weak during the interview. 3L NC.    OBJECTIVE:  Vital Signs Last 12 Hrs  T(F): 97.1 (07-26-24 @ 04:25), Max: 97.9 (07-26-24 @ 00:49)  HR: 81 (07-26-24 @ 04:25) (81 - 125)  BP: 97/55 (07-26-24 @ 04:25) (77/49 - 101/66)  BP(mean): --  RR: 20 (07-26-24 @ 04:25) (20 - 20)  SpO2: 95% (07-26-24 @ 04:25) (95% - 98%)    I&O's Summary    24 Jul 2024 07:01  -  25 Jul 2024 07:00  --------------------------------------------------------  IN: 200 mL / OUT: 0 mL / NET: 200 mL    25 Jul 2024 07:01  -  26 Jul 2024 06:34  --------------------------------------------------------  IN: 120 mL / OUT: 377 mL / NET: -257 mL    - 2 stools yesterday night + 1 yesterday morning after giving fleet enema  - Evening furosemide dose held due to PHAM.    PHYSICAL EXAM:  Constitutional: NAD, appears tired and resting in bed.  HEENT: NC/AT, PERRLA, EOMI, no conjunctival pallor or scleral icterus, MMM  Neck: Supple, JVD  Respiratory: decreased breath sounds on both lower lung bases. No w/r/r.   Cardiovascular: RRR, normal S1 and S2.  Gastrointestinal: +BS, soft NT, mild distension, no guarding or rebound tenderness, no palpable masses   Extremities: wwp; no cyanosis or clubbing. Edema 2+  Vascular: Pedal pulses equal and strong throughout.   Neurological: AAOx3  : no suprapubic pain  Skin: Legs with lymphedema wrapped up in dressing with stable size. No new petechiae or bleeds visible.         LABS:                        9.9    17.89 )-----------( 337      ( 26 Jul 2024 06:32 )             34.7     07-26    145  |  97  |  69<H>  ----------------------------<  101<H>  5.4<H>   |  32<H>  |  2.35<H>    Ca    9.6      26 Jul 2024 06:32  Phos  5.4     07-26  Mg     2.2     07-26                  RADIOLOGY & ADDITIONAL TESTS: Reviewed.    MEDICATIONS:  MEDICATIONS  (STANDING):  atorvastatin 20 milliGRAM(s) Oral at bedtime  hemorrhoidal Ointment 1 Application(s) Rectal two times a day  heparin   Injectable 5000 Unit(s) SubCutaneous every 12 hours  melatonin 3 milliGRAM(s) Oral at bedtime  metoprolol tartrate 100 milliGRAM(s) Oral three times a day  pantoprazole    Tablet 40 milliGRAM(s) Oral before breakfast  senna 2 Tablet(s) Oral at bedtime      Events:  - Patient's PCP does do duplex ultrasound at patient's home (U/S RLE).  - Told PCP office about cardiology needs. They will attempt to look for a provider that does house calls.  -> PCP office called back to say that U/S has been scheduled for patient on 7/31 and 8/7. Patient's daughter was contacted and consented by the PCP office. **********************  Desmond Yeoh  MS-3, Internal Medicine  **********************  Patient is a 89y old  Female who presents with a chief complaint of asymptomatic thrombocytopenia (22 Jul 2024 07:13)      OVERNIGHT EVENTS: Two episodes of hypotension yesterday (during the afternoon and at night).    SUBJECTIVE:  Patient seen and examined at bedside. Patient appears more tired and weak during the interview. She had two episodes of BM yesterday night. Did not produce any urine overnight. SOB has increased since yesterday. Patient has not been eating or drinking much in the past few days as per son.  Denies fever, chills, HA, cough, chest pain, new palpitations, abdominal pain, dysuria. 3L NC.    OBJECTIVE:  Vital Signs Last 12 Hrs  T(F): 97.1 (07-26-24 @ 04:25), Max: 97.9 (07-26-24 @ 00:49)  HR: 81 (07-26-24 @ 04:25) (81 - 125)  BP: 97/55 (07-26-24 @ 04:25) (77/49 - 101/66)  BP(mean): --  RR: 20 (07-26-24 @ 04:25) (20 - 20)  SpO2: 95% (07-26-24 @ 04:25) (95% - 98%)    I&O's Summary    24 Jul 2024 07:01  -  25 Jul 2024 07:00  --------------------------------------------------------  IN: 200 mL / OUT: 0 mL / NET: 200 mL    25 Jul 2024 07:01  -  26 Jul 2024 06:34  --------------------------------------------------------  IN: 120 mL / OUT: 377 mL / NET: -257 mL    - 2 stools yesterday night + 1 yesterday morning after giving fleet enema  - Evening furosemide dose held due to PHAM.    PHYSICAL EXAM:  Constitutional: NAD, appears tired and resting in bed.  HEENT: NC/AT, PERRLA, EOMI, no conjunctival pallor or scleral icterus, MMM  Neck: Supple, JVD  Respiratory: decreased breath sounds on both lower lung bases. No w/r/r.   Cardiovascular: RRR, normal S1 and S2.  Gastrointestinal: +BS, soft NT, mild distension, no guarding or rebound tenderness, no palpable masses   Extremities: wwp; no cyanosis or clubbing. Edema 2+  Vascular: Pedal pulses equal and strong throughout.   Neurological: AAOx3  : no suprapubic pain  Skin: Legs with lymphedema wrapped up in dressing with stable size. No new petechiae or bleeds visible.         LABS:                        9.9    17.89 )-----------( 337      ( 26 Jul 2024 06:32 )             34.7     07-26    145  |  97  |  69<H>  ----------------------------<  101<H>  5.4<H>   |  32<H>  |  2.35<H>    Ca    9.6      26 Jul 2024 06:32  Phos  5.4     07-26  Mg     2.2     07-26                  RADIOLOGY & ADDITIONAL TESTS: Reviewed.    MEDICATIONS:  MEDICATIONS  (STANDING):  atorvastatin 20 milliGRAM(s) Oral at bedtime  hemorrhoidal Ointment 1 Application(s) Rectal two times a day  heparin   Injectable 5000 Unit(s) SubCutaneous every 12 hours  melatonin 3 milliGRAM(s) Oral at bedtime  metoprolol tartrate 100 milliGRAM(s) Oral three times a day  pantoprazole    Tablet 40 milliGRAM(s) Oral before breakfast  senna 2 Tablet(s) Oral at bedtime      Events:  - Patient's PCP does do duplex ultrasound at patient's home (U/S RLE).  - Told PCP office about cardiology needs. They will attempt to look for a provider that does house calls.  -> PCP office called back to say that U/S has been scheduled for patient on 7/31 and 8/7. Patient's daughter was contacted and consented by the PCP office. **********************  Desmond Yeoh  MS-3, Internal Medicine  **********************  Patient is a 89y old  Female who presents with a chief complaint of asymptomatic thrombocytopenia (22 Jul 2024 07:13)      OVERNIGHT EVENTS: Two episodes of hypotension yesterday (during the afternoon and at night).    SUBJECTIVE:  Patient seen and examined at bedside. Patient appears more tired and weak during the interview. She had two episodes of BM yesterday night. Did not produce any urine overnight. SOB has increased since yesterday. Patient has not been eating or drinking much in the past few days as per son.  Denies fever, chills, HA, cough, chest pain, new palpitations, abdominal pain, dysuria. 3L NC.    OBJECTIVE:  Vital Signs Last 12 Hrs  T(F): 97.1 (07-26-24 @ 04:25), Max: 97.9 (07-26-24 @ 00:49)  HR: 81 (07-26-24 @ 04:25) (81 - 125)  BP: 97/55 (07-26-24 @ 04:25) (77/49 - 101/66)  BP(mean): --  RR: 20 (07-26-24 @ 04:25) (20 - 20)  SpO2: 95% (07-26-24 @ 04:25) (95% - 98%)    I&O's Summary    24 Jul 2024 07:01  -  25 Jul 2024 07:00  --------------------------------------------------------  IN: 200 mL / OUT: 0 mL / NET: 200 mL    25 Jul 2024 07:01  -  26 Jul 2024 06:34  --------------------------------------------------------  IN: 120 mL / OUT: 377 mL / NET: -257 mL    - 2 stools yesterday night + 1 yesterday morning after giving fleet enema  - Evening furosemide dose held due to PHMA.    PHYSICAL EXAM:  Constitutional: NAD, appears tired and resting in bed.  HEENT: NC/AT, PERRLA, EOMI, no conjunctival pallor or scleral icterus, MMM  Neck: Supple  Respiratory: decreased breath sounds and crackles on both lower lung bases. No w/r/r.   Cardiovascular: RRR, normal S1 and S2.  Gastrointestinal: +BS, soft NT, mild distention, no guarding or rebound tenderness, no palpable masses   Extremities: wwp; no cyanosis or clubbing. Edema 2+  Vascular: Pedal pulses equal and strong throughout.   Neurological: AAOx3, did not know who the president was.  : no suprapubic pain  Skin: Legs with lymphedema wrapped up in dressing with stable size. No new petechiae or bleeds visible.         LABS:                        9.9    17.89 )-----------( 337      ( 26 Jul 2024 06:32 )             34.7     07-26    145  |  97  |  69<H>  ----------------------------<  101<H>  5.4<H>   |  32<H>  |  2.35<H>    Ca    9.6      26 Jul 2024 06:32  Phos  5.4     07-26  Mg     2.2     07-26                  RADIOLOGY & ADDITIONAL TESTS: Reviewed.    MEDICATIONS:  MEDICATIONS  (STANDING):  atorvastatin 20 milliGRAM(s) Oral at bedtime  hemorrhoidal Ointment 1 Application(s) Rectal two times a day  heparin   Injectable 5000 Unit(s) SubCutaneous every 12 hours  melatonin 3 milliGRAM(s) Oral at bedtime  metoprolol tartrate 100 milliGRAM(s) Oral three times a day  pantoprazole    Tablet 40 milliGRAM(s) Oral before breakfast  senna 2 Tablet(s) Oral at bedtime      Events:  - Patient's PCP does do duplex ultrasound at patient's home (U/S RLE).  - Told PCP office about cardiology needs. They will attempt to look for a provider that does house calls.  -> PCP office called back to say that U/S has been scheduled for patient on 7/31 and 8/7. Patient's daughter was contacted and consented by the PCP office. **********************  Desmond Yeoh  MS-3, Internal Medicine  **********************  Patient is a 89y old  Female who presents with a chief complaint of asymptomatic thrombocytopenia (22 Jul 2024 07:13)      OVERNIGHT EVENTS: Two episodes of hypotension yesterday (during the afternoon and at night).    SUBJECTIVE:  Patient seen and examined at bedside. Patient appears more tired and weak during the interview. She had two episodes of BM yesterday night. Did not produce any urine overnight. SOB has increased since yesterday. Patient has not been eating or drinking much in the past few days as per son.  Denies fever, chills, HA, cough, chest pain, new palpitations, abdominal pain, dysuria. 3L NC.    OBJECTIVE:  Vital Signs Last 12 Hrs  T(F): 97.1 (07-26-24 @ 04:25), Max: 97.9 (07-26-24 @ 00:49)  HR: 81 (07-26-24 @ 04:25) (81 - 125)  BP: 97/55 (07-26-24 @ 04:25) (77/49 - 101/66)  BP(mean): --  RR: 20 (07-26-24 @ 04:25) (20 - 20)  SpO2: 95% (07-26-24 @ 04:25) (95% - 98%)    I&O's Summary    24 Jul 2024 07:01  -  25 Jul 2024 07:00  --------------------------------------------------------  IN: 200 mL / OUT: 0 mL / NET: 200 mL    25 Jul 2024 07:01  -  26 Jul 2024 06:34  --------------------------------------------------------  IN: 120 mL / OUT: 377 mL / NET: -257 mL    - 2 stools yesterday night + 1 yesterday morning after giving fleet enema  - Evening furosemide dose held due to PHAM.    PHYSICAL EXAM:  Constitutional: NAD, appears tired and resting in bed.  HEENT: NC/AT, PERRLA, EOMI, no conjunctival pallor or scleral icterus, MMM  Neck: Supple  Respiratory: decreased breath sounds and crackles on both lower lung bases. No w/r/r.   Cardiovascular: RRR, normal S1 and S2.  Gastrointestinal: +BS, soft NT, mild distention, no guarding or rebound tenderness, no palpable masses   Extremities: wwp; no cyanosis or clubbing. Edema 2+  Vascular: Pedal pulses equal and strong throughout.   Neurological: AAOx3, did not know who the president was.  : no suprapubic pain  Skin: Legs with lymphedema wrapped up in dressing with stable size. No new petechiae or bleeds visible.         LABS:                        9.9    17.89 )-----------( 337      ( 26 Jul 2024 06:32 )             34.7     07-26    145  |  97  |  69<H>  ----------------------------<  101<H>  5.4<H>   |  32<H>  |  2.35<H>    Ca    9.6      26 Jul 2024 06:32  Phos  5.4     07-26  Mg     2.2     07-26                  RADIOLOGY & ADDITIONAL TESTS: Reviewed.    MEDICATIONS:  MEDICATIONS  (STANDING):  atorvastatin 20 milliGRAM(s) Oral at bedtime  hemorrhoidal Ointment 1 Application(s) Rectal two times a day  heparin   Injectable 5000 Unit(s) SubCutaneous every 12 hours  melatonin 3 milliGRAM(s) Oral at bedtime  metoprolol tartrate 100 milliGRAM(s) Oral three times a day  pantoprazole    Tablet 40 milliGRAM(s) Oral before breakfast  senna 2 Tablet(s) Oral at bedtime      Events (yesterday):  - Patient's PCP does do duplex ultrasound at patient's home (U/S RLE).  - Told PCP office about cardiology needs. They will attempt to look for a provider that does house calls.  - PCP office called back to say that U/S has been scheduled for patient on 7/31 and 8/7. Patient's daughter was contacted and consented by the PCP office.    Events (today):

## 2024-07-26 NOTE — CONSULT NOTE ADULT - SUBJECTIVE AND OBJECTIVE BOX
[facility] DIVISION OF KIDNEY DISEASES AND HYPERTENSION -- [phone]  -- INITIAL CONSULT NOTE  --------------------------------------------------------------------------------  HPI: 89 yo with hx of HFpEF on 3L NC, Afib, HLD, CVA and lymphedema presents with initially with SOB and received blood transfusion. Pt was discharged and came back and found to have thrombocytopenia. During course, pt noted to have increased SOB with worsening of HF. Pt was starting on lasix. During admission, pt's creatinine was 0.95 and began to trend up throughout hospital course. It went from 1.22 to 1.58 from 7/24 to 7/25 and currently at 2.35. Nephrology was consulted for PHAM.         PAST HISTORY  --------------------------------------------------------------------------------  PAST MEDICAL & SURGICAL HISTORY:  Hypertension      Dyslipidemia      Myocardial Infarction  15-20 years ago      Hypercalcemia      Lymphedema      Chronic atrial fibrillation      (HFpEF) heart failure with preserved ejection fraction      CVA (cerebrovascular accident)      S/P Parathyroidectomy      S/P Hysterectomy      Bilateral Cataracts        FAMILY HISTORY:  No pertinent family history in first degree relatives      PAST SOCIAL HISTORY:    ALLERGIES & MEDICATIONS  --------------------------------------------------------------------------------  Allergies    No Known Allergies    Intolerances      Standing Inpatient Medications  atorvastatin 20 milliGRAM(s) Oral at bedtime  cefTRIAXone   IVPB 1000 milliGRAM(s) IV Intermittent every 24 hours  hemorrhoidal Ointment 1 Application(s) Rectal two times a day  heparin   Injectable 5000 Unit(s) SubCutaneous every 12 hours  lactated ringers. 1000 milliLiter(s) IV Continuous <Continuous>  melatonin 3 milliGRAM(s) Oral at bedtime  metoprolol tartrate 100 milliGRAM(s) Oral three times a day  pantoprazole    Tablet 40 milliGRAM(s) Oral before breakfast  senna 2 Tablet(s) Oral at bedtime    PRN Inpatient Medications  polyethylene glycol 3350 17 Gram(s) Oral two times a day PRN      REVIEW OF SYSTEMS  --------------------------------------------------------------------------------  Gen: No fevers/chills  Head/Eyes/Ears: No HA  Respiratory: No dyspnea, cough  CV: No chest pain  GI: No abdominal pain, diarrhea  : No dysuria, hematuria  MSK: No  edema  Skin: No rashes  Heme: No easy bruising or bleeding    All other systems were reviewed and are negative, except as noted.    VITALS/PHYSICAL EXAM  --------------------------------------------------------------------------------  T(C): 36.2 (07-26-24 @ 04:25), Max: 36.6 (07-26-24 @ 00:49)  HR: 117 (07-26-24 @ 08:05) (81 - 125)  BP: 91/57 (07-26-24 @ 08:05) (76/58 - 101/66)  RR: 20 (07-26-24 @ 08:05) (20 - 20)  SpO2: 98% (07-26-24 @ 08:05) (95% - 98%)  Wt(kg): --        07-25-24 @ 07:01  -  07-26-24 @ 07:00  --------------------------------------------------------  IN: 120 mL / OUT: 377 mL / NET: -257 mL        Physical Exam:  	Gen: NAD  	HEENT: Anicteric  	Pulm: CTA B/L  	CV: S1S2+  	Abd: Soft, +BS            Transplant site: RLQ non tender, well healed surgical scar.  	Ext: No LE edema B/L  	Neuro: Awake          : Garzon+ with clear urine in the bag  	Skin: Warm and dry  	Dialysis access:     LABS/STUDIES  --------------------------------------------------------------------------------              9.9    17.89 >-----------<  337      [07-26-24 @ 06:32]              34.7     145  |  97  |  69  ----------------------------<  101      [07-26-24 @ 06:32]  5.4   |  32  |  2.35        Ca     9.6     [07-26-24 @ 06:32]      Mg     2.2     [07-26-24 @ 06:32]      Phos  5.4     [07-26-24 @ 06:32]    TPro  6.8  /  Alb  3.2  /  TBili  1.4  /  DBili  0.6  /  AST  30  /  ALT  10  /  AlkPhos  79  [07-26-24 @ 06:32]          Creatinine Trend:  SCr 2.35 [07-26 @ 06:32]  SCr 1.58 [07-25 @ 07:21]  SCr 1.22 [07-24 @ 06:06]  SCr 1.06 [07-23 @ 08:13]  SCr 0.97 [07-22 @ 07:55]    Urinalysis - [07-26-24 @ 06:32]      Color  / Appearance  / SG  / pH       Gluc 101 / Ketone   / Bili  / Urobili        Blood  / Protein  / Leuk Est  / Nitrite       RBC  / WBC  / Hyaline  / Gran  / Sq Epi  / Non Sq Epi  / Bacteria     Urine Creatinine 127      [07-25-24 @ 13:12]  Urine Protein 87      [07-25-24 @ 13:12]  Urine Sodium 10      [07-25-24 @ 13:12]  Urine Urea Nitrogen 387      [07-25-24 @ 13:12]  Urine Potassium 51      [07-25-24 @ 13:12]  Urine Osmolality 471      [07-25-24 @ 13:12]    HBsAg Nonreact      [07-03-24 @ 17:11]  HCV 0.16, Nonreact      [07-03-24 @ 17:11]  HIV Nonreact      [07-02-24 @ 06:11]      Tacrolimus  Cyclosporine  Sirolimus  Mycophenolate  BK PCR  CMV PCRCMVPCR Log: Det <1.54 Assay Dynamic Range: 34.5 to 1.0E+07 IU/mL (1.54 to 7.00 Log10 IU/mL)  Assay lower limit of quantification (LLOQ) is 34.5 IU/mL (1.54 Log10  IU/mL)  CMV DNA detected below the LLOQ will be reported as Detected < 34.5 IU/mL  (<1.54 Log10 IU/mL)  Eliana Cytomegalovirus (CMV) is an FDA-cleared quantitative test that  enables the detection and quantitation of CMV DNA in EDTA plasma of  infected transplant patients on the eliana 8800 system. This test was  verified by [facility]. Results should be interpreted  with consideration of all clinical findings and laboratory findings and  should not form the sole basis for a diagnosis or treatment decision. Pnb70UO/mL (07-03 @ 17:09)    Parvo PCR  EBV PCR [facility] DIVISION OF KIDNEY DISEASES AND HYPERTENSION -- [phone]  -- INITIAL CONSULT NOTE  --------------------------------------------------------------------------------  HPI: 89 yo with hx of HFpEF on 3L NC, Afib, HLD, CVA and lymphedema presents with initially with SOB and received blood transfusion. Pt was discharged and came back and found to have thrombocytopenia. During course, pt noted to have increased SOB with worsening of HF. Pt was starting on lasix. During admission, pt's creatinine was 0.95 and began to trend up throughout hospital course. It went from 1.22 to 1.58 from 7/24 to 7/25 and currently at 2.35. Pt received Ct\T with contrast on 7/24. Nephrology was consulted for PHAM.         PAST HISTORY  --------------------------------------------------------------------------------  PAST MEDICAL & SURGICAL HISTORY:  Hypertension      Dyslipidemia      Myocardial Infarction  15-20 years ago      Hypercalcemia      Lymphedema      Chronic atrial fibrillation      (HFpEF) heart failure with preserved ejection fraction      CVA (cerebrovascular accident)      S/P Parathyroidectomy      S/P Hysterectomy      Bilateral Cataracts        FAMILY HISTORY:  No pertinent family history in first degree relatives      PAST SOCIAL HISTORY:    ALLERGIES & MEDICATIONS  --------------------------------------------------------------------------------  Allergies    No Known Allergies    Intolerances      Standing Inpatient Medications  atorvastatin 20 milliGRAM(s) Oral at bedtime  cefTRIAXone   IVPB 1000 milliGRAM(s) IV Intermittent every 24 hours  hemorrhoidal Ointment 1 Application(s) Rectal two times a day  heparin   Injectable 5000 Unit(s) SubCutaneous every 12 hours  lactated ringers. 1000 milliLiter(s) IV Continuous <Continuous>  melatonin 3 milliGRAM(s) Oral at bedtime  metoprolol tartrate 100 milliGRAM(s) Oral three times a day  pantoprazole    Tablet 40 milliGRAM(s) Oral before breakfast  senna 2 Tablet(s) Oral at bedtime    PRN Inpatient Medications  polyethylene glycol 3350 17 Gram(s) Oral two times a day PRN      REVIEW OF SYSTEMS  --------------------------------------------------------------------------------  Gen: No fevers/chills  Head/Eyes/Ears: No HA  Respiratory: No dyspnea, cough  CV: No chest pain  GI: No abdominal pain, diarrhea  : No dysuria, hematuria  MSK: No  edema  Skin: No rashes  Heme: No easy bruising or bleeding    All other systems were reviewed and are negative, except as noted.    VITALS/PHYSICAL EXAM  --------------------------------------------------------------------------------  T(C): 36.2 (07-26-24 @ 04:25), Max: 36.6 (07-26-24 @ 00:49)  HR: 117 (07-26-24 @ 08:05) (81 - 125)  BP: 91/57 (07-26-24 @ 08:05) (76/58 - 101/66)  RR: 20 (07-26-24 @ 08:05) (20 - 20)  SpO2: 98% (07-26-24 @ 08:05) (95% - 98%)  Wt(kg): --        07-25-24 @ 07:01  -  07-26-24 @ 07:00  --------------------------------------------------------  IN: 120 mL / OUT: 377 mL / NET: -257 mL        Physical Exam:  	Gen: NAD  	HEENT: Anicteric  	Pulm: CTA B/L  	CV: S1S2+  	Abd: Soft, +BS            Transplant site: RLQ non tender, well healed surgical scar.  	Ext: No LE edema B/L  	Neuro: Awake          : Garzon+ with clear urine in the bag  	Skin: Warm and dry  	Dialysis access:     LABS/STUDIES  --------------------------------------------------------------------------------              9.9    17.89 >-----------<  337      [07-26-24 @ 06:32]              34.7     145  |  97  |  69  ----------------------------<  101      [07-26-24 @ 06:32]  5.4   |  32  |  2.35        Ca     9.6     [07-26-24 @ 06:32]      Mg     2.2     [07-26-24 @ 06:32]      Phos  5.4     [07-26-24 @ 06:32]    TPro  6.8  /  Alb  3.2  /  TBili  1.4  /  DBili  0.6  /  AST  30  /  ALT  10  /  AlkPhos  79  [07-26-24 @ 06:32]          Creatinine Trend:  SCr 2.35 [07-26 @ 06:32]  SCr 1.58 [07-25 @ 07:21]  SCr 1.22 [07-24 @ 06:06]  SCr 1.06 [07-23 @ 08:13]  SCr 0.97 [07-22 @ 07:55]    Urinalysis - [07-26-24 @ 06:32]      Color  / Appearance  / SG  / pH       Gluc 101 / Ketone   / Bili  / Urobili        Blood  / Protein  / Leuk Est  / Nitrite       RBC  / WBC  / Hyaline  / Gran  / Sq Epi  / Non Sq Epi  / Bacteria     Urine Creatinine 127      [07-25-24 @ 13:12]  Urine Protein 87      [07-25-24 @ 13:12]  Urine Sodium 10      [07-25-24 @ 13:12]  Urine Urea Nitrogen 387      [07-25-24 @ 13:12]  Urine Potassium 51      [07-25-24 @ 13:12]  Urine Osmolality 471      [07-25-24 @ 13:12]    HBsAg Nonreact      [07-03-24 @ 17:11]  HCV 0.16, Nonreact      [07-03-24 @ 17:11]  HIV Nonreact      [07-02-24 @ 06:11]      Tacrolimus  Cyclosporine  Sirolimus  Mycophenolate  BK PCR  CMV PCRCMVPCR Log: Det <1.54 Assay Dynamic Range: 34.5 to 1.0E+07 IU/mL (1.54 to 7.00 Log10 IU/mL)  Assay lower limit of quantification (LLOQ) is 34.5 IU/mL (1.54 Log10  IU/mL)  CMV DNA detected below the LLOQ will be reported as Detected < 34.5 IU/mL  (<1.54 Log10 IU/mL)  Eliana Cytomegalovirus (CMV) is an FDA-cleared quantitative test that  enables the detection and quantitation of CMV DNA in EDTA plasma of  infected transplant patients on the eliana 8800 system. This test was  verified by [facility]. Results should be interpreted  with consideration of all clinical findings and laboratory findings and  should not form the sole basis for a diagnosis or treatment decision. [id number]/mL (07-03 @ 17:09)    Parvo PCR  EBV PCR [facility] DIVISION OF KIDNEY DISEASES AND HYPERTENSION -- [phone]  -- INITIAL CONSULT NOTE  --------------------------------------------------------------------------------  HPI: 89 yo with hx of HFpEF on 3L NC, Afib, HLD, CVA and lymphedema presents with initially with SOB and received blood transfusion. Pt was discharged and came back and found to have thrombocytopenia. During course, pt noted to have increased SOB with worsening of HF. Pt also received a total of 3 doses of lasix on 7/22, 723 and 7/25. Pt also received CTA Chest PE with contrast on 7/24. During admission, pt's creatinine was 0.95 and began to trend up throughout hospital course. It went from 1.22 to 1.58 from 7/24 to 7/25 and currently at 2.35.  Nephrology was consulted for PHAM.       PAST HISTORY  --------------------------------------------------------------------------------  PAST MEDICAL & SURGICAL HISTORY:  Hypertension      Dyslipidemia      Myocardial Infarction  15-20 years ago      Hypercalcemia      Lymphedema      Chronic atrial fibrillation      (HFpEF) heart failure with preserved ejection fraction      CVA (cerebrovascular accident)      S/P Parathyroidectomy      S/P Hysterectomy      Bilateral Cataracts        FAMILY HISTORY:  No pertinent family history in first degree relatives      PAST SOCIAL HISTORY:    ALLERGIES & MEDICATIONS  --------------------------------------------------------------------------------  Allergies    No Known Allergies    Intolerances      Standing Inpatient Medications  atorvastatin 20 milliGRAM(s) Oral at bedtime  cefTRIAXone   IVPB 1000 milliGRAM(s) IV Intermittent every 24 hours  hemorrhoidal Ointment 1 Application(s) Rectal two times a day  heparin   Injectable 5000 Unit(s) SubCutaneous every 12 hours  lactated ringers. 1000 milliLiter(s) IV Continuous <Continuous>  melatonin 3 milliGRAM(s) Oral at bedtime  metoprolol tartrate 100 milliGRAM(s) Oral three times a day  pantoprazole    Tablet 40 milliGRAM(s) Oral before breakfast  senna 2 Tablet(s) Oral at bedtime    PRN Inpatient Medications  polyethylene glycol 3350 17 Gram(s) Oral two times a day PRN      REVIEW OF SYSTEMS  --------------------------------------------------------------------------------  Gen: No fevers/chills  Head/Eyes/Ears: No HA  Respiratory: No dyspnea, cough  CV: No chest pain  GI: No abdominal pain, diarrhea  : No dysuria, hematuria  MSK: No  edema  Skin: No rashes  Heme: No easy bruising or bleeding    All other systems were reviewed and are negative, except as noted.    VITALS/PHYSICAL EXAM  --------------------------------------------------------------------------------  T(C): 36.2 (07-26-24 @ 04:25), Max: 36.6 (07-26-24 @ 00:49)  HR: 117 (07-26-24 @ 08:05) (81 - 125)  BP: 91/57 (07-26-24 @ 08:05) (76/58 - 101/66)  RR: 20 (07-26-24 @ 08:05) (20 - 20)  SpO2: 98% (07-26-24 @ 08:05) (95% - 98%)  Wt(kg): --        07-25-24 @ 07:01  -  07-26-24 @ 07:00  --------------------------------------------------------  IN: 120 mL / OUT: 377 mL / NET: -257 mL        Physical Exam:  	Gen: NAD  	HEENT: Anicteric  	Pulm: CTA B/L  	CV: S1S2+  	Abd: Soft, +BS    	Ext: No LE edema B/L  	Neuro: Awake          : Garzon+ with clear urine in the bag  	Skin: Warm and dry    LABS/STUDIES  --------------------------------------------------------------------------------              9.9    17.89 >-----------<  337      [07-26-24 @ 06:32]              34.7     145  |  97  |  69  ----------------------------<  101      [07-26-24 @ 06:32]  5.4   |  32  |  2.35        Ca     9.6     [07-26-24 @ 06:32]      Mg     2.2     [07-26-24 @ 06:32]      Phos  5.4     [07-26-24 @ 06:32]    TPro  6.8  /  Alb  3.2  /  TBili  1.4  /  DBili  0.6  /  AST  30  /  ALT  10  /  AlkPhos  79  [07-26-24 @ 06:32]          Creatinine Trend:  SCr 2.35 [07-26 @ 06:32]  SCr 1.58 [07-25 @ 07:21]  SCr 1.22 [07-24 @ 06:06]  SCr 1.06 [07-23 @ 08:13]  SCr 0.97 [07-22 @ 07:55]    Urinalysis - [07-26-24 @ 06:32]      Color  / Appearance  / SG  / pH       Gluc 101 / Ketone   / Bili  / Urobili        Blood  / Protein  / Leuk Est  / Nitrite       RBC  / WBC  / Hyaline  / Gran  / Sq Epi  / Non Sq Epi  / Bacteria     Urine Creatinine 127      [07-25-24 @ 13:12]  Urine Protein 87      [07-25-24 @ 13:12]  Urine Sodium 10      [07-25-24 @ 13:12]  Urine Urea Nitrogen 387      [07-25-24 @ 13:12]  Urine Potassium 51      [07-25-24 @ 13:12]  Urine Osmolality 471      [07-25-24 @ 13:12]    HBsAg Nonreact      [07-03-24 @ 17:11]  HCV 0.16, Nonreact      [07-03-24 @ 17:11]  HIV Nonreact      [07-02-24 @ 06:11]      Tacrolimus  Cyclosporine  Sirolimus  Mycophenolate  BK PCR  CMV PCRCMVPCR Log: Det <1.54 Assay Dynamic Range: 34.5 to 1.0E+07 IU/mL (1.54 to 7.00 Log10 IU/mL)  Assay lower limit of quantification (LLOQ) is 34.5 IU/mL (1.54 Log10  IU/mL)  CMV DNA detected below the LLOQ will be reported as Detected < 34.5 IU/mL  (<1.54 Log10 IU/mL)  Eliana Cytomegalovirus (CMV) is an FDA-cleared quantitative test that  enables the detection and quantitation of CMV DNA in EDTA plasma of  infected transplant patients on the eliana 8800 system. This test was  verified by [employer]. Results should be interpreted  with consideration of all clinical findings and laboratory findings and  should not form the sole basis for a diagnosis or treatment decision. [id number]/mL (07-03 @ 17:09)    Parvo PCR  EBV PCR

## 2024-07-26 NOTE — PROGRESS NOTE ADULT - PROBLEM SELECTOR PROBLEM 3
Chronic atrial fibrillation Elevated white blood cell count Heart failure with preserved ejection fraction

## 2024-07-26 NOTE — PROGRESS NOTE ADULT - SUBJECTIVE AND OBJECTIVE BOX
[facility]-- WOUND TEAM -- FOLLOW UP NOTE  --------------------------------------------------------------------------------    24 hour events/subjective:          Diet:  Diet, Regular (07-17-24 @ 13:45)      ROS: pt unable to offer    ALLERGIES & MEDICATIONS  ------------------------------------------------------------------------------    No Known Allergies        STANDING INPATIENT MEDICATIONS  atorvastatin 20 milliGRAM(s) Oral at bedtime  cefTRIAXone IVPB 1000 milliGRAM(s) IV Intermittent every 24 hours  hemorrhoidal Ointment 1 Application(s) Rectal two times a day  heparin  Injectable 5000 Unit(s) SubCutaneous every 12 hours  lactated ringers. 1000 milliLiter(s) IV Continuous <Continuous>  melatonin 3 milliGRAM(s) Oral at bedtime  metoprolol tartrate 100 milliGRAM(s) Oral three times a day  pantoprazole    Tablet 40 milliGRAM(s) Oral before breakfast  senna 2 Tablet(s) Oral at bedtime      PRN INPATIENT MEDICATION  polyethylene glycol 3350 17 Gram(s) Oral two times a day PRN        VITALS/PHYSICAL EXAM  --------------------------------------------------------------------------------  T(C): 36.2 (07-26-24 @ 04:25), Max: 36.6 (07-26-24 @ 00:49)  HR: 117 (07-26-24 @ 08:05) (81 - 125)  BP: 91/57 (07-26-24 @ 08:05) (76/58 - 101/66)  RR: 20 (07-26-24 @ 08:05) (20 - 20)  SpO2: 98% (07-26-24 @ 08:05) (95% - 98%)  Wt(kg): --            LABS/ CULTURES/ RADIOLOGY:              9.9    17.89 >-----------<  337      [07-26-24 @ 06:32]              34.7     145  |  97  |  69  ----------------------------<  101      [07-26-24 @ 06:32]  5.4   |  32  |  2.35        Ca     9.6     [07-26-24 @ 06:32]      Mg     2.2     [07-26-24 @ 06:32]      Phos  5.4     [07-26-24 @ 06:32]    TPro  6.8  /  Alb  3.2  /  TBili  1.4  /  DBili  0.6  /  AST  30  /  ALT  10  /  AlkPhos  79  [07-26-24 @ 06:32]      A1C with Estimated Average Glucose Result: 5.4 % (07-01-24 @ 03:49)   [facility]-- WOUND TEAM -- FOLLOW UP NOTE  --------------------------------------------------------------------------------    24 hour events/subjective:    afebrile  tolerating po   incontinent  tolerating ace wrapping  s/w pt's children= pt compliant w/ ace wrapping at home- HHA assist     pt was minimally ambulatory w/ assist and the wraps help decrease the     'heaviness' she felt in her legs      Diet:  Diet, Regular (07-17-24 @ 13:45)      ROS: pt unable to offer    ALLERGIES & MEDICATIONS  ------------------------------------------------------------------------------    No Known Allergies        STANDING INPATIENT MEDICATIONS  atorvastatin 20 milliGRAM(s) Oral at bedtime  cefTRIAXone IVPB 1000 milliGRAM(s) IV Intermittent every 24 hours  hemorrhoidal Ointment 1 Application(s) Rectal two times a day  heparin  Injectable 5000 Unit(s) SubCutaneous every 12 hours  lactated ringers. 1000 milliLiter(s) IV Continuous <Continuous>  melatonin 3 milliGRAM(s) Oral at bedtime  metoprolol tartrate 100 milliGRAM(s) Oral three times a day  pantoprazole    Tablet 40 milliGRAM(s) Oral before breakfast  senna 2 Tablet(s) Oral at bedtime      PRN INPATIENT MEDICATION  polyethylene glycol 3350 17 Gram(s) Oral two times a day PRN        VITALS/PHYSICAL EXAM  --------------------------------------------------------------------------------  T(C): 36.2 (07-26-24 @ 04:25), Max: 36.6 (07-26-24 @ 00:49)  HR: 117 (07-26-24 @ 08:05) (81 - 125)  BP: 91/57 (07-26-24 @ 08:05) (76/58 - 101/66)  RR: 20 (07-26-24 @ 08:05) (20 - 20)  SpO2: 98% (07-26-24 @ 08:05) (95% - 98%)  Wt(kg): --      NAD/ Alert/ Obese, frail  WN/WG/WD  Versa Care P500  HEENT: NC/AT,  sclera clear, mucosa moist, throat clear, trachea midline, neck supple  Respiratory: nonlabored w/ equal chest rise  Gastrointestinal: soft NT/ND   : (+)purewick  Neurology:  verbal,  follows commands  Psych: calm/ appropriate  Musculoskeletal: no deformities/ contractures  Vascular: BLE equally warm, no cyanosis, clubbing, nor acute ischemia           BLE DP pulses not palpable           BLE lymphedema with cobblestone appearance              LLE ecchymosis with denuded skin from ruptured serous weeping blisters            RLE healing partial thickness moist pink wound with mild serosanguinous drainage  Skin:  thin, dry, pale, frail  Buttocks / sacral region with hyper and hypo pigmentation c/w moisture associated dermatitis     There is no blistering, drainage      increased warmth, tenderness, induration, fluctuance, nor crepitus        LABS/ CULTURES/ RADIOLOGY:              9.9    17.89 >-----------<  337      [07-26-24 @ 06:32]              34.7     145  |  97  |  69  ----------------------------<  101      [07-26-24 @ 06:32]  5.4   |  32  |  2.35        Ca     9.6     [07-26-24 @ 06:32]      Mg     2.2     [07-26-24 @ 06:32]      Phos  5.4     [07-26-24 @ 06:32]    TPro  6.8  /  Alb  3.2  /  TBili  1.4  /  DBili  0.6  /  AST  30  /  ALT  10  /  AlkPhos  79  [07-26-24 @ 06:32]      A1C with Estimated Average Glucose Result: 5.4 % (07-01-24 @ 03:49)      ACC: [id number] EXAM:  DUPLEX SCAN EXT VEINS LOWER BI   ORDERED BY:  BELLA MANDUJANO DATE:  07/24/2024          INTERPRETATION:  CLINICAL INFORMATION: Bilateral lower extremity swelling    COMPARISON: A prior examination, dated 3/8/2023, showed no DVT.    TECHNIQUE: Duplex sonography of the BILATERAL LOWER extremity veins with   color and spectral Doppler, with and without compression.    FINDINGS:    RIGHT: There is  an avascular cystic collection measuring 2.1 cm in its   long diameter in the superficial soft tissues of the right thigh.    The right common femoral, femoral and popliteal veins are patent and free   of thrombus.    Acute below the knee DVT, affects the right posterior tibial veins.    The right peroneal veins are patent and free of clot.    LEFT:    The left common femoral, femoral and popliteal veins are patent and free   of clot.    The left calf veins were not diagnostically imaged.    IMPRESSION:    There is acute, below the knee DVT affecting the the right posterior   tibial veins.    There is  an avascular cystic collection measuring 2.1 cm in its long   diameter in the superficial soft tissues of the right thigh.

## 2024-07-26 NOTE — PROGRESS NOTE ADULT - PROBLEM SELECTOR PLAN 6
Resolved (>200K 07/22). Asymptomatic at 4k on admission s/p dexa 40mg x 4 days (end 07/20) and IVIG x 2days  Blood smear: hypochromic microcytic anemia with anisocytosis,  neutrophils with normal morphology, relative increased in monocytes, some reactive lymphocytes seen, very scant but giant platelets  Flow cytometry: Polytypic B cells, decreased CD4: CD8 ratio with no aberrancy, NK cells show no diagnostic abnormalities, myeloid findings are normal  - Heme: not concerning for malignancy    Ddx: likely ITP considering response to treatment vs possible malignancy per heme    Plan: Resolved (7/25 - 351)  - F/u outpatient with heme Resolved (>200K 07/22). Asymptomatic at 4k on admission s/p dexa 40mg x 4 days (end 07/20) and IVIG x 2days  Blood smear: hypochromic microcytic anemia with anisocytosis,  neutrophils with normal morphology, relative increased in monocytes, some reactive lymphocytes seen, very scant but giant platelets  Flow cytometry: Polytypic B cells, decreased CD4: CD8 ratio with no aberrancy, NK cells show no diagnostic abnormalities, myeloid findings are normal  - Heme: not concerning for malignancy    Ddx: likely ITP considering response to treatment vs possible malignancy per heme    Plan: Resolved (7/26 - 337)  - F/u outpatient with heme TTE showed BRIANNA, severe TR with mPAP of 53.  Probably secondary to afib.     - CTM, follow outpatient with cardiologist (home visits)

## 2024-07-26 NOTE — PROGRESS NOTE ADULT - SUBJECTIVE AND OBJECTIVE BOX
INTERVAL EVENTS/SUBJ:  relative hypotension overnight    Home Medications:  Klor-Con M20 oral tablet, extended release: 1 tab(s) orally once a day (17 Jul 2024 14:26)  metoprolol tartrate 75 mg oral tablet: 1 tab(s) orally 3 times a day (22 Jul 2024 14:17)  pantoprazole 40 mg oral delayed release tablet: 1 tab(s) orally once a day (before a meal) (25 Jul 2024 13:54)  rosuvastatin 5 mg oral tablet: 1 tab(s) orally once a day (at bedtime) (17 Jul 2024 14:26)      MEDICATIONS  (STANDING):  atorvastatin 20 milliGRAM(s) Oral at bedtime  hemorrhoidal Ointment 1 Application(s) Rectal two times a day  heparin   Injectable 5000 Unit(s) SubCutaneous every 12 hours  lactated ringers. 1000 milliLiter(s) (50 mL/Hr) IV Continuous <Continuous>  melatonin 3 milliGRAM(s) Oral at bedtime  metoprolol tartrate 100 milliGRAM(s) Oral three times a day  pantoprazole    Tablet 40 milliGRAM(s) Oral before breakfast  senna 2 Tablet(s) Oral at bedtime  sodium zirconium cyclosilicate 5 Gram(s) Oral once    MEDICATIONS  (PRN):  polyethylene glycol 3350 17 Gram(s) Oral two times a day PRN Constipation      Vital Signs Last 24 Hrs  T(C): 36.2 (26 Jul 2024 04:25), Max: 36.6 (26 Jul 2024 00:49)  T(F): 97.1 (26 Jul 2024 04:25), Max: 97.9 (26 Jul 2024 00:49)  HR: 117 (26 Jul 2024 08:05) (60 - 125)  BP: 91/57 (26 Jul 2024 08:05) (76/58 - 101/66)  BP(mean): --  RR: 20 (26 Jul 2024 08:05) (20 - 20)  SpO2: 98% (26 Jul 2024 08:05) (95% - 98%)    Parameters below as of 26 Jul 2024 08:05  Patient On (Oxygen Delivery Method): nasal cannula  O2 Flow (L/min): 3      REVIEW OF SYSTEMS:  As per HPI, otherwise unremarkable.     PHYSICAL EXAM:  Constitutional/Appearance: Normal, Well-developed  HEENT:   Normal oral mucosa, no drainage or redness, supple neck  Lymphatic: No lymphadenopathy  Cardiovascular: Normal S1 S2, No edema, II/VI SADAF  Respiratory: Lungs clear to auscultation, respirations non-labored  Psychiatry: A & O x 3, appropriate affect.   Gastrointestinal:  Soft, Non-tender, no distention  Skin: No rashes, No ecchymoses, No cyanosis	  Neurologic: Non-focal, Alert and oriented x 3  Extremities: Normal range of motion  Vascular: Peripheral pulses palpable 2+ bilaterally (radial)    LABS:  CBC Full  -  ( 26 Jul 2024 06:32 )  WBC Count : 17.89 K/uL  RBC Count : 3.80 M/uL  Hemoglobin : 9.9 g/dL  Hematocrit : 34.7 %  Platelet Count - Automated : 337 K/uL  Mean Cell Volume : 91.3 fl  Mean Cell Hemoglobin : 26.1 pg  Mean Cell Hemoglobin Concentration : 28.5 gm/dL  Auto Neutrophil # : x  Auto Lymphocyte # : x  Auto Monocyte # : x  Auto Eosinophil # : x  Auto Basophil # : x  Auto Neutrophil % : x  Auto Lymphocyte % : x  Auto Monocyte % : x  Auto Eosinophil % : x  Auto Basophil % : x      07-26    145  |  97  |  69<H>  ----------------------------<  101<H>  5.4<H>   |  32<H>  |  2.35<H>    Ca    9.6      26 Jul 2024 06:32  Phos  5.4     07-26  Mg     2.2     07-26      IMPRESSION AND PLAN: 89F w HFpEF on NC, CAD s/p remote MI, AF, CVA here w asx thrombocytopenia. Denies bleeding, CP, syncope. + anemia improved w 1UPRBC. + ADHF and now with hypotension for which cardiology consulted. Now stabilized with ongoing rate control. + pleural effusion trigger, ongoing diuresis restart tmw. Caution heparin given TCP/ITP.   -tele AF RVR  -TTE EF 45-50, severe LAE, BRIANNA, severe TR, large pleural effusion  -cont lasix, close monitor BP  -cont metoprolol for rate control   -TCP, pleural effusion mgmt ongoing  -+DVT, however holding ac given bleeding risk      ***    Daniel Montenegro MD, MPhil, [facility]  Cardiologist, [facility]  ; [employer] at [facility]  email: [email or web address] on-service contact/call information, go to [email or web address] and use "[email or web address]" to login.  Outpatient Cardiology appointments, call  [phone] to arrange with a colleague; I do not have outpatient Cardiology clinic.

## 2024-07-26 NOTE — CHART NOTE - NSCHARTNOTEFT_GEN_A_CORE
Patient was reported to hypotensive to 77/54, R 98, satting 95%. Patient was evaluated by me on bedside. Patient appears lethargic, but answers questions. AOx2, PERLLA, Lungs decreased breath sounds b/l, CV nl S1 and S2, abdomen +mild distension, NT, b/l lower extremities with wrap +edema. Patient is volume overloaded 2/2 HF, however given hypotension and lack of fluid/PO intake for a day with increased weakness, gave 250cc bolus to hydrate. Will recheck CBC.     Reassessment: /70, remains asymptomatic.

## 2024-07-26 NOTE — PROGRESS NOTE ADULT - PROBLEM SELECTOR PLAN 12
PPX: Heparin  Diet: regular diet  Amb: non ambulatory at baseline. fall risk.   Dispo: Floors.   PT: Home PT Continue statin 20 mg qD - Fluids: LR 50mL till evening today  - Electrolytes: Will replete to maintain K>4, Phos>3, and Mag>2  - Nutrition: Regular Pureed  - Activity: PT  - DVT Prophylaxis: Heparin  - Stress Ulcer/GI Prophylaxis: PPI   - Disposition: Admit to medicine

## 2024-07-26 NOTE — PROGRESS NOTE ADULT - PROBLEM SELECTOR PLAN 3
- Pradaxa was stopped during last admission for bleeding risk (early july 2024). Known melena during that admission. - CHADSVASC 5, HASBLED 3.  - In continuous AFib during admission, with HR ranging from 60s to 130s  - Held Pradaxa due to recent presumed GI bleed  - Increase metop tartrate to 100 mg TID  - Cards rec: Hold rhythm control as chemical cardioversion increases risk for stroke (off AC for few weeks now)    - Patient is at high risk for bleeding with suspected GI bleed, anemia, past thrombocytopenia, age>75 and reduced functional capacity. Recommend U/S instead of full anticoagulation.  - U/S has been ordered for 7/31 and 8/7 at patient's home. Patient's daughter informed by outpatient clinic. Increased WBC count, tachycardia with occasional hypotension. Afebrile    Plan:  - Infectious workup (chest x-ray, WBC with diff, cultures, UA)  - No Abx at this time unless spiking fevers. BNP of 1994 (below baseline) and long standing dyspnea. Home Lasix PO 40qD  Crackles at right lower lung and JVD. increased SOB 07/21 and 07/22  - SOB is improving  - CXR (07/21) showed new R sided pleural effusion likely secondary to increased hydrostatic pressure  - TTE (07/23) showed decreased EF (45-50%), dilated L and R atrium and R ventricle, tricuspid regurgitation, mPAP 53.  - BNP increased to 7124 (07/22)  -> Duplex ultrasound (7/23): Right posterior tibial DVT seen.  -> CT angio (7/23): No PEs were visualized. Moderate R and small L pleural effusion.    DDx: HF exacerbation likely 2/2 AFib with CT Angio ruling out PE    Plan:  - Hold Lasix for now given PHAM and prerenal azotemia.  - incentive spirometry  - CTM oxygen requirements  - Considering age, GDMT not best option

## 2024-07-26 NOTE — PROGRESS NOTE ADULT - PROBLEM SELECTOR PLAN 8
Close to baseline now. No drainage today    Plan:  - IV treatment per above  -> Wound care consult: conservative management with [facility] [address]  Observe discharge from left leg (currently no discharge) Does not report new onset fatigue. Past admission in early july 2024 got multiple pRBC transfusions and discharged with Hgb 9.1.  - Likely multifactorial considering age and other comorbidities  - Iron 28, TIBC 311, Tsat 9%, Ferritin  - Likely iron deficiency dominant but overall multifactorial  - HgB fell to 6.7 (07/19) s/p 1u pRBC -> post transfusion 8.2 -> 07/26 9.9.   - Significantly improved after IV iron sucrose x4 doses (end: 07/22)    DDx: occult GI bleeding vs IDS vs MDS/LGL vs less likely hemolysis    Plan:  - Consider GI outpatient colonoscopy.  - CTM  - f/u outpatient heme

## 2024-07-26 NOTE — PROGRESS NOTE ADULT - PROBLEM SELECTOR PLAN 1
BNP of 1994 (below baseline) and long standing dyspnea. Home Lasix PO 40qD  Crackles at right lower lung and JVD. increased SOB 07/21 and 07/22  - SOB is improving  - CXR (07/21) showed new R sided pleural effusion likely secondary to increased hydrostatic pressure  - TTE (07/23) showed decreased EF (45-50%), dilated L and R atrium and R ventricle, tricuspid regurgitation, mPAP 53.  - BNP increased to 7124 (07/22)  -> Duplex ultrasound (7/23): Right posterior tibial DVT seen.  -> CT angio (7/23): No PEs were visualized. Moderate R and small L pleural effusion.    DDx: HF exacerbation likely 2/2 AFib with CT Angio ruling out PE    Plan:  - Hold Lasix for now given PHAM and prerenal azotemia. If Cr looks fine, can give Bumex IV.  - IV Albumin 50mL was given.  - incentive spirometry  - CTM oxygen requirements  - Considering age, GDMT not best option BNP of 1994 (below baseline) and long standing dyspnea. Home Lasix PO 40qD  Crackles at right lower lung and JVD. increased SOB 07/21 and 07/22  - SOB is improving  - CXR (07/21) showed new R sided pleural effusion likely secondary to increased hydrostatic pressure  - TTE (07/23) showed decreased EF (45-50%), dilated L and R atrium and R ventricle, tricuspid regurgitation, mPAP 53.  - BNP increased to 7124 (07/22)  -> Duplex ultrasound (7/23): Right posterior tibial DVT seen.  -> CT angio (7/23): No PEs were visualized. Moderate R and small L pleural effusion.    DDx: HF exacerbation likely 2/2 AFib with CT Angio ruling out PE    Plan:  - Hold Lasix for now given PHAM and prerenal azotemia.  - IV Albumin 50mL was given.  - incentive spirometry  - CTM oxygen requirements  - Considering age, GDMT not best option Patient's baseline is BUN=23, Cr=0.95  BUN 58 and Cr 1.58. Ratio=36.7:1  Urine: sodium 10, protein 87, creatinine 127. FeNa is low, suggestive of prerenal azotemia likely from constrast induced nephropathy vs hypovolemia due to third spacing (low intravascular oncotic pressure and heart failure) vs diuretic use  Bladder scan shows 50mL of urine with no evidence of retention.    Plan:  - Halt diuresis and reassess kidney function tests  - Change from Lasix to Bumex due to low levels of albumin  - Replete albumin with IV 50mg.  - Encourage water intake from patient.  - Giving fluids LR 50mL/Hr for 12 hours.  - Place nephro consult. - Increased WBC count with neutrophil predominance, tachycardia with occasional hypotension. Afebrile  - Previously on steroids but stopped multiple days ago and WBC count now uptrending (was previously downtrending)  - Increased work of breathing but sating >92% on 3L NC  - DVT in RLE but PE ruled out with CTA  - Initially oxygen demands 2/2 HF exacerbation. Now concerning for superimposed infection.     - Ddx: aspiration vs hospital acquired PNA superimposed on HF exarcebation    Plan:  - Infectious workup (chest x-ray, WBC with diff, cultures, UA)  - Start CTX after Bcx is drawn. Cefepime and Zosyn increased side effects - Increased WBC count with neutrophil predominance, tachycardia with occasional hypotension. Afebrile  - Previously on steroids but stopped multiple days ago and WBC count now uptrending (was previously downtrending)  - Increased work of breathing but sating >92% on 3L NC  - DVT in RLE but PE ruled out with CTA  - Initially oxygen demands 2/2 HF exacerbation. Now concerning for superimposed infection.   - CXR: prelim read shows infiltration in RML, suggestive of hospital acquired aspiration pneumonia    - Ddx: aspiration vs hospital acquired PNA superimposed on HF exacerbation    Plan:  - Infectious workup (chest x-ray, WBC with diff, cultures, UA)  - Start CTX after Bcx is drawn. Cefepime and Zosyn increased side effects  - Start empiric ceftriaxone - Increased WBC count with neutrophil predominance, tachycardia with occasional hypotension. Afebrile  - Previously on steroids but stopped multiple days ago and WBC count now uptrending (was previously downtrending)  - Increased work of breathing but sating >92% on 3L NC  - DVT in RLE but PE ruled out with CTA  - Initially oxygen demands 2/2 HF exacerbation. Now concerning for superimposed infection.   - CXR: prelim read shows infiltration in RML, suggestive of hospital acquired aspiration pneumonia    - Ddx: aspiration vs hospital acquired PNA superimposed on HF exacerbation    Plan:  - Infectious workup (chest x-ray, WBC with diff, cultures, UA)  - Start empiric ceftriaxone after Bcx is drawn. Cefepime and Zosyn would have increased side effects

## 2024-07-26 NOTE — PROGRESS NOTE ADULT - ATTENDING COMMENTS
Encounter addended by: Nancy Campbell M.D. on: 6/29/2023 3:43 PM   Actions taken: SmartForm saved, Clinical Note Signed -Patient more somnolent today. Cr worse to 2.35 with K 5.4. Reduced PO intake and UO. -S/p lokelma 5gm x 1. -F/u BMP this evening. -Will give IV LR 50cc/hr x several hours today to try to rehydrate a bit, but cautiously given her underlying CHF and pleural effusions. -Cards and renal appreciated.   -If Cr improves/stable and volume status worse, may be able to resume diuresis and would trial bumex given low albumin. -VBG in am.   -Leukocytosis worsened and ?CXR worsened, therefore will start CTX to cover possible PNA (?aspiration vs in setting of pleural effusions/atelectasis). -F/u cultures and UA/UCx, viral swab, sputum culture.   -D/w house staff. -D/w patient's son Mani at bedside.

## 2024-07-26 NOTE — PROGRESS NOTE ADULT - PROBLEM SELECTOR PLAN 2
Patient's baseline is BUN=23, Cr=0.95  BUN 58 and Cr 1.58. Ratio=36.7:1  Urine: sodium 10, protein 87, creatinine 127. FeNa is low, suggestive of prerenal azotemia likely from hypovolemia due to third spacing (low intravascular oncotic pressure and heart failure)    Plan:  - Halt diuresis and reassess kidney function tests before more diuresis  - Change from Lasix to Bumex due to low levels of albumin  - Replete albumin with IV 50mg.  - Encourage water intake from patient. Patient's baseline is BUN=23, Cr=0.95  BUN 58 and Cr 1.58. Ratio=36.7:1  Urine: sodium 10, protein 87, creatinine 127. FeNa is low, suggestive of prerenal azotemia likely from constrast induced nephropathy vs hypovolemia due to third spacing (low intravascular oncotic pressure and heart failure) vs diuretic use    Plan:  - Halt diuresis and reassess kidney function tests before more diuresis  - Change from Lasix to Bumex due to low levels of albumin  - Replete albumin with IV 50mg.  - Encourage water intake from patient. Patient's baseline is BUN=23, Cr=0.95  BUN 58 and Cr 1.58. Ratio=36.7:1  Urine: sodium 10, protein 87, creatinine 127. FeNa is low, suggestive of prerenal azotemia likely from constrast induced nephropathy vs hypovolemia due to third spacing (low intravascular oncotic pressure and heart failure) vs diuretic use    Plan:  - Halt diuresis and reassess kidney function tests  - Change from Lasix to Bumex due to low levels of albumin  - Replete albumin with IV 50mg.  - Encourage water intake from patient. Patient's baseline is BUN=23, Cr=0.95  BUN 58 and Cr 1.58. Ratio=36.7:1  Urine: sodium 10, protein 87, creatinine 127. FeNa is low, suggestive of prerenal azotemia likely from constrast induced nephropathy vs hypovolemia due to third spacing (low intravascular oncotic pressure and heart failure) vs diuretic use  Bladder scan shows 50mL of urine with no evidence of retention.    Plan:  - Halt diuresis and reassess kidney function tests  - Change from Lasix to Bumex due to low levels of albumin  - Replete albumin with IV 50mg.  - Encourage water intake from patient. Patient's baseline is BUN=23, Cr=0.95  BUN 58 and Cr 1.58. Ratio=36.7:1  Urine: sodium 10, protein 87, creatinine 127. FeNa is low, suggestive of prerenal azotemia likely from constrast induced nephropathy vs hypovolemia due to third spacing (low intravascular oncotic pressure and heart failure) vs diuretic use  Bladder scan shows 50mL of urine with no evidence of retention.    Plan:  - Halt diuresis and reassess kidney function tests  - Change from Lasix to Bumex due to low levels of albumin  - Replete albumin with IV 50mg.  - Encourage water intake from patient.  - Place nephro consult. BNP of 1994 (below baseline) and long standing dyspnea. Home Lasix PO 40qD  Crackles at right lower lung and JVD. increased SOB 07/21 and 07/22  - SOB is improving  - CXR (07/21) showed new R sided pleural effusion likely secondary to increased hydrostatic pressure  - TTE (07/23) showed decreased EF (45-50%), dilated L and R atrium and R ventricle, tricuspid regurgitation, mPAP 53.  - BNP increased to 7124 (07/22)  -> Duplex ultrasound (7/23): Right posterior tibial DVT seen.  -> CT angio (7/23): No PEs were visualized. Moderate R and small L pleural effusion.    DDx: HF exacerbation likely 2/2 AFib with CT Angio ruling out PE    Plan:  - Hold Lasix for now given PHAM and prerenal azotemia.  - IV Albumin 50mL was given.  - incentive spirometry  - CTM oxygen requirements  - Considering age, GDMT not best option Patient's baseline is BUN=23, Cr=0.95  BUN 69 and Cr 2.35  Urine: sodium 10, protein 87, creatinine 127. FeNa is low, suggestive of prerenal azotemia likely from contrast induced nephropathy vs hypovolemia due to third spacing (low intravascular oncotic pressure and heart failure) vs diuretic use  Bladder scan shows 50mL of urine with no evidence of retention.    Plan:  - Hold diuresis   - Nephro consulted. Appreciate recs  - Encourage water intake from patient.  - Giving fluids LR 50mL/Hr for 12 hours. Patient's baseline is BUN=23, Cr=0.95  BUN 69 and Cr 2.35  Urine: sodium 10, protein 87, creatinine 127. FeNa is low, suggestive of prerenal azotemia likely from contrast induced nephropathy vs hypovolemia due to third spacing (low intravascular oncotic pressure and heart failure) vs diuretic use  Bladder scan shows 50mL of urine with no evidence of retention.    Plan:  - Hold diuresis   - Nephro consulted. Appreciate recs to hold off on fluids and diuretics.  - Encourage water intake from patient.  - Giving fluids LR 50mL/Hr for 12 hours. Patient's baseline is BUN=23, Cr=0.95  BUN 69 and Cr 2.35  Urine: sodium 10, protein 87, creatinine 127. FeNa is low, suggestive of prerenal azotemia likely from contrast induced nephropathy vs hypovolemia due to third spacing (low intravascular oncotic pressure and heart failure) vs diuretic use  Bladder scan shows 50mL of urine with no evidence of retention.    Plan:  - Hold diuresis   - Nephro consulted. Appreciate recs to hold off on fluids and diuretics.  - Encourage water intake from patient.  - Giving fluids LR 50mL/Hr. Plan to hold later in afternoon.

## 2024-07-27 NOTE — PROGRESS NOTE ADULT - PROBLEM SELECTOR PLAN 6
TTE showed BRIANNA, severe TR with mPAP of 53.  Probably secondary to afib.     - CTM, follow outpatient with cardiologist (home visits)

## 2024-07-27 NOTE — PROGRESS NOTE ADULT - PROBLEM SELECTOR PLAN 5
Duplex ultrasound visualized DVT in R posterior tibial vein. (Wells Score = 4.5)  CTA did not show evidence of PE + bilateral pleural effusions  - Patient is at high risk for bleeding with suspected GI bleed, anemia, past thrombocytopenia, age>75 and reduced functional capacity. Recommend U/S instead of full anticoagulation.    - U/S has been ordered for 7/31 and 8/7 at patient's home per home health. Patient's daughter informed by outpatient clinic.

## 2024-07-27 NOTE — PROGRESS NOTE ADULT - ASSESSMENT
89 yo with hx of HFpEF on 3L NC, Afib, HLD, CVA and lymphedema presents with initially with SOB and received blood transfusion. Over course, she received lasix, IVF and contrast and noted to have PHAM and nephrology consulted.

## 2024-07-27 NOTE — PROGRESS NOTE ADULT - PROBLEM SELECTOR PLAN 8
Does not report new onset fatigue. Past admission in early july 2024 got multiple pRBC transfusions and discharged with Hgb 9.1.  - Likely multifactorial considering age and other comorbidities  - Iron 28, TIBC 311, Tsat 9%, Ferritin  - Likely iron deficiency dominant but overall multifactorial  - HgB fell to 6.7 (07/19) s/p 1u pRBC -> post transfusion 8.2 -> 07/26 9.9.   - Significantly improved after IV iron sucrose x4 doses (end: 07/22)    DDx: occult GI bleeding vs IDS vs MDS/LGL vs less likely hemolysis    Plan:  - Consider GI outpatient colonoscopy.  - CTM  - f/u outpatient heme

## 2024-07-27 NOTE — PROGRESS NOTE ADULT - PROBLEM SELECTOR PLAN 12
- Fluids: LR 50mL till evening today  - Electrolytes: Will replete to maintain K>4, Phos>3, and Mag>2  - Nutrition: Regular Pureed  - Activity: PT  - DVT Prophylaxis: Heparin  - Stress Ulcer/GI Prophylaxis: PPI   - Disposition: Admit to medicine

## 2024-07-27 NOTE — PROGRESS NOTE ADULT - ATTENDING COMMENTS
89F w HFpEF on NC, CAD s/p remote MI, AF, CVA here w asx thrombocytopenia. Pt with low EF and high recent pulm pressures.   PHAM from cardiorenal and contrast nephropathy  Effusions on CT  Poor HD candidate  Would recommend increase BUMEX 2mg IV BID and to switch to bumex gtt if no response.   Will need goals of care.  Poor prognosis.   dw hospitalist    Julianne Burroughs MD  Off: [phone]  contact me on teams    (After 5 pm or on weekends please page the on-call fellow/attending, can check [email or web address] for schedule. Login is pratima fam, schedule under [facility] medicine, psych, derm)

## 2024-07-27 NOTE — PROGRESS NOTE ADULT - PROBLEM SELECTOR PLAN 1
- Increased WBC count with neutrophil predominance, tachycardia with occasional hypotension. Afebrile  - Previously on steroids but stopped multiple days ago and WBC count now uptrending (was previously downtrending)  - Increased work of breathing but sating >92% on 3L NC  - DVT in RLE but PE ruled out with CTA  - Initially oxygen demands 2/2 HF exacerbation. Now concerning for superimposed infection.   - CXR: prelim read shows infiltration in RML, suggestive of hospital acquired aspiration pneumonia    - Ddx: aspiration vs hospital acquired PNA superimposed on HF exacerbation    Plan:  - Infectious workup (chest x-ray, WBC with diff, cultures, UA)  - Start empiric ceftriaxone after Bcx is drawn. Cefepime and Zosyn would have increased side effects - Increased WBC count with neutrophil predominance, tachycardia with occasional hypotension. Afebrile  - Increased work of breathing but sating >92% on 3L NC  - DVT in RLE but PE ruled out with CTA  - Initially oxygen demands 2/2 HF exacerbation with subcostal retractions. Very lethargic  - CXR: prelim read shows infiltration in RML, suggestive of hospital acquired aspiration pneumonia  - VBG 07/27 showed lactic acidosis (3.6), pCO2 69 and pO2 161  - Ddx: aspiration vs hospital acquired PNA superimposed on HF exacerbation    Plan:  - f/u on sputum and bacterial cultures  - Change to zosyn 3.375 q12h (renally dosed)  - Trial NRB with possible escalation to Bipap - Increased WBC count with neutrophil predominance, tachycardia with occasional hypotension. Afebrile  - Increased work of breathing but sating >92% on 3L NC  - DVT in RLE but PE ruled out with CTA  - Initially oxygen demands 2/2 HF exacerbation with subcostal retractions. Very lethargic  - CXR: prelim read shows infiltration in RML, suggestive of hospital acquired aspiration pneumonia  - VBG 07/27 showed lactic acidosis (3.6), pCO2 69 and pO2 161  - Ddx: aspiration vs hospital acquired PNA superimposed on HF exacerbation    Plan:  - f/u on sputum and bacterial cultures  - Change to zosyn 3.375 q12h (renally dosed)  - Trial NRB with possible escalation to Bipap if needed  - F/u on VBG am  - Aspiration precaution. Swallow eval and diet changed to pureed. FYI, no feeding tube per family.

## 2024-07-27 NOTE — PROGRESS NOTE ADULT - ASSESSMENT
89F with a PMH of HFpEF on chronic 2L NC, a-fib, CVA and lymphedema presents to the ED with asymptomatic thrombocytopenia. 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  Anemia (hgb 6.7) s/p 1u pRBC improved and thrombocytopenia normalized. Now treating for increased SOB likely 2/2 worsening HF with no PE seen on imaging. Infectious workup significant for at present. 89F with a PMH of HFpEF on chronic 2L NC, a-fib, CVA and lymphedema presents to the ED with asymptomatic thrombocytopenia. 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  Anemia (hgb 6.7) s/p 1u pRBC improved and thrombocytopenia normalized. Now treating for increased SOB likely 2/2 worsening HF with no PE seen on imaging. Infectious workup significant for UTI and possible aspiration PNA. Also worsening PHAM, possibly cardiorenal vs hypovolemia.

## 2024-07-27 NOTE — PROGRESS NOTE ADULT - PROBLEM SELECTOR PLAN 7
Resolved (>200K 07/22). Asymptomatic at 4k on admission s/p dexa 40mg x 4 days (end 07/20) and IVIG x 2days  Blood smear: hypochromic microcytic anemia with anisocytosis,  neutrophils with normal morphology, relative increased in monocytes, some reactive lymphocytes seen, very scant but giant platelets  Flow cytometry: Polytypic B cells, decreased CD4: CD8 ratio with no aberrancy, NK cells show no diagnostic abnormalities, myeloid findings are normal  - Heme: not concerning for malignancy    Ddx: likely ITP considering response to treatment vs possible malignancy per heme    Plan: Resolved (7/26 - 337)  - F/u outpatient with heme

## 2024-07-27 NOTE — PROGRESS NOTE ADULT - PROBLEM SELECTOR PLAN 2
Patient's baseline is BUN=23, Cr=0.95  BUN 69 and Cr 2.35  Urine: sodium 10, protein 87, creatinine 127. FeNa is low, suggestive of prerenal azotemia likely from contrast induced nephropathy vs hypovolemia due to third spacing (low intravascular oncotic pressure and heart failure) vs diuretic use  Bladder scan shows 50mL of urine with no evidence of retention.    Plan:  - Hold diuresis   - Nephro consulted. Appreciate recs to hold off on fluids and diuretics.  - Encourage water intake from patient.  - Giving fluids LR 50mL/Hr. Plan to hold later in afternoon. Patient's baseline is BUN=23, Cr=0.95  BUN 77 and Cr 3.32 this am. Trending up.  Bladder scan shows 50mL of urine with no evidence of retention.  Ddx: likely cardiorenal    Plan:  - Bumex 2mg BID  - Strict ins and outs. Place koehler  - Nephro consulted. Appreciate recs   - CTM fluid status

## 2024-07-27 NOTE — CHART NOTE - NSCHARTNOTEFT_GEN_A_CORE
Family at bedside with patient, including patient's children (Mani Oliva and Angela Oliva). Patient refused Bipap initially but she agreed to try it after some consideration. Patient is sill very somnolent and is only able to nod. Asked RT to retry placing Bipap on her. Spoke with the family about her prognosis regarding worsening respiratory failure and PHAM. Family want to see if he Bumex helps with her respiration today but mentioned they would consider comfort care tomorrow. Alison Garcia in palliative is aware.

## 2024-07-27 NOTE — PROGRESS NOTE ADULT - PROBLEM SELECTOR PLAN 10
Resolved. RLE more erythematous and warm compared to LLE with some mild tenderness to palpation. Received steroid and IVIG so started IV antibx. Not concerned for MRSA.    - received Cefazolin 1000 q8h for 5 days (end: 07/21)  - Improved on exam. CTM

## 2024-07-27 NOTE — PROGRESS NOTE ADULT - ATTENDING COMMENTS
89F with a PMH of HFpEF on chronic 2L NC, a-fib, CVA and lymphedema presents to the ED with asymptomatic thrombocytopenia. 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  Anemia (hgb 6.7) s/p 1u pRBC improved and thrombocytopenia normalized. Now treating for increased SOB likely 2/2 worsening HF with no PE seen on imaging. Infectious workup significant for UTI and possible aspiration PNA. Also worsening PHAM, possibly cardiorenal vs hypovolemia.    - Patient somnolent. Reduced PO intake and UO. -S/p lokelma 5gm x 1. -F/u BMP this evening. -Received IV LR 50cc/hr x several hours yesterday to try to rehydrate a bit, followed by small bolus due to hypotension. -Cards and renal appreciated.   - Cr continues to rise 3.32. Started on Bumex 2mg IV followed by TID. Possible cardiorenal picture. D/w Nephro   - Leukocytosis ?CXR worsened, therefore will start CTX to cover possible PNA (?aspiration vs in setting of pleural effusions/atelectasis) concern for aspiration --> abx changed to renally dosed zosyn. -F/u cultures and UA showing possible infection, COVID RSV influenza negative, sputum culture pending    - VBG showing pH 7.29 with hypercapnia 69, will place on BIPAP with repeat ABG      -D/w patient's son Mani at bedside. Discussed that pt does not want invasive measure if we are unable to stabilize acute issues. He would consider comfort measures if not able to resolve acute issues.

## 2024-07-27 NOTE — PROGRESS NOTE ADULT - SUBJECTIVE AND OBJECTIVE BOX
[facility] DIVISION OF KIDNEY DISEASES AND HYPERTENSION --    24 hour events/subjective:    pt seen with family bedside  more sob and labored    PAST HISTORY  --------------------------------------------------------------------------------  No significant changes to PMH, PSH, FHx, SHx, unless otherwise noted    ALLERGIES & MEDICATIONS  --------------------------------------------------------------------------------  Allergies    No Known Allergies    Intolerances      Standing Inpatient Medications  atorvastatin 20 milliGRAM(s) Oral at bedtime  buMETAnide Injectable 2 milliGRAM(s) IV Push two times a day  hemorrhoidal Ointment 1 Application(s) Rectal two times a day  heparin   Injectable 5000 Unit(s) SubCutaneous every 12 hours  melatonin 3 milliGRAM(s) Oral at bedtime  metoprolol tartrate 100 milliGRAM(s) Oral three times a day  pantoprazole    Tablet 40 milliGRAM(s) Oral before breakfast  piperacillin/tazobactam IVPB.- 3.375 Gram(s) IV Intermittent once  senna 2 Tablet(s) Oral at bedtime    PRN Inpatient Medications  polyethylene glycol 3350 17 Gram(s) Oral two times a day PRN      REVIEW OF SYSTEMS  --------------------------------------------------------------------------------  Gen: No weight changes, fatigue, fevers/chills, weakness  Skin: No rashes  Respiratory: No dyspnea, cough, wheezing, hemoptysis  CV: No chest pain, PND, orthopnea  GI: No abdominal pain, diarrhea, constipation, nausea, vomiting, melena, hematochezia  : No increased frequency, dysuria, hematuria, nocturia  Neuro: No dizziness/lightheadedness    All other systems were reviewed and are negative, except as noted.    VITALS/PHYSICAL EXAM  --------------------------------------------------------------------------------  T(C): 36.2 (07-27-24 @ 11:01), Max: 36.4 (07-27-24 @ 05:14)  HR: 123 (07-27-24 @ 14:30) (80 - 123)  BP: 87/59 (07-27-24 @ 11:01) (77/54 - 100/70)  RR: 18 (07-27-24 @ 11:01) (18 - 20)  SpO2: 97% (07-27-24 @ 14:30) (94% - 97%)  Wt(kg): --        07-26-24 @ 07:01  -  07-27-24 @ 07:00  --------------------------------------------------------  IN: 0 mL / OUT: 0 mL / NET: 0 mL    07-27-24 @ 07:01  -  07-27-24 @ 17:21  --------------------------------------------------------  IN: 25 mL / OUT: 0 mL / NET: 25 mL      Physical Exam:  	Gen: mild resp distress  	Pulm: decreased bs  	CV: RRR, S1S2  	Abd: +BS,   	LE: Warm, edema  	Skin: Warm, without rashes    LABS/STUDIES  --------------------------------------------------------------------------------              9.9    13.00 >-----------<  276      [07-27-24 @ 10:34]              34.0     144  |  97  |  77  ----------------------------<  97      [07-27-24 @ 10:33]  5.6   |  28  |  3.32        Ca     9.1     [07-27-24 @ 10:33]      Mg     2.4     [07-27-24 @ 10:33]      Phos  6.3     [07-27-24 @ 10:33]    TPro  6.6  /  Alb  3.2  /  TBili  1.7  /  DBili  x   /  AST  67  /  ALT  24  /  AlkPhos  77  [07-27-24 @ 10:33]          Creatinine Trend:  SCr 3.32 [07-27 @ 10:33]  SCr 2.79 [07-26 @ 16:12]  SCr 2.35 [07-26 @ 06:32]  SCr 1.58 [07-25 @ 07:21]  SCr 1.22 [07-24 @ 06:06]    Urinalysis - [07-27-24 @ 10:33]      Color  / Appearance  / SG  / pH       Gluc 97 / Ketone   / Bili  / Urobili        Blood  / Protein  / Leuk Est  / Nitrite       RBC  / WBC  / Hyaline  / Gran  / Sq Epi  / Non Sq Epi  / Bacteria     Urine Creatinine 127      [07-25-24 @ 13:12]  Urine Protein 87      [07-25-24 @ 13:12]  Urine Sodium 10      [07-25-24 @ 13:12]  Urine Urea Nitrogen 387      [07-25-24 @ 13:12]  Urine Potassium 51      [07-25-24 @ 13:12]  Urine Osmolality 471      [07-25-24 @ 13:12]    Iron 28, TIBC 311, %sat 9      [07-18-24 @ 07:26]  Ferritin 36      [07-18-24 @ 07:26]  TSH 5.56      [07-01-24 @ 03:48]  Lipid: chol 88, TG 96, HDL 22, LDL --      [07-01-24 @ 03:48]    HBsAg Nonreact      [07-03-24 @ 17:11]  HCV 0.16, Nonreact      [07-03-24 @ 17:11]  HIV Nonreact      [07-02-24 @ 06:11]

## 2024-07-27 NOTE — PROGRESS NOTE ADULT - PROBLEM SELECTOR PLAN 3
Have pt see Dr. Jacob or Dr. Hyman for a biopsy of one of the lesions    Electronically Signed by: Rodney Steele MD, 4/15/2020   BNP of 1994 (below baseline) and long standing dyspnea. Home Lasix PO 40qD  Crackles at right lower lung and JVD. increased SOB 07/21 and 07/22  - SOB is improving  - CXR (07/21) showed new R sided pleural effusion likely secondary to increased hydrostatic pressure  - TTE (07/23) showed decreased EF (45-50%), dilated L and R atrium and R ventricle, tricuspid regurgitation, mPAP 53.  - BNP increased to 7124 (07/22)  -> Duplex ultrasound (7/23): Right posterior tibial DVT seen.  -> CT angio (7/23): No PEs were visualized. Moderate R and small L pleural effusion.    DDx: HF exacerbation likely 2/2 AFib with CT Angio ruling out PE    Plan:  - Hold Lasix for now given PHAM and prerenal azotemia.  - incentive spirometry  - CTM oxygen requirements  - Considering age, GDMT not best option BNP of 1994 (below baseline) and long standing dyspnea. Home Lasix PO 40qD  Crackles at right lower lung and JVD. increased SOB 07/21 and 07/22  - SOB is improving  - CXR (07/21) showed new R sided pleural effusion likely secondary to increased hydrostatic pressure  - TTE (07/23) showed decreased EF (45-50%), dilated L and R atrium and R ventricle, tricuspid regurgitation, mPAP 53.  - BNP increased to 7124 (07/22)  -> Duplex ultrasound (7/23): Right posterior tibial DVT seen.  -> CT angio (7/23): No PEs were visualized. Moderate R and small L pleural effusion.    DDx: HF exacerbation likely 2/2 AFib with CT Angio ruling out PE. Now with superimposed infection    Plan:  - Bumex as above  - incentive spirometry  - AHRF treatment as above  - Considering age, GDMT not best option

## 2024-07-27 NOTE — PROGRESS NOTE ADULT - PROBLEM SELECTOR PLAN 4
- Pradaxa was stopped during last admission for bleeding risk (early july 2024). Known melena during that admission. - CHADSVASC 5, HASBLED 3.  - In continuous AFib during admission, with HR ranging from 60s to 130s  - Held Pradaxa due to recent presumed GI bleed  - Continue metop tartrate to 100 mg TID  - Cards rec: Hold rhythm control as chemical cardioversion increases risk for stroke (off AC for few weeks now) - Pradaxa was stopped during last admission for bleeding risk (early july 2024). Known melena during that admission. - CHADSVASC 5, HASBLED 3.  - In continuous AFib during admission, with HR ranging from 80s to 130s  - Held Pradaxa due to recent presumed GI bleed  - Continue metop tartrate to 100 mg TID  - Cards rec: Hold rhythm control as chemical cardioversion increases risk for stroke (off AC for few weeks now)

## 2024-07-27 NOTE — PROGRESS NOTE ADULT - SUBJECTIVE AND OBJECTIVE BOX
***************************************************************  Darrel Contreras  (PGY1) Internal Medicine  On TEAMS  ***************************************************************    PROGRESS NOTE:     Patient is a 89y old  Female who presents with a chief complaint of asymptomatic thrombocytopenia (26 Jul 2024 15:00)      INTERVAL EVENTS:   SUBJECTIVE / OVERNIGHT EVENTS: No acute overnight events. Patient was seen and examined by the bedside this AM.   OXYGEN:   TELEMETRY:       MEDICATIONS  (STANDING):  atorvastatin 20 milliGRAM(s) Oral at bedtime  cefTRIAXone   IVPB 1000 milliGRAM(s) IV Intermittent every 24 hours  hemorrhoidal Ointment 1 Application(s) Rectal two times a day  heparin   Injectable 5000 Unit(s) SubCutaneous every 12 hours  melatonin 3 milliGRAM(s) Oral at bedtime  metoprolol tartrate 100 milliGRAM(s) Oral three times a day  pantoprazole    Tablet 40 milliGRAM(s) Oral before breakfast  senna 2 Tablet(s) Oral at bedtime    MEDICATIONS  (PRN):  polyethylene glycol 3350 17 Gram(s) Oral two times a day PRN Constipation      CAPILLARY BLOOD GLUCOSE        I&O's Summary    26 Jul 2024 07:01  -  27 Jul 2024 07:00  --------------------------------------------------------  IN: 0 mL / OUT: 0 mL / NET: 0 mL        PHYSICAL EXAM:  Vital Signs Last 24 Hrs  T(C): 36.4 (27 Jul 2024 05:14), Max: 36.5 (26 Jul 2024 14:07)  T(F): 97.5 (27 Jul 2024 05:14), Max: 97.7 (26 Jul 2024 14:07)  HR: 114 (27 Jul 2024 05:14) (80 - 117)  BP: 90/55 (27 Jul 2024 05:14) (77/54 - 100/70)  BP(mean): --  RR: 20 (27 Jul 2024 05:14) (18 - 20)  SpO2: 94% (27 Jul 2024 05:14) (94% - 98%)    Parameters below as of 27 Jul 2024 05:14  Patient On (Oxygen Delivery Method): nasal cannula  O2 Flow (L/min): 3      General : NAD  CV: RRR no R/M/G  Lungs: CTAB  Abd : Soft, non-tender, non-distended  Extremities : No LE Edema  Neuro : A&Ox3  Skin: Normal color and turgor    LABS:                        9.9    14.69 )-----------( 270      ( 27 Jul 2024 00:45 )             35.5     07-26    143  |  97  |  70<H>  ----------------------------<  117<H>  5.1   |  33<H>  |  2.79<H>    Ca    9.5      26 Jul 2024 16:12  Phos  5.4     07-26  Mg     2.2     07-26    TPro  6.8  /  Alb  3.2<L>  /  TBili  1.4<H>  /  DBili  0.6<H>  /  AST  30  /  ALT  10  /  AlkPhos  79  07-26          Urinalysis Basic - ( 26 Jul 2024 18:25 )    Color: Dark Yellow / Appearance: Turbid / SG: >1.030 / pH: x  Gluc: x / Ketone: Negative mg/dL  / Bili: Small / Urobili: 1.0 mg/dL   Blood: x / Protein: 300 mg/dL / Nitrite: Negative   Leuk Esterase: Moderate / RBC: 34 /HPF / WBC 74 /HPF   Sq Epi: x / Non Sq Epi: 7 /HPF / Bacteria: Many /HPF          COORDINATION OF CARE:  Care Discussed with Consultants/Other Providers [Y/N]:  Prior or Outpatient Records Reviewed [Y/N]: ***************************************************************  Darrel Contreras  (PGY1) Internal Medicine  On TEAMS  ***************************************************************    PROGRESS NOTE:     Patient is a 89y old  Female who presents with a chief complaint of asymptomatic thrombocytopenia (26 Jul 2024 15:00)      INTERVAL EVENTS:   SUBJECTIVE / OVERNIGHT EVENTS: Received 250cc bolus for hypotension (70 systolic) with improvement (100 systolic). Patient was seen and examined by the bedside this AM. Work of breathing increased this am and was very lethargic. Not able to hold a conversation.   OXYGEN:   TELEMETRY:       MEDICATIONS  (STANDING):  atorvastatin 20 milliGRAM(s) Oral at bedtime  cefTRIAXone   IVPB 1000 milliGRAM(s) IV Intermittent every 24 hours  hemorrhoidal Ointment 1 Application(s) Rectal two times a day  heparin   Injectable 5000 Unit(s) SubCutaneous every 12 hours  melatonin 3 milliGRAM(s) Oral at bedtime  metoprolol tartrate 100 milliGRAM(s) Oral three times a day  pantoprazole    Tablet 40 milliGRAM(s) Oral before breakfast  senna 2 Tablet(s) Oral at bedtime    MEDICATIONS  (PRN):  polyethylene glycol 3350 17 Gram(s) Oral two times a day PRN Constipation      CAPILLARY BLOOD GLUCOSE        I&O's Summary    26 Jul 2024 07:01  -  27 Jul 2024 07:00  --------------------------------------------------------  IN: 0 mL / OUT: 0 mL / NET: 0 mL        PHYSICAL EXAM:  Vital Signs Last 24 Hrs  T(C): 36.4 (27 Jul 2024 05:14), Max: 36.5 (26 Jul 2024 14:07)  T(F): 97.5 (27 Jul 2024 05:14), Max: 97.7 (26 Jul 2024 14:07)  HR: 114 (27 Jul 2024 05:14) (80 - 117)  BP: 90/55 (27 Jul 2024 05:14) (77/54 - 100/70)  BP(mean): --  RR: 20 (27 Jul 2024 05:14) (18 - 20)  SpO2: 94% (27 Jul 2024 05:14) (94% - 98%)    Parameters below as of 27 Jul 2024 05:14  Patient On (Oxygen Delivery Method): nasal cannula  O2 Flow (L/min): 3      General : NAD  CV: RRR no R/M/G  Lungs: CTAB  Abd : Soft, non-tender, non-distended  Extremities : No LE Edema  Neuro : A&Ox3  Skin: Normal color and turgor    LABS:                        9.9    14.69 )-----------( 270      ( 27 Jul 2024 00:45 )             35.5     07-26    143  |  97  |  70<H>  ----------------------------<  117<H>  5.1   |  33<H>  |  2.79<H>    Ca    9.5      26 Jul 2024 16:12  Phos  5.4     07-26  Mg     2.2     07-26    TPro  6.8  /  Alb  3.2<L>  /  TBili  1.4<H>  /  DBili  0.6<H>  /  AST  30  /  ALT  10  /  AlkPhos  79  07-26          Urinalysis Basic - ( 26 Jul 2024 18:25 )    Color: Dark Yellow / Appearance: Turbid / SG: >1.030 / pH: x  Gluc: x / Ketone: Negative mg/dL  / Bili: Small / Urobili: 1.0 mg/dL   Blood: x / Protein: 300 mg/dL / Nitrite: Negative   Leuk Esterase: Moderate / RBC: 34 /HPF / WBC 74 /HPF   Sq Epi: x / Non Sq Epi: 7 /HPF / Bacteria: Many /HPF          COORDINATION OF CARE:  Care Discussed with Consultants/Other Providers [Y/N]:  Prior or Outpatient Records Reviewed [Y/N]: ***************************************************************  Darrel Contreras  (PGY1) Internal Medicine  On TEAMS  ***************************************************************    PROGRESS NOTE:     Patient is a 89y old  Female who presents with a chief complaint of asymptomatic thrombocytopenia (26 Jul 2024 15:00)      INTERVAL EVENTS:   SUBJECTIVE / OVERNIGHT EVENTS: Received 250cc bolus for hypotension (70 systolic) with improvement (100 systolic). Patient was seen and examined by the bedside this AM. Work of breathing increased this am and was very lethargic. Not able to hold a conversation.   OXYGEN:   TELEMETRY:       MEDICATIONS  (STANDING):  atorvastatin 20 milliGRAM(s) Oral at bedtime  cefTRIAXone   IVPB 1000 milliGRAM(s) IV Intermittent every 24 hours  hemorrhoidal Ointment 1 Application(s) Rectal two times a day  heparin   Injectable 5000 Unit(s) SubCutaneous every 12 hours  melatonin 3 milliGRAM(s) Oral at bedtime  metoprolol tartrate 100 milliGRAM(s) Oral three times a day  pantoprazole    Tablet 40 milliGRAM(s) Oral before breakfast  senna 2 Tablet(s) Oral at bedtime    MEDICATIONS  (PRN):  polyethylene glycol 3350 17 Gram(s) Oral two times a day PRN Constipation      CAPILLARY BLOOD GLUCOSE        I&O's Summary    26 Jul 2024 07:01  -  27 Jul 2024 07:00  --------------------------------------------------------  IN: 0 mL / OUT: 0 mL / NET: 0 mL        PHYSICAL EXAM:  Vital Signs Last 24 Hrs  T(C): 36.4 (27 Jul 2024 05:14), Max: 36.5 (26 Jul 2024 14:07)  T(F): 97.5 (27 Jul 2024 05:14), Max: 97.7 (26 Jul 2024 14:07)  HR: 114 (27 Jul 2024 05:14) (80 - 117)  BP: 90/55 (27 Jul 2024 05:14) (77/54 - 100/70)  BP(mean): --  RR: 20 (27 Jul 2024 05:14) (18 - 20)  SpO2: 94% (27 Jul 2024 05:14) (94% - 98%)    Parameters below as of 27 Jul 2024 05:14  Patient On (Oxygen Delivery Method): nasal cannula  O2 Flow (L/min): 3      General : Looks uncomfortable with increased work of breathing.   CV: Tachycardic that is irregularly irregular. Difficult to hear murmurs due to breathing effort  Lungs: Crackle in Right Lower lobe with decreased breathing sound in Left lower lobe. Significant subcostal retraction  Abd : Soft, non-tender, non-distended  Extremities : Legs with lymphedema wrapped up in dressing. Significant edema on her back with no pitting  Neuro : A&Ox1-2? --> unable to hold a conversation and very lethargic  Skin: Normal color and turgor    LABS:                        9.9    14.69 )-----------( 270      ( 27 Jul 2024 00:45 )             35.5     07-26    143  |  97  |  70<H>  ----------------------------<  117<H>  5.1   |  33<H>  |  2.79<H>    Ca    9.5      26 Jul 2024 16:12  Phos  5.4     07-26  Mg     2.2     07-26    TPro  6.8  /  Alb  3.2<L>  /  TBili  1.4<H>  /  DBili  0.6<H>  /  AST  30  /  ALT  10  /  AlkPhos  79  07-26          Urinalysis Basic - ( 26 Jul 2024 18:25 )    Color: Dark Yellow / Appearance: Turbid / SG: >1.030 / pH: x  Gluc: x / Ketone: Negative mg/dL  / Bili: Small / Urobili: 1.0 mg/dL   Blood: x / Protein: 300 mg/dL / Nitrite: Negative   Leuk Esterase: Moderate / RBC: 34 /HPF / WBC 74 /HPF   Sq Epi: x / Non Sq Epi: 7 /HPF / Bacteria: Many /HPF          COORDINATION OF CARE:  Care Discussed with Consultants/Other Providers [Y/N]:  Prior or Outpatient Records Reviewed [Y/N]:

## 2024-07-28 NOTE — PROGRESS NOTE ADULT - PROBLEM SELECTOR PLAN 11
Currently hypotensive with SBP<100 but baseline for patient.    - Metop tartrate 75mg TID DVT PPx: heparin SQ  Diet: pureed, pending S+S evaluation  Dispo: TBD, medically acute  Code Status: DNR/DNI w/ ToNIV, no HD, no feeding tube. Ok w/ IVFs, pressors as needed, antibiotics (see GOC note from 7/26)

## 2024-07-28 NOTE — CONSULT NOTE ADULT - PROBLEM SELECTOR RECOMMENDATION 3
- worsening renal function; CR-4 on 7/28   - US 7/26- No hydronephrosis. 6 mm nonobstructing stone upper pole right kidney.  1.2 cm simple cyst upper pole left kidney.  - Nephrology recommendations appreciated  - Family confirm deferring HD  - management as per primary team

## 2024-07-28 NOTE — PROGRESS NOTE ADULT - CONVERSATION DETAILS
Spoke to patient's son Mani and daughter Angela at bedside regarding patient's current prognosis and medical course. Explained that the patient is continuing to slowly deteriorate despite medical therapy including diuresis and BIPAP. Per discussions with primary team yesterday, they were considering possibly transitioning to comfort-based care however are not ready to pursue that currently. Family would like to continue management with diuresis and BIPAP for now and will make a decision to transition to comfort-based care over the next couple of days.    Patient remains DNR/DNI w/ ToNIV, no HD, no feeding tube. Will continue diuresis and BIPAP for now. Will continue antibiotics for aspiration PNA and UTI. Will hold off pressors unless absolutely necessary.     Emotional support provided, all questions answered Spoke to patient's son Mani and daughter Angela at bedside regarding patient's current prognosis and medical course. Explained that the patient is continuing to slowly deteriorate despite medical therapy including diuresis and BIPAP. Per discussions with primary team yesterday, they were considering possibly transitioning to comfort-based care however are not ready to pursue that currently. Family would like to continue management with diuresis and BIPAP for now and will make a decision to transition to comfort-based care over the next couple of days.    Patient remains DNR/DNI w/ ToNIV, no HD, no feeding tube. Will continue diuresis and BIPAP for now. Will continue antibiotics for aspiration PNA and UTI. Will hold off pressors unless absolutely necessary.     Emotional support provided, all questions answered    ADDENDUM: Patient noted to be hypotensive again to 70s/50s around 11AM. Saw patient at bedside with son Mani. Rediscussed goals of care including pressors for hypotension and would like to hold off for now. Will give additional 250cc IVF bolus and hold off further diuresis for now. Pending discussions with patient's daughter, will speak to palliative for PCU evaluation.

## 2024-07-28 NOTE — PROGRESS NOTE ADULT - SUBJECTIVE AND OBJECTIVE BOX
*******************************  Jorge Rodriguez MD (PGY-3)  Internal Medicine  Contact via [employer] TEAMS  *******************************    PROGRESS NOTE:     Patient is a 89y old  Female who presents with a chief complaint of asymptomatic thrombocytopenia (28 Jul 2024 07:02)    INTERVAL EVENTS: Noted to be hypotensive overnight to 70s/50s, assessed at bedside and given 250cc IVF bolus with some improvement. Remains on BiPAP    SUBJECTIVE: Patient seen and examined at bedside. Appears lethargic on BiPAP, unable to meaningfully participate in interview.    MEDICATIONS  (STANDING):  atorvastatin 20 milliGRAM(s) Oral at bedtime  buMETAnide Injectable 2 milliGRAM(s) IV Push two times a day  hemorrhoidal Ointment 1 Application(s) Rectal two times a day  heparin   Injectable 5000 Unit(s) SubCutaneous every 12 hours  melatonin 3 milliGRAM(s) Oral at bedtime  metoprolol tartrate 100 milliGRAM(s) Oral three times a day  pantoprazole    Tablet 40 milliGRAM(s) Oral before breakfast  piperacillin/tazobactam IVPB.. 3.375 Gram(s) IV Intermittent every 12 hours  senna 2 Tablet(s) Oral at bedtime    MEDICATIONS  (PRN):  polyethylene glycol 3350 17 Gram(s) Oral two times a day PRN Constipation    CAPILLARY BLOOD GLUCOSE    I&O's Summary    27 Jul 2024 07:01  -  28 Jul 2024 07:00  --------------------------------------------------------  IN: 25 mL / OUT: 50 mL / NET: -25 mL    PHYSICAL EXAM:  Vital Signs Last 24 Hrs  T(C): 36.4 (27 Jul 2024 21:32), Max: 36.4 (27 Jul 2024 21:32)  T(F): 97.5 (27 Jul 2024 21:32), Max: 97.5 (27 Jul 2024 21:32)  HR: 132 (28 Jul 2024 08:05) (105 - 134)  BP: 91/64 (28 Jul 2024 03:50) (87/59 - 98/80)  BP(mean): --  RR: 18 (28 Jul 2024 03:50) (18 - 18)  SpO2: 100% (28 Jul 2024 08:05) (96% - 100%)    Parameters below as of 28 Jul 2024 03:50  Patient On (Oxygen Delivery Method): BiPAP/CPAP    GENERAL: uncomfortable appearing on BIPAP  NECK: unable to assess JVD  HEART: S1, S2, tachycardic and irregular. No murmurs, rubs, or gallops  LUNGS: tachypneic, coarse breath sounds b/l and decreased at bases  ABDOMEN: Soft, nontender, nondistended, +BS  EXTREMITIES: 2+ peripheral pulses bilaterally. +lymphedema of b/l LE  NERVOUS SYSTEM:  A&Ox2, lethargic, follows some commands  SKIN: No rashes or lesions    LABS:                        9.9    13.00 )-----------( 276      ( 27 Jul 2024 10:34 )             34.0     07-27    144  |  97  |  77<H>  ----------------------------<  97  5.6<H>   |  28  |  3.32<H>    Ca    9.1      27 Jul 2024 10:33  Phos  6.3     07-27  Mg     2.4     07-27    TPro  6.6  /  Alb  3.2<L>  /  TBili  1.7<H>  /  DBili  x   /  AST  67<H>  /  ALT  24  /  AlkPhos  77  07-27    Urinalysis Basic - ( 27 Jul 2024 10:33 )    Color: x / Appearance: x / SG: x / pH: x  Gluc: 97 mg/dL / Ketone: x  / Bili: x / Urobili: x   Blood: x / Protein: x / Nitrite: x   Leuk Esterase: x / RBC: x / WBC x   Sq Epi: x / Non Sq Epi: x / Bacteria: x    Culture - Blood (collected 26 Jul 2024 16:06)  Source: .Blood Blood-Peripheral  Preliminary Report (27 Jul 2024 20:01):    No growth at 24 hours    Culture - Blood (collected 26 Jul 2024 16:02)  Source: .Blood Blood-Peripheral  Preliminary Report (27 Jul 2024 20:01):    No growth at 24 hours    RADIOLOGY & ADDITIONAL TESTS:  Results Reviewed:   Imaging Personally Reviewed:  Electrocardiogram Personally Reviewed:  Tele:

## 2024-07-28 NOTE — CONSULT NOTE ADULT - REASON FOR ADMISSION
asymptomatic thrombocytopenia

## 2024-07-28 NOTE — PROGRESS NOTE ADULT - PROBLEM SELECTOR PLAN 1
- Increased WBC count with neutrophil predominance, tachycardia with occasional hypotension. Afebrile  - Increased work of breathing but sating >92% on 3L NC  - DVT in RLE but PE ruled out with CTA  - Initially oxygen demands 2/2 HF exacerbation with subcostal retractions. Very lethargic  - CXR: prelim read shows infiltration in RML, suggestive of hospital acquired aspiration pneumonia  - VBG 07/27 showed lactic acidosis (3.6), pCO2 69 and pO2 161  - Ddx: aspiration vs hospital acquired PNA superimposed on HF exacerbation    Plan:  - f/u on sputum and bacterial cultures  - continue Zosyn 3.375 q12h (renally dosed)  - continue BIPAP; wean as tolerated  - aspiration precaution. Swallow eval and diet changed to pureed. FYI, no feeding tube per family. - Increased WBC count with neutrophil predominance, tachycardia with occasional hypotension. Afebrile  - Increased work of breathing but sating >92% on 3L NC  - DVT in RLE but PE ruled out with CTA  - Initially oxygen demands 2/2 HF exacerbation with subcostal retractions. Very lethargic  - CXR: prelim read shows infiltration in RML, suggestive of hospital acquired aspiration pneumonia  - VBG 07/27 showed lactic acidosis (3.6), pCO2 69 and pO2 161  - Ddx: aspiration vs hospital acquired PNA superimposed on HF exacerbation  - BCx NGTD    Plan:  - continue Zosyn 3.375 q12h (7/27- )  - continue BIPAP; wean as tolerated  - aspiration precaution. Swallow eval and diet changed to pureed. FYI, no feeding tube per family Recurrent episodes of hypotension to 70s/50s, possibly 2/2 hypovolemia i/s/o diuresis vs septic shock 2/2 possible aspiration  - s/p several IVF boluses (cautiously given due to fluid overloaded state)  - discussed with family at bedside, would like to hold off pressors for now    Plan:  - give PRN boluses of 250cc LR  - hold further diuresis for now  - ongoing GOC- possible comfort care and PCU evaluation

## 2024-07-28 NOTE — PROGRESS NOTE ADULT - PROBLEM SELECTOR PLAN 9
Close to baseline now. No drainage today    Plan:  - IV treatment per above  -> Wound care consult: conservative management with [facility] [address]  Observe discharge from left leg (currently no discharge) Close to baseline now    Plan:  - IV treatment per above  -> Wound care consult: conservative management with f/u outpatient at [facility] [address]  Observe discharge from left leg (currently no discharge) Does not report new onset fatigue. Past admission in early july 2024 got multiple pRBC transfusions and discharged with Hgb 9.1.  - Likely multifactorial considering age and other comorbidities  - Iron 28, TIBC 311, Tsat 9%, Ferritin  - Likely iron deficiency dominant but overall multifactorial  - HgB fell to 6.7 (07/19) s/p 1u pRBC -> post transfusion 8.2 -> 07/26 9.9.   - Significantly improved after IV iron sucrose x4 doses (end: 07/22)    DDx: occult GI bleeding vs IDS vs MDS/LGL vs less likely hemolysis    Plan:  - consider outpatient GI follow-up for colonoscopy (if within GOC)  - outpatient hematology follow-up

## 2024-07-28 NOTE — CONSULT NOTE ADULT - NSCONSULTADDITIONALINFOA_GEN_ALL_CORE
In the event patient is symptomatic and family wants to prioritize symptoms would recommend the following:  - IV Dilaudid 0.2mg q2 PRN pain/dyspnea  - IV Ativan 0.25mg q2 PRN anxiety/agitation/ refractory dyspnea  - IV Robinul 0.4mg q6 PRN secretions

## 2024-07-28 NOTE — PROGRESS NOTE ADULT - PROBLEM SELECTOR PLAN 4
- Pradaxa was stopped during last admission for bleeding risk (early july 2024). Known melena during that admission. - CHADSVASC 5, HASBLED 3.  - In continuous AFib during admission, with HR ranging from 80s to 130s  - Held Pradaxa due to recent presumed GI bleed  - Continue metop tartrate to 100 mg TID  - Cards rec: Hold rhythm control as chemical cardioversion increases risk for stroke (off AC for few weeks now) - Pradaxa was stopped during last admission for bleeding risk (early july 2024). Known melena during that admission. - CHADSVASC 5, HASBLED 3.  - In continuous AFib during admission, with HR ranging from 80s to 130s    Plan:  - cardiology following; appreciate recs  - no AC for now due to recent presumed GI bleed  - c/w metoprolol tartrate 100 mg TID for rate control  - hold rhythm control given increased risk of stroke w/ conversion to NSR off AC BNP of 1994 (below baseline) and long standing dyspnea. Home Lasix PO 40qD  Crackles at right lower lung and JVD. increased SOB 07/21 and 07/22  - SOB is improving  - CXR (07/21) showed new R sided pleural effusion likely secondary to increased hydrostatic pressure  - TTE (07/23) showed decreased EF (45-50%), dilated L and R atrium and R ventricle, tricuspid regurgitation, mPAP 53.  - BNP increased to 7124 (07/22)  -> Duplex ultrasound (7/23): Right posterior tibial DVT seen.  -> CT angio (7/23): No PEs were visualized. Moderate R and small L pleural effusion.    DDx: HF exacerbation likely 2/2 AFib with CT Angio ruling out PE. Now with superimposed infection    Plan:  - hold IV Bumex 2mg BID for diuresis given hypotension as above  - GDMT on hold given hypotension and age

## 2024-07-28 NOTE — CONSULT NOTE ADULT - ASSESSMENT
89F with a PMH of HFpEF on chronic 2L NC, a-fib, CVA and lymphedema presents to the ED with asymptomatic thrombocytopenia. 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  Anemia (hgb 6.7) s/p 1u pRBC improved and thrombocytopenia normalized. Now treating for increased SOB likely 2/2 worsening HF with no PE seen on imaging. Infectious workup significant for UTI and possible aspiration PNA. Also worsening PHAM, possibly cardiorenal vs hypovolemia.  Palliative Care consulted for complex decision making  related to goals of care discussions in the setting of advanced illness/ evaluation and management of symptoms.

## 2024-07-28 NOTE — CONSULT NOTE ADULT - CONVERSATION DETAILS
HCPs Mani and Angela verbalized understanding of patient's hospitalization course and clinical status. They feel conflicted about next steps, including decision if they should transition to comfort centric approach of care. While they recognize patient's tenuous clinical status and guarded prognosis, they share patient was more interactive this afternoon.   They confirm that patient is DNR/DNI. Trial of non-invasive ventilation. No feeding tube. NO hemodialysis.     Family wish to reassess patient's clinical status in the next 24 hours to determine if they wish to continue with medical management vs transition to comfort centric approach of care. Addressed their questions regarding details of comfort centric approach of care and discussed possibility of PCU evaluation for further symptom management and disposition planning if goals transition towards symptom driven care.     Also discussed option of continued medical management with prioritization of symptoms as patient mildly dyspneic during encounter. Counseled about use of low dose opiates for dyspnea. Family wish to hold off on opiate initiation until reassessment of patient's clinical status tomorrow.     Family appreciative of counseling.  Emotional support provided

## 2024-07-28 NOTE — CONSULT NOTE ADULT - SUBJECTIVE AND OBJECTIVE BOX
Date of Service 07-28-24 @ 20:01    HPI:  89 y/o pt with a PMH of HFpEF on chronic 3-6L NC, a-fib, CVA and lymphedema presents to the ED with asymptomatic thrombocytopenia with baseline SOB and no jaimie blood from any bodily orifices. 3 weeks ago, patient got admitted for decreased HgB, for which EGD and colonoscopy was done. EGD was unremarkable while colonoscopy was non-determinative due to poor bowel prep. Patient chose to follow up outpatient and was discharged. Heparin was not administered during the hospital course. 2 days ago, the patient's home health aide found clear to pink exudate from bilateral chronic leg lymphedema. Earlier today, the patient received a result showing 4000 platelets which prompted daughter to bring her to the ED. Patient does not have chest pain, fever, loss of consciousness, change in vision, nausea, vomiting, headache, recent trauma, blood in stool, urine, mouth, anus, eyes, or nose. She also denies any recent diarrhea or significant weight loss. Last two weeks, patient complained of fatigue and she has history of anemia since childhood per her daughter but had never required transfusions until the last admission early July.     At Ed, patient showed no signs of bleed and was mentating at baseline. Trops negatives, coag studies wnl, BNP 1994 (baseline) afebrile, RVP negative and xray showing likely left pleural effusion/atelectasis. Plts lowered to 4k but Hgb stable ~8, and bilirubin wnl. Platelet transfusion held due to no signs of bleeding and concern for ITP.  (17 Jul 2024 15:02)    PERTINENT PM/SXH:   Hypertension  Dyslipidemia  Myocardial Infarction  Hypercalcemia  Lymphedema  Chronic atrial fibrillation  (HFpEF) heart failure with preserved ejection fraction  CVA (cerebrovascular accident)  S/P Parathyroidectomy  S/P Hysterectomy  Bilateral Cataracts    FAMILY HISTORY:  No pertinent family history in first degree relatives    ITEMS NOT CHECKED ARE NOT PRESENT    SOCIAL HISTORY:   Significant other/partner[ ]  Children[x ]  Anabaptism/Spirituality:  Substance hx:  [ ]   Tobacco hx:  [ ]   Alcohol hx: [ ]   Home Opioid hx:  [ ] I-Stop Reference No: [id number]. No medications found on ISTOP  Living Situation: [ x]Home  [ ]Long term care  [ ]Rehab [ ]Other    ADVANCE DIRECTIVES:    MOLST  [ ]  Living Will  [ ]   DECISION MAKER(s):  [x ] Health Care Proxy(s)  [ ] Surrogate(s)  [ ] Guardian           Name(s): Phone Number(s):  Primary HCP: Son Mani Barber  Alternate HCP: Daughter Angela Barber: [phone]    BASELINE (I)ADL(s) (prior to admission):  Screven: [ ]Total  [ x] Moderate [ ]Dependent    Allergies    No Known Allergies    Intolerances    MEDICATIONS  (STANDING):  atorvastatin 20 milliGRAM(s) Oral at bedtime  hemorrhoidal Ointment 1 Application(s) Rectal two times a day  heparin   Injectable 5000 Unit(s) SubCutaneous every 12 hours  melatonin 3 milliGRAM(s) Oral at bedtime  metoprolol tartrate 100 milliGRAM(s) Oral three times a day  midodrine. 20 milliGRAM(s) Oral three times a day  pantoprazole    Tablet 40 milliGRAM(s) Oral before breakfast  piperacillin/tazobactam IVPB.. 3.375 Gram(s) IV Intermittent every 12 hours  senna 2 Tablet(s) Oral at bedtime    MEDICATIONS  (PRN):  polyethylene glycol 3350 17 Gram(s) Oral two times a day PRN Constipation        ITEMS UNCHECKED ARE NOT PRESENT     PRESENT SYMPTOMS: [ ]Unable to self-report due to altered mental status  [ ] CPOT [ ] PAINADs [ ] RDOS  Source if other than patient:  [ ]Family   [ ]Team     Pain: [ ]yes [x ]no  QOL impact -   Location -                    Aggravating factors -  Quality -  Radiation -  Timing-  Severity (0-10 scale):  Minimal acceptable level / Pain goal (0-10 scale):     CPOT:    https://www.[facility].org/getattachment/tqd29j43-4w7l-5h3w-8d7v-1064v9868r8e/Critical-Care-Pain-Observation-Tool-(CPOT)    Dyspnea:                           [ x]Mild [ ]Moderate [ ]Severe  Anxiety:                             [ ]Mild [ ]Moderate [ ]Severe  Agitation:                          [ ]Mild [ ]Moderate [ ]Severe  Fatigue:                             [x ]Mild [ ]Moderate [ ]Severe  Nausea:                             [ ]Mild [ ]Moderate [ ]Severe  Loss of appetite:              [ ]Mild [ ]Moderate [ ]Severe  Constipation:                   [ ]Mild [ ]Moderate [ ]Severe  Diarrhea:                          [ ]Mild [ ]Moderate [ ]Severe      PCSSQ[Palliative Care Spiritual Screening Question]   Severity (0-10):  Score of 4 or > indicate consideration of Chaplaincy referral.  Chaplaincy Referral: [ ] yes [ ] refused [ ] following [ x] deferred    Caregiver Left Hand? : [ ] yes [ ] no [ ] Declined   [x ] Deferred            Social work referral [ ] Patient & Family Centered Care Referral [ ]     Anticipatory Grief present?:  [x ] yes [ ] no  [ ] Deferred                  Social work referral [x ] Chaplaincy Referral[ ]    Other Symptoms:  [x ]All other review of systems negative   [ ] Unable to obtain due to poor mentation     PHYSICAL EXAM:  Vital Signs Last 24 Hrs  T(C): 36.4 (27 Jul 2024 21:32), Max: 36.4 (27 Jul 2024 21:32)  T(F): 97.5 (27 Jul 2024 21:32), Max: 97.5 (27 Jul 2024 21:32)  HR: 130 (28 Jul 2024 18:30) (105 - 137)  BP: 72/52 (28 Jul 2024 18:30) (72/52 - 98/80)  BP(mean): --  RR: 20 (28 Jul 2024 18:30) (18 - 20)  SpO2: 98% (28 Jul 2024 18:30) (98% - 100%)    Parameters below as of 28 Jul 2024 18:30  Patient On (Oxygen Delivery Method): nasal cannula  O2 Flow (L/min): 6       I&O's Summary    27 Jul 2024 07:01  -  28 Jul 2024 07:00  --------------------------------------------------------  IN: 25 mL / OUT: 50 mL / NET: -25 mL    28 Jul 2024 07:01  -  28 Jul 2024 20:01  --------------------------------------------------------  IN: 0 mL / OUT: 0 mL / NET: 0 mL        GENERAL:  [ x]Alert  [ x]Oriented x  2 [ ]Lethargic  [ ]Cachexia  [ ]Unarousable  [ x]Verbal  [ ]Non-Verbal    Behavioral:   [ ] Anxiety  [ x] Delirium [ ] Agitation [ ] Calm  [ ] Other  HEENT:  [ ]Normal  [ ] Temporal Wasting  [x ]Dry mouth   [ ]ET Tube/Trach  [ ]Oral lesions  [ ] Mucositis  PULMONARY: coarse breath sounds b/l  [ ]Clear [x ]Tachypnea mild  [ ]Audible excessive secretions   [ ]Rhonchi        [ ]Right [ ]Left [ ]Bilateral  [ ]Crackles        [ ]Right [ ]Left [ ]Bilateral  [ ]Wheezing     [ ]Right [ ]Left [ ]Bilateral  [ ]Diminished breath sounds [ ]right [ ]left [ ]bilateral  CARDIOVASCULAR:    [ ]Regular [ ]Irregular [ x]Tachy  [ ]Steven [ ]Murmur [ ]Other  GASTROINTESTINAL:  [x ]Soft  [ ]Distended   [ ]+BS  [ x]Non tender [ ]Tender  [ ]PEG [ ]OGT/ NGT  Last BM: fecal incontinence   GENITOURINARY:  [ ]Normal [ ] Incontinent   [ ]Oliguria/Anuria   [x ]Garzon  MUSCULOSKELETAL:   [ ]Normal   [x ]Weakness  [ x]Bed/Wheelchair bound [x ]Edema- lymphedema of b/l lower extremities  [  ] amputation  [  ] contraction  NEUROLOGIC:   [x ]No focal deficits  [ ]Cognitive impairment  [ ]Dysphagia [ ]Dysarthria [ ]Paresis [ ]Other   SKIN: See Nursing Skin Assessment for further details  [ ]Normal    [ ]Rash  [ ]Pressure ulcer(s)       Present on admission [ ]y [ ]n   [  ]  Wound    [  ] hyperpigmentation    CRITICAL CARE:  [ ] Shock Present  [ ]Septic [ ]Cardiogenic [ ]Neurologic [ ]Hypovolemic  [ ]  Vasopressors [ ]  Inotropes   [ ]Respiratory failure present [ ]Mechanical ventilation [ ]Non-invasive ventilatory support [ ]High flow    [ ]Acute  [ ]Chronic [ ]Hypoxic  [ ]Hypercarbic [ ]Other  [ ]Other organ failure     LABS:  reviewed                         9.1    15.28 )-----------( 240      ( 28 Jul 2024 10:27 )             30.6   07-28    143  |  97  |  88<H>  ----------------------------<  117<H>  5.4<H>   |  28  |  4.00<H>    Ca    8.6      28 Jul 2024 10:27  Phos  5.7     07-28  Mg     2.3     07-28    TPro  6.5  /  Alb  3.1<L>  /  TBili  1.8<H>  /  DBili  x   /  AST  56<H>  /  ALT  24  /  AlkPhos  69  07-28    Urinalysis Basic - ( 28 Jul 2024 10:27 )    Color: x / Appearance: x / SG: x / pH: x  Gluc: 117 mg/dL / Ketone: x  / Bili: x / Urobili: x   Blood: x / Protein: x / Nitrite: x   Leuk Esterase: x / RBC: x / WBC x   Sq Epi: x / Non Sq Epi: x / Bacteria: x      CAPILLARY BLOOD GLUCOSE          RADIOLOGY & ADDITIONAL STUDIES: reviewed     PROTEIN CALORIE MALNUTRITION PRESENT: [ ]mild [ ]moderate [ ]severe [ ]underweight [ ]morbid obesity  https://www.andeal.org/vault/2440/web/files/ONC/Table_Clinical%20Characteristics%20to%20Document%20Malnutrition-White%20JV%20et%20al%289337.pdf    Height (cm): 157.5 (07-17-24 @ 10:35), 157.5 (01-10-24 @ 16:35)  Weight (kg): 82.2 (07-24-24 @ 07:50), 54.4 (07-02-24 @ 14:57), 90.3 (01-10-24 @ 16:35)  BMI (kg/m2): 33.1 (07-24-24 @ 07:50), 21.9 (07-17-24 @ 10:35), 21.9 (07-02-24 @ 14:57)    [ ]PPSV2 < or = to 30% [ ]significant weight loss  [ ]poor nutritional intake  [ ]anasarca [ ]Artificial Nutrition      REFERRALS:   [ ]Chaplaincy  [ ]Hospice  [ ]Child Life  [ ]Social Work  [x ]Case management [ ]Holistic Therapy

## 2024-07-28 NOTE — PROGRESS NOTE ADULT - PROBLEM SELECTOR PLAN 3
BNP of 1994 (below baseline) and long standing dyspnea. Home Lasix PO 40qD  Crackles at right lower lung and JVD. increased SOB 07/21 and 07/22  - SOB is improving  - CXR (07/21) showed new R sided pleural effusion likely secondary to increased hydrostatic pressure  - TTE (07/23) showed decreased EF (45-50%), dilated L and R atrium and R ventricle, tricuspid regurgitation, mPAP 53.  - BNP increased to 7124 (07/22)  -> Duplex ultrasound (7/23): Right posterior tibial DVT seen.  -> CT angio (7/23): No PEs were visualized. Moderate R and small L pleural effusion.    DDx: HF exacerbation likely 2/2 AFib with CT Angio ruling out PE. Now with superimposed infection    Plan:  - Bumex as above  - incentive spirometry  - AHRF treatment as above  - Considering age, GDMT not best option BNP of 1994 (below baseline) and long standing dyspnea. Home Lasix PO 40qD  Crackles at right lower lung and JVD. increased SOB 07/21 and 07/22  - SOB is improving  - CXR (07/21) showed new R sided pleural effusion likely secondary to increased hydrostatic pressure  - TTE (07/23) showed decreased EF (45-50%), dilated L and R atrium and R ventricle, tricuspid regurgitation, mPAP 53.  - BNP increased to 7124 (07/22)  -> Duplex ultrasound (7/23): Right posterior tibial DVT seen.  -> CT angio (7/23): No PEs were visualized. Moderate R and small L pleural effusion.    DDx: HF exacerbation likely 2/2 AFib with CT Angio ruling out PE. Now with superimposed infection    Plan:  - c/w IV Bumex 2mg BID for diuresis as above  - GDMT on hold given hypotension and age - Increased WBC count with neutrophil predominance, tachycardia with occasional hypotension. Afebrile  - Increased work of breathing but sating >92% on 3L NC  - DVT in RLE but PE ruled out with CTA  - Initially oxygen demands 2/2 HF exacerbation with subcostal retractions. Very lethargic  - CXR: prelim read shows infiltration in RML, suggestive of hospital acquired aspiration pneumonia  - VBG 07/27 showed lactic acidosis (3.6), pCO2 69 and pO2 161  - Ddx: aspiration vs hospital acquired PNA superimposed on HF exacerbation  - BCx NGTD    Plan:  - continue Zosyn 3.375 q12h (7/27- )  - continue BIPAP; wean as tolerated. No plans for intubation per GOC  - hold IV Bumex 2mg BID for diuresis given hypotension as above  - aspiration precaution. Swallow eval and diet changed to pureed. FYI, no feeding tube per family

## 2024-07-28 NOTE — PROVIDER CONTACT NOTE (OTHER) - BACKGROUND
Diagnosis) Thrombocytopenia
(Admit Diagnosis) Thrombocytopenia
(Admit Diagnosis) Thrombocytopenia
Dx: Thrombocytopenia

## 2024-07-28 NOTE — PROGRESS NOTE ADULT - ATTENDING COMMENTS
89F w HFpEF on NC, CAD s/p remote MI, AF, CVA here w asx thrombocytopenia. Pt with low EF and high recent pulm pressures.   Oliguric PHAM from cardiorenal and contrast nephropathy now ATN  Effusions on CT  Worsening renal function. Not a HD candidate  Will need goals of care.  Poor prognosis. Favor comfort  dw hospitalist and family    Julianne Burroughs MD  Off: [phone]  contact me on teams    (After 5 pm or on weekends please page the on-call fellow/attending, can check [email or web address] for schedule. Login is pratima fam, schedule under [facility] medicine, psych, derm)

## 2024-07-28 NOTE — PROGRESS NOTE ADULT - PROBLEM SELECTOR PLAN 8
Does not report new onset fatigue. Past admission in early july 2024 got multiple pRBC transfusions and discharged with Hgb 9.1.  - Likely multifactorial considering age and other comorbidities  - Iron 28, TIBC 311, Tsat 9%, Ferritin  - Likely iron deficiency dominant but overall multifactorial  - HgB fell to 6.7 (07/19) s/p 1u pRBC -> post transfusion 8.2 -> 07/26 9.9.   - Significantly improved after IV iron sucrose x4 doses (end: 07/22)    DDx: occult GI bleeding vs IDS vs MDS/LGL vs less likely hemolysis    Plan:  - Consider GI outpatient colonoscopy.  - CTM  - f/u outpatient heme Does not report new onset fatigue. Past admission in early july 2024 got multiple pRBC transfusions and discharged with Hgb 9.1.  - Likely multifactorial considering age and other comorbidities  - Iron 28, TIBC 311, Tsat 9%, Ferritin  - Likely iron deficiency dominant but overall multifactorial  - HgB fell to 6.7 (07/19) s/p 1u pRBC -> post transfusion 8.2 -> 07/26 9.9.   - Significantly improved after IV iron sucrose x4 doses (end: 07/22)    DDx: occult GI bleeding vs IDS vs MDS/LGL vs less likely hemolysis    Plan:  - consider outpatient GI follow-up for colonoscopy (if within GOC)  - outpatient hematology follow-up Resolved (>200K 07/22). Asymptomatic at 4k on admission s/p dexa 40mg x 4 days (end 07/20) and IVIG x 2days  Blood smear: hypochromic microcytic anemia with anisocytosis,  neutrophils with normal morphology, relative increased in monocytes, some reactive lymphocytes seen, very scant but giant platelets  Flow cytometry: Polytypic B cells, decreased CD4: CD8 ratio with no aberrancy, NK cells show no diagnostic abnormalities, myeloid findings are normal  - Heme: not concerning for malignancy    Ddx: likely ITP considering response to treatment vs possible malignancy per heme    Plan: Resolved (7/26 - 337)  - f/u outpatient with heme

## 2024-07-28 NOTE — PROGRESS NOTE ADULT - PROBLEM SELECTOR PLAN 5
Duplex ultrasound visualized DVT in R posterior tibial vein. (Wells Score = 4.5)  CTA did not show evidence of PE + bilateral pleural effusions  - Patient is at high risk for bleeding with suspected GI bleed, anemia, past thrombocytopenia, age>75 and reduced functional capacity. Recommend U/S instead of full anticoagulation.    - U/S has been ordered for 7/31 and 8/7 at patient's home per home health. Patient's daughter informed by outpatient clinic. Duplex ultrasound visualized DVT in R posterior tibial vein. (Wells Score = 4.5)  CTA did not show evidence of PE + bilateral pleural effusions  - Patient is at high risk for bleeding with suspected GI bleed, anemia, past thrombocytopenia, age>75 and reduced functional capacity. Recommend U/S instead of full anticoagulation.  - U/S has been ordered for 7/31 and 8/7 at patient's home per home health. Patient's daughter informed by outpatient clinic - Pradaxa was stopped during last admission for bleeding risk (early july 2024). Known melena during that admission. - CHADSVASC 5, HASBLED 3.  - In continuous AFib during admission, with HR ranging from 80s to 130s    Plan:  - cardiology following; appreciate recs  - no AC for now due to recent presumed GI bleed  - c/w metoprolol tartrate 100 mg TID for rate control  - hold rhythm control given increased risk of stroke w/ conversion to NSR off AC

## 2024-07-28 NOTE — CHART NOTE - NSCHARTNOTEFT_GEN_A_CORE
Informed by RN that pt's BP is 75/48, , 128, 133 non-sustaining. Pt was examined at bedside, during which the IV team was placing a new IV in pt. AAOx2, asymptomatic. She was tolerating BIPAP well. Once new IV was placed, pt's BP was rechecked: 89/48, HR high 110s to 130s. Metoprolol tartrate 100mg TID has been held over the last two days (7/26-7/27) d/t pt's hypotension. Pt was given one-time IV LR bolus 250cc for hydration as pt was recently restarted on Bumex IV 2mg BID on 7/27 and has had poor PO intake.     When IV bolus was completed, BP was 91/64, HR now sustaining in high 110s-140s. AAOx2. Lungs w/ b/l decreased breath sounds. CV tachycardic w/ irregularly irregular rhythm. Abdomen non-tender w/ mild distension. B/l LEs w/ wrap - edema present. Pt remains asymptomatic: she denied any chest pain, palpitations, dizziness, headache, or dyspnea. Pt has had minimal urine output overnight (around 30 cc). Urinary output from day shift recorded as 0 mL. Will have bladder scan performed to check for urinary retention. Will hold off on metoprolol and Bumex for now.

## 2024-07-28 NOTE — CONSULT NOTE ADULT - PROBLEM SELECTOR RECOMMENDATION 8
Case reviewed with primary team    In the event of newly developing, evolving, or worsening symptoms, please contact the Palliative Medicine team via pager (if the patient is at [facility] #[phone] or if the patient is at [facility] #[phone]) The Geriatric and Palliative Medicine service has coverage 24 hours a day/ 7 days a week to provide medical recommendations regarding symptom management needs via telephone.

## 2024-07-28 NOTE — CONSULT NOTE ADULT - PROBLEM SELECTOR RECOMMENDATION 9
- possibly due to fluid overload in setting of HF and renal failure + possible Aspiratoin PNA  - CTA 7/24-  No pulmonary embolus to the level of the segmental vessels. Moderate right and small left pleural effusions.   - BETH 7/26- Unchanged small to moderate bilateral pleural effusions. Superimposed   infection is not excluded.  - management as per primary team

## 2024-07-28 NOTE — PROGRESS NOTE ADULT - PROBLEM SELECTOR PLAN 6
TTE showed BRIANNA, severe TR with mPAP of 53.  Probably secondary to afib.     - CTM, follow outpatient with cardiologist (home visits) TTE showed BRIANNA, severe TR with mPAP of 53.  Probably secondary to afib  - CTM, follow outpatient with cardiologist (home visits) Duplex ultrasound visualized DVT in R posterior tibial vein. (Wells Score = 4.5)  CTA did not show evidence of PE + bilateral pleural effusions  - Patient is at high risk for bleeding with suspected GI bleed, anemia, past thrombocytopenia, age>75 and reduced functional capacity. Recommend U/S instead of full anticoagulation.  - U/S has been ordered for 7/31 and 8/7 at patient's home per home health. Patient's daughter informed by outpatient clinic

## 2024-07-28 NOTE — PROGRESS NOTE ADULT - PROBLEM SELECTOR PLAN 2
Statement Selected Patient's baseline is BUN=23, Cr=0.95  BUN 77 and Cr 3.32 this am. Trending up.  Bladder scan shows 50mL of urine with no evidence of retention.  Ddx: likely cardiorenal    Plan:  - IV Bumex 2mg BID  - Strict ins and outs. Place koehler  - Nephro consulted. Appreciate recs   - CTM fluid status Patient's baseline is BUN=23, Cr=0.95, has been steadily worsening throughout this admission, likely 2/2 cardiorenal syndrome vs hypovolemia 2/2 overdiuresis vs intrinsic renal injury 2/2 contrast use  Bladder scan shows 50mL of urine with no evidence of retention.  Ddx: likely cardiorenal    Plan:  - nephrology following; appreciate recs- no plans for HD per GOC and patient frailty/age  - c/w IV Bumex 2mg BID for diuresis  - monitor strict I/Os, daily weights, SCr, UOP  - dose medications as per eGFR Patient's baseline is BUN=23, Cr=0.95, has been steadily worsening throughout this admission, likely 2/2 cardiorenal syndrome vs hypovolemia 2/2 overdiuresis vs intrinsic renal injury 2/2 contrast use  Bladder scan shows 50mL of urine with no evidence of retention.  Ddx: likely cardiorenal    Plan:  - nephrology following; appreciate recs- no plans for HD per GOC and patient frailty/age  - hold IV Bumex 2mg BID for diuresis given hypotension as above  - monitor strict I/Os, daily weights, SCr, UOP  - dose medications as per eGFR

## 2024-07-28 NOTE — PROGRESS NOTE ADULT - PROBLEM SELECTOR PLAN 10
Resolved. RLE more erythematous and warm compared to LLE with some mild tenderness to palpation. Received steroid and IVIG so started IV antibx. Not concerned for MRSA.    - received Cefazolin 1000 q8h for 5 days (end: 07/21)  - Improved on exam. CTM DVT PPx: heparin SQ  Diet: pureed, pending S+S evaluation  Dispo: TBD, medically acute  Code Status: DNR/DNI w/ ToNIV, no HD, no feeding tube. Ok w/ IVFs, pressors as needed, antibiotics (see GOC note from 7/26) Close to baseline now    Plan:  - IV treatment per above  -> Wound care consult: conservative management with [facility] [address]  Observe discharge from left leg (currently no discharge)

## 2024-07-28 NOTE — CONSULT NOTE ADULT - PROBLEM SELECTOR RECOMMENDATION 2
Pt with mild metabolic alkalosis, possible 2/2 recent diuretic use. Continue to monitor.
- TTE 7/23 -  1. Left ventricular cavity is small. Left ventricular wall thickness is normal. Left ventricular systolic function is mildly decreased with an ejection fraction visually estimated at 45 to 50 %. There are no regional wall motion abnormalities seen.  2. Analysis of left ventricular diastolic function and filling pressure is made challenging by the presence of atrial fibrillation.  3. Enlarged right ventricular cavity size, with normal wall thickness, and mildly reduced right ventricular systolic function.  4. The left atrium is severely dilated.  5. The right atrium is severely dilated.   6. No pericardial effusion seen.  7. Severe tricuspid regurgitation.  8. Estimated pulmonary artery systolic pressure is 53 mmHg.  9. Large left pleural effusion noted. 10. Trileaflet aortic valve with reduced systolic excursion. Mild aortic stenosis. 11. The inferior vena cava is dilated measuring 2.10 cm in diameter, (dilated >2.1cm) with abnormal inspiratory collapse (abnormal <50%) consistent with elevated right atrial pressure (~15, range 10-20mmHg).  12. No prior echocardiogram is available for comparison  - Cardiology recommendations appreciated

## 2024-07-28 NOTE — PROGRESS NOTE ADULT - ATTENDING COMMENTS
89F with a PMH of HFpEF on chronic 2L NC, a-fib, CVA and lymphedema presents to the ED with asymptomatic thrombocytopenia. 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  Anemia (hgb 6.7) s/p 1u pRBC improved and thrombocytopenia normalized. Now treating for increased SOB likely 2/2 worsening HF with no PE seen on imaging. Infectious workup significant for UTI and possible aspiration PNA. Also worsening PHAM, possibly cardiorenal vs hypovolemia.    - Patient somnolent. Reduced PO intake and UO. -S/p D50 insulin for hyper K. Has received 250cc bolus x2 boluses for hypotension.   - Cr continues to rise 3.32--> 4. Started on Bumex 2mg IV followed by TID. Pt not able to tolerate some dosing due to hypotension. Possible cardiorenal picture. D/w Nephro- not offering HD at this time. Hyperkalemia 5.7- shift with D50 insulin  Hyperphos likely 2/2 renal failure  - Leukocytosis ?CXR worsened, therefore will start CTX to cover possible PNA (?aspiration vs in setting of pleural effusions/atelectasis) concern for aspiration --> abx changed to renally dosed zosyn. -F/u cultures and UA showing possible infection, COVID RSV influenza negative, sputum culture pending    - Hypercapnic resp failure likely 2/2 to fluid overload pt now on BIPAP      -D/w patient's son Mani at bedside. Discussed that pt does not want invasive measure if we are unable to stabilize acute issues. He would consider comfort measures if not able to resolve acute issues, would like to continue to try. Team discussion with son and daughter this afternoon. 89F with a PMH of HFpEF on chronic 2L NC, a-fib, CVA and lymphedema presents to the ED with asymptomatic thrombocytopenia. 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  Anemia (hgb 6.7) s/p 1u pRBC improved and thrombocytopenia normalized. Now treating for increased SOB likely 2/2 worsening HF with no PE seen on imaging. Infectious workup significant for UTI and possible aspiration PNA. Also worsening PHAM, possibly cardiorenal vs hypovolemia.    - Patient somnolent. Reduced PO intake and UO. -S/p D50 insulin for hyper K. Has received 250cc bolus x2 boluses for hypotension.   - Cr continues to rise 3.32--> 4. Started on Bumex 2mg IV followed by TID. Pt not able to tolerate some dosing due to hypotension. Possible cardiorenal picture. D/w Nephro- not offering HD at this time. Hyperkalemia 5.7- shift with D50 insulin  Hyperphos likely 2/2 renal failure  - Leukocytosis ?CXR worsened, therefore will start CTX to cover possible PNA (?aspiration vs in setting of pleural effusions/atelectasis) concern for aspiration --> abx changed to renally dosed zosyn. -F/u cultures and UA showing possible infection, COVID RSV influenza negative, sputum culture pending    - Hypercapnic resp failure likely 2/2 to fluid overload pt now on BIPAP      - 7/28: D/w patient's son Mani at bedside. Discussed that pt does not want invasive measure if we are unable to stabilize acute issues. He would consider comfort measures if not able to resolve acute issues, would like to continue to try. Team discussion with son and daughter this afternoon. 89F with a PMH of HFpEF on chronic 2L NC, a-fib, CVA and lymphedema presents to the ED with asymptomatic thrombocytopenia. 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  Anemia (hgb 6.7) s/p 1u pRBC improved and thrombocytopenia normalized. Now treating for increased SOB likely 2/2 worsening HF with no PE seen on imaging. Infectious workup significant for UTI and possible aspiration PNA. Also worsening PHAM, possibly cardiorenal vs hypovolemia.    - Patient somnolent. Reduced PO intake and UO. -S/p D50 insulin for hyper K. Has received 250cc bolus x2 boluses for hypotension.   - Cr continues to rise 3.32--> 4. Started on Bumex 2mg IV followed by TID. Pt not able to tolerate some dosing due to hypotension. Possible cardiorenal picture. D/w Nephro- not offering HD at this time. Hyperkalemia 5.7- shift with D50 insulin  Hyperphos likely 2/2 renal failure  - Leukocytosis ?CXR worsened, therefore will start CTX to cover possible PNA (?aspiration vs in setting of pleural effusions/atelectasis) concern for aspiration --> abx changed to renally dosed zosyn. -F/u cultures and UA showing possible infection, COVID RSV influenza negative, sputum culture pending    - Hypercapnic resp failure likely 2/2 to fluid overload pt now on BIPAP  - Elevated lactate likely 2/2 hypotension. Given judicious fluid as above but remains hypotensive.      - 7/28: D/w patient's son Mani at bedside. Discussed that pt does not want invasive measure if we are unable to stabilize acute issues. He would consider comfort measures if not able to resolve acute issues, would like to continue to try. Team discussion with son and daughter this afternoon, wants to talk to palliative care about comfort care. Reached out to palliative, might be able to see today.

## 2024-07-28 NOTE — PROGRESS NOTE ADULT - ASSESSMENT
89F with a PMH of HFpEF on chronic 2L NC, a-fib, CVA and lymphedema presents to the ED with asymptomatic thrombocytopenia. 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  Anemia (hgb 6.7) s/p 1u pRBC improved and thrombocytopenia normalized. Now treating for increased SOB likely 2/2 worsening HF with no PE seen on imaging. Infectious workup significant for UTI and possible aspiration PNA. Also worsening PHAM, possibly cardiorenal vs hypovolemia.  89F with a PMH of HFpEF on chronic 2L NC, a-fib, CVA and lymphedema presents to the ED with asymptomatic thrombocytopenia. 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  Anemia (hgb 6.7) s/p 1u pRBC improved and thrombocytopenia normalized. Now treating for increased SOB likely 2/2 worsening HF with no PE seen on imaging. Infectious workup significant for UTI and possible aspiration PNA. Course also c/b worsening PHAM, possibly cardiorenal vs hypovolemia vs intrinsic 2/2 recent contrast.

## 2024-07-28 NOTE — CONSULT NOTE ADULT - TIME BILLING
Time spent for extensive review of the physical chart, electronic medical record, and documentation to obtain collateral information including but not limited to:  [x] Inpatient records (ED, H&P, primary team, and consultants if applicable, care coordination)  [x] Inpatient values/results (biomarkers, immunoassays, imaging, and microbiology results)  [x] Current or proposed treatment plans  [x] Pharmacotherapy review  [x] Discussion and coordinating care with primary team and interdisciplinary staff and floor staff  [x] Discussion including counseling/ education with the patient, surrogate decision maker, or family    In addition to above time, Spent 16 minutes separately discussing goals of care/ advanced care planning with patient/ family

## 2024-07-28 NOTE — PROGRESS NOTE ADULT - SUBJECTIVE AND OBJECTIVE BOX
[facility] DIVISION OF KIDNEY DISEASES AND HYPERTENSION --    24 hour events/subjective:    events overnight noted  periods of hypotension and on and off bipap    PAST HISTORY  --------------------------------------------------------------------------------  No significant changes to PMH, PSH, FHx, SHx, unless otherwise noted    ALLERGIES & MEDICATIONS  --------------------------------------------------------------------------------  Allergies    No Known Allergies    Intolerances      Standing Inpatient Medications  atorvastatin 20 milliGRAM(s) Oral at bedtime  buMETAnide Injectable 2 milliGRAM(s) IV Push two times a day  hemorrhoidal Ointment 1 Application(s) Rectal two times a day  heparin   Injectable 5000 Unit(s) SubCutaneous every 12 hours  melatonin 3 milliGRAM(s) Oral at bedtime  metoprolol tartrate 100 milliGRAM(s) Oral three times a day  pantoprazole    Tablet 40 milliGRAM(s) Oral before breakfast  piperacillin/tazobactam IVPB.. 3.375 Gram(s) IV Intermittent every 12 hours  senna 2 Tablet(s) Oral at bedtime    PRN Inpatient Medications  polyethylene glycol 3350 17 Gram(s) Oral two times a day PRN      REVIEW OF SYSTEMS  --------------------------------------------------------------------------------    All other systems were reviewed and are negative, except as noted.    VITALS/PHYSICAL EXAM  --------------------------------------------------------------------------------  T(C): 36.4 (07-27-24 @ 21:32), Max: 36.4 (07-27-24 @ 21:32)  HR: 128 (07-28-24 @ 11:05) (105 - 134)  BP: 76/54 (07-28-24 @ 11:05) (76/54 - 98/80)  RR: 20 (07-28-24 @ 11:05) (18 - 20)  SpO2: 100% (07-28-24 @ 11:05) (97% - 100%)  Wt(kg): --        07-27-24 @ 07:01  -  07-28-24 @ 07:00  --------------------------------------------------------  IN: 25 mL / OUT: 50 mL / NET: -25 mL      Physical Exam:  	Gen: NAD  	Pulm: decreased bs  	CV: RRR, S1S2  	Abd: +BS, soft,   	LE: Warm, FROM,  edema  	Skin: Warm, without rashes    LABS/STUDIES  --------------------------------------------------------------------------------              9.1    15.28 >-----------<  240      [07-28-24 @ 10:27]              30.6     143  |  97  |  88  ----------------------------<  117      [07-28-24 @ 10:27]  5.4   |  28  |  4.00        Ca     8.6     [07-28-24 @ 10:27]      Mg     2.3     [07-28-24 @ 10:27]      Phos  5.7     [07-28-24 @ 10:27]    TPro  6.5  /  Alb  3.1  /  TBili  1.8  /  DBili  x   /  AST  56  /  ALT  24  /  AlkPhos  69  [07-28-24 @ 10:27]    Creatinine Trend:  SCr 4.00 [07-28 @ 10:27]  SCr 3.32 [07-27 @ 10:33]  SCr 2.79 [07-26 @ 16:12]  SCr 2.35 [07-26 @ 06:32]  SCr 1.58 [07-25 @ 07:21]    Urinalysis - [07-28-24 @ 10:27]      Color  / Appearance  / SG  / pH       Gluc 117 / Ketone   / Bili  / Urobili        Blood  / Protein  / Leuk Est  / Nitrite       RBC  / WBC  / Hyaline  / Gran  / Sq Epi  / Non Sq Epi  / Bacteria     Urine Creatinine 127      [07-25-24 @ 13:12]  Urine Protein 87      [07-25-24 @ 13:12]  Urine Sodium 10      [07-25-24 @ 13:12]  Urine Urea Nitrogen 387      [07-25-24 @ 13:12]  Urine Potassium 51      [07-25-24 @ 13:12]  Urine Osmolality 471      [07-25-24 @ 13:12]    Iron 28, TIBC 311, %sat 9      [07-18-24 @ 07:26]  Ferritin 36      [07-18-24 @ 07:26]  TSH 5.56      [07-01-24 @ 03:48]  Lipid: chol 88, TG 96, HDL 22, LDL --      [07-01-24 @ 03:48]    HBsAg Nonreact      [07-03-24 @ 17:11]  HCV 0.16, Nonreact      [07-03-24 @ 17:11]  HIV Nonreact      [07-02-24 @ 06:11]

## 2024-07-28 NOTE — PROGRESS NOTE ADULT - PROBLEM SELECTOR PLAN 7
Resolved (>200K 07/22). Asymptomatic at 4k on admission s/p dexa 40mg x 4 days (end 07/20) and IVIG x 2days  Blood smear: hypochromic microcytic anemia with anisocytosis,  neutrophils with normal morphology, relative increased in monocytes, some reactive lymphocytes seen, very scant but giant platelets  Flow cytometry: Polytypic B cells, decreased CD4: CD8 ratio with no aberrancy, NK cells show no diagnostic abnormalities, myeloid findings are normal  - Heme: not concerning for malignancy    Ddx: likely ITP considering response to treatment vs possible malignancy per heme    Plan: Resolved (7/26 - 337)  - F/u outpatient with heme Resolved (>200K 07/22). Asymptomatic at 4k on admission s/p dexa 40mg x 4 days (end 07/20) and IVIG x 2days  Blood smear: hypochromic microcytic anemia with anisocytosis,  neutrophils with normal morphology, relative increased in monocytes, some reactive lymphocytes seen, very scant but giant platelets  Flow cytometry: Polytypic B cells, decreased CD4: CD8 ratio with no aberrancy, NK cells show no diagnostic abnormalities, myeloid findings are normal  - Heme: not concerning for malignancy    Ddx: likely ITP considering response to treatment vs possible malignancy per heme    Plan: Resolved (7/26 - 337)  - f/u outpatient with heme TTE showed BRIANNA, severe TR with mPAP of 53.  Probably secondary to afib  - CTM, follow outpatient with cardiologist (home visits)

## 2024-07-28 NOTE — CONSULT NOTE ADULT - PROBLEM SELECTOR RECOMMENDATION 5
- Patient AAOx2 during encounter  - likely multifactorial from renal failure + infection + respiratory failure   - please coordinate care with family

## 2024-07-28 NOTE — CONSULT NOTE ADULT - PROBLEM SELECTOR RECOMMENDATION 4
- Patient complaining of mild dyspnea during encounter  - Counseled about use of low dose opiates for dyspnea. Family wish to hold off on opiate initiation until reassessment of patient's clinical status tomorrow.   - medical management as per primary team

## 2024-07-29 NOTE — PROGRESS NOTE ADULT - ATTENDING COMMENTS
Problem: Safety  Goal: Will remain free from injury  Outcome: PROGRESSING AS EXPECTED  Intervention: Provide assistance with mobility  Note:   Provided with assistance during mobility.      Problem: Bowel/Gastric:  Goal: Normal bowel function is maintained or improved  Outcome: PROGRESSING AS EXPECTED  Intervention: Educate patient and significant other/support system about signs and symptoms of constipation and interventions to implement  Note:   Pt agreered to take stool softener.       89F w HFpEF on NC, CAD s/p remote MI, AF, CVA here w asx thrombocytopenia. Pt with low EF and high recent pulm pressures.   Oliguric PHAM from cardiorenal and contrast nephropathy now ATN  Effusions on CT  Worsening renal function. Not a HD candidate  Goals of care.  Poor prognosis. Favor comfort  dw  family    Julianne Burroughs MD  Off: [phone]  contact me on teams    (After 5 pm or on weekends please page the on-call fellow/attending, can check [email or web address] for schedule. Login is pratima fam, schedule under [facility] medicine, psych, derm)

## 2024-07-29 NOTE — PROGRESS NOTE ADULT - PROBLEM SELECTOR PLAN 5
BNP of 1994 (below baseline) and long standing dyspnea. Home Lasix PO 40qD  Crackles at right lower lung and JVD. increased SOB 07/21 and 07/22  - SOB is improving  - CXR (07/21) showed new R sided pleural effusion likely secondary to increased hydrostatic pressure  - TTE (07/23) showed decreased EF (45-50%), dilated L and R atrium and R ventricle, tricuspid regurgitation, mPAP 53.  - BNP increased to 7124 (07/22)  -> Duplex ultrasound (7/23): Right posterior tibial DVT seen.  -> CT angio (7/23): No PEs were visualized. Moderate R and small L pleural effusion.    DDx: HF exacerbation likely 2/2 AFib with CT Angio ruling out PE. Now with superimposed infection    Plan:  - hold IV Bumex 2mg BID for diuresis given hypotension as above  - GDMT on hold given hypotension and age - Pradaxa was stopped during last admission for bleeding risk (early july 2024). Known melena during that admission. - CHADSVASC 5, HASBLED 3.  - In continuous AFib during admission, with HR ranging from 80s to 130s    Plan:  - cardiology following; appreciate recs  - no AC for now due to recent presumed GI bleed  - c/w metoprolol tartrate 50 mg TID for rate control  - hold rhythm control given increased risk of stroke w/ conversion to NSR off AC - Pradaxa was stopped during last admission for bleeding risk (early july 2024). Known melena during that admission. - CHADSVASC 5, HASBLED 3.  - In continuous AFib during admission, with HR ranging from 80s to 130s    Plan:  - cardiology following; appreciate recs  - no AC for now due to recent presumed GI bleed  - c/w metoprolol tartrate 50 mg TID for rate control  - hold rhythm control given increased risk of stroke w/ conversion to NSR off AC. Cards does not want to give digoxin or amiodarone at this point - Pradaxa was stopped during last admission for bleeding risk (early july 2024). Known melena during that admission. - CHADSVASC 5, HASBLED 3.  - In continuous AFib during admission, with HR ranging from 80s to 130s    Plan:  - cardiology following; appreciate recs  - no AC for now due to recent presumed GI bleed  - give metoprolol tartrate 25 mg q6h for rate control  - hold rhythm control given increased risk of stroke w/ conversion to NSR off AC. Cards does not want to give digoxin or amiodarone at this point

## 2024-07-29 NOTE — PROGRESS NOTE ADULT - PROBLEM SELECTOR PLAN 1
- Recurrent episode of hypotension 77/70s on midodrine now, with lactic acidosis of 3.3 on ABG 07/28 and leukocytosis with neutrophil dominance  - Possible RLL asp pna and UTI present (LE +, WBC +, Nitrite -)  - s/p several IVF boluses (cautiously given due to fluid overloaded state)  - discussed with family at bedside, would like to hold off pressors for now  - Pressures improved on midodrine    Plan:  - Hold further diuresis for now  - Continue Zosyn (07/27 - 08/04?)  - Wean midodrine as tolerated (currently 20mg TID)  - IV Albumin 25%, 50mL q8h for 3 doses  - F/u with family on changes on PCU/comfort care. Palliative following   - BiPap (10/5, FiO2 40) nightly and PRN in the am - Recurrent episode of hypotension 77/70s on midodrine now, with lactic acidosis of 3.3 on ABG 07/28 and leukocytosis with neutrophil dominance  - Possible RLL asp pna and UTI present (LE +, WBC +, Nitrite -)  - s/p several IVF boluses (cautiously given due to fluid overloaded state)  - discussed with family at bedside, would like to hold off pressors for now  - Pressures improved on midodrine    Plan:  - Hold further diuresis for now  - Continue Zosyn (07/27 - 08/04?)  - Wean midodrine as tolerated (currently 20mg TID) - not ideal in HF  - IV Albumin 25%, 50mL q8h for 3 doses  - Better Afib control as below to help pressures.   - F/u with family on changes on PCU/comfort care. Palliative following   - BiPap (10/5, FiO2 40) nightly and PRN in the am

## 2024-07-29 NOTE — PROGRESS NOTE ADULT - PROBLEM SELECTOR PLAN 11
Close to baseline now    Plan:  - IV treatment per above  -> Wound care consult: conservative management with [facility] [address]  Observe discharge from left leg (currently no discharge) DVT PPx: heparin SQ  Diet: pureed per SS  Dispo: TBD, medically acute  Code Status: DNR/DNI w/ ToNIV, no HD, no feeding tube. Ok w/ IVFs, no pressors (see 07/28 note), antibiotics (see GOC note from 7/26)

## 2024-07-29 NOTE — PROGRESS NOTE ADULT - PROBLEM SELECTOR PLAN 2
Recurrent episodes of hypotension to 70s/50s, possibly 2/2 hypovolemia i/s/o diuresis vs septic shock 2/2 possible aspiration  - s/p several IVF boluses (cautiously given due to fluid overloaded state)  - discussed with family at bedside, would like to hold off pressors for now    Plan:  - hold further diuresis for now  - ongoing GOC- possible comfort care and PCU evaluation Patient's baseline is BUN=23, Cr=0.95, has been steadily worsening throughout this admission, likely 2/2 cardiorenal syndrome vs hypovolemia 2/2 overdiuresis vs intrinsic renal injury 2/2 contrast use  Bladder scan shows 50mL of urine with no evidence of retention.  Ddx: likely cardiorenal    Plan:  - nephrology following; appreciate recs- no plans for HD per GOC and patient frailty/age  - hold IV Bumex 2mg BID for diuresis given hypotension as above  - monitor strict I/Os, daily weights, SCr, UOP  - dose medications as per eGFR

## 2024-07-29 NOTE — PROGRESS NOTE ADULT - SUBJECTIVE AND OBJECTIVE BOX
[facility] DIVISION OF KIDNEY DISEASES AND HYPERTENSION   FOLLOW UP NOTE    --------------------------------------------------------------------------------  Chief Complaint:    24 hour events/subjective: Pt. was seen and examined today. BP still intermittently low. Slightly more awake. On nasal canula today.        PAST HISTORY  --------------------------------------------------------------------------------  No significant changes to PMH, PSH, FHx, SHx, unless otherwise noted    ALLERGIES & MEDICATIONS  --------------------------------------------------------------------------------  Allergies    No Known Allergies    Intolerances      Standing Inpatient Medications  albumin human 25% IVPB 50 milliLiter(s) IV Intermittent every 8 hours  atorvastatin 20 milliGRAM(s) Oral at bedtime  hemorrhoidal Ointment 1 Application(s) Rectal two times a day  heparin   Injectable 5000 Unit(s) SubCutaneous every 12 hours  melatonin 3 milliGRAM(s) Oral at bedtime  metoprolol tartrate 50 milliGRAM(s) Oral three times a day  midodrine. 20 milliGRAM(s) Oral three times a day  pantoprazole    Tablet 40 milliGRAM(s) Oral before breakfast  piperacillin/tazobactam IVPB.. 3.375 Gram(s) IV Intermittent every 12 hours  senna 2 Tablet(s) Oral at bedtime    PRN Inpatient Medications  polyethylene glycol 3350 17 Gram(s) Oral two times a day PRN      REVIEW OF SYSTEMS  --------------------------------------------------------------------------------  unable to obtain      VITALS/PHYSICAL EXAM  --------------------------------------------------------------------------------  T(C): 36.4 (07-29-24 @ 11:21), Max: 37.1 (07-29-24 @ 01:00)  HR: 113 (07-29-24 @ 11:21) (76 - 144)  BP: 87/66 (07-29-24 @ 04:44) (72/52 - 105/75)  RR: 18 (07-29-24 @ 11:21) (18 - 20)  SpO2: 99% (07-29-24 @ 11:21) (98% - 100%)  Wt(kg): --        07-28-24 @ 07:01  -  07-29-24 @ 07:00  --------------------------------------------------------  IN: 0 mL / OUT: 300 mL / NET: -300 mL        Physical Exam:  	Gen: NAD  	HEENT: Anicteric  	Pulm: Crackles scattered   	CV: S1S2+  	Abd: Soft, +BS   	Ext: ++LE edema B/L  	Neuro: Drowsy  	Skin: Warm and dry      LABS/STUDIES  --------------------------------------------------------------------------------              10.0   13.22 >-----------<  213      [07-29-24 @ 06:37]              34.1     144  |  97  |  92  ----------------------------<  108      [07-29-24 @ 06:37]  5.0   |  29  |  4.33        Ca     8.8     [07-29-24 @ 06:37]      Mg     2.4     [07-29-24 @ 06:37]      Phos  5.6     [07-29-24 @ 06:37]    TPro  6.4  /  Alb  2.9  /  TBili  2.0  /  DBili  x   /  AST  48  /  ALT  23  /  AlkPhos  69  [07-29-24 @ 06:37]          Creatinine Trend:  SCr 4.33 [07-29 @ 06:37]  SCr 4.00 [07-28 @ 10:27]  SCr 3.32 [07-27 @ 10:33]  SCr 2.79 [07-26 @ 16:12]  SCr 2.35 [07-26 @ 06:32]    Urinalysis - [07-29-24 @ 06:37]      Color  / Appearance  / SG  / pH       Gluc 108 / Ketone   / Bili  / Urobili        Blood  / Protein  / Leuk Est  / Nitrite       RBC  / WBC  / Hyaline  / Gran  / Sq Epi  / Non Sq Epi  / Bacteria     Urine Creatinine 127      [07-25-24 @ 13:12]  Urine Protein 87      [07-25-24 @ 13:12]  Urine Sodium 10      [07-25-24 @ 13:12]  Urine Urea Nitrogen 387      [07-25-24 @ 13:12]  Urine Potassium 51      [07-25-24 @ 13:12]  Urine Osmolality 471      [07-25-24 @ 13:12]    HBsAg Nonreact      [07-03-24 @ 17:11]  HCV 0.16, Nonreact      [07-03-24 @ 17:11]  HIV Nonreact      [07-02-24 @ 06:11]      Tacrolimus  Cyclosporine  Sirolimus  Mycophenolate  BK PCR  CMV PCRCMVPCR Log: Det <1.54 Assay Dynamic Range: 34.5 to 1.0E+07 IU/mL (1.54 to 7.00 Log10 IU/mL)  Assay lower limit of quantification (LLOQ) is 34.5 IU/mL (1.54 Log10  IU/mL)  CMV DNA detected below the LLOQ will be reported as Detected < 34.5 IU/mL  (<1.54 Log10 IU/mL)  Eliana Cytomegalovirus (CMV) is an FDA-cleared quantitative test that  enables the detection and quantitation of CMV DNA in EDTA plasma of  infected transplant patients on the eliana 8800 system. This test was  verified by Orlebar Brown. Results should be interpreted  with consideration of all clinical findings and laboratory findings and  should not form the sole basis for a diagnosis or treatment decision. Xdm33MU/mL (07-03 @ 17:09)    Parvo PCR  EBV PCR

## 2024-07-29 NOTE — SWALLOW BEDSIDE ASSESSMENT ADULT - COMMENTS
per chart on 7/29: "Currently, family confirmed DNR/DNI,  Trial of non-invasive ventilation. No feeding tube. NO hemodialysis." harder solids deferred due to missing dentures and weakened state

## 2024-07-29 NOTE — PROGRESS NOTE ADULT - PROBLEM SELECTOR PLAN 4
- Increased WBC count with neutrophil predominance, tachycardia with occasional hypotension. Afebrile  - Increased work of breathing but sating >92% on 3L NC  - DVT in RLE but PE ruled out with CTA  - Initially oxygen demands 2/2 HF exacerbation with subcostal retractions. Very lethargic  - CXR: prelim read shows infiltration in RML, suggestive of hospital acquired aspiration pneumonia  - VBG 07/27 showed lactic acidosis (3.6), pCO2 69 and pO2 161  - Ddx: aspiration vs hospital acquired PNA superimposed on HF exacerbation  - BCx NGTD    Plan:  - continue Zosyn 3.375 q12h (7/27- )  - continue BIPAP; wean as tolerated. No plans for intubation per GOC  - hold IV Bumex 2mg BID for diuresis given hypotension as above  - aspiration precaution. Swallow eval and diet changed to pureed. FYI, no feeding tube per family BNP of 1994 (below baseline) and long standing dyspnea. Home Lasix PO 40qD  Crackles at right lower lung and JVD. increased SOB 07/21 and 07/22  - SOB is improving  - CXR (07/21) showed new R sided pleural effusion likely secondary to increased hydrostatic pressure  - TTE (07/23) showed decreased EF (45-50%), dilated L and R atrium and R ventricle, tricuspid regurgitation, mPAP 53.  - BNP increased to 7124 (07/22)  -> Duplex ultrasound (7/23): Right posterior tibial DVT seen.  -> CT angio (7/23): No PEs were visualized. Moderate R and small L pleural effusion.    DDx: HF exacerbation likely 2/2 AFib with CT Angio ruling out PE. Now with superimposed infection    Plan:  - hold IV Bumex 2mg BID for diuresis given hypotension as above  - GDMT on hold given hypotension and age

## 2024-07-29 NOTE — PROGRESS NOTE ADULT - PROBLEM SELECTOR PLAN 6
- Pradaxa was stopped during last admission for bleeding risk (early july 2024). Known melena during that admission. - CHADSVASC 5, HASBLED 3.  - In continuous AFib during admission, with HR ranging from 80s to 130s    Plan:  - cardiology following; appreciate recs  - no AC for now due to recent presumed GI bleed  - c/w metoprolol tartrate 100 mg TID for rate control  - hold rhythm control given increased risk of stroke w/ conversion to NSR off AC Duplex ultrasound visualized DVT in R posterior tibial vein. (Wells Score = 4.5)  CTA did not show evidence of PE + bilateral pleural effusions  - Patient is at high risk for bleeding with suspected GI bleed, anemia, past thrombocytopenia, age>75 and reduced functional capacity. Recommend U/S instead of full anticoagulation.  - U/S has been ordered for 7/31 and 8/7 at patient's home per home health. Patient's daughter informed by outpatient clinic

## 2024-07-29 NOTE — PROGRESS NOTE ADULT - PROBLEM SELECTOR PLAN 1
- Possibly due to fluid overload in setting of HF and renal failure + possible Aspiration PNA  - CTA 7/24-  No pulmonary embolus to the level of the segmental vessels. Moderate right and small left pleural effusions.   - BETH 7/26- Unchanged small to moderate bilateral pleural effusions. Superimposed   infection is not excluded.  - management as per primary team.

## 2024-07-29 NOTE — PROGRESS NOTE ADULT - PROBLEM SELECTOR PLAN 9
Resolved (>200K 07/22). Asymptomatic at 4k on admission s/p dexa 40mg x 4 days (end 07/20) and IVIG x 2days  Blood smear: hypochromic microcytic anemia with anisocytosis,  neutrophils with normal morphology, relative increased in monocytes, some reactive lymphocytes seen, very scant but giant platelets  Flow cytometry: Polytypic B cells, decreased CD4: CD8 ratio with no aberrancy, NK cells show no diagnostic abnormalities, myeloid findings are normal  - Heme: not concerning for malignancy    Ddx: likely ITP considering response to treatment vs possible malignancy per heme    Plan: Resolved (7/26 - 337)  - f/u outpatient with heme Does not report new onset fatigue. Past admission in early july 2024 got multiple pRBC transfusions and discharged with Hgb 9.1.  - Likely multifactorial considering age and other comorbidities  - Iron 28, TIBC 311, Tsat 9%, Ferritin  - Likely iron deficiency dominant but overall multifactorial  - HgB fell to 6.7 (07/19) s/p 1u pRBC -> post transfusion 8.2 -> 07/26 9.9.   - Significantly improved after IV iron sucrose x4 doses (end: 07/22)    DDx: occult GI bleeding vs IDS vs MDS/LGL vs less likely hemolysis    Plan:  - consider outpatient GI follow-up for colonoscopy (if within GOC)  - outpatient hematology follow-up

## 2024-07-29 NOTE — PROGRESS NOTE ADULT - ASSESSMENT
89yo F Hx of HFpEF on 3L NC, CAD s/p remote MI, Afib (off AC d/t bleeding risk), CVA, HLD, lymphedema with ongoing afib with RVR with rates in the 140s-150s and ADHF with hypotension started on midodrine 20mg TID with some improvement in BP. Patient is asymptomatic from afib standpoint. Diuretics have been held given hypotension. Rate control may be difficult to achieve given her hypotension requiring midodrine and the recent downtitrating of metoprolol due to BP. Her prognosis is poor and family prefers conservative management which is reasonable. I would not start amiodarone given the risk of chemical cardioversion leading to CVA, would not utilize digoxin given her renal function, and diltiazem will likely cause similar issues with hypotension. An elective CANDIDA/DCCV would not be safe to proceed with at this time given the significant hypotension and hypoxia.    # Atrial Fibrillation with RVR  # HF Exacerbation  - continue metoprolol tartrate as BP allows, if continuing to miss doses due to blood pressure can trial 25mg q6h with hold parameters  - diuresis on hold given hypotension  - prognosis poor, continue ongoing goals of care discussion  - remainder of excellent care as per primary team

## 2024-07-29 NOTE — SWALLOW BEDSIDE ASSESSMENT ADULT - SWALLOW EVAL: DIAGNOSIS
Per chart review pt is now DNR/DNI/no feeding tubes and possibly pending PCU evaluation. Per discussion with provider, swallow evaluation to be deferred pending rounds this AM. Team to f/u after rounds and inform SLP how to proceed. Patient presents with a suspected oropharyngeal dysphagia in setting of deconditioned state and compromised respiratory status characterized by prolonged oral transport, suspected delay in swallow trigger, and intermittent repeat swallows. No overt s/s of laryngeal penetration/aspiration during exam. Harder solids deferred as pt does not have her dentures and is in a weakened state.

## 2024-07-29 NOTE — PROGRESS NOTE ADULT - ASSESSMENT
-89 yo with hx of HFpEF on 3L NC, Afib, HLD, CVA and lymphedema presents with initially with SOB and received blood transfusion. Over course, she received lasix, IVF and contrast and noted to have PHAM and nephrology consulted.       #Oliguric PHAM from cardiorenal and contrast nephropathy now ATN  -Echo from 7/23 show LA and RA severely dilated, severe TR with PAP of 53 and large left pleural effusion.   -Pr/Cr elevated at 0.7.   -Effusions on CT  Recs:  -Worsening renal function. Not a HD candidate. Diuresis as tolerated. [facility] discussion.       Lamin Bailey  Nephrology Fellow  Feel free to contact me on TEAMS  After 5 pm please contact the on-call Fellow.

## 2024-07-29 NOTE — PROGRESS NOTE ADULT - CONVERSATION DETAILS
First introduced myself and role in patient care. Family shared that they opted to continue same level of care and hold PCU and symptoms medications for now, answered questions and emotional support provided.

## 2024-07-29 NOTE — PROGRESS NOTE ADULT - PROBLEM SELECTOR PLAN 12
DVT PPx: heparin SQ  Diet: pureed, pending S+S evaluation  Dispo: TBD, medically acute  Code Status: DNR/DNI w/ ToNIV, no HD, no feeding tube. Ok w/ IVFs, pressors as needed, antibiotics (see GOC note from 7/26)

## 2024-07-29 NOTE — CHART NOTE - NSCHARTNOTEFT_GEN_A_CORE
NUTRITION FOLLOW UP NOTE    PATIENT SEEN FOR: follow-up for nutrition service     SOURCE: [] Patient  [x] Current Medical Record  [x] RN  [x] Son and daughter (Yuly) at bedside  [x] Patient unavailable/inappropriate  [] Other:    CHART REVIEWED/EVENTS NOTED.  [] No changes to nutrition care plan to note  [x] Nutrition Status:   -- was on regular texture diet from  to , diet texture downgraded to puree since   -- Encounter for palliative care for C discussion, DNR and DNI with Trial NIV, hold PCU for now (per [facility] note )     DIET ORDER:   Diet, Pureed (24)    CURRENT DIET ORDER IS:  [] Appropriate:  [x] Inadequate: see below for recommendation   [] Other:    NUTRITION INTAKE/PROVISION:  [x] PO: son and daughter at bedside reports pt with minimal PO intake <25% since  despite encouragement and assistance with PO. Food preferences updated, will honor as able. Amendable for pt to get Mighty Shakes every meal to encourage protein and energy intake.   [] Enteral Nutrition:  [] Parenteral Nutrition:    ANTHROPOMETRICS:  Drug Dosing Weight  Height (cm): 157.5 (2024 10:35)  Weight (kg): 82.2 (2024 07:50)  BMI (kg/m2): 33.1 (2024 07:50)  Weights:   Daily Weight in k.9 (-), 82.3 (), 82 (), 82.8 (), 82.2 ()   --noted stable wt however true weight loss might be masked due to pt with +3 edema. Will continue to monitor weight trends as available/able.     MEDICATIONS:  MEDICATIONS  (STANDING):  albumin human 25% IVPB 50 milliLiter(s) IV Intermittent every 8 hours  atorvastatin 20 milliGRAM(s) Oral at bedtime  hemorrhoidal Ointment 1 Application(s) Rectal two times a day  heparin   Injectable 5000 Unit(s) SubCutaneous every 12 hours  melatonin 3 milliGRAM(s) Oral at bedtime  metoprolol tartrate 25 milliGRAM(s) Oral every 6 hours  midodrine. 20 milliGRAM(s) Oral three times a day  pantoprazole    Tablet 40 milliGRAM(s) Oral before breakfast  piperacillin/tazobactam IVPB.. 3.375 Gram(s) IV Intermittent every 12 hours  senna 2 Tablet(s) Oral at bedtime    MEDICATIONS  (PRN):  polyethylene glycol 3350 17 Gram(s) Oral two times a day PRN Constipation      NUTRITIONALLY PERTINENT LABS:   Na144 mmol/L Glu 108 mg/dL<H> K+ 5.0 mmol/L Cr  4.33 mg/dL<H> BUN 92 mg/dL<H>  Phos 5.6 mg/dL<H>  Alb 2.9 g/dL<L>  Chol 88 mg/dL LDL --    HDL 22 mg/dL<L> Trig 96 mg/dL ALT 23 U/L AST 48 U/L<H> Alkaline Phosphatase 69 U/L    A1C with Estimated Average Glucose Result: 5.4 % (24 @ 03:49)    Finger Sticks:  POCT Blood Glucose.: 135 mg/dL ( @ 11:35)    NUTRITIONALLY PERTINENT MEDICATIONS/LABS:  [x] Reviewed  [x] Relevant notes on medications/labs  -- CV: Midodrine (s/p Lasix and Bumex)   -- Renal: s/p correction for hyperkalemia on , noted abnormal renal indices, hyperkalemia and hyperphosphatemia today. Ordered for Albumin human 25% @ 50ml/hr    EDEMA:  [x] Reviewed  [x] Relevant notes: +3 to left and right legs per flowsheet      GI/ I&O:  [x] Reviewed  [x] Relevant notes: Protonix, Miralax and Senna. Last BM on  per flowsheet.   [] Other:    SKIN:   [x] No pressure injuries documented, per nursing flowsheet  [] Pressure injury previously noted  [] Change in pressure injury documentation:  [] Other:    ESTIMATED NEEDS:  [] No change:  [x] Updated:  Energy:  5311-7642 kcal/day (28-33 kcal/kg)  Protein: 60-71 g/day (1.1-1.3 g/kg)  Fluid:   ml/day or [x] defer to team  Based on: IBW of 54.8kg in consideration of BMI >30, malnutrition but with abnormal renal function.     NUTRITION DIAGNOSIS:  [x] Prior Dx: Decreased nutrient needs (sodium)  [x] New Dx: acute severe malnutrition related to inadequate PO intake in setting of medical condition as evidenced by PO </=50% for >/= 5 days, severe edema.     EDUCATION:  [x] Yes: to son and daughter: Emphasized the importance of adequate kcal and protein intake; recommend to optimize nutritional intake in case of decreased appetite; recommended small frequent meals by ordering nutrient-dense snacks and leaving non-perishable food away from tray for later consumption during the day or between meals; to start with protein, and sips of supplement throughout the day; reviewed foods with protein and menu order procedures in hospital.   [] Not appropriate/warranted    NUTRITION CARE PLAN:  1. Diet: Continue diet free of therapeutic restriction, diet texture per SLP/team. RD remains available to adjust diet as needed.   2. Supplements: will add Mighty Shakes 3x daily (600kcal, 18g proteins) to provide additional calories and nutrients to encourage PO intake.  3. Multivitamin/mineral supplementation: Recommend Nephro-Suzie supplement, pending no medical contraindication, for micronutrient support     [x] Achieved - Continue current nutrition intervention(s)  [] Current medical condition precludes nutrition intervention at this time.    MONITORING AND EVALUATION:   RD remains available upon request and will follow up per protocol:   Amada Chavarria MS, RDN, CDN (Teams)

## 2024-07-29 NOTE — SWALLOW BEDSIDE ASSESSMENT ADULT - SLP GENERAL OBSERVATIONS
Patient encountered awake and alert, positioned upright in bed, A&Ox2, +6L/NC, mildly short of breath. Able to follow simple commands and make basic wants/needs known. Vocal quality weak/breathy due to poor effort and breath support.

## 2024-07-29 NOTE — PROGRESS NOTE ADULT - TIME BILLING
Total Time Spent 50 minutes.    This includes chart review, patient assessment, discussion and collaboration with interdisciplinary team members, excluding ACP.    COUNSELING:  Face to face meeting to discuss Advanced Care Planning- Time Spent 20 minutes
chart reviewing, history taking, physical exam, assessment and documentation, including speaking to specialists regarding the management.

## 2024-07-29 NOTE — SWALLOW BEDSIDE ASSESSMENT ADULT - ASR SWALLOW ASPIRATION MONITOR
Monitor for s/s aspiration/laryngeal penetration. If noted:  D/C p.o. intake, provide non-oral nutrition/hydration/meds, and contact this service @ [phone]/change of breathing pattern/cough/gurgly voice/fever/pneumonia/throat clearing/upper respiratory infection

## 2024-07-29 NOTE — PROGRESS NOTE ADULT - ATTENDING COMMENTS
-D/w patient's son and daughter at bedside.   -Afib with rapid rates on tele. BP soft now on midodrine 20mg TID, but this is not ideal given her underlying CHF. Also, metoprolol po reduced to 50mg TID. Will see if can get rates under control with IV metoprolol and possibly IV diltiazem and may need to consider digoxin given low BP. -F/u cards recs.   -Abx on IV zosyn for leukocytosis, which has improved and UA positive, f/u UCx.   -HSQ for DVT PPx. Will need repeat US this week Wednesday for monitoring of distal RLE DVT.   -Cr worsens, but K wnl today. Would hold diuretics for now. Consider trial of albumin. F/u renal recs.  -Bipap/NIV for WOB and some CO2 retention. F/u VBG in am.  -Family aware of prognosis but would still like to treat and give her a chance. Hold off on PCU eval at this time.   -D/w house staff. -D/w patient's son and daughter at bedside.   -Afib with rapid rates on tele. BP soft now on midodrine 20mg TID, but this is not ideal given her underlying CHF. Also, metoprolol po reduced to 50mg TID. Will see if can get rates under control with IV metoprolol and possibly IV diltiazem and may need to consider digoxin given low BP. -F/u cards recs.   -Abx on IV zosyn for leukocytosis, which has improved and UA positive, f/u UCx.   -HSQ for DVT PPx. Will need repeat US this week Wednesday for monitoring of distal RLE DVT.   -Cr worsens, but K wnl today. Would hold diuretics for now. Consider trial of albumin. F/u renal recs.  -Bipap/NIV QHS and prn during the day for WOB and some CO2 retention. F/u VBG in am.  -Family aware of prognosis but would still like to treat and give her a chance. Hold off on PCU eval at this time.   -D/w house staff.

## 2024-07-29 NOTE — PROGRESS NOTE ADULT - PROBLEM SELECTOR PLAN 10
Does not report new onset fatigue. Past admission in early july 2024 got multiple pRBC transfusions and discharged with Hgb 9.1.  - Likely multifactorial considering age and other comorbidities  - Iron 28, TIBC 311, Tsat 9%, Ferritin  - Likely iron deficiency dominant but overall multifactorial  - HgB fell to 6.7 (07/19) s/p 1u pRBC -> post transfusion 8.2 -> 07/26 9.9.   - Significantly improved after IV iron sucrose x4 doses (end: 07/22)    DDx: occult GI bleeding vs IDS vs MDS/LGL vs less likely hemolysis    Plan:  - consider outpatient GI follow-up for colonoscopy (if within GOC)  - outpatient hematology follow-up Close to baseline now    Plan:  - IV treatment per above  -> Wound care consult: conservative management with [facility] [address]  Observe discharge from left leg (currently no discharge)

## 2024-07-29 NOTE — SWALLOW BEDSIDE ASSESSMENT ADULT - SWALLOW EVAL: PATIENT/FAMILY GOALS STATEMENT
Pt's daughter at bedside and stated pt was tolerating a regular diet prior to recent decline. Reported pt has not eaten in several days, and just started taking sips of water and eating small amounts yesterday. Pt has dentures, but does not wish to wear them at this time so daughter brought them home.

## 2024-07-29 NOTE — PROGRESS NOTE ADULT - PROBLEM SELECTOR PLAN 7
Duplex ultrasound visualized DVT in R posterior tibial vein. (Wells Score = 4.5)  CTA did not show evidence of PE + bilateral pleural effusions  - Patient is at high risk for bleeding with suspected GI bleed, anemia, past thrombocytopenia, age>75 and reduced functional capacity. Recommend U/S instead of full anticoagulation.  - U/S has been ordered for 7/31 and 8/7 at patient's home per home health. Patient's daughter informed by outpatient clinic TTE showed BRIANNA, severe TR with mPAP of 53.  Probably secondary to afib  - CTM, follow outpatient with cardiologist (home visits)

## 2024-07-29 NOTE — PROGRESS NOTE ADULT - PROBLEM SELECTOR PLAN 4
-Frequent reassurance and verbal orientation   -Family members or other familiar persons by his bedside.   -Delusions and hallucinations should be neither endorsed nor challenged.   -Physical restraints should be avoided. Alternatives to restraint use, such as constant observation (preferably by someone familiar to the patient such as a family member), may be more effectiv

## 2024-07-29 NOTE — PROGRESS NOTE ADULT - SUBJECTIVE AND OBJECTIVE BOX
Patient evaluated with family at bedside. Denies chest pain and palpitations. Tele reviewed and shows afib with RVR in the 140s persistently.    T(C): 36.4 (07-29-24 @ 11:21), Max: 37.1 (07-29-24 @ 01:00)  HR: 113 (07-29-24 @ 11:21) (76 - 144)  BP: 87/66 (07-29-24 @ 04:44) (72/52 - 105/75)  RR: 18 (07-29-24 @ 11:21) (18 - 20)  SpO2: 99% (07-29-24 @ 11:21) (98% - 100%)    CONSTITUTIONAL: nad, tired appearing  RESP: mildly labored breathing on nasal canula, reduced breath sounds bilaterally, crackles at the bases   CV: irregularly irregular, tachycardic, +S1S2, no MRG; +JVD  ABD: soft, non-ttp  MSK: 1+ LE edema bilaterally    Labs/imaging/tele reviewed in chart.

## 2024-07-29 NOTE — PROGRESS NOTE ADULT - PROBLEM SELECTOR PLAN 3
Patient's baseline is BUN=23, Cr=0.95, has been steadily worsening throughout this admission, likely 2/2 cardiorenal syndrome vs hypovolemia 2/2 overdiuresis vs intrinsic renal injury 2/2 contrast use  Bladder scan shows 50mL of urine with no evidence of retention.  Ddx: likely cardiorenal    Plan:  - nephrology following; appreciate recs- no plans for HD per GOC and patient frailty/age  - hold IV Bumex 2mg BID for diuresis given hypotension as above  - monitor strict I/Os, daily weights, SCr, UOP  - dose medications as per eGFR - Increased WBC count with neutrophil predominance, tachycardia with occasional hypotension. Afebrile  - Increased work of breathing but sating >92% on 3L NC  - DVT in RLE but PE ruled out with CTA  - Initially oxygen demands 2/2 HF exacerbation with subcostal retractions. Very lethargic  - CXR: prelim read shows infiltration in RML, suggestive of hospital acquired aspiration pneumonia  - VBG 07/27 showed lactic acidosis (3.6), pCO2 69 and pO2 161  - Ddx: aspiration vs hospital acquired PNA superimposed on HF exacerbation  - BCx NGTD    Plan:  - continue Zosyn 3.375 q12h (7/27- )  - continue BIPAP as above; wean as tolerated. No plans for intubation per GOC  - hold IV Bumex 2mg BID for diuresis given hypotension as above  - aspiration precaution. Diet changed to pureed per SS. FYI, no feeding tube per family

## 2024-07-29 NOTE — CHART NOTE - NSCHARTNOTEFT_GEN_A_CORE
Spoke with the son, Mani Barber, and alternate HCP, Daughter Angela Barber at 2:15am for goal of care given patient's deconditioning. Currently, family confirmed DNR/DNI,  Trial of non-invasive ventilation. No feeding tube. NO hemodialysis.     Further explained the use of pressor and cardioversion with each risk and benefit. Family does not want pressor or cardioversion on her. They are still in discussion of comfort care but understand pt's current condition and does not want to pursue aggressive measure.

## 2024-07-29 NOTE — PROGRESS NOTE ADULT - SUBJECTIVE AND OBJECTIVE BOX
***************************************************************  Darrel Contreras  (PGY1) Internal Medicine  On TEAMS  ***************************************************************    PROGRESS NOTE:     Patient is a 89y old  Female who presents with a chief complaint of asymptomatic thrombocytopenia (28 Jul 2024 20:00)      INTERVAL EVENTS:   SUBJECTIVE / OVERNIGHT EVENTS: No acute overnight events. Patient was seen and examined by the bedside this AM.   OXYGEN:   TELEMETRY:       MEDICATIONS  (STANDING):  atorvastatin 20 milliGRAM(s) Oral at bedtime  hemorrhoidal Ointment 1 Application(s) Rectal two times a day  heparin   Injectable 5000 Unit(s) SubCutaneous every 12 hours  melatonin 3 milliGRAM(s) Oral at bedtime  metoprolol tartrate 50 milliGRAM(s) Oral three times a day  midodrine. 20 milliGRAM(s) Oral three times a day  pantoprazole    Tablet 40 milliGRAM(s) Oral before breakfast  piperacillin/tazobactam IVPB.. 3.375 Gram(s) IV Intermittent every 12 hours  senna 2 Tablet(s) Oral at bedtime    MEDICATIONS  (PRN):  polyethylene glycol 3350 17 Gram(s) Oral two times a day PRN Constipation      CAPILLARY BLOOD GLUCOSE        I&O's Summary    28 Jul 2024 07:01  -  29 Jul 2024 07:00  --------------------------------------------------------  IN: 0 mL / OUT: 250 mL / NET: -250 mL        PHYSICAL EXAM:  Vital Signs Last 24 Hrs  T(C): 36.3 (29 Jul 2024 04:42), Max: 37.1 (29 Jul 2024 01:00)  T(F): 97.4 (29 Jul 2024 04:42), Max: 98.7 (29 Jul 2024 01:00)  HR: 130 (29 Jul 2024 04:42) (76 - 144)  BP: 87/66 (29 Jul 2024 04:44) (72/52 - 105/75)  BP(mean): --  RR: 20 (29 Jul 2024 04:42) (20 - 20)  SpO2: 100% (29 Jul 2024 04:42) (98% - 100%)    Parameters below as of 29 Jul 2024 04:42  Patient On (Oxygen Delivery Method): BiPAP/CPAP        General : NAD  CV: RRR no R/M/G  Lungs: CTAB  Abd : Soft, non-tender, non-distended  Extremities : No LE Edema  Neuro : A&Ox3  Skin: Normal color and turgor    LABS:                        10.0   13.22 )-----------( 213      ( 29 Jul 2024 06:37 )             34.1     07-29    144  |  97  |  92<H>  ----------------------------<  108<H>  5.0   |  29  |  4.33<H>    Ca    8.8      29 Jul 2024 06:37  Phos  5.6     07-29  Mg     2.4     07-29    TPro  6.4  /  Alb  2.9<L>  /  TBili  2.0<H>  /  DBili  x   /  AST  48<H>  /  ALT  23  /  AlkPhos  69  07-29          Urinalysis Basic - ( 29 Jul 2024 06:37 )    Color: x / Appearance: x / SG: x / pH: x  Gluc: 108 mg/dL / Ketone: x  / Bili: x / Urobili: x   Blood: x / Protein: x / Nitrite: x   Leuk Esterase: x / RBC: x / WBC x   Sq Epi: x / Non Sq Epi: x / Bacteria: x        Culture - Urine (collected 26 Jul 2024 18:40)  Source: Clean Catch Clean Catch (Midstream)  Preliminary Report (29 Jul 2024 00:34):    10,000 - 49,000 CFU/mL Gram Negative Rods    Culture - Blood (collected 26 Jul 2024 16:06)  Source: .Blood Blood-Peripheral  Preliminary Report (28 Jul 2024 20:01):    No growth at 48 Hours    Culture - Blood (collected 26 Jul 2024 16:02)  Source: .Blood Blood-Peripheral  Preliminary Report (28 Jul 2024 20:01):    No growth at 48 Hours        COORDINATION OF CARE:  Care Discussed with Consultants/Other Providers [Y/N]:  Prior or Outpatient Records Reviewed [Y/N]: ***************************************************************  Darrel Contreras  (PGY1) Internal Medicine  On TEAMS  ***************************************************************    PROGRESS NOTE:     Patient is a 89y old  Female who presents with a chief complaint of asymptomatic thrombocytopenia (28 Jul 2024 20:00)      INTERVAL EVENTS:   SUBJECTIVE / OVERNIGHT EVENTS: No acute overnight events. Patient was seen and examined by the bedside this AM. Looked better this am off Bipap. Was on NC looking more comfortable than my last examination on Saturday. Still very lethargic but able to respond today. Family does not want go into comfort care at this point.     OXYGEN:   TELEMETRY:       MEDICATIONS  (STANDING):  atorvastatin 20 milliGRAM(s) Oral at bedtime  hemorrhoidal Ointment 1 Application(s) Rectal two times a day  heparin   Injectable 5000 Unit(s) SubCutaneous every 12 hours  melatonin 3 milliGRAM(s) Oral at bedtime  metoprolol tartrate 50 milliGRAM(s) Oral three times a day  midodrine. 20 milliGRAM(s) Oral three times a day  pantoprazole    Tablet 40 milliGRAM(s) Oral before breakfast  piperacillin/tazobactam IVPB.. 3.375 Gram(s) IV Intermittent every 12 hours  senna 2 Tablet(s) Oral at bedtime    MEDICATIONS  (PRN):  polyethylene glycol 3350 17 Gram(s) Oral two times a day PRN Constipation      CAPILLARY BLOOD GLUCOSE        I&O's Summary    28 Jul 2024 07:01  -  29 Jul 2024 07:00  --------------------------------------------------------  IN: 0 mL / OUT: 250 mL / NET: -250 mL        PHYSICAL EXAM:  Vital Signs Last 24 Hrs  T(C): 36.3 (29 Jul 2024 04:42), Max: 37.1 (29 Jul 2024 01:00)  T(F): 97.4 (29 Jul 2024 04:42), Max: 98.7 (29 Jul 2024 01:00)  HR: 130 (29 Jul 2024 04:42) (76 - 144)  BP: 87/66 (29 Jul 2024 04:44) (72/52 - 105/75)  BP(mean): --  RR: 20 (29 Jul 2024 04:42) (20 - 20)  SpO2: 100% (29 Jul 2024 04:42) (98% - 100%)    Parameters below as of 29 Jul 2024 04:42  Patient On (Oxygen Delivery Method): BiPAP/CPAP        General : out of breath on NC but no accessory muscle use. Lethargic.   CV: Tachycardic, irregularly irregular. Difficult to hear for murmurs through breathing.   Lungs: Decreased breath sounds at lower lobes with some mild crackles.   Abd : Soft, non-tender, non-distended  Extremities : Chronic lymphedema looks better on exam compared to before.   Neuro : A&Ox2  Skin: Normal color and turgor    LABS:                        10.0   13.22 )-----------( 213      ( 29 Jul 2024 06:37 )             34.1     07-29    144  |  97  |  92<H>  ----------------------------<  108<H>  5.0   |  29  |  4.33<H>    Ca    8.8      29 Jul 2024 06:37  Phos  5.6     07-29  Mg     2.4     07-29    TPro  6.4  /  Alb  2.9<L>  /  TBili  2.0<H>  /  DBili  x   /  AST  48<H>  /  ALT  23  /  AlkPhos  69  07-29          Urinalysis Basic - ( 29 Jul 2024 06:37 )    Color: x / Appearance: x / SG: x / pH: x  Gluc: 108 mg/dL / Ketone: x  / Bili: x / Urobili: x   Blood: x / Protein: x / Nitrite: x   Leuk Esterase: x / RBC: x / WBC x   Sq Epi: x / Non Sq Epi: x / Bacteria: x        Culture - Urine (collected 26 Jul 2024 18:40)  Source: Clean Catch Clean Catch (Midstream)  Preliminary Report (29 Jul 2024 00:34):    10,000 - 49,000 CFU/mL Gram Negative Rods    Culture - Blood (collected 26 Jul 2024 16:06)  Source: .Blood Blood-Peripheral  Preliminary Report (28 Jul 2024 20:01):    No growth at 48 Hours    Culture - Blood (collected 26 Jul 2024 16:02)  Source: .Blood Blood-Peripheral  Preliminary Report (28 Jul 2024 20:01):    No growth at 48 Hours        COORDINATION OF CARE:  Care Discussed with Consultants/Other Providers [Y/N]:  Prior or Outpatient Records Reviewed [Y/N]: ***************************************************************  Darrel Contreras  (PGY1) Internal Medicine  On TEAMS  ***************************************************************    PROGRESS NOTE:     Patient is a 89y old  Female who presents with a chief complaint of asymptomatic thrombocytopenia (28 Jul 2024 20:00)      INTERVAL EVENTS:   SUBJECTIVE / OVERNIGHT EVENTS: No acute overnight events. Patient was seen and examined by the bedside this AM. Looked better this am off Bipap. Was on NC looking slightly more comfortable than my last examination on Saturday. Still very lethargic but able to respond today. Family does not want go into comfort care at this point.     OXYGEN:   TELEMETRY:       MEDICATIONS  (STANDING):  atorvastatin 20 milliGRAM(s) Oral at bedtime  hemorrhoidal Ointment 1 Application(s) Rectal two times a day  heparin   Injectable 5000 Unit(s) SubCutaneous every 12 hours  melatonin 3 milliGRAM(s) Oral at bedtime  metoprolol tartrate 50 milliGRAM(s) Oral three times a day  midodrine. 20 milliGRAM(s) Oral three times a day  pantoprazole    Tablet 40 milliGRAM(s) Oral before breakfast  piperacillin/tazobactam IVPB.. 3.375 Gram(s) IV Intermittent every 12 hours  senna 2 Tablet(s) Oral at bedtime    MEDICATIONS  (PRN):  polyethylene glycol 3350 17 Gram(s) Oral two times a day PRN Constipation      CAPILLARY BLOOD GLUCOSE        I&O's Summary    28 Jul 2024 07:01  -  29 Jul 2024 07:00  --------------------------------------------------------  IN: 0 mL / OUT: 250 mL / NET: -250 mL        PHYSICAL EXAM:  Vital Signs Last 24 Hrs  T(C): 36.3 (29 Jul 2024 04:42), Max: 37.1 (29 Jul 2024 01:00)  T(F): 97.4 (29 Jul 2024 04:42), Max: 98.7 (29 Jul 2024 01:00)  HR: 130 (29 Jul 2024 04:42) (76 - 144)  BP: 87/66 (29 Jul 2024 04:44) (72/52 - 105/75)  BP(mean): --  RR: 20 (29 Jul 2024 04:42) (20 - 20)  SpO2: 100% (29 Jul 2024 04:42) (98% - 100%)    Parameters below as of 29 Jul 2024 04:42  Patient On (Oxygen Delivery Method): BiPAP/CPAP        General : out of breath on NC but no accessory muscle use. Lethargic.   CV: Tachycardic, irregularly irregular. Difficult to hear for murmurs through breathing.   Lungs: Decreased breath sounds at lower lobes with some mild crackles.   Abd : Soft, non-tender, non-distended  Extremities : Chronic lymphedema looks better on exam compared to before.   Neuro : A&Ox2  Skin: Normal color and turgor    LABS:                        10.0   13.22 )-----------( 213      ( 29 Jul 2024 06:37 )             34.1     07-29    144  |  97  |  92<H>  ----------------------------<  108<H>  5.0   |  29  |  4.33<H>    Ca    8.8      29 Jul 2024 06:37  Phos  5.6     07-29  Mg     2.4     07-29    TPro  6.4  /  Alb  2.9<L>  /  TBili  2.0<H>  /  DBili  x   /  AST  48<H>  /  ALT  23  /  AlkPhos  69  07-29          Urinalysis Basic - ( 29 Jul 2024 06:37 )    Color: x / Appearance: x / SG: x / pH: x  Gluc: 108 mg/dL / Ketone: x  / Bili: x / Urobili: x   Blood: x / Protein: x / Nitrite: x   Leuk Esterase: x / RBC: x / WBC x   Sq Epi: x / Non Sq Epi: x / Bacteria: x        Culture - Urine (collected 26 Jul 2024 18:40)  Source: Clean Catch Clean Catch (Midstream)  Preliminary Report (29 Jul 2024 00:34):    10,000 - 49,000 CFU/mL Gram Negative Rods    Culture - Blood (collected 26 Jul 2024 16:06)  Source: .Blood Blood-Peripheral  Preliminary Report (28 Jul 2024 20:01):    No growth at 48 Hours    Culture - Blood (collected 26 Jul 2024 16:02)  Source: .Blood Blood-Peripheral  Preliminary Report (28 Jul 2024 20:01):    No growth at 48 Hours        COORDINATION OF CARE:  Care Discussed with Consultants/Other Providers [Y/N]:  Prior or Outpatient Records Reviewed [Y/N]:

## 2024-07-29 NOTE — PROGRESS NOTE ADULT - SUBJECTIVE AND OBJECTIVE BOX
Indication for Geriatrics and Palliative Care Services/INTERVAL HPI: GaP team consulted for complex medical decision making in the setting of serious illness and symptoms management.    OVERNIGHT EVENTS: No acute events reported at bedside.     SUBJECTIVE AND OBJECTIVE: Patient seen this morning, Family at bedside. Patient noticed to be awake and lethargic. Son reports that patient had some apple sauce this morning. Son reported that they would like patient to received all medical treatment available and opted to continue same level of care and hold PCU for now.     DNR on chart:DNI: Yes  DNI: Trial NIV      Allergies    No Known Allergies    Intolerances    MEDICATIONS  (STANDING):  albumin human 25% IVPB 50 milliLiter(s) IV Intermittent every 8 hours  atorvastatin 20 milliGRAM(s) Oral at bedtime  hemorrhoidal Ointment 1 Application(s) Rectal two times a day  heparin   Injectable 5000 Unit(s) SubCutaneous every 12 hours  melatonin 3 milliGRAM(s) Oral at bedtime  metoprolol tartrate 50 milliGRAM(s) Oral three times a day  midodrine. 20 milliGRAM(s) Oral three times a day  pantoprazole    Tablet 40 milliGRAM(s) Oral before breakfast  piperacillin/tazobactam IVPB.. 3.375 Gram(s) IV Intermittent every 12 hours  senna 2 Tablet(s) Oral at bedtime    MEDICATIONS  (PRN):  polyethylene glycol 3350 17 Gram(s) Oral two times a day PRN Constipation      ITEMS UNCHECKED ARE NOT PRESENT    PRESENT SYMPTOMS: [ ]Unable to self-report - see  CPOT, PAINADS, RDOS below  Source if other than patient:  [ ]Family   [ ]Team     Pain:  [ ]yes [ ]no  QOL impact -   Location -                    Aggravating factors -  Quality -  Radiation -  Timing-  Severity (0-10 scale):  Minimal acceptable level (0-10 scale):     Dyspnea:                           [ ]Mild [ ]Moderate [ ]Severe  Anxiety:                             [ ]Mild [ ]Moderate [ ]Severe  Fatigue:                             [ ]Mild [ ]Moderate [ ]Severe  Nausea:                             [ ]Mild [ ]Moderate [ ]Severe  Loss of appetite:              [ ]Mild [ ]Moderate [ ]Severe  Constipation:                    [ ]Mild [ ]Moderate [ ]Severe    PCSSQ[Palliative Care Spiritual Screening Question]   Severity (0-10):  Score of 4 or > indicate consideration of Chaplaincy referral.  Chaplaincy Referral: [ ] yes [ ] refused [ ] following  Caregiver [place]? : [ ] yes [ ] no  [x ] deferred:  Social work referral [ ] Patient & Family Centered Care Referral [ ]   Anticipatory Grief present?:  [ ] yes [x ] no  [ ] deferred: Social work referral [ ] Patient & Family Centered Care Referral [ ]      Other Symptoms:  [ ]All other review of systems negative   Not able to obtain due to mentation     PHYSICAL EXAM:  Vital Signs Last 24 Hrs  T(C): 36.4 (29 Jul 2024 11:21), Max: 37.1 (29 Jul 2024 01:00)  T(F): 97.5 (29 Jul 2024 11:21), Max: 98.7 (29 Jul 2024 01:00)  HR: 113 (29 Jul 2024 11:21) (76 - 144)  BP: 87/66 (29 Jul 2024 04:44) (72/52 - 105/75)  BP(mean): --  RR: 18 (29 Jul 2024 11:21) (18 - 20)  SpO2: 99% (29 Jul 2024 11:21) (98% - 100%)    Parameters below as of 29 Jul 2024 11:21  Patient On (Oxygen Delivery Method): nasal cannula     I&O's Summary    28 Jul 2024 07:01  -  29 Jul 2024 07:00  --------------------------------------------------------  IN: 0 mL / OUT: 300 mL / NET: -300 mL       GENERAL:  [ x]Alert  [ x]Oriented x  1 [x ]Lethargic  [ ]Cachexia  [ ]Unarousable  [ x]Verbal  [ ]Non-Verbal    Behavioral:   [ ] Anxiety  [ x] Delirium [ ] Agitation [ ] Calm  [ ] Other  HEENT:  [ ]Normal  [ ] Temporal Wasting  [x ]Dry mouth   [ ]ET Tube/Trach  [ ]Oral lesions  [ ] Mucositis  PULMONARY: coarse breath sounds b/l  [ ]Clear [x ]Tachypnea mild  [ ]Audible excessive secretions   [ ]Rhonchi        [ ]Right [ ]Left [ ]Bilateral  [ ]Crackles        [ ]Right [ ]Left [ ]Bilateral  [ ]Wheezing     [ ]Right [ ]Left [ ]Bilateral  [ ]Diminished breath sounds [ ]right [ ]left [ ]bilateral  CARDIOVASCULAR:    [ ]Regular [ ]Irregular [ x]Tachy  [ ]Steven [ ]Murmur [ ]Other  GASTROINTESTINAL:  [x ]Soft  [ ]Distended   [ ]+BS  [ x]Non tender [ ]Tender  [ ]PEG [ ]OGT/ NGT  Last BM: 07/26 fecal incontinence   GENITOURINARY:  [ ]Normal [ ] Incontinent   [ ]Oliguria/Anuria   [x ]Koehler  MUSCULOSKELETAL:   [ ]Normal   [x ]Weakness  [ x]Bed/Wheelchair bound [x ]Edema- lymphedema of b/l lower extremities  [  ] amputation  [  ] contraction  NEUROLOGIC:   [x ]No focal deficits  [ ]Cognitive impairment  [ ]Dysphagia [ ]Dysarthria [ ]Paresis [ ]Other   SKIN: See Nursing Skin Assessment for further details  [ ]Normal    [ ]Rash  [ ]Pressure ulcer(s)       Present on admission [ ]y [ ]n   [  ]  Wound    [  ] hyperpigmentation    CRITICAL CARE:  [ ] Shock Present  [ ]Septic [ ]Cardiogenic [ ]Neurologic [ ]Hypovolemic  [ ]  Vasopressors [ ]  Inotropes   [ ]Respiratory failure present [ ]Mechanical ventilation [ ]Non-invasive ventilatory support [ ]High flow    [ ]Acute  [ ]Chronic [ ]Hypoxic  [ ]Hypercarbic [ ]Other  [ ]Other organ failure     LABS:                                          10.0   13.22 )-----------( 213      ( 29 Jul 2024 06:37 )             34.1   07-29    144  |  97  |  92<H>  ----------------------------<  108<H>  5.0   |  29  |  4.33<H>    Ca    8.8      29 Jul 2024 06:37  Phos  5.6     07-29  Mg     2.4     07-29    TPro  6.4  /  Alb  2.9<L>  /  TBili  2.0<H>  /  DBili  x   /  AST  48<H>  /  ALT  23  /  AlkPhos  69  07-29    RADIOLOGY & ADDITIONAL STUDIES:    US Kidney and Bladder (07.26.24 @ 20:59) >  No hydronephrosis.  6 mm nonobstructing stone upper pole right kidney.  1.2 cm simple cyst upper pole left kidney.    CT Angio Chest PE Protocol w/ IV Cont (07.24.24 @ 14:47) >  No pulmonary embolus to the level of the segmental vessels.  Moderate right and small left pleural effusions.       Xray Chest 1 View- PORTABLE-Urgent (07.17.24 @ 11:57) >  Left retrocardiac opacity, which may represent a combination of pleural   effusion and/or atelectasis.        Protein Calorie Malnutrition Present: [ ]mild [ ]moderate [ ]severe [ ]underweight [ ]morbid obesity  https://www.andeal.org/vault/2440/web/files/ONC/Table_Clinical%20Characteristics%20to%20Document%20Malnutrition-White%20JV%20et%20al%232523.pdf    Height (cm): 157.5 (07-17-24 @ 10:35), 157.5 (01-10-24 @ 16:35)  Weight (kg): 82.2 (07-24-24 @ 07:50), 54.4 (07-02-24 @ 14:57), 90.3 (01-10-24 @ 16:35)  BMI (kg/m2): 33.1 (07-24-24 @ 07:50), 21.9 (07-17-24 @ 10:35), 21.9 (07-02-24 @ 14:57)    [ ]PPSV2 < or = 30%  [ ]significant weight loss [x ]poor nutritional intake [ ]anasarca[ ]Artificial Nutrition    Other REFERRALS:  [ ]Hospice  [ ]Child Life  [ ]Social Work  [x ]Case management [ ]Holistic Therapy     Palliative Performance Scale:  http://npcrc.org/files/news/palliative_performance_scale_ppsv2.pdf  (Ctrl +  left click to view)  Respiratory Distress Observation Tool:  https://homecareinformation.net/handouts/hen/Respiratory_Distress_Observation_Scale.pdf (Ctrl +  left click to view)  PAINAD Score:  http://geriatrictoolkit.[place].[facility]/cog/painad.pdf (Ctrl +  left click to view)                                         Progress Notes    PROGRESS NOTE  Date & Time of Note   2024-07-29 10:35    Notes    Notes: Chart reviewed and patient discussed during IDR, remains acute. IV ABT  in progress. Chronic koehler in place. O2 @ 6L via NC. monitoring WBC.  possibility of PCU evaluation for further symptom management as per GOC note.  CM will continue to follow pending hospital course.       Electronically signed by:  Neftaly YANES RN  Electronically signed on:  2024-07-29  10:36

## 2024-07-29 NOTE — PROGRESS NOTE ADULT - PROBLEM SELECTOR PLAN 7
Home GaP team consulted for complex medical decision making in the setting of serious illness.    Can be reached by TEAMS M-F 9-5 Yue Delgado. Any other time please page [phone] if needed.    In the event of worsening symptoms, please contact the Palliative Medicine team via pager (if the patient is at [facility] #[phone] or if the patient is at [facility] #[phone]) The Geriatric and Palliative Medicine service has coverage 24 hours a day/ 7 days a week to provide medical recommendations regarding symptom management needs via telephone

## 2024-07-29 NOTE — PROGRESS NOTE ADULT - ASSESSMENT
89F with a PMH of HFpEF on chronic 2L NC, a-fib, CVA and lymphedema presents to the ED with asymptomatic thrombocytopenia. 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  Anemia (hgb 6.7) s/p 1u pRBC improved and thrombocytopenia normalized. Now treating for increased SOB likely 2/2 worsening HF with no PE seen on imaging. Infectious workup significant for UTI and possible aspiration PNA. Course also c/b worsening PHAM, possibly cardiorenal vs hypovolemia vs intrinsic 2/2 recent contrast.

## 2024-07-29 NOTE — PROGRESS NOTE ADULT - PROBLEM SELECTOR PLAN 6
Code status: DNR/I trial NIV   HCP: son Mani and alternate daughter Angela who preferred to continue current level of care and hold PCU and opioids medications

## 2024-07-30 NOTE — PROVIDER CONTACT NOTE (OTHER) - SITUATION
pt hypotensive
Hypotensive even post PO midodrine 20mg, manual bp 72/52
Hypotensive even post 250 cc bolus, manual bp 74/54
Hypotensive noted, BP 80/56 manual, second manul BP 76/54
Pt's BP 77/54 electronic; Pt asymptomatic, no change in mental status

## 2024-07-30 NOTE — PROGRESS NOTE ADULT - PROBLEM SELECTOR PLAN 6
Detail Level: Detailed Quality 226: Preventive Care And Screening: Tobacco Use: Screening And Cessation Intervention: Patient screened for tobacco use and is an ex/non-smoker Quality 111:Pneumonia Vaccination Status For Older Adults: Pneumococcal vaccine (PPSV23) administered on or after patient’s 60th birthday and before the end of the measurement period Quality 402: Tobacco Use And Help With Quitting Among Adolescents: Patient screened for tobacco and never smoked Quality 110: Preventive Care And Screening: Influenza Immunization: Influenza Immunization Administered during Influenza season Duplex ultrasound visualized DVT in R posterior tibial vein. (Wells Score = 4.5)  CTA did not show evidence of PE + bilateral pleural effusions  - Patient is at high risk for bleeding with suspected GI bleed, anemia, past thrombocytopenia, age>75 and reduced functional capacity. Recommend U/S instead of full anticoagulation.    - Plan for U/S 07/31 in patient  - US 8/7 at patient's home per home health. Patient's daughter informed by outpatient clinic

## 2024-07-30 NOTE — PROGRESS NOTE ADULT - ASSESSMENT
89 yo with hx of HFpEF on 3L NC, Afib, HLD, CVA and lymphedema presents with initially with SOB and received blood transfusion. Over course, she received lasix, IVF and contrast and noted to have PHAM and nephrology consulted.       #Oliguric PHAM from cardiorenal and contrast nephropathy now ATN  -Echo from 7/23 show LA and RA severely dilated, severe TR with PAP of 53 and large left pleural effusion.   -Pr/Cr elevated at 0.7.   -Effusions on CT  Recs:  -Worsening renal function. HyperK 5.5. Please give lokelma 10 gm x 1. Not a HD candidate. Diuresis as tolerated. GOC discussion. Discussed with son at bedside.       Lamin Bailey  Nephrology Fellow  Feel free to contact me on TEAMS  After 5 pm please contact the on-call Fellow.

## 2024-07-30 NOTE — PROVIDER CONTACT NOTE (OTHER) - ACTION/TREATMENT ORDERED:
will be down at bedside shortly.
Darrel Contreras made aware
will be down at bedside.
Provider notified and aware.

## 2024-07-30 NOTE — PROGRESS NOTE ADULT - PROBLEM SELECTOR PLAN 11
DVT PPx: heparin SQ  Diet: pureed per SS  Dispo: TBD, medically acute  Code Status: DNR/DNI w/ ToNIV, no HD, no feeding tube. Ok w/ IVFs, no pressors (see 07/28 note), antibiotics (see GOC note from 7/26)

## 2024-07-30 NOTE — PROGRESS NOTE ADULT - PROBLEM SELECTOR PLAN 7
TTE showed BRIANNA, severe TR with mPAP of 53.  Probably secondary to afib  - CTM, follow outpatient with cardiologist (home visits)

## 2024-07-30 NOTE — PROGRESS NOTE ADULT - PROBLEM SELECTOR PLAN 4
BNP of 1994 (below baseline) and long standing dyspnea. Home Lasix PO 40qD  Crackles at right lower lung and JVD. increased SOB 07/21 and 07/22  - SOB is improving  - CXR (07/21) showed new R sided pleural effusion likely secondary to increased hydrostatic pressure  - TTE (07/23) showed decreased EF (45-50%), dilated L and R atrium and R ventricle, tricuspid regurgitation, mPAP 53.  - BNP increased to 7124 (07/22)  -> Duplex ultrasound (7/23): Right posterior tibial DVT seen.  -> CT angio (7/23): No PEs were visualized. Moderate R and small L pleural effusion.    DDx: HF exacerbation likely 2/2 AFib with CT Angio ruling out PE. Now with superimposed infection    Plan:  - hold IV Bumex 2mg BID for diuresis given hypotension as above  - GDMT on hold given hypotension and age

## 2024-07-30 NOTE — PROVIDER CONTACT NOTE (OTHER) - ASSESSMENT
Pt alert, no change in mental status from baseline, denies headache, dizziness, cp, or palpitations, in no acute distress
Patient A&O x 2, on BIPAP. DNR/ trial of Intub. VS: 74/54, HR  133, T 97.4F, 100% on BIPAP.
Blood Pressure 81/58, heart rate 125-140. metoprolol held due to parameters
Patient A&O x 2, on BIPAP. DNR/ trial of Intub. VS: 72/52, HR  130, 98% on 6LNC.
Patient A&O x 2, on BIPAP. DNR/ trial of Intub.

## 2024-07-30 NOTE — PROGRESS NOTE ADULT - PROBLEM SELECTOR PLAN 3
- Increased WBC count with neutrophil predominance, tachycardia with occasional hypotension. Afebrile  - Increased work of breathing but sating >92% on 3L NC  - DVT in RLE but PE ruled out with CTA  - Initially oxygen demands 2/2 HF exacerbation with subcostal retractions. Very lethargic  - CXR: prelim read shows infiltration in RML, suggestive of hospital acquired aspiration pneumonia  - VBG 07/27 showed lactic acidosis (3.6), pCO2 69 and pO2 161  - Ddx: aspiration vs hospital acquired PNA superimposed on HF exacerbation  - BCx NGTD    Plan:  - continue Zosyn 3.375 q12h (7/27- 08/01)  - continue BIPAP as above; wean as tolerated. No plans for intubation per GOC  - hold IV Bumex 2mg BID for diuresis given hypotension as above  - aspiration precaution. Diet changed to pureed per SS. FYI, no feeding tube per family

## 2024-07-30 NOTE — PROGRESS NOTE ADULT - PROBLEM SELECTOR PLAN 1
- Recurrent episode of hypotension 77/70s on midodrine now, with lactic acidosis of 3.3 on ABG 07/28 and leukocytosis with neutrophil dominance  - Possible RLL asp pna and UTI present (LE +, WBC +, Nitrite -)  - s/p several IVF boluses (cautiously given due to fluid overloaded state)  - discussed with family at bedside, would like to hold off pressors for now  - Pressures improved on midodrine    Plan:  - Hold further diuresis for now  - Continue Zosyn (07/27 - 08/01)  - Wean midodrine as tolerated (currently 20mg TID) - not ideal in HF  - IV Albumin 25%, 50mL q6h for 4 doses  - Better Afib control as below to help pressures.   - F/u with family on changes on PCU/comfort care. Palliative following   - BiPap (10/5, FiO2 40) nightly and PRN in the am

## 2024-07-30 NOTE — PROVIDER CONTACT NOTE (OTHER) - REASON
Pt's BP 77/54 electronic; Pt asymptomatic, no change in mental status
Hypotensive even post 250 cc bolus, manual bp 74/54
Hypotensive even post midodrine 20mg PO, manual bp 72/52
Hypotensive noted, BP 80/56 manual
hypotensive

## 2024-07-30 NOTE — PROGRESS NOTE ADULT - SUBJECTIVE AND OBJECTIVE BOX
***************************************************************  Darrel Contreras  (PGY1) Internal Medicine  On TEAMS  ***************************************************************    PROGRESS NOTE:     Patient is a 89y old  Female who presents with a chief complaint of asymptomatic thrombocytopenia (30 Jul 2024 14:00)      INTERVAL EVENTS:   SUBJECTIVE / OVERNIGHT EVENTS: No acute overnight events. Patient was seen and examined by the bedside this AM. Patient showed increased work of breathing and unable to hold conversation. Talked with son who understands prognosis but still wants to hold off on PCU  OXYGEN:   TELEMETRY:       MEDICATIONS  (STANDING):  albumin human 25% IVPB 50 milliLiter(s) IV Intermittent every 6 hours  atorvastatin 20 milliGRAM(s) Oral at bedtime  hemorrhoidal Ointment 1 Application(s) Rectal two times a day  heparin   Injectable 5000 Unit(s) SubCutaneous every 12 hours  melatonin 3 milliGRAM(s) Oral at bedtime  metoprolol tartrate 25 milliGRAM(s) Oral every 6 hours  midodrine. 20 milliGRAM(s) Oral three times a day  pantoprazole    Tablet 40 milliGRAM(s) Oral before breakfast  piperacillin/tazobactam IVPB.. 3.375 Gram(s) IV Intermittent every 12 hours  senna 2 Tablet(s) Oral at bedtime  sodium zirconium cyclosilicate 10 Gram(s) Oral once    MEDICATIONS  (PRN):  polyethylene glycol 3350 17 Gram(s) Oral two times a day PRN Constipation      CAPILLARY BLOOD GLUCOSE        I&O's Summary    29 Jul 2024 07:01  -  30 Jul 2024 07:00  --------------------------------------------------------  IN: 220 mL / OUT: 50 mL / NET: 170 mL    30 Jul 2024 07:01  -  30 Jul 2024 15:19  --------------------------------------------------------  IN: 100 mL / OUT: 50 mL / NET: 50 mL        PHYSICAL EXAM:  Vital Signs Last 24 Hrs  T(C): 36.3 (30 Jul 2024 11:39), Max: 36.6 (30 Jul 2024 04:23)  T(F): 97.4 (30 Jul 2024 11:39), Max: 97.8 (30 Jul 2024 04:23)  HR: 127 (30 Jul 2024 11:39) (107 - 135)  BP: 81/58 (30 Jul 2024 11:39) (80/40 - 117/81)  BP(mean): --  RR: 19 (30 Jul 2024 11:39) (16 - 32)  SpO2: 100% (30 Jul 2024 11:39) (97% - 100%)    Parameters below as of 30 Jul 2024 05:24  Patient On (Oxygen Delivery Method): BiPAP/CPAP        General : Lethargic  CV: Tachycardic with irregularly irregular rhythm. Hard to hear for murmurs due to breathing  Lungs: Difficult to evaluate as patient unable to lean front. Subcostal retraction on respiration  Abd : Soft, non-tender, non-distended  Extremities : Chronic lymphedema, improved since admission. Significant edema on back   Neuro : A&Ox1-2  Skin: Normal color and turgor    LABS:                        9.6    12.24 )-----------( 162      ( 30 Jul 2024 06:55 )             32.0     07-30    144  |  97  |  95<H>  ----------------------------<  69<L>  5.5<H>   |  25  |  4.81<H>    Ca    8.5      30 Jul 2024 06:54  Phos  6.3     07-30  Mg     2.4     07-30    TPro  6.4  /  Alb  2.9<L>  /  TBili  2.0<H>  /  DBili  x   /  AST  48<H>  /  ALT  23  /  AlkPhos  69  07-29          Urinalysis Basic - ( 30 Jul 2024 06:54 )    Color: x / Appearance: x / SG: x / pH: x  Gluc: 69 mg/dL / Ketone: x  / Bili: x / Urobili: x   Blood: x / Protein: x / Nitrite: x   Leuk Esterase: x / RBC: x / WBC x   Sq Epi: x / Non Sq Epi: x / Bacteria: x          COORDINATION OF CARE:  Care Discussed with Consultants/Other Providers [Y/N]:  Prior or Outpatient Records Reviewed [Y/N]:

## 2024-07-30 NOTE — PROGRESS NOTE ADULT - SUBJECTIVE AND OBJECTIVE BOX
[facility] DIVISION OF KIDNEY DISEASES AND HYPERTENSION   FOLLOW UP NOTE    --------------------------------------------------------------------------------  Chief Complaint:    24 hour events/subjective: Pt. was seen and examined today.  BP still intermittently low. More awake today. On nasal canula today.      PAST HISTORY  --------------------------------------------------------------------------------  No significant changes to PMH, PSH, FHx, SHx, unless otherwise noted    ALLERGIES & MEDICATIONS  --------------------------------------------------------------------------------  Allergies    No Known Allergies    Intolerances      Standing Inpatient Medications  albumin human 25% IVPB 50 milliLiter(s) IV Intermittent every 6 hours  atorvastatin 20 milliGRAM(s) Oral at bedtime  hemorrhoidal Ointment 1 Application(s) Rectal two times a day  heparin   Injectable 5000 Unit(s) SubCutaneous every 12 hours  melatonin 3 milliGRAM(s) Oral at bedtime  metoprolol tartrate 25 milliGRAM(s) Oral every 6 hours  midodrine. 20 milliGRAM(s) Oral three times a day  pantoprazole    Tablet 40 milliGRAM(s) Oral before breakfast  piperacillin/tazobactam IVPB.. 3.375 Gram(s) IV Intermittent every 12 hours  senna 2 Tablet(s) Oral at bedtime  sodium zirconium cyclosilicate 10 Gram(s) Oral once    PRN Inpatient Medications  polyethylene glycol 3350 17 Gram(s) Oral two times a day PRN      REVIEW OF SYSTEMS  --------------------------------------------------------------------------------    All other systems were reviewed and are negative, except as noted.    VITALS/PHYSICAL EXAM  --------------------------------------------------------------------------------  T(C): 36.3 (07-30-24 @ 11:39), Max: 36.6 (07-30-24 @ 04:23)  HR: 127 (07-30-24 @ 11:39) (107 - 135)  BP: 81/58 (07-30-24 @ 11:39) (80/40 - 117/81)  RR: 19 (07-30-24 @ 11:39) (16 - 32)  SpO2: 100% (07-30-24 @ 11:39) (97% - 100%)  Wt(kg): --        07-29-24 @ 07:01  -  07-30-24 @ 07:00  --------------------------------------------------------  IN: 220 mL / OUT: 50 mL / NET: 170 mL    07-30-24 @ 07:01  -  07-30-24 @ 14:00  --------------------------------------------------------  IN: 100 mL / OUT: 50 mL / NET: 50 mL        Physical Exam:  	Gen: NAD  	HEENT: Anicteric  	Pulm: Crackles scattered   	CV: S1S2+  	Abd: Soft, +BS   	Ext: ++LE edema B/L  	Neuro: Drowsy  	Skin: Warm and dry      LABS/STUDIES  --------------------------------------------------------------------------------              9.6    12.24 >-----------<  162      [07-30-24 @ 06:55]              32.0     144  |  97  |  95  ----------------------------<  69      [07-30-24 @ 06:54]  5.5   |  25  |  4.81        Ca     8.5     [07-30-24 @ 06:54]      Mg     2.4     [07-30-24 @ 06:54]      Phos  6.3     [07-30-24 @ 06:54]    TPro  6.4  /  Alb  2.9  /  TBili  2.0  /  DBili  x   /  AST  48  /  ALT  23  /  AlkPhos  69  [07-29-24 @ 06:37]          Creatinine Trend:  SCr 4.81 [07-30 @ 06:54]  SCr 4.33 [07-29 @ 06:37]  SCr 4.00 [07-28 @ 10:27]  SCr 3.32 [07-27 @ 10:33]  SCr 2.79 [07-26 @ 16:12]    Urinalysis - [07-30-24 @ 06:54]      Color  / Appearance  / SG  / pH       Gluc 69 / Ketone   / Bili  / Urobili        Blood  / Protein  / Leuk Est  / Nitrite       RBC  / WBC  / Hyaline  / Gran  / Sq Epi  / Non Sq Epi  / Bacteria     Urine Creatinine 127      [07-25-24 @ 13:12]  Urine Protein 87      [07-25-24 @ 13:12]  Urine Sodium 10      [07-25-24 @ 13:12]  Urine Urea Nitrogen 387      [07-25-24 @ 13:12]  Urine Potassium 51      [07-25-24 @ 13:12]  Urine Osmolality 471      [07-25-24 @ 13:12]    HBsAg Nonreact      [07-03-24 @ 17:11]  HCV 0.16, Nonreact      [07-03-24 @ 17:11]  HIV Nonreact      [07-02-24 @ 06:11]      Tacrolimus  Cyclosporine  Sirolimus  Mycophenolate  BK PCR  CMV PCRCMVPCR Log: Det <1.54 Assay Dynamic Range: 34.5 to 1.0E+07 IU/mL (1.54 to 7.00 Log10 IU/mL)  Assay lower limit of quantification (LLOQ) is 34.5 IU/mL (1.54 Log10  IU/mL)  CMV DNA detected below the LLOQ will be reported as Detected < 34.5 IU/mL  (<1.54 Log10 IU/mL)  [employer] Cytomegalovirus (CMV) is an FDA-cleared quantitative test that  enables the detection and quantitation of CMV DNA in EDTA plasma of  infected transplant patients on the eliana 8800 system. This test was  verified by [employer]. Results should be interpreted  with consideration of all clinical findings and laboratory findings and  should not form the sole basis for a diagnosis or treatment decision. Cci25IS/mL (07-03 @ 17:09)    Parvo PCR  EBV PCR

## 2024-07-30 NOTE — PROGRESS NOTE ADULT - PROBLEM SELECTOR PLAN 2
Patient's baseline is BUN=23, Cr=0.95, has been steadily worsening throughout this admission, likely 2/2 cardiorenal syndrome vs hypovolemia 2/2 overdiuresis vs intrinsic renal injury 2/2 contrast use  Bladder scan shows 50mL of urine with no evidence of retention.  - K 5.5 and phos 6.3 07/30  Ddx: likely cardiorenal    Plan:  - Lokelma 10mg x1  - Repeat BMP at 4pm and can give another dose of Lokelma if needed  - Sevelamer 800 x 2 doses. Recheck phos in am  - nephrology following; appreciate recs- no plans for HD per GOC and patient frailty/age  - hold IV Bumex 2mg BID for diuresis given hypotension as above  - monitor strict I/Os, daily weights, SCr, UOP  - dose medications as per eGFR

## 2024-07-30 NOTE — PROGRESS NOTE ADULT - PROBLEM SELECTOR PLAN 9
Does not report new onset fatigue. Past admission in early july 2024 got multiple pRBC transfusions and discharged with Hgb 9.1.  - Likely multifactorial considering age and other comorbidities  - Iron 28, TIBC 311, Tsat 9%, Ferritin  - Likely iron deficiency dominant but overall multifactorial  - HgB fell to 6.7 (07/19) s/p 1u pRBC -> post transfusion 8.2 -> 07/26 9.9.   - Significantly improved after IV iron sucrose x4 doses (end: 07/22)    DDx: occult GI bleeding vs IDS vs MDS/LGL vs less likely hemolysis    Plan:  - consider outpatient GI follow-up for colonoscopy (if within GOC)  - outpatient hematology follow-up

## 2024-07-30 NOTE — PROGRESS NOTE ADULT - ATTENDING COMMENTS
-Spoke with son at bedside and daughter over the phone. Patient a bit more interactive today at bedside than yesterday. Denies any symptoms. -Not ready for PCU transfer yet.   -Cr worse unfortunately and UO low. Hyperkalemic again. Afib rates not well controlled and BP intermittently low. -On midodrine 20mg TID and trying metoprolol 25mg q6h as able. Will give another trial of albumin 25% q6h x 4. -Received lokelma 10gm x 1 today and will repeat tonight. Using NIV overnight for WOB. Diuretics have been on hold given renal failure and hypotension. Consider adding phos binder for hyperphos.   -Renal appreciated.   -Cards appreciated; would not recommend amio given possible cardioversion and stroke risk given had not been on AC. Digoxin risky in setting of renal failure. Will c/w BB as able. Consider IV push metoprolol.   -On Zosyn for UTI/leukocytosis, which has improved.   -Due for serial LE duplex tomorrow for RLE DVT.   -D/w house staff.

## 2024-07-30 NOTE — PROGRESS NOTE ADULT - PROBLEM SELECTOR PLAN 10
Close to baseline now    Plan:  - IV treatment per above  -> Wound care consult: conservative management with [facility] [address]  Observe discharge from left leg (currently no discharge)

## 2024-07-31 NOTE — RAPID RESPONSE TEAM SUMMARY - NSSITUATIONBACKGROUNDRRT_GEN_ALL_CORE
89F with a PMH of HFpEF on chronic 3-6L NC, a-fib, CVA and lymphedema presents to the ED with asymptomatic thrombocytopenia. 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  Anemia (hgb 6.7) s/p 1u pRBC improved and thrombocytopenia normalized. Now treating for increased SOB likely 2/2 worsening HF.    RRT called for hypotension and hypoxia on RN assessment. Patient connected to [facility], showing stable /54. Patient mentating appropriately and asymptomatic, AOx4. VS afebrile, satting 100% on 2-3L, BG WNL. Heart rate irregular, variable rates  (not sustained). EKG showing Afib with underlying RBBB. 
89F with a PMH of HFpEF on chronic 2L NC, a-fib, CVA and lymphedema presents to the ED with asymptomatic thrombocytopenia. 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  Anemia (hgb 6.7) s/p 1u pRBC improved and thrombocytopenia normalized. Now treating for increased SOB likely 2/2 worsening HF with no PE seen on imaging. Infectious workup significant for UTI and possible aspiration PNA. Course also c/b worsening PHAM, possibly cardiorenal vs hypovolemia vs intrinsic 2/2 recent contrast.      RRT called for AMS and unresponsiveness. On exam, patient was AOx0, with no central pulse. Family at bedside who confirmed patient is DNR/DNI. POCG was 47, d50 was not given patient was asystolic. We performed a full exam, and confirmed Time of Death as 8:03AM. Family and primary team updated at bedside.

## 2024-07-31 NOTE — PROGRESS NOTE ADULT - PROVIDER SPECIALTY LIST ADULT
Internal Medicine
Internal Medicine
Nephrology
Cardiology
Internal Medicine
Nephrology
Nephrology
Cardiology
Internal Medicine
Nephrology
Wound Care
Heme/Onc
Internal Medicine
Palliative Care

## 2024-07-31 NOTE — RAPID RESPONSE TEAM SUMMARY - NSOTHERINTERVENTIONSRRT_GEN_ALL_CORE
see above 
Patient asymptomatic with non-sustained Afib with RVR, currently on metoprolol TID. BP stable, at baseline oxygen requirement. Primary team at bedside.   Recommend:  -transfer to telemetry for cardiac monitoring   -follow up TTE read, consider cardiology consult  -replete electrolytes K>4, Phos>3, Mag>2

## 2024-07-31 NOTE — DISCHARGE NOTE FOR THE EXPIRED PATIENT - HOSPITAL COURSE
patient passed away at 8:03 am from cardiopulmonary arrest in the setting of acute on chronic heart failure with renal failure.  Patient admitted for severe thrombocytopenia of 4k but received no transfusion as there was no signs of bleeding, good mentation, and there was concern for ITP. Heme consulted and started patient on Dexamethasone 40mg x 4 doses with IVIG x 2 doses that helped improve platelets to 60k on second day of admission making ITP likely. Concurrently started IV Cefazolin 5 day course for mild cellulitis of right foot with chronic lymphedema. Found to be anemic with Hgb 6.7 (07/19) and received 1u pRBC improving Hgb to around 8. Thrombocytopenia and her Hg remains stable around 9-10. Patient started becoming more SOB likely secondary to increased IV fluids from pRBC and IVIG requiring increased diuresis with prompt improvement. Patient also had an episode of decreased blood pressure and tachycardia likely secondary to atrial fibrillation that resolved with magnesium supplementation. Repeat TTE showed decreased ejection fraction at 45-50%, evidence of increased right cavity pressures and left pleural effusions. Duplex ultrasound of the lower extremities and CTA showed right posterior tibial DVT with no thromboembolic PE. Therapeutic anticoagulation was not administered due to past history of bleeds. Patient also developed acute kidney injury post CT angiography mostly likely secondary to heart failure that worsened over time and became renal failure with worsening mental status. Patient was placed DNR/DNI w/ trial of non-invasive ventilation, with no pressors, no dialysis and no feeding tube per the [facility] proxy Mani Oliva (Son). Patient continued to have worsening respiratory function to and was placed on BiPape overnight with PRN in the am. On 07/31 am, a rapid response was called for unresponsiveness and the patient was pronounced dead at 8:03 am from cardiopulmonary arrest in the setting of acute on chronic heart failure with renal failure.

## 2024-07-31 NOTE — PROGRESS NOTE ADULT - ATTENDING SUPERVISION STATEMENT
Fellow
Resident
Resident
Fellow
Resident
Fellow
Resident

## 2024-07-31 NOTE — PROGRESS NOTE ADULT - ATTENDING COMMENTS
-Patient passed away this morning. Discussed with family at bedside. Chaplaincy and  were requested. -D/w house staff. -25 minutes.

## 2024-07-31 NOTE — PROGRESS NOTE ADULT - REASON FOR ADMISSION
asymptomatic thrombocytopenia

## 2024-07-31 NOTE — CHART NOTE - NSCHARTNOTESELECT_GEN_ALL_CORE
Death Note/Event Note
Event Note
Event Note
Family Communication
Family Discussion
Nutrition Services
Event Note
heme/onc

## 2024-07-31 NOTE — CHART NOTE - NSCHARTNOTEFT_GEN_A_CORE
DEATH NOTE    Called to bedside to evaluate the patient for unresponsiveness.     On physical exam, patient did not respond to verbal or noxious stimuli.  No spontaneous respirations.  Absent heart and breath sounds.  Absent radial and carotid pulses.   Pupils are fixed and dilated, no corneal reflex.  EKG rhythm strip shows asystole.   Patient pronounced dead at 8:03 AM.  Attending notified.  Family notified at bedside. Family declining autopsy.    Kellie Womack PGY3

## 2024-07-31 NOTE — PROGRESS NOTE ADULT - SUBJECTIVE AND OBJECTIVE BOX
***************************************************************  Darrel Contreras  (PGY1) Internal Medicine  On TEAMS  ***************************************************************    PROGRESS NOTE:     Patient is a 89y old  Female who presents with a chief complaint of asymptomatic thrombocytopenia (30 Jul 2024 14:00)      INTERVAL EVENTS:   SUBJECTIVE / OVERNIGHT EVENTS: No acute overnight events. Patient was seen and examined by the bedside this AM.   OXYGEN:   TELEMETRY:       MEDICATIONS  (STANDING):  atorvastatin 20 milliGRAM(s) Oral at bedtime  calcium acetate 667 milliGRAM(s) Oral three times a day with meals  hemorrhoidal Ointment 1 Application(s) Rectal two times a day  heparin   Injectable 5000 Unit(s) SubCutaneous every 12 hours  melatonin 3 milliGRAM(s) Oral at bedtime  metoprolol tartrate 25 milliGRAM(s) Oral every 6 hours  midodrine. 20 milliGRAM(s) Oral three times a day  pantoprazole    Tablet 40 milliGRAM(s) Oral before breakfast  piperacillin/tazobactam IVPB.. 3.375 Gram(s) IV Intermittent every 12 hours  senna 2 Tablet(s) Oral at bedtime    MEDICATIONS  (PRN):  polyethylene glycol 3350 17 Gram(s) Oral two times a day PRN Constipation      CAPILLARY BLOOD GLUCOSE        I&O's Summary    30 Jul 2024 07:01  -  31 Jul 2024 07:00  --------------------------------------------------------  IN: 140 mL / OUT: 125 mL / NET: 15 mL        PHYSICAL EXAM:  Vital Signs Last 24 Hrs  T(C): 36.4 (31 Jul 2024 04:06), Max: 36.5 (30 Jul 2024 21:02)  T(F): 97.5 (31 Jul 2024 04:06), Max: 97.7 (30 Jul 2024 21:02)  HR: 132 (31 Jul 2024 04:58) (111 - 134)  BP: 114/72 (31 Jul 2024 04:06) (81/58 - 114/72)  BP(mean): --  RR: 32 (31 Jul 2024 04:58) (18 - 32)  SpO2: 98% (31 Jul 2024 04:58) (96% - 100%)    Parameters below as of 31 Jul 2024 04:58  Patient On (Oxygen Delivery Method): BiPAP/CPAP        General : NAD  CV: RRR no R/M/G  Lungs: CTAB  Abd : Soft, non-tender, non-distended  Extremities : No LE Edema  Neuro : A&Ox3  Skin: Normal color and turgor    LABS:                        9.6    12.24 )-----------( 162      ( 30 Jul 2024 06:55 )             32.0     07-30    146<H>  |  96  |  98<H>  ----------------------------<  92  5.4<H>   |  28  |  4.79<H>    Ca    8.6      30 Jul 2024 17:47  Phos  6.3     07-30  Mg     2.4     07-30            Urinalysis Basic - ( 30 Jul 2024 17:47 )    Color: x / Appearance: x / SG: x / pH: x  Gluc: 92 mg/dL / Ketone: x  / Bili: x / Urobili: x   Blood: x / Protein: x / Nitrite: x   Leuk Esterase: x / RBC: x / WBC x   Sq Epi: x / Non Sq Epi: x / Bacteria: x          COORDINATION OF CARE:  Care Discussed with Consultants/Other Providers [Y/N]:  Prior or Outpatient Records Reviewed [Y/N]:

## 2024-07-31 NOTE — PROGRESS NOTE ADULT - NUTRITIONAL ASSESSMENT
This patient has been assessed with a concern for Malnutrition and has been determined to have a diagnosis/diagnoses of Severe protein-calorie malnutrition.    This patient is being managed with:   Diet Pureed-  Entered: Jul 27 2024 11:09AM  
This patient has been assessed with a concern for Malnutrition and has been determined to have a diagnosis/diagnoses of Severe protein-calorie malnutrition.    This patient is being managed with:   Diet Pureed-  Entered: Jul 27 2024 11:09AM

## 2024-08-01 LAB
CHROM ANALY OVERALL INTERP SPEC-IMP: SIGNIFICANT CHANGE UP
VWF CBA/VWF AG PPP IA-RTO: SIGNIFICANT CHANGE UP

## 2024-08-05 LAB — CHROM ANALY INTERPHASE BLD FISH-IMP: SIGNIFICANT CHANGE UP

## 2024-10-25 NOTE — PROGRESS NOTE ADULT - PROBLEM SELECTOR PLAN 8
Incoming call from [facility] with [facility],    They are requesting a 3 day hold of Eliquis prior to EUS Upper on 11/8/24    If not safe for 3 day hold, then they request patient's most recent Cr level be faxed to [phone]    Bong ePlaez RN     [facility]     TTE showed BRIANNA, severe TR with mPAP of 53.  Probably secondary to afib  - CTM, follow outpatient with cardiologist (home visits) Resolved (>200K 07/22). Asymptomatic at 4k on admission s/p dexa 40mg x 4 days (end 07/20) and IVIG x 2days  Blood smear: hypochromic microcytic anemia with anisocytosis,  neutrophils with normal morphology, relative increased in monocytes, some reactive lymphocytes seen, very scant but giant platelets  Flow cytometry: Polytypic B cells, decreased CD4: CD8 ratio with no aberrancy, NK cells show no diagnostic abnormalities, myeloid findings are normal  - Heme: not concerning for malignancy    Ddx: likely ITP considering response to treatment vs possible malignancy per heme    Plan: Resolved (7/26 - 337)  - f/u outpatient with heme

## 2024-11-21 NOTE — DISCHARGE NOTE PROVIDER - CARE PROVIDERS DIRECT ADDRESSES
None ,DirectAddress_Unknown ,[email or web address] ,[email or web address] ,[email or web address]

## 2025-05-25 NOTE — PROGRESS NOTE ADULT - PROBLEM SELECTOR PLAN 6
Pradaxa was stopped during last admission for bleeding risk.    Continue holding Pradaxa  Continue metop tartrate 75mg TID (1) Oriented to own ability

## (undated) DEVICE — FOLEY HOLDER STATLOCK 2 WAY ADULT

## (undated) DEVICE — BIOPSY FORCEP RADIAL JAW 4 STANDARD WITH NEEDLE

## (undated) DEVICE — BRUSH COLONOSCOPY CYTOLOGY

## (undated) DEVICE — TUBING SUCTION 20FT

## (undated) DEVICE — SENSOR O2 FINGER ADULT

## (undated) DEVICE — FORCEP RADIAL JAW 4 JUMBO 2.8MM 3.2MM 240CM ORANGE DISP

## (undated) DEVICE — CATH IV SAFE BC 22G X 1" (BLUE)

## (undated) DEVICE — IRRIGATOR BIO SHIELD

## (undated) DEVICE — CATH IV SAFE BC 20G X 1.16" (PINK)

## (undated) DEVICE — ELCTR GROUNDING PAD ADULT COVIDIEN

## (undated) DEVICE — BITE BLOCK ADULT 20 X 27MM (GREEN)

## (undated) DEVICE — TUBING IV SET GRAVITY 3Y 100" MACRO

## (undated) DEVICE — TUBING SUCTION CONN 6FT STERILE

## (undated) DEVICE — PACK IV START WITH CHG

## (undated) DEVICE — CLAMP BX HOT RAD JAW 3

## (undated) DEVICE — SOL INJ NS 0.9% 500ML 2 PORT

## (undated) DEVICE — BALLOON US ENDO

## (undated) DEVICE — SUCTION YANKAUER NO CONTROL VENT

## (undated) DEVICE — SYR ALLIANCE II INFLATION 60ML

## (undated) DEVICE — SYR LUER LOK 50CC

## (undated) DEVICE — POLY TRAP ETRAP